# Patient Record
Sex: MALE | Race: WHITE | NOT HISPANIC OR LATINO | Employment: OTHER | URBAN - NONMETROPOLITAN AREA
[De-identification: names, ages, dates, MRNs, and addresses within clinical notes are randomized per-mention and may not be internally consistent; named-entity substitution may affect disease eponyms.]

---

## 2017-03-13 ENCOUNTER — APPOINTMENT (EMERGENCY)
Dept: RADIOLOGY | Facility: HOSPITAL | Age: 62
End: 2017-03-13
Payer: COMMERCIAL

## 2017-03-13 ENCOUNTER — HOSPITAL ENCOUNTER (OUTPATIENT)
Facility: HOSPITAL | Age: 62
Setting detail: OBSERVATION
LOS: 1 days | Discharge: HOME | End: 2017-03-15
Attending: FAMILY MEDICINE | Admitting: FAMILY MEDICINE
Payer: COMMERCIAL

## 2017-03-13 ENCOUNTER — APPOINTMENT (OUTPATIENT)
Dept: NON INVASIVE DIAGNOSTICS | Facility: HOSPITAL | Age: 62
End: 2017-03-13
Payer: COMMERCIAL

## 2017-03-13 DIAGNOSIS — R07.9 CHEST PAIN: Primary | ICD-10-CM

## 2017-03-13 LAB
ALBUMIN SERPL BCP-MCNC: 3.9 G/DL (ref 3.5–5)
ALP SERPL-CCNC: 51 U/L (ref 46–116)
ALT SERPL W P-5'-P-CCNC: 42 U/L (ref 12–78)
ANION GAP SERPL CALCULATED.3IONS-SCNC: 7 MMOL/L (ref 4–13)
AST SERPL W P-5'-P-CCNC: 25 U/L (ref 5–45)
ATRIAL RATE: 81 BPM
BASOPHILS # BLD AUTO: 0.02 THOUSANDS/ΜL (ref 0–0.1)
BASOPHILS NFR BLD AUTO: 0 % (ref 0–1)
BILIRUB SERPL-MCNC: 0.4 MG/DL (ref 0.2–1)
BUN SERPL-MCNC: 11 MG/DL (ref 5–25)
CALCIUM SERPL-MCNC: 9 MG/DL (ref 8.3–10.1)
CHLORIDE SERPL-SCNC: 102 MMOL/L (ref 100–108)
CO2 SERPL-SCNC: 30 MMOL/L (ref 21–32)
CREAT SERPL-MCNC: 0.89 MG/DL (ref 0.6–1.3)
EOSINOPHIL # BLD AUTO: 0.17 THOUSAND/ΜL (ref 0–0.61)
EOSINOPHIL NFR BLD AUTO: 3 % (ref 0–6)
ERYTHROCYTE [DISTWIDTH] IN BLOOD BY AUTOMATED COUNT: 14 % (ref 11.6–15.1)
GFR SERPL CREATININE-BSD FRML MDRD: >60 ML/MIN/1.73SQ M
GLUCOSE SERPL-MCNC: 172 MG/DL (ref 65–140)
HCT VFR BLD AUTO: 38.2 % (ref 36.5–49.3)
HGB BLD-MCNC: 12.8 G/DL (ref 12–17)
LYMPHOCYTES # BLD AUTO: 2.13 THOUSANDS/ΜL (ref 0.6–4.47)
LYMPHOCYTES NFR BLD AUTO: 38 % (ref 14–44)
MCH RBC QN AUTO: 27.8 PG (ref 26.8–34.3)
MCHC RBC AUTO-ENTMCNC: 33.5 G/DL (ref 31.4–37.4)
MCV RBC AUTO: 83 FL (ref 82–98)
MONOCYTES # BLD AUTO: 0.46 THOUSAND/ΜL (ref 0.17–1.22)
MONOCYTES NFR BLD AUTO: 8 % (ref 4–12)
NEUTROPHILS # BLD AUTO: 2.88 THOUSANDS/ΜL (ref 1.85–7.62)
NEUTS SEG NFR BLD AUTO: 51 % (ref 43–75)
P AXIS: 57 DEGREES
PLATELET # BLD AUTO: 201 THOUSANDS/UL (ref 149–390)
PLATELET # BLD AUTO: 204 THOUSANDS/UL (ref 149–390)
PMV BLD AUTO: 8.9 FL (ref 8.9–12.7)
PMV BLD AUTO: 9.4 FL (ref 8.9–12.7)
POTASSIUM SERPL-SCNC: 3.9 MMOL/L (ref 3.5–5.3)
PR INTERVAL: 158 MS
PROT SERPL-MCNC: 7.2 G/DL (ref 6.4–8.2)
QRS AXIS: 26 DEGREES
QRSD INTERVAL: 102 MS
QT INTERVAL: 388 MS
QTC INTERVAL: 450 MS
RBC # BLD AUTO: 4.6 MILLION/UL (ref 3.88–5.62)
SODIUM SERPL-SCNC: 139 MMOL/L (ref 136–145)
T WAVE AXIS: 5 DEGREES
TROPONIN I SERPL-MCNC: <0.02 NG/ML
TROPONIN I SERPL-MCNC: <0.02 NG/ML
VENTRICULAR RATE: 81 BPM
WBC # BLD AUTO: 5.66 THOUSAND/UL (ref 4.31–10.16)

## 2017-03-13 PROCEDURE — 36415 COLL VENOUS BLD VENIPUNCTURE: CPT | Performed by: PHYSICIAN ASSISTANT

## 2017-03-13 PROCEDURE — 84484 ASSAY OF TROPONIN QUANT: CPT | Performed by: PHYSICIAN ASSISTANT

## 2017-03-13 PROCEDURE — 80053 COMPREHEN METABOLIC PANEL: CPT | Performed by: PHYSICIAN ASSISTANT

## 2017-03-13 PROCEDURE — 96374 THER/PROPH/DIAG INJ IV PUSH: CPT

## 2017-03-13 PROCEDURE — 84484 ASSAY OF TROPONIN QUANT: CPT | Performed by: FAMILY MEDICINE

## 2017-03-13 PROCEDURE — 93306 TTE W/DOPPLER COMPLETE: CPT

## 2017-03-13 PROCEDURE — 71020 HB CHEST X-RAY 2VW FRONTAL&LATL: CPT

## 2017-03-13 PROCEDURE — 93005 ELECTROCARDIOGRAM TRACING: CPT

## 2017-03-13 PROCEDURE — 85049 AUTOMATED PLATELET COUNT: CPT | Performed by: FAMILY MEDICINE

## 2017-03-13 PROCEDURE — 99285 EMERGENCY DEPT VISIT HI MDM: CPT

## 2017-03-13 PROCEDURE — 85025 COMPLETE CBC W/AUTO DIFF WBC: CPT | Performed by: PHYSICIAN ASSISTANT

## 2017-03-13 RX ORDER — CETIRIZINE HYDROCHLORIDE 10 MG/1
10 TABLET ORAL DAILY
COMMUNITY

## 2017-03-13 RX ORDER — LOVASTATIN 40 MG/1
40 TABLET ORAL
COMMUNITY

## 2017-03-13 RX ORDER — OMEPRAZOLE 40 MG/1
40 CAPSULE, DELAYED RELEASE ORAL 2 TIMES DAILY
COMMUNITY
End: 2017-03-15 | Stop reason: HOSPADM

## 2017-03-13 RX ORDER — AMLODIPINE BESYLATE 10 MG/1
10 TABLET ORAL
COMMUNITY

## 2017-03-13 RX ORDER — CHOLECALCIFEROL (VITAMIN D3) 125 MCG
100 CAPSULE ORAL DAILY
Status: DISCONTINUED | OUTPATIENT
Start: 2017-03-14 | End: 2017-03-15 | Stop reason: HOSPADM

## 2017-03-13 RX ORDER — VALSARTAN 160 MG/1
160 TABLET ORAL DAILY
COMMUNITY
End: 2020-02-21 | Stop reason: ALTCHOICE

## 2017-03-13 RX ORDER — GLIMEPIRIDE 1 MG/1
2 TABLET ORAL 2 TIMES DAILY
COMMUNITY

## 2017-03-13 RX ORDER — PANTOPRAZOLE SODIUM 40 MG/1
40 TABLET, DELAYED RELEASE ORAL
Status: DISCONTINUED | OUTPATIENT
Start: 2017-03-13 | End: 2017-03-15 | Stop reason: HOSPADM

## 2017-03-13 RX ORDER — HYDROCHLOROTHIAZIDE 25 MG/1
25 TABLET ORAL DAILY
Status: DISCONTINUED | OUTPATIENT
Start: 2017-03-14 | End: 2017-03-15 | Stop reason: HOSPADM

## 2017-03-13 RX ORDER — AMLODIPINE BESYLATE 10 MG/1
10 TABLET ORAL
Status: DISCONTINUED | OUTPATIENT
Start: 2017-03-13 | End: 2017-03-15 | Stop reason: HOSPADM

## 2017-03-13 RX ORDER — VALSARTAN 80 MG/1
160 TABLET ORAL DAILY
Status: DISCONTINUED | OUTPATIENT
Start: 2017-03-14 | End: 2017-03-15 | Stop reason: HOSPADM

## 2017-03-13 RX ORDER — ONDANSETRON 2 MG/ML
4 INJECTION INTRAMUSCULAR; INTRAVENOUS ONCE
Status: COMPLETED | OUTPATIENT
Start: 2017-03-13 | End: 2017-03-13

## 2017-03-13 RX ORDER — LORATADINE 10 MG/1
10 TABLET ORAL DAILY
Status: DISCONTINUED | OUTPATIENT
Start: 2017-03-14 | End: 2017-03-15 | Stop reason: HOSPADM

## 2017-03-13 RX ORDER — GLIMEPIRIDE 2 MG/1
2 TABLET ORAL 2 TIMES DAILY
Status: DISCONTINUED | OUTPATIENT
Start: 2017-03-13 | End: 2017-03-15 | Stop reason: HOSPADM

## 2017-03-13 RX ORDER — ASPIRIN 81 MG/1
81 TABLET, CHEWABLE ORAL DAILY
Status: DISCONTINUED | OUTPATIENT
Start: 2017-03-14 | End: 2017-03-15 | Stop reason: HOSPADM

## 2017-03-13 RX ORDER — PRAVASTATIN SODIUM 20 MG
10 TABLET ORAL
Status: DISCONTINUED | OUTPATIENT
Start: 2017-03-13 | End: 2017-03-15 | Stop reason: HOSPADM

## 2017-03-13 RX ORDER — HYDROCHLOROTHIAZIDE 12.5 MG/1
12.5 CAPSULE, GELATIN COATED ORAL DAILY
COMMUNITY

## 2017-03-13 RX ADMIN — PRAVASTATIN SODIUM 10 MG: 20 TABLET ORAL at 17:04

## 2017-03-13 RX ADMIN — SODIUM CHLORIDE 1000 ML: 0.9 INJECTION, SOLUTION INTRAVENOUS at 12:08

## 2017-03-13 RX ADMIN — ONDANSETRON 4 MG: 2 INJECTION INTRAMUSCULAR; INTRAVENOUS at 12:09

## 2017-03-13 RX ADMIN — PANTOPRAZOLE SODIUM 40 MG: 40 TABLET, DELAYED RELEASE ORAL at 17:03

## 2017-03-13 RX ADMIN — ENOXAPARIN SODIUM 40 MG: 40 INJECTION SUBCUTANEOUS at 17:04

## 2017-03-13 RX ADMIN — AMLODIPINE BESYLATE 10 MG: 10 TABLET ORAL at 22:09

## 2017-03-13 RX ADMIN — METFORMIN HYDROCHLORIDE 1000 MG: 500 TABLET, FILM COATED ORAL at 17:04

## 2017-03-13 RX ADMIN — GLIMEPIRIDE 2 MG: 2 TABLET ORAL at 17:03

## 2017-03-14 PROBLEM — R07.9 CHEST PAIN: Status: ACTIVE | Noted: 2017-03-14

## 2017-03-14 LAB
CHOLEST SERPL-MCNC: 138 MG/DL (ref 50–200)
HDLC SERPL-MCNC: 34 MG/DL (ref 40–60)
LDLC SERPL CALC-MCNC: 56 MG/DL (ref 0–100)
TRIGL SERPL-MCNC: 240 MG/DL
TSH SERPL DL<=0.05 MIU/L-ACNC: 2.27 UIU/ML (ref 0.36–3.74)

## 2017-03-14 PROCEDURE — 80061 LIPID PANEL: CPT | Performed by: FAMILY MEDICINE

## 2017-03-14 PROCEDURE — 84443 ASSAY THYROID STIM HORMONE: CPT | Performed by: FAMILY MEDICINE

## 2017-03-14 RX ORDER — PANTOPRAZOLE SODIUM 40 MG/1
40 TABLET, DELAYED RELEASE ORAL
Qty: 60 TABLET | Refills: 0 | Status: SHIPPED | OUTPATIENT
Start: 2017-03-14 | End: 2017-03-17 | Stop reason: HOSPADM

## 2017-03-14 RX ADMIN — METFORMIN HYDROCHLORIDE 1000 MG: 500 TABLET, FILM COATED ORAL at 17:01

## 2017-03-14 RX ADMIN — ENOXAPARIN SODIUM 40 MG: 40 INJECTION SUBCUTANEOUS at 17:02

## 2017-03-14 RX ADMIN — VALSARTAN 160 MG: 80 TABLET, FILM COATED ORAL at 10:06

## 2017-03-14 RX ADMIN — HYDROCHLOROTHIAZIDE 25 MG: 25 TABLET ORAL at 10:06

## 2017-03-14 RX ADMIN — LORATADINE 10 MG: 10 TABLET ORAL at 10:06

## 2017-03-14 RX ADMIN — AMLODIPINE BESYLATE 10 MG: 10 TABLET ORAL at 22:30

## 2017-03-14 RX ADMIN — GLIMEPIRIDE 2 MG: 2 TABLET ORAL at 10:05

## 2017-03-14 RX ADMIN — PRAVASTATIN SODIUM 10 MG: 20 TABLET ORAL at 17:02

## 2017-03-14 RX ADMIN — PANTOPRAZOLE SODIUM 40 MG: 40 TABLET, DELAYED RELEASE ORAL at 07:17

## 2017-03-14 RX ADMIN — METFORMIN HYDROCHLORIDE 1000 MG: 500 TABLET, FILM COATED ORAL at 07:17

## 2017-03-14 RX ADMIN — GLIMEPIRIDE 2 MG: 2 TABLET ORAL at 17:04

## 2017-03-14 RX ADMIN — PANTOPRAZOLE SODIUM 40 MG: 40 TABLET, DELAYED RELEASE ORAL at 17:01

## 2017-03-14 RX ADMIN — Medication 100 MG: at 10:11

## 2017-03-14 RX ADMIN — ASPIRIN 81 MG 81 MG: 81 TABLET ORAL at 10:05

## 2017-03-14 RX ADMIN — SITAGLIPTIN 100 MG: 100 TABLET, FILM COATED ORAL at 10:06

## 2017-03-15 ENCOUNTER — APPOINTMENT (EMERGENCY)
Dept: CT IMAGING | Facility: HOSPITAL | Age: 62
DRG: 176 | End: 2017-03-15
Payer: COMMERCIAL

## 2017-03-15 ENCOUNTER — HOSPITAL ENCOUNTER (INPATIENT)
Facility: HOSPITAL | Age: 62
LOS: 2 days | Discharge: HOME | DRG: 176 | End: 2017-03-17
Attending: EMERGENCY MEDICINE | Admitting: FAMILY MEDICINE
Payer: COMMERCIAL

## 2017-03-15 VITALS
HEART RATE: 69 BPM | SYSTOLIC BLOOD PRESSURE: 119 MMHG | BODY MASS INDEX: 29.14 KG/M2 | WEIGHT: 208.11 LBS | DIASTOLIC BLOOD PRESSURE: 73 MMHG | OXYGEN SATURATION: 95 % | TEMPERATURE: 98.2 F | RESPIRATION RATE: 18 BRPM | HEIGHT: 71 IN

## 2017-03-15 DIAGNOSIS — I26.99 PULMONARY EMBOLI (HCC): Primary | ICD-10-CM

## 2017-03-15 LAB
ALBUMIN SERPL BCP-MCNC: 4.2 G/DL (ref 3.5–5)
ALP SERPL-CCNC: 51 U/L (ref 46–116)
ALT SERPL W P-5'-P-CCNC: 45 U/L (ref 12–78)
ANION GAP SERPL CALCULATED.3IONS-SCNC: 9 MMOL/L (ref 4–13)
APTT PPP: 27 SECONDS (ref 24–36)
AST SERPL W P-5'-P-CCNC: 28 U/L (ref 5–45)
BASOPHILS # BLD AUTO: 0.02 THOUSANDS/ΜL (ref 0–0.1)
BASOPHILS NFR BLD AUTO: 0 % (ref 0–1)
BILIRUB SERPL-MCNC: 0.5 MG/DL (ref 0.2–1)
BUN SERPL-MCNC: 13 MG/DL (ref 5–25)
CALCIUM SERPL-MCNC: 9.2 MG/DL (ref 8.3–10.1)
CHLORIDE SERPL-SCNC: 101 MMOL/L (ref 100–108)
CO2 SERPL-SCNC: 31 MMOL/L (ref 21–32)
CREAT SERPL-MCNC: 0.97 MG/DL (ref 0.6–1.3)
EOSINOPHIL # BLD AUTO: 0.17 THOUSAND/ΜL (ref 0–0.61)
EOSINOPHIL NFR BLD AUTO: 3 % (ref 0–6)
ERYTHROCYTE [DISTWIDTH] IN BLOOD BY AUTOMATED COUNT: 14.2 % (ref 11.6–15.1)
ERYTHROCYTE [DISTWIDTH] IN BLOOD BY AUTOMATED COUNT: 14.3 % (ref 11.6–15.1)
GFR SERPL CREATININE-BSD FRML MDRD: >60 ML/MIN/1.73SQ M
GLUCOSE SERPL-MCNC: 108 MG/DL (ref 65–140)
GLUCOSE SERPL-MCNC: 187 MG/DL (ref 65–140)
HCT VFR BLD AUTO: 38.7 % (ref 36.5–49.3)
HCT VFR BLD AUTO: 42.9 % (ref 36.5–49.3)
HGB BLD-MCNC: 12.9 G/DL (ref 12–17)
HGB BLD-MCNC: 14.3 G/DL (ref 12–17)
INR PPP: 1.1 (ref 0.86–1.16)
LACTATE SERPL-SCNC: 1.8 MMOL/L (ref 0.5–2)
LIPASE SERPL-CCNC: 172 U/L (ref 73–393)
LYMPHOCYTES # BLD AUTO: 1.55 THOUSANDS/ΜL (ref 0.6–4.47)
LYMPHOCYTES NFR BLD AUTO: 25 % (ref 14–44)
MCH RBC QN AUTO: 27.6 PG (ref 26.8–34.3)
MCH RBC QN AUTO: 27.7 PG (ref 26.8–34.3)
MCHC RBC AUTO-ENTMCNC: 33.3 G/DL (ref 31.4–37.4)
MCHC RBC AUTO-ENTMCNC: 33.3 G/DL (ref 31.4–37.4)
MCV RBC AUTO: 83 FL (ref 82–98)
MCV RBC AUTO: 83 FL (ref 82–98)
MONOCYTES # BLD AUTO: 0.42 THOUSAND/ΜL (ref 0.17–1.22)
MONOCYTES NFR BLD AUTO: 7 % (ref 4–12)
NEUTROPHILS # BLD AUTO: 4.16 THOUSANDS/ΜL (ref 1.85–7.62)
NEUTS SEG NFR BLD AUTO: 65 % (ref 43–75)
PLATELET # BLD AUTO: 212 THOUSANDS/UL (ref 149–390)
PLATELET # BLD AUTO: 220 THOUSANDS/UL (ref 149–390)
PMV BLD AUTO: 8.6 FL (ref 8.9–12.7)
PMV BLD AUTO: 9.3 FL (ref 8.9–12.7)
POTASSIUM SERPL-SCNC: 3.6 MMOL/L (ref 3.5–5.3)
PROT SERPL-MCNC: 7.7 G/DL (ref 6.4–8.2)
PROTHROMBIN TIME: 13.9 SECONDS (ref 12–14.3)
RBC # BLD AUTO: 4.67 MILLION/UL (ref 3.88–5.62)
RBC # BLD AUTO: 5.16 MILLION/UL (ref 3.88–5.62)
SODIUM SERPL-SCNC: 141 MMOL/L (ref 136–145)
TROPONIN I SERPL-MCNC: <0.02 NG/ML
WBC # BLD AUTO: 6.32 THOUSAND/UL (ref 4.31–10.16)
WBC # BLD AUTO: 6.56 THOUSAND/UL (ref 4.31–10.16)

## 2017-03-15 PROCEDURE — 71275 CT ANGIOGRAPHY CHEST: CPT

## 2017-03-15 PROCEDURE — 83690 ASSAY OF LIPASE: CPT | Performed by: EMERGENCY MEDICINE

## 2017-03-15 PROCEDURE — 84484 ASSAY OF TROPONIN QUANT: CPT | Performed by: EMERGENCY MEDICINE

## 2017-03-15 PROCEDURE — 85610 PROTHROMBIN TIME: CPT | Performed by: FAMILY MEDICINE

## 2017-03-15 PROCEDURE — 85025 COMPLETE CBC W/AUTO DIFF WBC: CPT | Performed by: EMERGENCY MEDICINE

## 2017-03-15 PROCEDURE — 96361 HYDRATE IV INFUSION ADD-ON: CPT

## 2017-03-15 PROCEDURE — 83605 ASSAY OF LACTIC ACID: CPT | Performed by: EMERGENCY MEDICINE

## 2017-03-15 PROCEDURE — 96374 THER/PROPH/DIAG INJ IV PUSH: CPT

## 2017-03-15 PROCEDURE — 82948 REAGENT STRIP/BLOOD GLUCOSE: CPT

## 2017-03-15 PROCEDURE — 36415 COLL VENOUS BLD VENIPUNCTURE: CPT | Performed by: EMERGENCY MEDICINE

## 2017-03-15 PROCEDURE — 85027 COMPLETE CBC AUTOMATED: CPT | Performed by: FAMILY MEDICINE

## 2017-03-15 PROCEDURE — 74177 CT ABD & PELVIS W/CONTRAST: CPT

## 2017-03-15 PROCEDURE — 85730 THROMBOPLASTIN TIME PARTIAL: CPT | Performed by: FAMILY MEDICINE

## 2017-03-15 PROCEDURE — 80053 COMPREHEN METABOLIC PANEL: CPT | Performed by: EMERGENCY MEDICINE

## 2017-03-15 PROCEDURE — 99285 EMERGENCY DEPT VISIT HI MDM: CPT

## 2017-03-15 PROCEDURE — 93005 ELECTROCARDIOGRAM TRACING: CPT | Performed by: EMERGENCY MEDICINE

## 2017-03-15 PROCEDURE — 96376 TX/PRO/DX INJ SAME DRUG ADON: CPT

## 2017-03-15 RX ORDER — PANTOPRAZOLE SODIUM 40 MG/1
40 TABLET, DELAYED RELEASE ORAL
Status: DISCONTINUED | OUTPATIENT
Start: 2017-03-15 | End: 2017-03-17 | Stop reason: HOSPADM

## 2017-03-15 RX ORDER — GLIMEPIRIDE 2 MG/1
2 TABLET ORAL 2 TIMES DAILY
Status: DISCONTINUED | OUTPATIENT
Start: 2017-03-15 | End: 2017-03-17 | Stop reason: HOSPADM

## 2017-03-15 RX ORDER — HEPARIN SODIUM 1000 [USP'U]/ML
3600 INJECTION, SOLUTION INTRAVENOUS; SUBCUTANEOUS AS NEEDED
Status: DISCONTINUED | OUTPATIENT
Start: 2017-03-15 | End: 2017-03-15

## 2017-03-15 RX ORDER — ONDANSETRON 2 MG/ML
4 INJECTION INTRAMUSCULAR; INTRAVENOUS ONCE
Status: COMPLETED | OUTPATIENT
Start: 2017-03-15 | End: 2017-03-15

## 2017-03-15 RX ORDER — HEPARIN SODIUM 1000 [USP'U]/ML
7200 INJECTION, SOLUTION INTRAVENOUS; SUBCUTANEOUS ONCE
Status: DISCONTINUED | OUTPATIENT
Start: 2017-03-15 | End: 2017-03-15

## 2017-03-15 RX ORDER — HEPARIN SODIUM 10000 [USP'U]/100ML
3-30 INJECTION, SOLUTION INTRAVENOUS
Status: DISCONTINUED | OUTPATIENT
Start: 2017-03-15 | End: 2017-03-15

## 2017-03-15 RX ORDER — HEPARIN SODIUM 1000 [USP'U]/ML
7200 INJECTION, SOLUTION INTRAVENOUS; SUBCUTANEOUS AS NEEDED
Status: DISCONTINUED | OUTPATIENT
Start: 2017-03-15 | End: 2017-03-15

## 2017-03-15 RX ORDER — VALSARTAN 80 MG/1
160 TABLET ORAL DAILY
Status: DISCONTINUED | OUTPATIENT
Start: 2017-03-16 | End: 2017-03-17 | Stop reason: HOSPADM

## 2017-03-15 RX ORDER — SODIUM CHLORIDE 9 MG/ML
75 INJECTION, SOLUTION INTRAVENOUS ONCE
Status: COMPLETED | OUTPATIENT
Start: 2017-03-15 | End: 2017-03-15

## 2017-03-15 RX ORDER — PRAVASTATIN SODIUM 20 MG
20 TABLET ORAL
Status: DISCONTINUED | OUTPATIENT
Start: 2017-03-15 | End: 2017-03-17 | Stop reason: HOSPADM

## 2017-03-15 RX ORDER — ASPIRIN 81 MG/1
81 TABLET, CHEWABLE ORAL DAILY
Status: DISCONTINUED | OUTPATIENT
Start: 2017-03-16 | End: 2017-03-17 | Stop reason: HOSPADM

## 2017-03-15 RX ORDER — AMLODIPINE BESYLATE 10 MG/1
10 TABLET ORAL
Status: DISCONTINUED | OUTPATIENT
Start: 2017-03-15 | End: 2017-03-17 | Stop reason: HOSPADM

## 2017-03-15 RX ADMIN — PRAVASTATIN SODIUM 20 MG: 20 TABLET ORAL at 18:07

## 2017-03-15 RX ADMIN — PANTOPRAZOLE SODIUM 40 MG: 40 TABLET, DELAYED RELEASE ORAL at 18:07

## 2017-03-15 RX ADMIN — ONDANSETRON 4 MG: 2 INJECTION INTRAMUSCULAR; INTRAVENOUS at 11:19

## 2017-03-15 RX ADMIN — SODIUM CHLORIDE 1000 ML: 0.9 INJECTION, SOLUTION INTRAVENOUS at 11:19

## 2017-03-15 RX ADMIN — ONDANSETRON 4 MG: 2 INJECTION INTRAMUSCULAR; INTRAVENOUS at 12:38

## 2017-03-15 RX ADMIN — METFORMIN HYDROCHLORIDE 1000 MG: 500 TABLET, FILM COATED ORAL at 07:26

## 2017-03-15 RX ADMIN — PANTOPRAZOLE SODIUM 40 MG: 40 TABLET, DELAYED RELEASE ORAL at 07:26

## 2017-03-15 RX ADMIN — ENOXAPARIN SODIUM 90 MG: 100 INJECTION SUBCUTANEOUS at 14:47

## 2017-03-15 RX ADMIN — IOHEXOL 50 ML: 240 INJECTION, SOLUTION INTRATHECAL; INTRAVASCULAR; INTRAVENOUS; ORAL at 12:50

## 2017-03-15 RX ADMIN — AMLODIPINE BESYLATE 10 MG: 10 TABLET ORAL at 21:45

## 2017-03-15 RX ADMIN — GLIMEPIRIDE 2 MG: 2 TABLET ORAL at 18:07

## 2017-03-15 RX ADMIN — IOHEXOL 100 ML: 350 INJECTION, SOLUTION INTRAVENOUS at 12:50

## 2017-03-15 RX ADMIN — SODIUM CHLORIDE 75 ML/HR: 0.9 INJECTION, SOLUTION INTRAVENOUS at 18:12

## 2017-03-16 LAB
ANION GAP SERPL CALCULATED.3IONS-SCNC: 6 MMOL/L (ref 4–13)
BUN SERPL-MCNC: 10 MG/DL (ref 5–25)
CALCIUM SERPL-MCNC: 8.6 MG/DL (ref 8.3–10.1)
CHLORIDE SERPL-SCNC: 105 MMOL/L (ref 100–108)
CO2 SERPL-SCNC: 30 MMOL/L (ref 21–32)
CREAT SERPL-MCNC: 0.83 MG/DL (ref 0.6–1.3)
GFR SERPL CREATININE-BSD FRML MDRD: >60 ML/MIN/1.73SQ M
GLUCOSE SERPL-MCNC: 138 MG/DL (ref 65–140)
GLUCOSE SERPL-MCNC: 148 MG/DL (ref 65–140)
GLUCOSE SERPL-MCNC: 161 MG/DL (ref 65–140)
GLUCOSE SERPL-MCNC: 255 MG/DL (ref 65–140)
POTASSIUM SERPL-SCNC: 3.9 MMOL/L (ref 3.5–5.3)
SODIUM SERPL-SCNC: 141 MMOL/L (ref 136–145)

## 2017-03-16 PROCEDURE — 80048 BASIC METABOLIC PNL TOTAL CA: CPT | Performed by: FAMILY MEDICINE

## 2017-03-16 PROCEDURE — 82948 REAGENT STRIP/BLOOD GLUCOSE: CPT

## 2017-03-16 RX ADMIN — INSULIN LISPRO 1 UNITS: 100 INJECTION, SOLUTION INTRAVENOUS; SUBCUTANEOUS at 08:12

## 2017-03-16 RX ADMIN — GLIMEPIRIDE 2 MG: 2 TABLET ORAL at 08:15

## 2017-03-16 RX ADMIN — PRAVASTATIN SODIUM 20 MG: 20 TABLET ORAL at 16:51

## 2017-03-16 RX ADMIN — AMLODIPINE BESYLATE 10 MG: 10 TABLET ORAL at 21:50

## 2017-03-16 RX ADMIN — ENOXAPARIN SODIUM 90 MG: 100 INJECTION SUBCUTANEOUS at 14:21

## 2017-03-16 RX ADMIN — GLIMEPIRIDE 2 MG: 2 TABLET ORAL at 18:12

## 2017-03-16 RX ADMIN — PANTOPRAZOLE SODIUM 40 MG: 40 TABLET, DELAYED RELEASE ORAL at 16:51

## 2017-03-16 RX ADMIN — INSULIN LISPRO 3 UNITS: 100 INJECTION, SOLUTION INTRAVENOUS; SUBCUTANEOUS at 12:11

## 2017-03-16 RX ADMIN — ASPIRIN 81 MG 81 MG: 81 TABLET ORAL at 08:15

## 2017-03-16 RX ADMIN — ENOXAPARIN SODIUM 90 MG: 100 INJECTION SUBCUTANEOUS at 02:44

## 2017-03-16 RX ADMIN — PANTOPRAZOLE SODIUM 40 MG: 40 TABLET, DELAYED RELEASE ORAL at 06:11

## 2017-03-16 RX ADMIN — SITAGLIPTIN 100 MG: 100 TABLET, FILM COATED ORAL at 08:15

## 2017-03-16 RX ADMIN — VALSARTAN 160 MG: 80 TABLET, FILM COATED ORAL at 08:16

## 2017-03-17 VITALS
BODY MASS INDEX: 29.88 KG/M2 | OXYGEN SATURATION: 95 % | HEART RATE: 70 BPM | TEMPERATURE: 97.8 F | RESPIRATION RATE: 16 BRPM | SYSTOLIC BLOOD PRESSURE: 134 MMHG | DIASTOLIC BLOOD PRESSURE: 80 MMHG | WEIGHT: 213.41 LBS | HEIGHT: 71 IN

## 2017-03-17 LAB
ATRIAL RATE: 99 BPM
GLUCOSE SERPL-MCNC: 157 MG/DL (ref 65–140)
GLUCOSE SERPL-MCNC: 174 MG/DL (ref 65–140)
P AXIS: 32 DEGREES
PR INTERVAL: 152 MS
QRS AXIS: 25 DEGREES
QRSD INTERVAL: 94 MS
QT INTERVAL: 356 MS
QTC INTERVAL: 456 MS
T WAVE AXIS: -7 DEGREES
VENTRICULAR RATE: 99 BPM

## 2017-03-17 PROCEDURE — 82948 REAGENT STRIP/BLOOD GLUCOSE: CPT

## 2017-03-17 RX ORDER — OMEPRAZOLE 20 MG/1
20 TABLET, DELAYED RELEASE ORAL DAILY
Qty: 30 TABLET | Refills: 0
Start: 2017-03-17 | End: 2019-02-14 | Stop reason: DRUGHIGH

## 2017-03-17 RX ADMIN — GLIMEPIRIDE 2 MG: 2 TABLET ORAL at 08:30

## 2017-03-17 RX ADMIN — PANTOPRAZOLE SODIUM 40 MG: 40 TABLET, DELAYED RELEASE ORAL at 06:01

## 2017-03-17 RX ADMIN — SITAGLIPTIN 100 MG: 100 TABLET, FILM COATED ORAL at 08:30

## 2017-03-17 RX ADMIN — ASPIRIN 81 MG 81 MG: 81 TABLET ORAL at 08:30

## 2017-03-17 RX ADMIN — ENOXAPARIN SODIUM 90 MG: 100 INJECTION SUBCUTANEOUS at 02:34

## 2017-03-17 RX ADMIN — INSULIN LISPRO 1 UNITS: 100 INJECTION, SOLUTION INTRAVENOUS; SUBCUTANEOUS at 08:28

## 2017-03-17 RX ADMIN — INSULIN LISPRO 1 UNITS: 100 INJECTION, SOLUTION INTRAVENOUS; SUBCUTANEOUS at 12:53

## 2017-03-17 RX ADMIN — VALSARTAN 160 MG: 80 TABLET, FILM COATED ORAL at 08:30

## 2017-03-31 ENCOUNTER — ALLSCRIPTS OFFICE VISIT (OUTPATIENT)
Dept: OTHER | Facility: OTHER | Age: 62
End: 2017-03-31

## 2017-04-03 ENCOUNTER — TRANSCRIBE ORDERS (OUTPATIENT)
Dept: SLEEP CENTER | Facility: HOSPITAL | Age: 62
End: 2017-04-03

## 2017-04-03 DIAGNOSIS — G47.33 OBSTRUCTIVE SLEEP APNEA (ADULT) (PEDIATRIC): Primary | ICD-10-CM

## 2017-05-05 ENCOUNTER — HOSPITAL ENCOUNTER (OUTPATIENT)
Dept: SLEEP CENTER | Facility: HOSPITAL | Age: 62
Discharge: HOME | End: 2017-05-05
Payer: COMMERCIAL

## 2017-05-05 DIAGNOSIS — G47.33 OBSTRUCTIVE SLEEP APNEA (ADULT) (PEDIATRIC): ICD-10-CM

## 2017-05-05 PROCEDURE — 95810 POLYSOM 6/> YRS 4/> PARAM: CPT

## 2017-05-11 ENCOUNTER — TRANSCRIBE ORDERS (OUTPATIENT)
Dept: SLEEP CENTER | Facility: HOSPITAL | Age: 62
End: 2017-05-11

## 2017-05-11 DIAGNOSIS — G47.33 OBSTRUCTIVE SLEEP APNEA (ADULT) (PEDIATRIC): Primary | ICD-10-CM

## 2017-06-15 ENCOUNTER — HOSPITAL ENCOUNTER (OUTPATIENT)
Dept: SLEEP CENTER | Facility: HOSPITAL | Age: 62
Discharge: HOME | End: 2017-06-15
Payer: COMMERCIAL

## 2017-06-15 DIAGNOSIS — G47.33 OBSTRUCTIVE SLEEP APNEA (ADULT) (PEDIATRIC): ICD-10-CM

## 2017-06-15 PROCEDURE — 95811 POLYSOM 6/>YRS CPAP 4/> PARM: CPT

## 2017-07-06 ENCOUNTER — OFFICE VISIT (OUTPATIENT)
Dept: URGENT CARE | Facility: CLINIC | Age: 62
End: 2017-07-06
Payer: COMMERCIAL

## 2017-07-06 PROCEDURE — 96372 THER/PROPH/DIAG INJ SC/IM: CPT

## 2017-07-06 PROCEDURE — 99213 OFFICE O/P EST LOW 20 MIN: CPT

## 2017-07-10 ENCOUNTER — TRANSCRIBE ORDERS (OUTPATIENT)
Dept: SLEEP CENTER | Facility: HOSPITAL | Age: 62
End: 2017-07-10

## 2017-07-10 DIAGNOSIS — G47.33 OBSTRUCTIVE SLEEP APNEA (ADULT) (PEDIATRIC): Primary | ICD-10-CM

## 2017-07-20 ENCOUNTER — HOSPITAL ENCOUNTER (OUTPATIENT)
Dept: SLEEP CENTER | Facility: HOSPITAL | Age: 62
Discharge: HOME | End: 2017-07-20
Payer: COMMERCIAL

## 2017-07-20 ENCOUNTER — TRANSCRIBE ORDERS (OUTPATIENT)
Dept: SLEEP CENTER | Facility: HOSPITAL | Age: 62
End: 2017-07-20

## 2017-07-20 DIAGNOSIS — G47.33 OBSTRUCTIVE SLEEP APNEA (ADULT) (PEDIATRIC): Primary | ICD-10-CM

## 2017-07-20 DIAGNOSIS — G47.33 OBSTRUCTIVE SLEEP APNEA (ADULT) (PEDIATRIC): ICD-10-CM

## 2017-08-24 ENCOUNTER — APPOINTMENT (OUTPATIENT)
Dept: LAB | Facility: HOSPITAL | Age: 62
End: 2017-08-24
Payer: COMMERCIAL

## 2017-08-24 ENCOUNTER — TRANSCRIBE ORDERS (OUTPATIENT)
Dept: ADMINISTRATIVE | Facility: HOSPITAL | Age: 62
End: 2017-08-24

## 2017-08-24 DIAGNOSIS — R11.0 NAUSEA ALONE: Primary | ICD-10-CM

## 2017-08-24 DIAGNOSIS — R11.0 NAUSEA ALONE: ICD-10-CM

## 2017-08-24 DIAGNOSIS — K30 MILD DIETARY INDIGESTION: ICD-10-CM

## 2017-08-24 LAB
AMYLASE SERPL-CCNC: 59 IU/L (ref 25–115)
LIPASE SERPL-CCNC: 168 U/L (ref 73–393)

## 2017-08-24 PROCEDURE — 82150 ASSAY OF AMYLASE: CPT

## 2017-08-24 PROCEDURE — 83690 ASSAY OF LIPASE: CPT

## 2017-08-24 PROCEDURE — 86803 HEPATITIS C AB TEST: CPT

## 2017-08-24 PROCEDURE — 36415 COLL VENOUS BLD VENIPUNCTURE: CPT

## 2017-08-25 LAB — HCV AB SER QL: NORMAL

## 2017-09-07 ENCOUNTER — APPOINTMENT (OUTPATIENT)
Dept: LAB | Facility: HOSPITAL | Age: 62
End: 2017-09-07
Payer: COMMERCIAL

## 2017-09-07 ENCOUNTER — TRANSCRIBE ORDERS (OUTPATIENT)
Dept: ADMINISTRATIVE | Facility: HOSPITAL | Age: 62
End: 2017-09-07

## 2017-09-07 DIAGNOSIS — I10 UNSPECIFIED ESSENTIAL HYPERTENSION: Primary | ICD-10-CM

## 2017-09-07 DIAGNOSIS — IMO0002 UNCONTROLLED TYPE 2 DIABETES MELLITUS WITH COMPLICATION, WITHOUT LONG-TERM CURRENT USE OF INSULIN: ICD-10-CM

## 2017-09-07 DIAGNOSIS — I10 UNSPECIFIED ESSENTIAL HYPERTENSION: ICD-10-CM

## 2017-09-07 DIAGNOSIS — E78.2 MIXED HYPERLIPIDEMIA: ICD-10-CM

## 2017-09-07 LAB
ALBUMIN SERPL BCP-MCNC: 4 G/DL (ref 3.5–5)
ALP SERPL-CCNC: 45 U/L (ref 46–116)
ALT SERPL W P-5'-P-CCNC: 47 U/L (ref 12–78)
ANION GAP SERPL CALCULATED.3IONS-SCNC: 11 MMOL/L (ref 4–13)
AST SERPL W P-5'-P-CCNC: 31 U/L (ref 5–45)
BILIRUB SERPL-MCNC: 0.4 MG/DL (ref 0.2–1)
BUN SERPL-MCNC: 14 MG/DL (ref 5–25)
CALCIUM SERPL-MCNC: 9 MG/DL (ref 8.3–10.1)
CHLORIDE SERPL-SCNC: 101 MMOL/L (ref 100–108)
CHOLEST SERPL-MCNC: 162 MG/DL (ref 50–200)
CO2 SERPL-SCNC: 27 MMOL/L (ref 21–32)
CREAT SERPL-MCNC: 0.79 MG/DL (ref 0.6–1.3)
CREAT UR-MCNC: 180 MG/DL
EST. AVERAGE GLUCOSE BLD GHB EST-MCNC: 183 MG/DL
GFR SERPL CREATININE-BSD FRML MDRD: 98 ML/MIN/1.73SQ M
GLUCOSE P FAST SERPL-MCNC: 145 MG/DL (ref 65–99)
HBA1C MFR BLD: 8 % (ref 4.2–6.3)
HDLC SERPL-MCNC: 38 MG/DL (ref 40–60)
LDLC SERPL CALC-MCNC: 95 MG/DL (ref 0–100)
MICROALBUMIN UR-MCNC: 22.3 MG/L (ref 0–20)
MICROALBUMIN/CREAT 24H UR: 12 MG/G CREATININE (ref 0–30)
POTASSIUM SERPL-SCNC: 4.3 MMOL/L (ref 3.5–5.3)
PROT SERPL-MCNC: 7.3 G/DL (ref 6.4–8.2)
SODIUM SERPL-SCNC: 139 MMOL/L (ref 136–145)
TRIGL SERPL-MCNC: 147 MG/DL

## 2017-09-07 PROCEDURE — 82570 ASSAY OF URINE CREATININE: CPT | Performed by: INTERNAL MEDICINE

## 2017-09-07 PROCEDURE — 80061 LIPID PANEL: CPT

## 2017-09-07 PROCEDURE — 82043 UR ALBUMIN QUANTITATIVE: CPT | Performed by: INTERNAL MEDICINE

## 2017-09-07 PROCEDURE — 36415 COLL VENOUS BLD VENIPUNCTURE: CPT

## 2017-09-07 PROCEDURE — 80053 COMPREHEN METABOLIC PANEL: CPT

## 2017-09-07 PROCEDURE — 83036 HEMOGLOBIN GLYCOSYLATED A1C: CPT

## 2017-09-21 ENCOUNTER — HOSPITAL ENCOUNTER (OUTPATIENT)
Dept: SLEEP CENTER | Facility: HOSPITAL | Age: 62
Discharge: HOME | End: 2017-09-21
Payer: COMMERCIAL

## 2017-09-21 DIAGNOSIS — G47.33 OBSTRUCTIVE SLEEP APNEA (ADULT) (PEDIATRIC): ICD-10-CM

## 2017-12-18 ENCOUNTER — OFFICE VISIT (OUTPATIENT)
Dept: URGENT CARE | Facility: CLINIC | Age: 62
End: 2017-12-18
Payer: COMMERCIAL

## 2017-12-18 PROCEDURE — 99213 OFFICE O/P EST LOW 20 MIN: CPT

## 2017-12-19 NOTE — PROGRESS NOTES
Assessment  1  Acute bronchitis (466 0) (J20 9)    Plan  Acute bronchitis, unspecified organism    · Azithromycin 250 MG Oral Tablet; TAKE 2 TABLETS ON DAY 1 THEN TAKE 1TABLET A DAY FOR 4 DAYS   · Benzonatate 100 MG Oral Capsule; 1-2 caps every 8 hrs as needed for cough    Discussion/Summary  Medication Side Effects Reviewed: Possible side effects of new medications were reviewed with the patient/guardian today  Understands and agrees with treatment plan: The treatment plan was reviewed with the patient/guardian  The patient/guardian understands and agrees with the treatment plan   Counseling Documentation With Imm: The patient was counseled regarding instructions for management,-- patient and family education  Chief Complaint  1  Cold Symptoms  Chief Complaint Free Text Note Form: C/O sinus pain, post nasal drip, and productive cough , chills x 4 days      History of Present Illness  HPI: Started with cold sx 4 days ago  Now having yellow nasal drainage and cough with yellow mucous  Feels feverish intermittently  No CP, SOB, BROWN  Hospital Based Practices Required Assessment:  Pain Assessment  the patient states they do not have pain  (on a scale of 0 to 10, the patient rates the pain at 0 )  Abuse And Domestic Violence Screen   Yes, the patient is safe at home  -- The patient states no one is hurting them  Depression And Suicide Screen  No, the patient has not had thoughts of hurting themself  No, the patient has not felt depressed in the past 7 days  Cold Symptoms:  Associated symptoms include runny nose,-- post nasal drainage-- and-- productive cough, but-- no nasal congestion,-- no scratchy throat,-- no sore throat,-- no hoarseness,-- no facial pressure,-- no facial pain,-- no headache,-- no ear pain,-- no swollen lymph nodes,-- no wheezing,-- no shortness of breath,-- no fatigue,-- no weakness,-- no nausea,-- no vomiting-- and-- no diarrhea        Review of Systems  Focused-Male:  Constitutional: chills  ENT: no hearing loss-- and-- no hoarseness  Cardiovascular: no chest pain-- and-- no palpitations  Respiratory: no shortness of breath-- and-- no wheezing  Gastrointestinal: no abdominal pain,-- no nausea,-- no vomiting-- and-- no diarrhea  Musculoskeletal: no myalgias  Integumentary: no rashes  Neurological: no headache-- and-- no dizziness  ROS Reviewed:   ROS reviewed  Active Problems  1  Diabetes (250 00) (E11 9)   2  GERD (gastroesophageal reflux disease) (530 81) (K21 9)   3  Hyperlipidemia (272 4) (E78 5)   4  Hypertension (401 9) (I10)   5  Pulmonary embolism (415 19) (I26 99)   6  Seasonal allergies (477 9) (J30 2)    Past Medical History  1  History of Acute upper respiratory infection (465 9) (J06 9)   2  History of Contact dermatitis due to poison ivy (692 6) (L23 7)   3  History of Cough (786 2) (R05)   4  History of urinary tract infection (V13 02) (Z87 440)   5  No pertinent past medical history  Active Problems And Past Medical History Reviewed: The active problems and past medical history were reviewed and updated today  Family History  Father    1  Family history of Melanoma    Social History   · Never a smoker   · Social alcohol use (Z78 9)  Social History Reviewed: The social history was reviewed and updated today  Surgical History  1  History of Cholecystectomy    Current Meds   1  AmLODIPine Besylate 10 MG Oral Tablet; Therapy: 29JAM8588 to (Last DS:91BOM9368)  Requested for: 94STX0621 Ordered   2  Aspirin 81 MG TABS; Therapy: (Recorded:21Jan2015) to Recorded   3  CoQ10 200 MG Oral Capsule; Therapy: (Recorded:21Jan2015) to Recorded   4  Daily Multivitamin TABS; Therapy: (Recorded:21Jan2015) to Recorded   5  Diovan 160 MG Oral Tablet; Therapy: 02UJU0550 to (Last ZH:08FKV7135)  Requested for: 05IMB4715 Ordered   6  Eliquis 5 MG Oral Tablet; one tab BID; Therapy: (Recorded:31Mar2017) to Recorded   7  Fluticasone Propionate 50 MCG/ACT Nasal Suspension;  Therapy: 51GIT6225 to (Last Rx:28Nov2011)  Requested for: 28Nov2011 Ordered   8  FreeStyle Lancets Miscellaneous; Therapy: 03FNQ0142 to (Last Gladystine Sinks)  Requested for: 45WBK9285 Ordered   9  FreeStyle Lite Test In [employer]; Therapy: 17MOG8671 to (Last Gladystine Sinks)  Requested for: 91JOU5571 Ordered   10  Glimepiride 2 MG Oral Tablet; Therapy: (Ailyn Mccarthy) to Recorded   11  HydroCHLOROthiazide 12 5 MG Oral Capsule; Therapy: (Recorded:21Jan2015) to Recorded   12  Jublia 10 % External Solution; Therapy: (Recorded:31Mar2017) to Recorded   13  Lovastatin 40 MG Oral Tablet; Therapy: 38OXU4832 to (Last Rx:29Jow9285)  Requested for: 10Yke6386 Ordered   14  MetFORMIN HCl - 1000 MG Oral Tablet; Therapy: 56KSO8502 to (Last Rx:57Awa2151)  Requested for: 21PCO6740 Ordered   15  MethylPREDNISolone 4 MG Oral Tablet Therapy Pack; take as directed; Therapy: 23PRZ7476 to (Last Rx:16Jqz4819)  Requested for: 11MQY3327 Ordered   16  Omeprazole 40 MG Oral Capsule Delayed Release; Therapy: 82ACR1507 to (Last Rx:21Oct2011)  Requested for: 21Oct2011 Ordered   17  Trulicity SOPN;  Therapy: (Recorded:31Mar2017) to Recorded   18  ZyrTEC Allergy 10 MG Oral Capsule; Therapy: (Recorded:21Jan2015) to Recorded  Medication List Reviewed: The medication list was reviewed and updated today  Allergies  1  Crestor TABS    Vitals  Signs   Recorded: 32SMM3303 10:01AM   Temperature: 97 6 F  Heart Rate: 82  Respiration: 18  Systolic: 884  Diastolic: 80  Height: 5 ft 9 8 in  Weight: 215 lb 3 04 oz  BMI Calculated: 31 05  BSA Calculated: 2 14  O2 Saturation: 96  Pain Scale: 0    Physical Exam   Constitutional  General appearance: No acute distress, well appearing and well nourished  Eyes  Conjunctiva and lids: No swelling, erythema, or discharge  Ears, Nose, Mouth, and Throat  External inspection of ears and nose: Normal    Otoscopic examination: Tympanic membrance translucent with normal light reflex  Canals patent without erythema  Nasal mucosa, septum, and turbinates: Abnormal   There was a mucoid discharge from both nares  The bilateral nasal mucosa was boggy  Oropharynx: Normal with no erythema, edema, exudate or lesions  Pulmonary  Respiratory effort: No increased work of breathing or signs of respiratory distress  Auscultation of lungs: Clear to auscultation  Cardiovascular  Auscultation of heart: Normal rate and rhythm, normal S1 and S2, without murmurs  Lymphatic  Palpation of lymph nodes in neck: No lymphadenopathy  Musculoskeletal  Gait and station: Normal    Skin  Skin and subcutaneous tissue: Normal without rashes or lesions     Psychiatric  Mood and affect: Normal        Future Appointments    Date/Time Provider Specialty Site   02/07/2018 09:00 AM Lizzie Gomez MD Urology [address]     Signatures   Electronically signed by : MAURICE Arreola; Dec 18 2017 10:14AM EST                       (Author)    Electronically signed by : MADELINE Galvan ; Dec 18 2017  3:48PM EST                       (Co-author)

## 2018-01-14 VITALS
SYSTOLIC BLOOD PRESSURE: 126 MMHG | OXYGEN SATURATION: 95 % | DIASTOLIC BLOOD PRESSURE: 76 MMHG | TEMPERATURE: 97.8 F | WEIGHT: 215.13 LBS | HEART RATE: 87 BPM | HEIGHT: 70 IN | RESPIRATION RATE: 20 BRPM | BODY MASS INDEX: 30.8 KG/M2

## 2018-01-20 ENCOUNTER — APPOINTMENT (EMERGENCY)
Dept: CT IMAGING | Facility: HOSPITAL | Age: 63
DRG: 392 | End: 2018-01-20
Payer: COMMERCIAL

## 2018-01-20 ENCOUNTER — HOSPITAL ENCOUNTER (INPATIENT)
Facility: HOSPITAL | Age: 63
LOS: 1 days | Discharge: HOME | DRG: 392 | End: 2018-01-21
Attending: FAMILY MEDICINE | Admitting: FAMILY MEDICINE
Payer: COMMERCIAL

## 2018-01-20 DIAGNOSIS — K52.9 ACUTE GASTROENTERITIS: Primary | ICD-10-CM

## 2018-01-20 PROBLEM — I10 ESSENTIAL HYPERTENSION: Status: ACTIVE | Noted: 2018-01-20

## 2018-01-20 PROBLEM — Z86.711 HISTORY OF PULMONARY EMBOLISM: Status: ACTIVE | Noted: 2018-01-20

## 2018-01-20 PROBLEM — R11.10 VOMITING: Status: ACTIVE | Noted: 2018-01-20

## 2018-01-20 PROBLEM — R79.89 ELEVATED LACTIC ACID LEVEL: Status: ACTIVE | Noted: 2018-01-20

## 2018-01-20 PROBLEM — E78.5 HYPERLIPIDEMIA: Status: ACTIVE | Noted: 2018-01-20

## 2018-01-20 PROBLEM — D72.829 LEUKOCYTOSIS: Status: ACTIVE | Noted: 2018-01-20

## 2018-01-20 PROBLEM — R19.7 DIARRHEA: Status: ACTIVE | Noted: 2018-01-20

## 2018-01-20 PROBLEM — K21.9 GERD (GASTROESOPHAGEAL REFLUX DISEASE): Status: ACTIVE | Noted: 2018-01-20

## 2018-01-20 PROBLEM — E11.9 TYPE 2 DIABETES MELLITUS WITHOUT COMPLICATION (HCC): Status: ACTIVE | Noted: 2018-01-20

## 2018-01-20 LAB
ALBUMIN SERPL BCP-MCNC: 4.4 G/DL (ref 3.5–5)
ALP SERPL-CCNC: 54 U/L (ref 46–116)
ALT SERPL W P-5'-P-CCNC: 57 U/L (ref 12–78)
ANION GAP SERPL CALCULATED.3IONS-SCNC: 11 MMOL/L (ref 4–13)
APTT PPP: 28 SECONDS (ref 23–35)
AST SERPL W P-5'-P-CCNC: 33 U/L (ref 5–45)
BACTERIA UR QL AUTO: NORMAL /HPF
BASOPHILS # BLD AUTO: 0.01 THOUSANDS/ΜL (ref 0–0.1)
BASOPHILS NFR BLD AUTO: 0 % (ref 0–1)
BILIRUB SERPL-MCNC: 0.4 MG/DL (ref 0.2–1)
BILIRUB UR QL STRIP: NEGATIVE
BUN SERPL-MCNC: 23 MG/DL (ref 5–25)
CALCIUM SERPL-MCNC: 8.9 MG/DL (ref 8.3–10.1)
CHLORIDE SERPL-SCNC: 101 MMOL/L (ref 100–108)
CLARITY UR: CLEAR
CO2 SERPL-SCNC: 25 MMOL/L (ref 21–32)
COLOR UR: YELLOW
CREAT SERPL-MCNC: 1.18 MG/DL (ref 0.6–1.3)
EOSINOPHIL # BLD AUTO: 0.33 THOUSAND/ΜL (ref 0–0.61)
EOSINOPHIL NFR BLD AUTO: 3 % (ref 0–6)
ERYTHROCYTE [DISTWIDTH] IN BLOOD BY AUTOMATED COUNT: 13.5 % (ref 11.6–15.1)
GFR SERPL CREATININE-BSD FRML MDRD: 67 ML/MIN/1.73SQ M
GLUCOSE SERPL-MCNC: 168 MG/DL (ref 65–140)
GLUCOSE SERPL-MCNC: 184 MG/DL (ref 65–140)
GLUCOSE UR STRIP-MCNC: ABNORMAL MG/DL
HCT VFR BLD AUTO: 42.4 % (ref 36.5–49.3)
HEMOCCULT STL QL: NEGATIVE
HGB BLD-MCNC: 14.3 G/DL (ref 12–17)
HGB UR QL STRIP.AUTO: NEGATIVE
INR PPP: 0.98 (ref 0.86–1.16)
KETONES UR STRIP-MCNC: ABNORMAL MG/DL
LACTATE SERPL-SCNC: 2 MMOL/L (ref 0.5–2)
LACTATE SERPL-SCNC: 2.9 MMOL/L (ref 0.5–2)
LEUKOCYTE ESTERASE UR QL STRIP: ABNORMAL
LIPASE SERPL-CCNC: 275 U/L (ref 73–393)
LYMPHOCYTES # BLD AUTO: 1.14 THOUSANDS/ΜL (ref 0.6–4.47)
LYMPHOCYTES NFR BLD AUTO: 11 % (ref 14–44)
MCH RBC QN AUTO: 28.5 PG (ref 26.8–34.3)
MCHC RBC AUTO-ENTMCNC: 33.7 G/DL (ref 31.4–37.4)
MCV RBC AUTO: 85 FL (ref 82–98)
MONOCYTES # BLD AUTO: 0.86 THOUSAND/ΜL (ref 0.17–1.22)
MONOCYTES NFR BLD AUTO: 8 % (ref 4–12)
NEUTROPHILS # BLD AUTO: 8.4 THOUSANDS/ΜL (ref 1.85–7.62)
NEUTS SEG NFR BLD AUTO: 78 % (ref 43–75)
NITRITE UR QL STRIP: NEGATIVE
NON-SQ EPI CELLS URNS QL MICRO: NORMAL /HPF
PH UR STRIP.AUTO: 5 [PH] (ref 4.5–8)
PLATELET # BLD AUTO: 205 THOUSANDS/UL (ref 149–390)
PMV BLD AUTO: 8.9 FL (ref 8.9–12.7)
POTASSIUM SERPL-SCNC: 3.7 MMOL/L (ref 3.5–5.3)
PROT SERPL-MCNC: 8.1 G/DL (ref 6.4–8.2)
PROT UR STRIP-MCNC: NEGATIVE MG/DL
PROTHROMBIN TIME: 12.9 SECONDS (ref 12.1–14.4)
RBC # BLD AUTO: 5.01 MILLION/UL (ref 3.88–5.62)
RBC #/AREA URNS AUTO: NORMAL /HPF
SODIUM SERPL-SCNC: 137 MMOL/L (ref 136–145)
SP GR UR STRIP.AUTO: 1.02 (ref 1–1.03)
TROPONIN I SERPL-MCNC: <0.02 NG/ML
UROBILINOGEN UR QL STRIP.AUTO: 0.2 E.U./DL
WBC # BLD AUTO: 10.74 THOUSAND/UL (ref 4.31–10.16)
WBC #/AREA URNS AUTO: NORMAL /HPF

## 2018-01-20 PROCEDURE — 74177 CT ABD & PELVIS W/CONTRAST: CPT

## 2018-01-20 PROCEDURE — 85025 COMPLETE CBC W/AUTO DIFF WBC: CPT | Performed by: PHYSICIAN ASSISTANT

## 2018-01-20 PROCEDURE — 71275 CT ANGIOGRAPHY CHEST: CPT

## 2018-01-20 PROCEDURE — 96375 TX/PRO/DX INJ NEW DRUG ADDON: CPT

## 2018-01-20 PROCEDURE — 96376 TX/PRO/DX INJ SAME DRUG ADON: CPT

## 2018-01-20 PROCEDURE — 82948 REAGENT STRIP/BLOOD GLUCOSE: CPT

## 2018-01-20 PROCEDURE — 96374 THER/PROPH/DIAG INJ IV PUSH: CPT

## 2018-01-20 PROCEDURE — 83605 ASSAY OF LACTIC ACID: CPT | Performed by: PHYSICIAN ASSISTANT

## 2018-01-20 PROCEDURE — 85610 PROTHROMBIN TIME: CPT | Performed by: PHYSICIAN ASSISTANT

## 2018-01-20 PROCEDURE — 96361 HYDRATE IV INFUSION ADD-ON: CPT

## 2018-01-20 PROCEDURE — 82272 OCCULT BLD FECES 1-3 TESTS: CPT | Performed by: PHYSICIAN ASSISTANT

## 2018-01-20 PROCEDURE — 99285 EMERGENCY DEPT VISIT HI MDM: CPT

## 2018-01-20 PROCEDURE — 80053 COMPREHEN METABOLIC PANEL: CPT | Performed by: PHYSICIAN ASSISTANT

## 2018-01-20 PROCEDURE — 81001 URINALYSIS AUTO W/SCOPE: CPT | Performed by: PHYSICIAN ASSISTANT

## 2018-01-20 PROCEDURE — 93005 ELECTROCARDIOGRAM TRACING: CPT | Performed by: PHYSICIAN ASSISTANT

## 2018-01-20 PROCEDURE — 83690 ASSAY OF LIPASE: CPT | Performed by: PHYSICIAN ASSISTANT

## 2018-01-20 PROCEDURE — 36415 COLL VENOUS BLD VENIPUNCTURE: CPT | Performed by: PHYSICIAN ASSISTANT

## 2018-01-20 PROCEDURE — 89055 LEUKOCYTE ASSESSMENT FECAL: CPT | Performed by: PHYSICIAN ASSISTANT

## 2018-01-20 PROCEDURE — 87040 BLOOD CULTURE FOR BACTERIA: CPT | Performed by: PHYSICIAN ASSISTANT

## 2018-01-20 PROCEDURE — 84484 ASSAY OF TROPONIN QUANT: CPT | Performed by: PHYSICIAN ASSISTANT

## 2018-01-20 PROCEDURE — 85730 THROMBOPLASTIN TIME PARTIAL: CPT | Performed by: PHYSICIAN ASSISTANT

## 2018-01-20 RX ORDER — DIPHENHYDRAMINE HYDROCHLORIDE 50 MG/ML
25 INJECTION INTRAMUSCULAR; INTRAVENOUS ONCE
Status: COMPLETED | OUTPATIENT
Start: 2018-01-20 | End: 2018-01-20

## 2018-01-20 RX ORDER — PRAVASTATIN SODIUM 40 MG
40 TABLET ORAL
Status: DISCONTINUED | OUTPATIENT
Start: 2018-01-20 | End: 2018-01-21 | Stop reason: HOSPADM

## 2018-01-20 RX ORDER — ONDANSETRON 2 MG/ML
4 INJECTION INTRAMUSCULAR; INTRAVENOUS ONCE
Status: COMPLETED | OUTPATIENT
Start: 2018-01-20 | End: 2018-01-20

## 2018-01-20 RX ORDER — PANTOPRAZOLE SODIUM 20 MG/1
20 TABLET, DELAYED RELEASE ORAL
Status: DISCONTINUED | OUTPATIENT
Start: 2018-01-21 | End: 2018-01-21 | Stop reason: HOSPADM

## 2018-01-20 RX ORDER — AMLODIPINE BESYLATE 10 MG/1
10 TABLET ORAL
Status: DISCONTINUED | OUTPATIENT
Start: 2018-01-20 | End: 2018-01-21 | Stop reason: HOSPADM

## 2018-01-20 RX ORDER — ASPIRIN 81 MG/1
81 TABLET, CHEWABLE ORAL DAILY
Status: DISCONTINUED | OUTPATIENT
Start: 2018-01-21 | End: 2018-01-21 | Stop reason: HOSPADM

## 2018-01-20 RX ORDER — HYDROCHLOROTHIAZIDE 12.5 MG/1
12.5 TABLET ORAL DAILY
Status: DISCONTINUED | OUTPATIENT
Start: 2018-01-21 | End: 2018-01-21 | Stop reason: HOSPADM

## 2018-01-20 RX ORDER — VALSARTAN 80 MG/1
160 TABLET ORAL DAILY
Status: DISCONTINUED | OUTPATIENT
Start: 2018-01-21 | End: 2018-01-21 | Stop reason: HOSPADM

## 2018-01-20 RX ORDER — SODIUM CHLORIDE 9 MG/ML
125 INJECTION, SOLUTION INTRAVENOUS CONTINUOUS
Status: DISCONTINUED | OUTPATIENT
Start: 2018-01-20 | End: 2018-01-21 | Stop reason: HOSPADM

## 2018-01-20 RX ORDER — ONDANSETRON 2 MG/ML
4 INJECTION INTRAMUSCULAR; INTRAVENOUS EVERY 6 HOURS PRN
Status: DISCONTINUED | OUTPATIENT
Start: 2018-01-20 | End: 2018-01-21 | Stop reason: HOSPADM

## 2018-01-20 RX ORDER — ACETAMINOPHEN 325 MG/1
650 TABLET ORAL EVERY 6 HOURS PRN
Status: DISCONTINUED | OUTPATIENT
Start: 2018-01-20 | End: 2018-01-21 | Stop reason: HOSPADM

## 2018-01-20 RX ORDER — METOCLOPRAMIDE HYDROCHLORIDE 5 MG/ML
10 INJECTION INTRAMUSCULAR; INTRAVENOUS ONCE
Status: COMPLETED | OUTPATIENT
Start: 2018-01-20 | End: 2018-01-20

## 2018-01-20 RX ADMIN — SODIUM CHLORIDE 125 ML/HR: 0.9 INJECTION, SOLUTION INTRAVENOUS at 21:11

## 2018-01-20 RX ADMIN — ONDANSETRON 4 MG: 2 INJECTION INTRAMUSCULAR; INTRAVENOUS at 17:28

## 2018-01-20 RX ADMIN — SODIUM CHLORIDE 1000 ML: 0.9 INJECTION, SOLUTION INTRAVENOUS at 15:42

## 2018-01-20 RX ADMIN — INSULIN LISPRO 1 UNITS: 100 INJECTION, SOLUTION INTRAVENOUS; SUBCUTANEOUS at 21:10

## 2018-01-20 RX ADMIN — PRAVASTATIN SODIUM 40 MG: 40 TABLET ORAL at 21:09

## 2018-01-20 RX ADMIN — ONDANSETRON 4 MG: 2 INJECTION INTRAMUSCULAR; INTRAVENOUS at 15:41

## 2018-01-20 RX ADMIN — IOHEXOL 100 ML: 350 INJECTION, SOLUTION INTRAVENOUS at 17:12

## 2018-01-20 RX ADMIN — SODIUM CHLORIDE 1000 ML: 0.9 INJECTION, SOLUTION INTRAVENOUS at 18:29

## 2018-01-20 RX ADMIN — AMLODIPINE BESYLATE 10 MG: 10 TABLET ORAL at 21:09

## 2018-01-20 RX ADMIN — METOCLOPRAMIDE 10 MG: 5 INJECTION, SOLUTION INTRAMUSCULAR; INTRAVENOUS at 18:29

## 2018-01-20 RX ADMIN — APIXABAN 5 MG: 5 TABLET, FILM COATED ORAL at 21:09

## 2018-01-20 RX ADMIN — DIPHENHYDRAMINE HYDROCHLORIDE 25 MG: 50 INJECTION, SOLUTION INTRAMUSCULAR; INTRAVENOUS at 18:29

## 2018-01-20 NOTE — ED NOTES
Pt ambulated to bathroom at this time and began to vomit while having a bowel movement  Pt reports diarrhea at this time         Real ArtistBASHIR  01/20/18 4347

## 2018-01-20 NOTE — ED NOTES
Pt ambulated to bathroom again for diarrhea  Ghislaine Walden PA-C notified       Ruslan Beaver RN  01/20/18 6845

## 2018-01-21 VITALS
RESPIRATION RATE: 20 BRPM | SYSTOLIC BLOOD PRESSURE: 144 MMHG | DIASTOLIC BLOOD PRESSURE: 78 MMHG | OXYGEN SATURATION: 94 % | HEIGHT: 71 IN | TEMPERATURE: 98.1 F | HEART RATE: 96 BPM | WEIGHT: 222.88 LBS | BODY MASS INDEX: 31.2 KG/M2

## 2018-01-21 LAB
ALBUMIN SERPL BCP-MCNC: 3.3 G/DL (ref 3.5–5)
ALP SERPL-CCNC: 35 U/L (ref 46–116)
ALT SERPL W P-5'-P-CCNC: 41 U/L (ref 12–78)
ANION GAP SERPL CALCULATED.3IONS-SCNC: 14 MMOL/L (ref 4–13)
AST SERPL W P-5'-P-CCNC: 26 U/L (ref 5–45)
BILIRUB SERPL-MCNC: 0.4 MG/DL (ref 0.2–1)
BUN SERPL-MCNC: 23 MG/DL (ref 5–25)
CALCIUM SERPL-MCNC: 7.3 MG/DL (ref 8.3–10.1)
CHLORIDE SERPL-SCNC: 102 MMOL/L (ref 100–108)
CO2 SERPL-SCNC: 19 MMOL/L (ref 21–32)
CREAT SERPL-MCNC: 0.95 MG/DL (ref 0.6–1.3)
ERYTHROCYTE [DISTWIDTH] IN BLOOD BY AUTOMATED COUNT: 13.6 % (ref 11.6–15.1)
EST. AVERAGE GLUCOSE BLD GHB EST-MCNC: 212 MG/DL
GFR SERPL CREATININE-BSD FRML MDRD: 87 ML/MIN/1.73SQ M
GLUCOSE SERPL-MCNC: 161 MG/DL (ref 65–140)
GLUCOSE SERPL-MCNC: 191 MG/DL (ref 65–140)
GLUCOSE SERPL-MCNC: 223 MG/DL (ref 65–140)
HBA1C MFR BLD: 9 % (ref 4.2–6.3)
HCT VFR BLD AUTO: 37.8 % (ref 36.5–49.3)
HGB BLD-MCNC: 12.9 G/DL (ref 12–17)
MAGNESIUM SERPL-MCNC: 1.5 MG/DL (ref 1.6–2.6)
MCH RBC QN AUTO: 29.1 PG (ref 26.8–34.3)
MCHC RBC AUTO-ENTMCNC: 34.1 G/DL (ref 31.4–37.4)
MCV RBC AUTO: 85 FL (ref 82–98)
PHOSPHATE SERPL-MCNC: 3.4 MG/DL (ref 2.3–4.1)
PLATELET # BLD AUTO: 166 THOUSANDS/UL (ref 149–390)
PMV BLD AUTO: 9.5 FL (ref 8.9–12.7)
POTASSIUM SERPL-SCNC: 3.7 MMOL/L (ref 3.5–5.3)
PROT SERPL-MCNC: 6.4 G/DL (ref 6.4–8.2)
RBC # BLD AUTO: 4.43 MILLION/UL (ref 3.88–5.62)
SODIUM SERPL-SCNC: 135 MMOL/L (ref 136–145)
WBC # BLD AUTO: 5.93 THOUSAND/UL (ref 4.31–10.16)

## 2018-01-21 PROCEDURE — 85027 COMPLETE CBC AUTOMATED: CPT | Performed by: PHYSICIAN ASSISTANT

## 2018-01-21 PROCEDURE — G8978 MOBILITY CURRENT STATUS: HCPCS | Performed by: PHYSICAL THERAPIST

## 2018-01-21 PROCEDURE — G8980 MOBILITY D/C STATUS: HCPCS | Performed by: PHYSICAL THERAPIST

## 2018-01-21 PROCEDURE — 83036 HEMOGLOBIN GLYCOSYLATED A1C: CPT | Performed by: PHYSICIAN ASSISTANT

## 2018-01-21 PROCEDURE — 83735 ASSAY OF MAGNESIUM: CPT | Performed by: PHYSICIAN ASSISTANT

## 2018-01-21 PROCEDURE — 82948 REAGENT STRIP/BLOOD GLUCOSE: CPT

## 2018-01-21 PROCEDURE — 80053 COMPREHEN METABOLIC PANEL: CPT | Performed by: PHYSICIAN ASSISTANT

## 2018-01-21 PROCEDURE — 97162 PT EVAL MOD COMPLEX 30 MIN: CPT | Performed by: PHYSICAL THERAPIST

## 2018-01-21 PROCEDURE — 84100 ASSAY OF PHOSPHORUS: CPT | Performed by: PHYSICIAN ASSISTANT

## 2018-01-21 RX ORDER — ONDANSETRON 4 MG/1
4 TABLET, ORALLY DISINTEGRATING ORAL EVERY 6 HOURS PRN
Qty: 20 TABLET | Refills: 0 | Status: SHIPPED | OUTPATIENT
Start: 2018-01-21 | End: 2020-02-21 | Stop reason: ALTCHOICE

## 2018-01-21 RX ORDER — MAGNESIUM SULFATE 1 G/100ML
1 INJECTION INTRAVENOUS ONCE
Status: COMPLETED | OUTPATIENT
Start: 2018-01-21 | End: 2018-01-21

## 2018-01-21 RX ADMIN — INSULIN LISPRO 1 UNITS: 100 INJECTION, SOLUTION INTRAVENOUS; SUBCUTANEOUS at 07:42

## 2018-01-21 RX ADMIN — ASPIRIN 81 MG CHEWABLE TABLET 81 MG: 81 TABLET CHEWABLE at 08:28

## 2018-01-21 RX ADMIN — APIXABAN 5 MG: 5 TABLET, FILM COATED ORAL at 08:28

## 2018-01-21 RX ADMIN — SODIUM CHLORIDE 125 ML/HR: 0.9 INJECTION, SOLUTION INTRAVENOUS at 04:36

## 2018-01-21 RX ADMIN — MAGNESIUM SULFATE HEPTAHYDRATE 1 G: 1 INJECTION, SOLUTION INTRAVENOUS at 06:35

## 2018-01-21 RX ADMIN — HYDROCHLOROTHIAZIDE 12.5 MG: 12.5 TABLET ORAL at 08:28

## 2018-01-21 RX ADMIN — INSULIN LISPRO 2 UNITS: 100 INJECTION, SOLUTION INTRAVENOUS; SUBCUTANEOUS at 11:50

## 2018-01-21 RX ADMIN — VALSARTAN 160 MG: 80 TABLET, FILM COATED ORAL at 08:28

## 2018-01-21 RX ADMIN — PANTOPRAZOLE SODIUM 20 MG: 20 TABLET, DELAYED RELEASE ORAL at 07:42

## 2018-01-21 NOTE — PLAN OF CARE

## 2018-01-21 NOTE — DISCHARGE INSTRUCTIONS
Gastroenteritis   WHAT YOU NEED TO KNOW:   Gastroenteritis, or stomach flu, is an infection of the stomach and intestines  DISCHARGE INSTRUCTIONS:   Call 911 for any of the following:   · You have trouble breathing or a very fast pulse  Seek care immediately if:   · You see blood in your diarrhea  · You cannot stop vomiting  · You have not urinated for 12 hours  · You feel like you are going to faint  Contact your healthcare provider if:   · You have a fever  · You continue to vomit or have diarrhea, even after treatment  · You see worms in your diarrhea  · Your mouth or eyes are dry  You are not urinating as much or as often  · You have questions or concerns about your condition or care  Medicines:   · Medicines  may be given to stop vomiting or diarrhea, decrease abdominal cramps, or treat an infection  · Take your medicine as directed  Contact your healthcare provider if you think your medicine is not helping or if you have side effects  Tell him or her if you are allergic to any medicine  Keep a list of the medicines, vitamins, and herbs you take  Include the amounts, and when and why you take them  Bring the list or the pill bottles to follow-up visits  Carry your medicine list with you in case of an emergency  Manage your symptoms:   · Drink liquids as directed  Ask your healthcare provider how much liquid to drink each day, and which liquids are best for you  You may also need to drink an oral rehydration solution (ORS)  An ORS has the right amounts of sugar, salt, and minerals in water to replace body fluids  · Eat bland foods  When you feel hungry, begin eating soft, bland foods  Examples are bananas, clear soup, potatoes, and applesauce  Do not have dairy products, alcohol, sugary drinks, or drinks with caffeine until you feel better  · Rest as much as possible  Slowly start to do more each day when you begin to feel better    Prevent the spread of gastroenteritis:  Gastroenteritis can spread easily  Keep yourself, your family, and your surroundings clean to help prevent the spread of gastroenteritis:  · Wash your hands often  Use soap and water  Wash your hands after you use the bathroom, change a child's diapers, or sneeze  Wash your hands before you prepare or eat food  · Clean surfaces and do laundry often  Wash your clothes and towels separately from the rest of the laundry  Clean surfaces in your home with antibacterial  or bleach  · Clean food thoroughly and cook safely  Wash raw vegetables before you cook  Cook meat, fish, and eggs fully  Do not use the same dishes for raw meat as you do for other foods  Refrigerate any leftover food immediately  · Be aware when you camp or travel  Drink only clean water  Do not drink from rivers or lakes unless you purify or boil the water first  When you travel, drink bottled water and do not add ice  Do not eat fruit that has not been peeled  Do not eat raw fish or meat that is not fully cooked  Follow up with your healthcare provider as directed:  Write down your questions so you remember to ask them during your visits  © 2017 [employer]  Information is for End User's use only and may not be sold, redistributed or otherwise used for commercial purposes  All illustrations and images included in CareNotes® are the copyrighted property of A [employer] M [employer]  or [address]  The above information is an  only  It is not intended as medical advice for individual conditions or treatments  Talk to your doctor, nurse or pharmacist before following any medical regimen to see if it is safe and effective for you

## 2018-01-21 NOTE — SEPSIS NOTE
Sepsis Note   Kraig Silveira 63 y o  male MRN: [id number]  Unit/Bed#: RM13 Encounter: [id number]            Initial Sepsis Screening     Row Name 01/20/18 1700                Is the patient's history suggestive of a new or worsening infection? (!)  Yes (Proceed)  -TB        Suspected source of infection acute abdominal infection  -TB        Are two or more of the following signs & symptoms of infection both present and new to the patient? No  -TB        Indicate SIRS criteria          If the answer is yes to both questions, suspicion of sepsis is present          If severe sepsis is present AND tissue hypoperfusion perists in the hour after fluid resuscitation or lactate > 4, the patient meets criteria for SEPTIC SHOCK          Are any of the following organ dysfunction criteria present within 6 hours of suspected infection and SIRS criteria that are NOT considered to be chronic conditions?         Organ dysfunction          Date of presentation of severe sepsis          Time of presentation of severe sepsis          Tissue hypoperfusion persists in the hour after crystalloid fluid administration, evidenced, by either:          Was hypotension present within one hour of the conclusion of crystalloid fluid administration?         Date of presentation of septic shock          Time of presentation of septic shock            User Key  (r) = Recorded By, (t) = Taken By, (c) = Cosigned By    Initials Name Provider Type    TB Rg Hinton PA-C Physician Assistant          Pt with suspicion for abdominal infection, no SIRS criteria, no leukocytosis  Sepsis panel labs are sent- lactic acid 2 9  May represent emerging sepsis but no other s/sx at present time  Will monitor closely

## 2018-01-21 NOTE — CASE MANAGEMENT
Thank you,  [facility] in the [facility] by Max Clemons for 2017  Network Utilization Review Department  Phone: [phone]; Fax [phone]  ATTENTION: The Network Utilization Review Department is now centralized for our 7 Facilities  Make a note that we have a new phone and fax numbers for our Department  Please call with any questions or concerns to [phone] and carefully follow the prompts so that you are directed to the right person  All voicemails are confidential  Fax any determinations, approvals, denials, and requests for initial or continue stay review clinical to [phone]  Due to HIGH CALL volume, it would be easier if you could please send faxed requests to expedite your requests and in part, help us provide discharge notifications faster  Initial Clinical Review    Admission: Date/Time/Statement: 1/20/18 @ [address] This Encounter   Procedures    Inpatient Admission (expected length of stay for this patient is greater than two midnights)     Standing Status:   Standing     Number of Occurrences:   1     Order Specific Question:   Admitting Physician     Answer:   Thea Tinajero     Order Specific Question:   Level of Care     Answer:   Med Surg [16]     Order Specific Question:   Estimated length of stay     Answer:   More than 2 Midnights     Order Specific Question:   Certification     Answer:   I certify that inpatient services are medically necessary for this patient for a duration of greater than two midnights  See H&P and MD Progress Notes for additional information about the patient's course of treatment       ED: Date/Time/Mode of Arrival:   ED Arrival Information     Expected Arrival Acuity Means of Arrival Escorted By Service Admission Type    - 1/20/2018 15:04 Urgent Walk-In Spouse General Medicine Urgent    Arrival Complaint    vomiting        Chief Complaint:   Chief Complaint   Patient presents with   [facility] Vomiting     Patient started vomiting approx  2 hours ago along with chills  History of Illness: Naun Mack is a 63 y o  male who presents to the emergency room complaining of vomiting for approximately 2 hours  Vomiting is associated with episodes of chills  Patient's past medical history significant for history of pulmonary embolism type 2 diabetes, hypertension, hyperlipidemia, GERD  Patient states that he was not at family found had eggs and toast in the morning and had hotdogs closer to midday  He reported starting feeling nauseous and had multiple violent episodes of vomiting in large volume  Initially called Urgent care and was told that he should go to the emergency room  He reported normal bowel movements this morning able to pass gas no recent change in his diet  He admits to being on azithromycin treated recently for bronchitis no other new medication or medication changes  Patient denies chest pain,  shortness of breath, cough, headaches, weakness  He also denies any sick contacts or recent travel  He admits to feeling a bit lightheaded after 1st episode of vomiting  Of note patient recalls similar episodes of onset of vomiting prior to being diagnosed with pulmonary embolism  In the emergency room patient had additional episodes of vomiting with new onset of diarrhea  States that diarrhea was foul smelling and non-bloody  He also reported episodes of abdominal pain  He also reported episodes of vomiting in the emergency room was also foul-smelling  Labs drawn in emergency room shows glucose of 168, lactic acid of 2 9, WBC 10 74, CTA chest and CT abdomen and pelvis shows-no definite pulmonary arterial embolism, diffuse small bowel wall thickening with fluid in the proximal colon in keeping with a nonspecific enteritis  Received Zofran 4 mg IV X 2, 2 L normal saline, Benadryl 25 mg Iv, Reglan 10 mg Iv  At bedside patient reports feeling very groggy with intermittent nausea    No additional episode of vomiting or diarrhea since medication were given  His mucous membrane appears very dry  Patient has been admitted on inpatient status for further management and workup of enteritis unknown infectious agent, vomiting and diarrhea, leukocytosis and elevated lactic acid level  ED Vital Signs:   ED Triage Vitals [01/20/18 1512]   Temperature Pulse Respirations Blood Pressure SpO2   (!) 97 °F (36 1 °C) 89 18 127/76 96 %      Temp Source Heart Rate Source Patient Position - Orthostatic VS BP Location FiO2 (%)   Temporal Monitor Lying Left arm --      Pain Score       No Pain        Wt Readings from Last 1 Encounters:   01/21/18 101 kg (222 lb 14 2 oz)     Vital Signs (abnormal):   01/20/18 2329  98 3 °F (36 8 °C)   112  20  132/78  92 %  None (Room air)  Lying   01/20/18 2020  98 °F (36 7 °C)   122  20  130/80  98 %  None (Room air)  Sitting   01/20/18 1956  --   115  20  120/67  96 %  None (Room air)  Lying   01/20/18 1930  --   115  20  120/66  94 %  None (Room air)  Lying   01/20/18 1900  --   111  20  114/56  98 %  None (Room air)  Lying   01/20/18 1845   97 4 °F (36 3 °C)   117  18  124/79  98 %  None (Room air)  Lying   01/20/18 1800  --   106  15  126/71  95 %  None (Room air)  Lying   01/20/18 1512   97 °F (36 1 °C)  89  18  127/76  96 %  --  Lying        Abnormal Labs:   1/20 1/21   Sodium 137    135      CO2 25 19   Anion Gap 11 14   Glucose 168 191   Calcium 8 9 7 3   Alkaline Phosphatase 54 35   Albumin 4 4 3 3   Magnesium  1 5     Trop WNL   Lactic acid 2 9  WBC 10 74  POC Glucose 184, 161   Ref Range & Units 01/20/18 1650   Color, UA  Yellow    Clarity, UA  Clear    Specific Cantrall, UA 1 003 - 1 030 1 025    pH, UA 4 5 - 8 0 5 0    Leukocytes, UA Negative  Elevated glucose may cause decr    Elevated glucose may cause decreased leukocyte values   See urine microscopic for [facility] result/   Nitrite, UA Negative  Negative    Protein, UA Negative mg/dl  Negative    Glucose, UA Negative mg/dl  >=1000 (1%)     Ketones, UA Negative mg/dl Trace       Blood cultures pending     Diagnostic Test Results:   1/20 EKG -   Normal sinus rhythm  Moderate voltage criteria for LVH, may be normal variant  Borderline ECG  When compared with ECG of 15-MAR-2017 11:01,  ST no longer depressed in Anterolateral leads            1/20 CTA chest, abd, pelvis - Diffuse small bowel wall thickening with fluid in the proximal colon in keeping with a nonspecific enteritis      ED Treatment:   Medication Administration from 01/20/2018 1504 to 01/20/2018 2017    Date/Time Order Dose Route Action   01/20/2018 1542 sodium chloride 0 9 % bolus 1,000 mL 1,000 mL Intravenous New Bag   01/20/2018 1541 ondansetron (ZOFRAN) injection 4 mg 4 mg Intravenous Given   01/20/2018 1712 iohexol (OMNIPAQUE) 350 MG/ML injection (SINGLE-DOSE) 100 mL 100 mL Intravenous Given   01/20/2018 1728 ondansetron (ZOFRAN) injection 4 mg 4 mg Intravenous Given   01/20/2018 1829 metoclopramide (REGLAN) injection 10 mg 10 mg Intravenous Given   01/20/2018 1829 diphenhydrAMINE (BENADRYL) injection 25 mg 25 mg Intravenous Given   01/20/2018 1829 sodium chloride 0 9 % bolus 1,000 mL 1,000 mL Intravenous New Bag        Past Medical/Surgical History:    Active Ambulatory Problems     Diagnosis Date Noted    Chest pain 03/14/2017     Resolved Ambulatory Problems     Diagnosis Date Noted    No Resolved Ambulatory Problems     Past Medical History:   Diagnosis Date    Diabetes mellitus ([facility] [address] )     Hyperlipidemia     Hypertension        Admitting Diagnosis: Acute gastroenteritis [K52 9]  Vomiting [R11 10]    Age/Sex: 63 y o  male    Assessment/Plan:   Principal Problem:    Enteritis  Active Problems:    Vomiting    Elevated lactic acid level    Type 2 diabetes mellitus without complication (HCC)    Leukocytosis    Diarrhea    History of pulmonary embolism    Hyperlipidemia    Essential hypertension    GERD (gastroesophageal reflux disease)      Plan for the Primary Problem(s):  · Enteritis  ? Present on admission  ? Possible infectious source unknown infectious agent  ? Associated with nausea, vomiting and non bloody diarrhea  ? CTA chest abdomen pelvis- no definite pulmonary embolism, diffuse small bowel wall thickening with fluid in the proximal colon and keeping written nonspecific enteritis  ? Leukocytosis present, no fever  ? Admit to hospital on inpatient status  ? Telemetry monitoring  ? IV normal saline  ? Blood cultures, fecal leukocytes, occult blood  ? Monitor electrolytes  ? Supportive care  ? Am labs  ? Close monitoring     Plan for Additional Problems:   · Vomiting, diarrhea  ? Present on admission  ? Multiple episodes of vomiting starting around midday  ? Single episode of non-bloody  diarrhea in the emergency room  ? IV normal saline  ? Zofran 4 mg p r n   ? NPO for now, will advance diet as tolerated  ? Monitor electrolytes  · Elevated lactic acid  ? Present on admission  ? Lactic acid level at 2 9  ? IV normal saline  ? Repeat lactic acid levels  ? Close monitoring  · Type 2 diabetes  ? Last A1c at 8 0  ? Glucose at 168  ? Hold oral hypoglycemic  ? Finger stick glucose  ? Sliding scale insulin  ? Repeat A1c  ? Close monitoring  · Leukocytosis  ? WBC at 10 74  ? No evidence of infectious source  ? IV normal saline  ? Repeat CBC in the morning  · History of pulmonary embolism  ? Diagnosis in March 2017 , CTA completed then showed scattered bilateral pulmonary emboli   ? Currently anticoagulated with Eliquis 5 mg bid  ? Close monitoring  · Hyperlipidemia  ? Continue statins  · Essential hypertension  ? Blood pressure is stable  ? Continue amlodipine, hydrochlorothiazide  · GERD  ? Continue omeprazole     VTE Prophylaxis: Apixaban (Eliquis)  / sequential compression device   Code Status: Level 1- Full Code  Anticipated Length of Stay:  Patient will be admitted on an Inpatient basis with an anticipated length of stay of  < 2 midnights     Justification for Hospital Stay: Enteritis, Elevated lactic acid Levels, intractable vomiting,     Admission Orders:  Scheduled Meds:   amLODIPine 10 mg Oral HS   apixaban 5 mg Oral BID   aspirin 81 mg Oral Daily   hydrochlorothiazide 12 5 mg Oral Daily   insulin lispro 1-5 Units Subcutaneous HS   insulin lispro 1-6 Units Subcutaneous TID AC   pantoprazole 20 mg Oral Daily Before Breakfast   pravastatin 40 mg Oral HS   valsartan 160 mg Oral Daily     Continuous Infusions:   sodium chloride 125 mL/hr Last Rate: 125 mL/hr (01/21/18 0436)     PRN Meds:   acetaminophen    ondansetron    Tele   SCDs  POC glucose ac/hs  OOB as chance   Diet Kev/CHO Controlled; Consistent Carbohydrate Diet Level 2 (5 carb servings/75 grams CHO/meal)  PT eval/tx   Cons CM

## 2018-01-21 NOTE — SOCIAL WORK
Cm spoke with the patient and they are for d/c to home today  Cm is taking into account that the patient has preferences while planning the d/c needs  Cm plan was discussed with the patient and the patient is agreeable to the plan the patient does understand the importance of follow up care and taking the medications as prescribed  The patient is aware of any symptoms that he should look for when d/c  The patient will make his own appointments with his urologist and pcp he is agreeable to the d/c plan and his wife is driving him home

## 2018-01-21 NOTE — ED PROVIDER NOTES
History  Chief Complaint   Patient presents with    Vomiting     Patient started vomiting approx  2 hours ago along with chills  63 yr male with gradual onset over past 3 hours with nausea and vomiting, initially stomach contents and food  No pain  Normal BM this morning, passing gas normal  No change in diet  Has been exposed to a large [employer] group this morning and spent all day yesterday at nursing home cleaning out a  family member's belongings but no specific sick contact  Was on azithromycin 3 weeks ago for bronchitis but no other abx or med changes  No fever  +chills  No headache no neck pain no cough no chest pain no belly pain no edema no rash  History provided by:  Patient and spouse      Prior to Admission Medications   Prescriptions Last Dose Informant Patient Reported? Taking?    Dulaglutide (TRULICITY) 1 5 BF/6 1VS SOPN   Yes Yes   Sig: Inject under the skin daily   Efinaconazole (JUBLIA) 10 % SOLN   Yes Yes   Sig: Apply topically daily   Empagliflozin (JARDIANCE PO)   Yes Yes   Sig: Take by mouth   amLODIPine (NORVASC) 10 mg tablet   Yes Yes   Sig: Take 10 mg by mouth daily at bedtime   apixaban (ELIQUIS) 5 mg   No Yes   Sig: Take 1 tablet by mouth 2 (two) times a day for 30 days 2 tabs PO BID x 7 days, then 1 tab PO BID   aspirin 81 MG tablet   Yes Yes   Sig: Take 81 mg by mouth daily   cetirizine (ZyrTEC) 10 mg tablet   Yes Yes   Sig: Take 10 mg by mouth daily   co-enzyme Q-10 30 MG capsule   Yes Yes   Sig: Take 100 mg by mouth daily   glimepiride (AMARYL) 1 mg tablet   Yes Yes   Sig: Take 2 mg by mouth 2 (two) times a day   hydrochlorothiazide (MICROZIDE) 12 5 mg capsule   Yes Yes   Sig: Take 12 5 mg by mouth daily   lovastatin (MEVACOR) 40 MG tablet   Yes Yes   Sig: Take 40 mg by mouth daily at bedtime   metFORMIN (GLUCOPHAGE) 1000 MG tablet   Yes Yes   Sig: Take 1,000 mg by mouth 2 (two) times a day with meals   omeprazole (PriLOSEC OTC) 20 MG tablet   No Yes   Sig: Take 1 tablet by mouth daily for 30 days   valsartan (DIOVAN) 160 mg tablet   Yes Yes   Sig: Take 160 mg by mouth daily      Facility-Administered Medications: None       Past Medical History:   Diagnosis Date    Diabetes mellitus ([facility] [address] )     Hyperlipidemia     Hypertension        Past Surgical History:   Procedure Laterality Date    CHOLECYSTECTOMY         History reviewed  No pertinent family history  I have reviewed and agree with the history as documented  Social History   Substance Use Topics    Smoking status: Never Smoker    Smokeless tobacco: Not on file    Alcohol use Yes        Review of Systems   Constitutional: Negative for activity change, appetite change, chills, diaphoresis, fatigue, fever and unexpected weight change  HENT: Negative for congestion, ear pain, rhinorrhea, sinus pressure, sore throat and tinnitus  Eyes: Negative for visual disturbance  Respiratory: Negative for cough, chest tightness, shortness of breath and wheezing  Cardiovascular: Negative for chest pain, palpitations and leg swelling  Gastrointestinal: Positive for nausea and vomiting  Negative for abdominal distention, abdominal pain, anal bleeding, blood in stool, constipation, diarrhea and rectal pain  Genitourinary: Negative for dysuria, flank pain, frequency, hematuria and urgency  Musculoskeletal: Negative for arthralgias, back pain and myalgias  Skin: Negative for rash and wound  Neurological: Negative for dizziness, tremors, syncope and headaches         Physical Exam  ED Triage Vitals [01/20/18 1512]   Temperature Pulse Respirations Blood Pressure SpO2   (!) 97 °F (36 1 °C) 89 18 127/76 96 %      Temp Source Heart Rate Source Patient Position - Orthostatic VS BP Location FiO2 (%)   Temporal Monitor Lying Left arm --      Pain Score       No Pain           Orthostatic Vital Signs  Vitals:    01/20/18 1800 01/20/18 1845 01/20/18 1900 01/20/18 1930   BP: 126/71 124/79 114/56 120/66   Pulse: (!) 106 (!) 117 (!) 111 (!) 115   Patient Position - Orthostatic VS: Lying Lying Lying Lying       Physical Exam   Constitutional: He is oriented to person, place, and time  Vital signs are normal  He appears well-developed and well-nourished  Non-toxic appearance  He appears distressed (vomiting)  HENT:   Head: Normocephalic and atraumatic  Right Ear: Tympanic membrane and external ear normal    Left Ear: Tympanic membrane and external ear normal    Nose: Nose normal  No rhinorrhea  Mouth/Throat: Uvula is midline and oropharynx is clear and moist    Eyes: Conjunctivae, EOM and lids are normal  Pupils are equal, round, and reactive to light  Right eye exhibits no discharge  Left eye exhibits no discharge  Neck: Normal range of motion and full passive range of motion without pain  Neck supple  Cardiovascular: Normal rate, regular rhythm, normal heart sounds and intact distal pulses  No murmur heard  Pulmonary/Chest: Effort normal and breath sounds normal  No accessory muscle usage  No tachypnea  No respiratory distress  He has no wheezes  He has no rales  He exhibits no tenderness  Abdominal: Soft  Normal appearance and bowel sounds are normal  He exhibits no distension  There is no hepatosplenomegaly  There is no tenderness  There is no rebound, no guarding and no CVA tenderness  No hernia  Musculoskeletal: Normal range of motion  He exhibits no edema or tenderness  Neurological: He is alert and oriented to person, place, and time  No cranial nerve deficit or sensory deficit  Skin: Skin is warm, dry and intact  Capillary refill takes less than 2 seconds  No rash noted  He is not diaphoretic  No erythema  No pallor  Psychiatric: He has a normal mood and affect  His speech is normal and behavior is normal    Nursing note and vitals reviewed        ED Medications  Medications   sodium chloride 0 9 % bolus 1,000 mL (0 mL Intravenous Stopped 1/20/18 1642)   ondansetron (ZOFRAN) injection 4 mg (4 mg Intravenous Given 1/20/18 1541)   iohexol (OMNIPAQUE) 350 MG/ML injection (SINGLE-DOSE) 100 mL (100 mL Intravenous Given 1/20/18 1712)   ondansetron (ZOFRAN) injection 4 mg (4 mg Intravenous Given 1/20/18 1728)   metoclopramide (REGLAN) injection 10 mg (10 mg Intravenous Given 1/20/18 1829)   diphenhydrAMINE (BENADRYL) injection 25 mg (25 mg Intravenous Given 1/20/18 1829)   sodium chloride 0 9 % bolus 1,000 mL (1,000 mL Intravenous New Bag 1/20/18 1829)       Diagnostic Studies  Results Reviewed     Procedure Component Value Units Date/Time    Urine Microscopic [[id number]]  (Normal) Collected:  01/20/18 1650    Lab Status:  Final result Specimen:  Urine from Urine, Clean Catch Updated:  01/20/18 1716     RBC, UA None Seen /hpf      WBC, UA None Seen /hpf      Epithelial Cells Occasional /hpf      Bacteria, UA Occasional /hpf     UA w Reflex to Microscopic w Reflex to Culture [[id number]]  (Abnormal) Collected:  01/20/18 1650    Lab Status:  Final result Specimen:  Urine from Urine, Clean Catch Updated:  01/20/18 1655     Color, UA Yellow     Clarity, UA Clear     Specific Gravity, UA 1 025     pH, UA 5 0     Leukocytes, UA Elevated glucose may cause decreased leukocyte values  See urine microscopic for [facility] result/ (A)     Nitrite, UA Negative     Protein, UA Negative mg/dl      Glucose, UA >=1000 (1%) (A) mg/dl      Ketones, UA Trace (A) mg/dl      Urobilinogen, UA 0 2 E U /dl      Bilirubin, UA Negative     Blood, UA Negative    Blood culture #2 [[id number]] Collected:  01/20/18 1618    Lab Status:   In process Specimen:  Blood from Arm, Left Updated:  01/20/18 1626    Comprehensive metabolic panel [[id number]]  (Abnormal) Collected:  01/20/18 1542    Lab Status:  Final result Specimen:  Blood from Arm, Right Updated:  01/20/18 1614     Sodium 137 mmol/L      Potassium 3 7 mmol/L      Chloride 101 mmol/L      CO2 25 mmol/L      Anion Gap 11 mmol/L      BUN 23 mg/dL      Creatinine 1 18 mg/dL      Glucose 168 (H) mg/dL      Calcium 8 9 mg/dL      AST 33 U/L      ALT 57 U/L      Alkaline Phosphatase 54 U/L      Total Protein 8 1 g/dL      Albumin 4 4 g/dL      Total Bilirubin 0 40 mg/dL      eGFR 67 ml/min/1 73sq m     Narrative:         National Kidney Disease Education Program recommendations are as follows:  GFR calculation is accurate only with a steady state creatinine  Chronic Kidney disease less than 60 ml/min/1 73 sq  meters  Kidney failure less than 15 ml/min/1 73 sq  meters  Lipase [[id number]]  (Normal) Collected:  01/20/18 1542    Lab Status:  Final result Specimen:  Blood from Arm, Right Updated:  01/20/18 1614     Lipase 275 u/L     Troponin I [[id number]]  (Normal) Collected:  01/20/18 1542    Lab Status:  Final result Specimen:  Blood from Arm, Right Updated:  01/20/18 1611     Troponin I <0 02 ng/mL     Narrative:         [employer] Chemistry analyzer 99% cutoff is > 0 04 ng/mL in network labs    o cTnI 99% cutoff is useful only when applied to patients in the clinical setting of myocardial ischemia  o cTnI 99% cutoff should be interpreted in the context of clinical history, ECG findings and possibly cardiac imaging to establish correct diagnosis  o cTnI 99% cutoff may be suggestive but clearly not indicative of a coronary event without the clinical setting of myocardial ischemia  Lactic acid, plasma [[id number]]  (Abnormal) Collected:  01/20/18 1542    Lab Status:  Final result Specimen:  Blood from Arm, Right Updated:  01/20/18 1608     LACTIC ACID 2 9 (HH) mmol/L     Narrative:         Result may be elevated if tourniquet was used during collection  [facility] [[id number]]  (Normal) Collected:  01/20/18 1542    Lab Status:  Final result Specimen:  Blood from Arm, Right Updated:  01/20/18 1558     Protime 12 9 seconds      INR 0 98    APTT [[id number]]  (Normal) Collected:  01/20/18 1542    Lab Status:  Final result Specimen:  Blood from Arm, Right Updated:  01/20/18 1558     PTT 28 seconds     Narrative:          Therapeutic Heparin Range = 60-90 seconds    CBC and differential [[id number]]  (Abnormal) Collected:  01/20/18 1542    Lab Status:  Final result Specimen:  Blood from Arm, Right Updated:  01/20/18 1550     WBC 10 74 (H) Thousand/uL      RBC 5 01 Million/uL      Hemoglobin 14 3 g/dL      Hematocrit 42 4 %      MCV 85 fL      MCH 28 5 pg      MCHC 33 7 g/dL      RDW 13 5 %      MPV 8 9 fL      Platelets 718 Thousands/uL      Neutrophils Relative 78 (H) %      Lymphocytes Relative 11 (L) %      Monocytes Relative 8 %      Eosinophils Relative 3 %      Basophils Relative 0 %      Neutrophils Absolute 8 40 (H) Thousands/µL      Lymphocytes Absolute 1 14 Thousands/µL      Monocytes Absolute 0 86 Thousand/µL      Eosinophils Absolute 0 33 Thousand/µL      Basophils Absolute 0 01 Thousands/µL     Blood culture #1 [[id number]] Collected:  01/20/18 1542    Lab Status: In process Specimen:  Blood from Arm, Right Updated:  01/20/18 1547                 CTA chest ct abdomen pelvis w contrast   Final Result by Roddy Holstein, MD (01/20 1752)      No definite pulmonary arterial embolism  Limited study  Diffuse small bowel wall thickening with fluid in the proximal colon in keeping with a nonspecific enteritis                    Workstation performed: [id number]                    Procedures  ECG 12 Lead Documentation  Date/Time: 1/20/2018 4:06 PM  Performed by: Susan Davenport  Authorized by: Susan Davenport     Indications / Diagnosis:  Vomiting  ECG reviewed by me, the ED Provider: yes    Patient location:  ED  Rate:     ECG rate:  82  Rhythm:     Rhythm: sinus rhythm    Ectopy:     Ectopy: none    QRS:     QRS axis:  Normal  Conduction:     Conduction: normal    ST segments:     ST segments:  Normal  T waves:     T waves: normal             Phone Contacts  ED Phone Contact    ED Course  ED Course as of Jan 20 1950   Sat Jan 20, 2018   1619 Troponin I: <0 02   1619 Lipase: 275   1619 Sodium: 137   1619 Potassium: 3 7   1619 Chloride: 101   1619 CO2: 25   1619 Anion Gap: 11   1619 BUN: 23   1619 Creatinine: 1 18   1619 Glucose: (!) 168   1619 AST: 33   1619 ALT: 57   1620 Alkaline Phosphatase: 54   1620 eGFR: 67   1620 Total Bilirubin: 0 40   1620 WBC: (!) 10 74   1620 Hemoglobin: 14 3   1620 Hematocrit: 42 4   1620 Platelets: 096   1374 PTT: 28   1620 Protime: 12 9   1704 Pt resting, nausea improved s/p zofran  IVF complete 1L  Next for CT  Family concerned about possibility of PE as he had these same sx last time and was diagnosed with incidental PEs  Will expand the CT to include chest  Imaging dept aware  1731 Nausea and vomiting returned, additional zofran ordered    1825 Pt in restroom with explosive diarrhea and vomiting  Reglan/benadryl and additional ivf ordered  CT reviewed - no pe, no obstruction, no stones, no colitis  There is evidence of small bowel enteritis and inflammation  1827 RBC, UA: None Seen   1827 WBC, UA: None Seen   1827 Epithelial Cells: Occasional   1827 Bacteria, UA: Occasional   1827 Ketones, UA: (!) Trace   1858 Paged [facility] for admit no call back yet    1858 Pt with chills now and additional diarrhea episode    1913 Paged [facility] second time        MDM  Number of Diagnoses or Management Options  Acute gastroenteritis: new and requires workup  Diagnosis management comments: 63 yr male with gastroenteritis with intractable vomiting  Admit to medicine  Continue hydration, antiemetics   Accepted to [facility] service       Amount and/or Complexity of Data Reviewed  Clinical lab tests: ordered and reviewed  Tests in the radiology section of CPT®: ordered and reviewed  Obtain history from someone other than the patient: yes  Discuss the patient with other providers: yes    Patient Progress  Patient progress: stable    CritCare Time    Disposition  Final diagnoses:   Acute gastroenteritis     Time reflects when diagnosis was documented in both MDM as applicable and the Disposition within this note     Time User Action Codes Description Comment    1/20/2018  7:18 PM Sandra  Add [K52 9] Acute gastroenteritis       ED Disposition     ED Disposition Condition Comment    Admit  Case was discussed with CONSTANTINO and the patient's admission status was agreed to be Admission Status: inpatient status to the service of Dr Marta Milian   Follow-up Information    None       Patient's Medications   Discharge Prescriptions    No medications on file     No discharge procedures on file      ED Provider  Electronically Signed by           Catalino Salinas PA-C  01/20/18 1950

## 2018-01-21 NOTE — SOCIAL WORK
Cm met with the patient to assess the  functional status prior to the admission  Cm reviewed the discharge check list with the patient and answered any questions  The patient is aware that if they have any questions related to the d/c plan they can have cm review the plan with them at any time  Cm will continue to follow and  develop a safe plan to address all needs and concerns  the patient may have  The patient is independent at home with all adls and he has no dme that he uses at home  He was ordered a cpap for home but when he got the cpap he could not sleep with it on and he sent it back  He does drive and his pcp is with [address] [place] [address] in [facility]  Dr Ezio Unger he uses [facility] and Josue juan in [place]

## 2018-01-21 NOTE — ED NOTES
Pt given glass of water at this time at request  Brina Jeffries PA-C approved     Selam Calles, BASHIR  01/20/18 1904

## 2018-01-21 NOTE — H&P
History and Physical - [facility] Internal Medicine    Patient Information: Konstantin Hawkins 63 y o  male MRN: [id number]  Unit/Bed#: RUIZ Encounter: [id number]  Admitting Physician: Flip Tran PA-C  PCP: rBian Tejada MD  Date of Admission:  01/20/18    Assessment/Plan:    Hospital Problem List:     Principal Problem:    Enteritis  Active Problems:    Vomiting    Elevated lactic acid level    Type 2 diabetes mellitus without complication (HCC)    Leukocytosis    Diarrhea    History of pulmonary embolism    Hyperlipidemia    Essential hypertension    GERD (gastroesophageal reflux disease)      Plan for the Primary Problem(s):  · Enteritis  · Present on admission  · Possible infectious source unknown infectious agent  · Associated with nausea, vomiting and non bloody diarrhea  · CTA chest abdomen pelvis- no definite pulmonary embolism, diffuse small bowel wall thickening with fluid in the proximal colon and keeping written nonspecific enteritis  · Leukocytosis present, no fever  · Admit to hospital on inpatient status  · Telemetry monitoring  · IV normal saline  · Blood cultures, fecal leukocytes, occult blood  · Monitor electrolytes  · Supportive care  · Am labs  · Close monitoring    Plan for Additional Problems:   · Vomiting, diarrhea  · Present on admission  · Multiple episodes of vomiting starting around midday  · Single episode of non-bloody  diarrhea in the emergency room  · IV normal saline  · Zofran 4 mg p r n   · NPO for now, will advance diet as tolerated  · Monitor electrolytes  · Elevated lactic acid  · Present on admission  · Lactic acid level at 2 9  · IV normal saline  · Repeat lactic acid levels  · Close monitoring  · Type 2 diabetes  · Last A1c at 8 0  · Glucose at 168  · Hold oral hypoglycemic  · Finger stick glucose  · Sliding scale insulin  · Repeat A1c  · Close monitoring  · Leukocytosis  · WBC at 10 74  ·  No evidence of infectious source  · IV normal saline  · Repeat CBC in the morning  · History of pulmonary embolism  · Diagnosis in March 2017 , CTA completed then showed scattered bilateral pulmonary emboli   · Currently anticoagulated with Eliquis 5 mg bid  · Close monitoring  · Hyperlipidemia  · Continue statins  · Essential hypertension  · Blood pressure is stable  · Continue amlodipine, hydrochlorothiazide  · GERD  · Continue omeprazole    VTE Prophylaxis: Apixaban (Eliquis)  / sequential compression device   Code Status: Level 1- Full Code  POLST: POLST is not applicable to this patient    Anticipated Length of Stay:  Patient will be admitted on an Inpatient basis with an anticipated length of stay of  < 2 midnights  Justification for Hospital Stay: Enteritis, Elevated lactic acid Levels, intractable vomiting,     Total Time for Visit, including Counseling / Coordination of Care: 30 minutes  Greater than 50% of this total time spent on direct patient counseling and coordination of care  Chief Complaint:   Vomiting and diarrhea    History of Present Illness:    Kraig Silveira is a 63 y o  male who presents to the emergency room complaining of vomiting for approximately 2 hours  Vomiting is associated with episodes of chills  Patient's past medical history significant for history of pulmonary embolism type 2 diabetes, hypertension, hyperlipidemia, GERD  Patient states that he was not at family found had eggs and toast in the morning and had hotdogs closer to midday  He reported starting feeling nauseous and had multiple violent episodes of vomiting in large volume  Initially called Urgent care and was told that he should go to the emergency room  He reported normal bowel movements this morning able to pass gas no recent change in his diet  He admits to being on azithromycin treated recently for bronchitis no other new medication or medication changes  Patient denies chest pain,  shortness of breath, cough, headaches, weakness    He also denies any sick contacts or recent travel  He admits to feeling a bit lightheaded after 1st episode of vomiting  Of note patient recalls similar episodes of onset of vomiting prior to being diagnosed with pulmonary embolism  In the emergency room patient had additional episodes of vomiting with new onset of diarrhea  States that diarrhea was foul smelling and non-bloody  He also reported episodes of abdominal pain  He also reported episodes of vomiting in the emergency room was also foul-smelling  Labs drawn in emergency room shows glucose of 168, lactic acid of 2 9, WBC 10 74, CTA chest and CT abdomen and pelvis shows-no definite pulmonary arterial embolism, diffuse small bowel wall thickening with fluid in the proximal colon in keeping with a nonspecific enteritis  Received Zofran 4 mg IV X 2, 2 L normal saline, Benadryl 25 mg Iv, Reglan 10 mg Iv  At bedside patient reports feeling very groggy with intermittent nausea  No additional episode of vomiting or diarrhea since medication were given  His mucous membrane appears very dry  Patient has been admitted on inpatient status for further management and workup of enteritis unknown infectious agent, vomiting and diarrhea, leukocytosis and elevated lactic acid level    Review of Systems:    Review of Systems   Constitutional: Positive for chills  Negative for appetite change and fever  HENT: Negative for rhinorrhea, sinus pain, sore throat and trouble swallowing  Eyes: Negative  Respiratory: Negative for cough, chest tightness, shortness of breath and wheezing  Cardiovascular: Negative for chest pain, palpitations and leg swelling  Gastrointestinal: Positive for abdominal pain, diarrhea, nausea and vomiting  Endocrine: Negative  Genitourinary: Positive for frequency and urgency  Negative for difficulty urinating and hematuria  Musculoskeletal: Negative for arthralgias, gait problem and myalgias     Allergic/Immunologic: Negative for environmental allergies and food allergies  Neurological: Positive for light-headedness  Negative for dizziness, weakness and headaches  Hematological: Negative  Psychiatric/Behavioral: Negative  Past Medical and Surgical History:     Past Medical History:   Diagnosis Date    Diabetes mellitus ([address] )     Hyperlipidemia     Hypertension        Past Surgical History:   Procedure Laterality Date    CHOLECYSTECTOMY         Meds/Allergies:    Prior to Admission medications    Medication Sig Start Date End Date Taking?  Authorizing Provider   amLODIPine (NORVASC) 10 mg tablet Take 10 mg by mouth daily at bedtime   Yes Historical Provider, MD   apixaban (ELIQUIS) 5 mg Take 1 tablet by mouth 2 (two) times a day for 30 days 2 tabs PO BID x 7 days, then 1 tab PO BID 3/17/17 1/20/18 Yes Olya Lopez MD   aspirin 81 MG tablet Take 81 mg by mouth daily   Yes Historical Provider, MD   cetirizine (ZyrTEC) 10 mg tablet Take 10 mg by mouth daily   Yes Historical Provider, MD   co-enzyme Q-10 30 MG capsule Take 100 mg by mouth daily   Yes Historical Provider, MD   Dulaglutide (TRULICITY) 1 5 /5 4CZ SOPN Inject under the skin daily   Yes Historical Provider, MD   Efinaconazole (JUBLIA) 10 % SOLN Apply topically daily   Yes Historical Provider, MD   Empagliflozin (JARDIANCE PO) Take by mouth   Yes Historical Provider, MD   glimepiride (AMARYL) 1 mg tablet Take 2 mg by mouth 2 (two) times a day   Yes Historical Provider, MD   hydrochlorothiazide (MICROZIDE) 12 5 mg capsule Take 12 5 mg by mouth daily   Yes Historical Provider, MD   lovastatin (MEVACOR) 40 MG tablet Take 40 mg by mouth daily at bedtime   Yes Historical Provider, MD   metFORMIN (GLUCOPHAGE) 1000 MG tablet Take 1,000 mg by mouth 2 (two) times a day with meals   Yes Historical Provider, MD   omeprazole (PriLOSEC OTC) 20 MG tablet Take 1 tablet by mouth daily for 30 days 3/17/17 1/20/18 Yes Olya Lopez MD   valsartan (DIOVAN) 160 mg tablet Take 160 mg by mouth daily   Yes Historical Provider, MD   Fish Oil-Cholecalciferol (FISH OIL + D3 PO) Take by mouth daily  1/20/18  Historical Provider, MD   Multiple Vitamins-Minerals (MULTIVITAMIN ADULT PO) Take by mouth daily  1/20/18  Historical Provider, MD   sitaGLIPtin (JANUVIA) 100 mg tablet Take 100 mg by mouth daily  1/20/18  Historical Provider, MD     I have reviewed home medications with patient personally  Allergies: Allergies   Allergen Reactions    Crestor [Rosuvastatin]        Social History:     Marital Status: /Civil Union   Occupation: Retired  Patient Pre-hospital Living Situation: Lives with Family  Patient Pre-hospital Level of Mobility: Active  Patient Pre-hospital Diet Restrictions: None reported  Substance Use History:   History   Alcohol Use    Yes     History   Smoking Status    Never Smoker   Smokeless Tobacco    Never Used     History   Drug Use No       Family History:    Family History   Problem Relation Age of Onset    Hyperlipidemia Mother     Stroke Father     Cancer Father     Diabetes Sister     Hypertension Sister     Diabetes Brother        Physical Exam:     Vitals:   Blood Pressure: 120/67 (01/20/18 1956)  Pulse: (!) 115 (01/20/18 1956)  Temperature: (!) 97 4 °F (36 3 °C) (01/20/18 1845)  Temp Source: Temporal (01/20/18 1845)  Respirations: 20 (01/20/18 1956)  Weight - Scale: 95 3 kg (210 lb) (01/20/18 1512)  SpO2: 96 % (01/20/18 1956)    Physical Exam   Constitutional: He is oriented to person, place, and time  He appears well-developed  No distress  Dry mucus membranes   HENT:   Head: Normocephalic and atraumatic  Eyes: EOM are normal  Pupils are equal, round, and reactive to light  Neck: Normal range of motion  Neck supple  No JVD present  Cardiovascular: Regular rhythm  Exam reveals no friction rub  No murmur heard  tachycardic   Pulmonary/Chest: Effort normal and breath sounds normal  No stridor  No respiratory distress  He has no wheezes  He has no rales  Abdominal: Soft   He exhibits no distension  There is no tenderness  Musculoskeletal: Normal range of motion  He exhibits no edema or deformity  Lymphadenopathy:     He has no cervical adenopathy  Neurological: He is oriented to person, place, and time  Skin: Skin is warm and dry  No rash noted  He is not diaphoretic  No erythema  Psychiatric: He has a normal mood and affect  Vitals reviewed  Additional Data:     Lab Results: I have personally reviewed pertinent reports  Results from last 7 days  Lab Units 01/20/18  1542   WBC Thousand/uL 10 74*   HEMOGLOBIN g/dL 14 3   HEMATOCRIT % 42 4   PLATELETS Thousands/uL 205   NEUTROS PCT % 78*   LYMPHS PCT % 11*   MONOS PCT % 8   EOS PCT % 3       Results from last 7 days  Lab Units 01/20/18  1542   SODIUM mmol/L 137   POTASSIUM mmol/L 3 7   CHLORIDE mmol/L 101   CO2 mmol/L 25   BUN mg/dL 23   CREATININE mg/dL 1 18   CALCIUM mg/dL 8 9   TOTAL PROTEIN g/dL 8 1   BILIRUBIN TOTAL mg/dL 0 40   ALK PHOS U/L 54   ALT U/L 57   AST U/L 33   GLUCOSE RANDOM mg/dL 168*       Results from last 7 days  Lab Units 01/20/18  1542   INR  0 98       Imaging: I have personally reviewed pertinent reports  Cta Chest Ct Abdomen Pelvis W Contrast    Result Date: 1/20/2018  Narrative: CT PULMONARY ANGIOGRAM OF THE CHEST AND CT ABDOMEN AND PELVIS WITH INTRAVENOUS CONTRAST INDICATION:  "intractable bilious vomiting, hx of p " COMPARISON: CTA chest, CT abdomen/pelvis 3/15/2017  TECHNIQUE:  CT examination of the chest, abdomen and pelvis was performed  Thin section CT angiographic technique was used in the chest in order to evaluate for pulmonary embolus and coronal 3D MIP postprocessing was performed on the acquisition scanner  Reformatted images were created in axial, sagittal, and coronal planes  Radiation dose length product (DLP) for this visit:  1381 mGy-cm     This examination, like all CT scans performed in the [facility], was performed utilizing techniques to minimize radiation dose exposure, including the use of iterative reconstruction and automated exposure control  IV Contrast:  100 mL of iohexol (OMNIPAQUE)  350 single dose    Enteric Contrast:  Enteric contrast was not administered  FINDINGS: CHEST PULMONARY ARTERIAL TREE:  Limited by nonoptimal opacification of the pulmonary arterial tree with the IV contrast bolus  Much greater opacification of the aorta and the pulmonary artery indicates delayed imaging technique  No pulmonary arterial embolism is visualized  LUNGS:  Lungs are clear  There is no tracheal or endobronchial lesion  PLEURA:  Unremarkable  HEART/AORTA:  Unremarkable for patient's age  MEDIASTINUM AND ZORAIDA:  Small sliding-type hiatal hernia  CHEST WALL AND LOWER NECK:       Unremarkable  ABDOMEN LIVER/BILIARY TREE:  Stable fatty infiltration  Hepatomegaly  GALLBLADDER:  Gallbladder is surgically absent  SPLEEN:  Stable subcentimeter simple cyst  PANCREAS:  Unremarkable  ADRENAL GLANDS: Unremarkable  KIDNEYS/URETERS:  No hydronephrosis  Stable left renal cysts  STOMACH AND BOWEL:  Scattered areas of mild small bowel wall thickening with fluid throughout the proximal colon in keeping with a nonspecific enteritis  No bowel obstruction  APPENDIX:  No findings to suggest appendicitis  ABDOMINOPELVIC CAVITY:  No ascites or free intraperitoneal air  No lymphadenopathy  VESSELS:  Unremarkable for patient's age  PELVIS REPRODUCTIVE ORGANS:  Unremarkable for patient's age  URINARY BLADDER:  Unremarkable  ABDOMINAL WALL/INGUINAL REGIONS:  Unremarkable  OSSEOUS STRUCTURES:  No acute fracture or destructive osseous lesion  Impression: No definite pulmonary arterial embolism  Limited study  Diffuse small bowel wall thickening with fluid in the proximal colon in keeping with a nonspecific enteritis      Workstation performed: [id number]       EKG, Pathology, and Other Studies Reviewed on Admission:   · EKG: Normal sinus rhythm at rate of 82    Allscripts / Epic Records Reviewed: Yes     ** Please Note: This note has been constructed using a voice recognition system   **

## 2018-01-21 NOTE — PHYSICAL THERAPY NOTE
01/21/18 0800   Note Type   Note type Eval only   Pain Assessment   Pain Assessment No/denies pain   Home Living   Type of [address] Two level;Stairs to enter with rails  (Tub, shower, and toilet on both levels)   Bathroom Shower/Tub Tub/shower unit   Bathroom Toilet Standard   Bathroom Equipment (Patient ot have grab bars and raised toilet seats installed )   [address] (None)   Prior Function   Level of [place] Independent with ADLs and functional mobility   Lives With [facility] [address] in the last 6 months 0   Vocational Retired   Cognition   Orientation Level Oriented X4   RLE Assessment   RLE Assessment WNL   LLE Assessment   LLE Assessment WNL   Coordination   Movements are Fluid and Coordinated 1   Sensation WFL   Bed Mobility   Rolling R 7  Independent   Supine to Sit 7  Independent   Sit to Supine 7  Independent   Transfers   Sit to Stand 7  Independent   Stand to Sit 7  Independent   Ambulation/Elevation   Gait pattern WNL   Gait Assistance 7  Independent   Assistive Device (None)   Distance 300 feet  Balance   Static Sitting Normal   Dynamic Sitting Normal   Static Standing Normal   Dynamic Standing Normal   Ambulatory Normal   Endurance Deficit   Endurance Deficit No   Assessment   Prognosis Excellent   Problem List (No impairments)   Assessment (Patient is independent with bed mobility transfers and ambul)   Barriers to Discharge None   Goals   Patient Goals Return to independent living at home  LTG Expiration Date 02/04/18   Long Term Goal #1 Independent with bed mobility and transfers   Long Term Goal #2 Independent ambulating long distances     Plan   Treatment/Interventions (None)   PT Frequency One time visit   Recommendation   Recommendation D/C PT   Equipment Recommended (None)   PT - OK to Discharge Yes

## 2018-01-21 NOTE — DISCHARGE SUMMARY
Discharge Summary - [facility] Internal Medicine    Patient Information: Juliana Gonzalez 63 y o  male MRN: [id number]  Unit/Bed#: 828-56 Encounter: [id number]    Discharging Physician / Practitioner: Gisele Davis PA-C  PCP: Hui Davila MD  Admission Date: 1/20/2018  Discharge Date: 01/21/18    Reason for Admission: enteritis, lactic acidosis    Discharge Diagnoses:     Principal Problem:    Enteritis  Active Problems:    Type 2 diabetes mellitus without complication (HCC)    Vomiting    Elevated lactic acid level    Leukocytosis    Diarrhea    Hyperlipidemia    Essential hypertension    History of pulmonary embolism    GERD (gastroesophageal reflux disease)  Resolved Problems:    * No resolved hospital problems  *      Consultations During Hospital Stay:  · none    Procedures Performed:     · CTA chest abdomen pelvis: No definite pulmonary arterial embolism  Limited study  Diffuse small bowel wall thickening with fluid in the proximal colon in keeping with a nonspecific enteritis  Significant Findings / Test Results:     · Lactic acid 2 9, Magnesium 1 5    Incidental Findings:   · none     Test Results Pending at Discharge (will require follow up):   · none     Outpatient Tests Requested:  · none    Complications:  none    Hospital Course:     Juliana Gonzalez is a 63 y o  male patient who originally presented to the hospital on 1/20/2018 due to vomiting for approximately 2 hours  Vomiting is associated with episodes of chills  Patient's past medical history significant for history of pulmonary embolism type 2 diabetes, hypertension, hyperlipidemia, GERD  Patient states that he had eggs and toast in the morning and had hotdogs closer to midday  He reported feeling nauseous and had multiple violent episodes of vomiting in large volume  Initially called Urgent care and was told that he should go to the emergency room    He reported normal bowel movements this morning able to pass gas no recent change in his diet  He admits to being on azithromycin treated recently for bronchitis no other new medication or medication changes  Patient denies chest pain,  shortness of breath, cough, headaches, weakness  He also denies any sick contacts or recent travel  He admits to feeling a bit lightheaded after 1st episode of vomiting  In the emergency room patient had additional episodes of vomiting with new onset of diarrhea  He also reported episodes of abdominal pain  Labs drawn in emergency room shows glucose of 168, lactic acid of 2 9, WBC 10 74, CTA chest and CT abdomen and pelvis shows-no definite pulmonary arterial embolism, diffuse small bowel wall thickening with fluid in the proximal colon in keeping with a nonspecific enteritis  On admission he was started on IV fluids and PRN Zofran  The patient's lactic acidosis resolved with IV fluids  He was noted to have a magnesium of 1 5 and received 2 g IV magnesium  On day 2 of his hospital stay his nausea/vomiting resolved and he was started on full liquids which he tolerated well  He was then given a regular diet which he tolerated as well  His reported his diarrhea improving and was requesting to go home  He was discharged home with instructions to follow up with his PCP within 1 week  Condition at Discharge: good     Discharge Day Visit / Exam:     * Please refer to separate progress note for these details *    Discussion with Family: wife updated at bedside      Discharge instructions/Information to patient and family:   See after visit summary for information provided to patient and family  Provisions for Follow-Up Care:  See after visit summary for information related to follow-up care and any pertinent home health orders        Disposition:     Home    For Discharges to [facility]:   · Not Applicable to this Patient - Not Applicable to this Patient    Planned Readmission: no    Discharge Statement:  I spent 25 minutes discharging the patient  This time was spent on the day of discharge  I had direct contact with the patient on the day of discharge  Greater than 50% of the total time was spent examining patient, answering all patient questions, arranging and discussing plan of care with patient as well as directly providing post-discharge instructions  Additional time then spent on discharge activities  Discharge Medications:  See after visit summary for reconciled discharge medications provided to patient and family  ** Please Note: This note has been constructed using a voice recognition system   **

## 2018-01-21 NOTE — ED NOTES
Pt reports chills at this time  Pt provided with warm blankets and bedside commode for further diarrhea episodes  Family at bedside   Call bell within reach     Tres Culver RN  01/20/18 6611

## 2018-01-22 LAB
ATRIAL RATE: 82 BPM
P AXIS: 10 DEGREES
PR INTERVAL: 170 MS
QRS AXIS: 3 DEGREES
QRSD INTERVAL: 104 MS
QT INTERVAL: 376 MS
QTC INTERVAL: 439 MS
T WAVE AXIS: 0 DEGREES
VENTRICULAR RATE: 82 BPM

## 2018-01-23 VITALS
WEIGHT: 215.19 LBS | TEMPERATURE: 97.6 F | HEART RATE: 82 BPM | DIASTOLIC BLOOD PRESSURE: 80 MMHG | HEIGHT: 70 IN | BODY MASS INDEX: 30.81 KG/M2 | SYSTOLIC BLOOD PRESSURE: 122 MMHG | RESPIRATION RATE: 18 BRPM | OXYGEN SATURATION: 96 %

## 2018-01-24 LAB — WBC SPEC QL GRAM STN: NORMAL

## 2018-01-25 LAB — BACTERIA BLD CULT: NORMAL

## 2018-01-26 ENCOUNTER — TRANSCRIBE ORDERS (OUTPATIENT)
Dept: ADMINISTRATIVE | Facility: HOSPITAL | Age: 63
End: 2018-01-26

## 2018-01-26 ENCOUNTER — APPOINTMENT (OUTPATIENT)
Dept: LAB | Facility: HOSPITAL | Age: 63
End: 2018-01-26
Payer: COMMERCIAL

## 2018-01-26 DIAGNOSIS — R53.83 FATIGUE, UNSPECIFIED TYPE: Primary | ICD-10-CM

## 2018-01-26 DIAGNOSIS — R53.83 FATIGUE, UNSPECIFIED TYPE: ICD-10-CM

## 2018-01-26 DIAGNOSIS — K52.9 INFLAMMATORY BOWEL DISEASE: ICD-10-CM

## 2018-01-26 DIAGNOSIS — E55.9 VITAMIN D DEFICIENCY DISEASE: ICD-10-CM

## 2018-01-26 LAB
25(OH)D3 SERPL-MCNC: 26.1 NG/ML (ref 30–100)
ANION GAP SERPL CALCULATED.3IONS-SCNC: 11 MMOL/L (ref 4–13)
BACTERIA BLD CULT: NORMAL
BASOPHILS # BLD MANUAL: 0 THOUSAND/UL (ref 0–0.1)
BASOPHILS NFR MAR MANUAL: 0 % (ref 0–1)
BUN SERPL-MCNC: 16 MG/DL (ref 5–25)
CALCIUM SERPL-MCNC: 9 MG/DL (ref 8.3–10.1)
CHLORIDE SERPL-SCNC: 100 MMOL/L (ref 100–108)
CO2 SERPL-SCNC: 25 MMOL/L (ref 21–32)
CREAT SERPL-MCNC: 0.92 MG/DL (ref 0.6–1.3)
EOSINOPHIL # BLD MANUAL: 0.04 THOUSAND/UL (ref 0–0.4)
EOSINOPHIL NFR BLD MANUAL: 1 % (ref 0–6)
ERYTHROCYTE [DISTWIDTH] IN BLOOD BY AUTOMATED COUNT: 13.4 % (ref 11.6–15.1)
GFR SERPL CREATININE-BSD FRML MDRD: 90 ML/MIN/1.73SQ M
GLUCOSE SERPL-MCNC: 231 MG/DL (ref 65–140)
HCT VFR BLD AUTO: 38.3 % (ref 36.5–49.3)
HGB BLD-MCNC: 13.1 G/DL (ref 12–17)
LYMPHOCYTES # BLD AUTO: 1.6 THOUSAND/UL (ref 0.6–4.47)
LYMPHOCYTES # BLD AUTO: 45 % (ref 14–44)
MCH RBC QN AUTO: 28.7 PG (ref 26.8–34.3)
MCHC RBC AUTO-ENTMCNC: 34.2 G/DL (ref 31.4–37.4)
MCV RBC AUTO: 84 FL (ref 82–98)
MONOCYTES # BLD AUTO: 0.6 THOUSAND/UL (ref 0–1.22)
MONOCYTES NFR BLD: 17 % (ref 4–12)
NEUTROPHILS # BLD MANUAL: 1.28 THOUSAND/UL (ref 1.85–7.62)
NEUTS SEG NFR BLD AUTO: 36 % (ref 43–75)
PLATELET # BLD AUTO: 176 THOUSANDS/UL (ref 149–390)
PLATELET BLD QL SMEAR: ADEQUATE
PMV BLD AUTO: 8.9 FL (ref 8.9–12.7)
POTASSIUM SERPL-SCNC: 4 MMOL/L (ref 3.5–5.3)
RBC # BLD AUTO: 4.57 MILLION/UL (ref 3.88–5.62)
SODIUM SERPL-SCNC: 136 MMOL/L (ref 136–145)
TOTAL CELLS COUNTED SPEC: 100
TSH SERPL DL<=0.05 MIU/L-ACNC: 1.55 UIU/ML (ref 0.36–3.74)
VARIANT LYMPHS # BLD AUTO: 1 %
WBC # BLD AUTO: 3.55 THOUSAND/UL (ref 4.31–10.16)

## 2018-01-26 PROCEDURE — 85007 BL SMEAR W/DIFF WBC COUNT: CPT

## 2018-01-26 PROCEDURE — 85027 COMPLETE CBC AUTOMATED: CPT

## 2018-01-26 PROCEDURE — 80048 BASIC METABOLIC PNL TOTAL CA: CPT

## 2018-01-26 PROCEDURE — 36415 COLL VENOUS BLD VENIPUNCTURE: CPT

## 2018-01-26 PROCEDURE — 82306 VITAMIN D 25 HYDROXY: CPT

## 2018-01-26 PROCEDURE — 84443 ASSAY THYROID STIM HORMONE: CPT

## 2018-02-01 ENCOUNTER — TRANSCRIBE ORDERS (OUTPATIENT)
Dept: ADMINISTRATIVE | Facility: HOSPITAL | Age: 63
End: 2018-02-01

## 2018-02-01 ENCOUNTER — APPOINTMENT (OUTPATIENT)
Dept: LAB | Facility: HOSPITAL | Age: 63
End: 2018-02-01
Attending: UROLOGY
Payer: COMMERCIAL

## 2018-02-01 DIAGNOSIS — N40.0 BENIGN PROSTATIC HYPERPLASIA WITHOUT LOWER URINARY TRACT SYMPTOMS: Primary | ICD-10-CM

## 2018-02-01 DIAGNOSIS — N13.8 BENIGN PROSTATIC HYPERPLASIA WITH URINARY OBSTRUCTION: Primary | ICD-10-CM

## 2018-02-01 DIAGNOSIS — N40.1 BENIGN PROSTATIC HYPERPLASIA WITH URINARY OBSTRUCTION: Primary | ICD-10-CM

## 2018-02-01 LAB — PSA SERPL-MCNC: 1.7 NG/ML (ref 0–4)

## 2018-02-01 PROCEDURE — 84153 ASSAY OF PSA TOTAL: CPT

## 2018-02-12 ENCOUNTER — OFFICE VISIT (OUTPATIENT)
Dept: UROLOGY | Facility: MEDICAL CENTER | Age: 63
End: 2018-02-12
Payer: COMMERCIAL

## 2018-02-12 VITALS
DIASTOLIC BLOOD PRESSURE: 84 MMHG | WEIGHT: 209 LBS | BODY MASS INDEX: 29.26 KG/M2 | SYSTOLIC BLOOD PRESSURE: 120 MMHG | HEIGHT: 71 IN

## 2018-02-12 DIAGNOSIS — N40.0 BENIGN PROSTATIC HYPERPLASIA WITHOUT LOWER URINARY TRACT SYMPTOMS: Primary | ICD-10-CM

## 2018-02-12 LAB
SL AMB  POCT GLUCOSE, UA: NORMAL
SL AMB LEUKOCYTE ESTERASE,UA: NEGATIVE
SL AMB POCT BILIRUBIN,UA: NEGATIVE
SL AMB POCT BLOOD,UA: NEGATIVE
SL AMB POCT CLARITY,UA: CLEAR
SL AMB POCT COLOR,UA: YELLOW
SL AMB POCT KETONES,UA: NEGATIVE
SL AMB POCT NITRITE,UA: NEGATIVE
SL AMB POCT PH,UA: 5
SL AMB POCT SPECIFIC GRAVITY,UA: 1.01
SL AMB POCT URINE PROTEIN: NEGATIVE
SL AMB POCT UROBILINOGEN: 0.2

## 2018-02-12 PROCEDURE — 81003 URINALYSIS AUTO W/O SCOPE: CPT | Performed by: UROLOGY

## 2018-02-12 PROCEDURE — 99214 OFFICE O/P EST MOD 30 MIN: CPT | Performed by: UROLOGY

## 2018-02-12 NOTE — PATIENT INSTRUCTIONS
Benign Prostatic Hypertrophy   WHAT YOU NEED TO KNOW:   What is benign prostatic hypertrophy? Benign prostatic hypertrophy (BPH) is a condition that causes your prostate gland to grow larger than normal  The prostate gland is the male sex gland that produces a fluid that is part of semen  It is about the size of a walnut and it is located under the bladder  As the prostate grows, it can squeeze the urethra  This can block urine flow and cause urinary problems  What causes BPH? It is not known what causes BPH  It may be just part of getting older  BPH is very common in men over 39years of age, but rarely causes problems before age 61  Some healthcare providers believe it is caused by a change in hormone levels as men get older  What are the signs and symptoms of BPH? · Feeling like you have not emptied your bladder    · Feeling the need to urinate right away     · Urine does not flow right away when you start to urinate or stops and starts again    · Frequent urination, especially at night    · Weak urine stream    · Blood in your urine  How is BPH diagnosed? · Blood tests:  Blood tests such as prostate specific antigen (PSA) measurement may be done  The amount of PSA in your blood may go up if you have BPH  · Cystoscopy: A cystoscopy allows caregivers to look for problems inside your bladder  A cystoscope is put into your bladder through your urethra  The urethra is the tube that urine flows through when you urinate  The cystoscope is a long tube with a lens and a light on the end  The scope may be hooked to a camera or monitor, and pictures may be taken  A tissue sample may also be taken during your cystoscopy  During this test, small tumors may be removed or bleeding may be stopped       · Digital rectal examination:  Your healthcare provider will use his finger to feel if your prostate is larger than normal      · Prostate biopsy:  A small piece of the prostate is removed and sent to a lab for tests      · Transrectal ultrasound:  Sound waves are used to show pictures of your prostate on a monitor  · Urine tests:     ¨ Urine sample: For this test you need to urinate into a small container  You will be given instructions on how to clean your genital area before you urinate  Do not touch the inside of the cup  Follow instructions on where to place the cup of urine when you are done  ¨ Residual urine measurement:  Healthcare providers may use a catheter to measure how much urine is left in your bladder after you urinate  ¨ Flow rate recording: This is a test that measures the speed of your urine stream   How is BPH treated? · Medicines:      ¨ Alpha blockers: This medicine relaxes the muscles in your prostate and bladder  It may help you urinate more easily  ¨ 5 alpha reductase inhibitors: These medicines block the production of a hormone that causes the prostate to get larger  It may help to slow the growth of the prostate or shrink the prostate  · Stent:  A stent is a short, tiny mesh tube that is put into the urethra to hold it open  · Surgery: You may need surgery to remove your prostate  · Other procedures: The prostate may be made smaller by a procedure such as a transurethral needle ablation (TUNA) or microwave heat treatment  You may also have a laser procedure called interstitial laser coagulation (ILC) to remove the prostate  What are the risks of BPH?   · Surgery to remove your prostate may cause you to bleed more than expected or get an infection  You may also have trouble controlling your bladder  This may cause you to leak urine  You may also have sudden strong urges to urinate that make it hard for you to hold your urine  · Without treatment, you may have other health problems  If urine stays in your bladder too long, you may develop a urinary infection or bladder stones   Small blood vessels in the bladder and prostate may start to bleed because you strain more when you urinate  The urethra may suddenly become blocked off, making it even harder to urinate  Over time, your kidneys can be damaged as urine backs up from the bladder and into the kidneys  How can I manage BPH?   · Do not let your bladder get too full before you empty it  Urinate when you feel the urge  · Limit alcohol  Do not drink large amounts of any liquid at one time  · Decrease the amount of salt you eat  Examples of salty foods are chips, cured meats, and canned soups  Do not use table salt  · Healthcare providers may tell you not to eat spicy foods such as chilli peppers  This may help you find out if spicy food makes your BPH symptoms worse  · You may have sex if you feel well  When should I contact my healthcare provider? · There is a large amount of blood in your urine  · Your signs and symptoms get worse  · You have a fever  · You have questions or concerns about your condition or care  When should I seek immediate care? · You are unable to urinate  · Your bladder feels very full and painful  CARE AGREEMENT:   You have the right to help plan your care  Learn about your health condition and how it may be treated  Discuss treatment options with your caregivers to decide what care you want to receive  You always have the right to refuse treatment  The above information is an  only  It is not intended as medical advice for individual conditions or treatments  Talk to your doctor, nurse or pharmacist before following any medical regimen to see if it is safe and effective for you  © 2017 [employer] Information is for End User's use only and may not be sold, redistributed or otherwise used for commercial purposes  All illustrations and images included in CareNotes® are the copyrighted property of [employer]  or [employer]

## 2018-02-12 NOTE — PROGRESS NOTES
Assessment/Plan:    Assessment:  1  Prostatic hyperplasia with obstruction  2  Voiding symptoms aggravated by glycosuria due to diabetic medication    Plan:   1  PSA and office visit in 1 year  No problem-specific Assessment & Plan notes found for this encounter  Diagnoses and all orders for this visit:    Benign prostatic hyperplasia without lower urinary tract symptoms  -     POCT urine dip auto non-scope  -     PSA; Future          Subjective:      Patient ID: John Mccarthy is a 63 y o  male  followed for lower urinary tract symptoms  HPI      He notes urinary frequency, urinary urgency, nocturia x 2  He denies other significant urinary symptoms  No gross hematuria, urinary tract infections or incontinence  He is taking no prescription medications for his symptoms  Some of his urinary sx are due to Jardiance and attendant glycosuria  No dysuria or blood  No UTI's  The following portions of the patient's history were reviewed and updated as appropriate: allergies, current medications, past family history, past medical history, past social history, past surgical history and problem list     Review of Systems   Constitutional: Negative for activity change and fatigue  DIABETIC  Respiratory: Negative for shortness of breath and wheezing  HX OF PULMONARY EMBOLI  Cardiovascular: Negative for chest pain  Gastrointestinal: Negative for abdominal pain  Genitourinary: Negative for difficulty urinating, dysuria, frequency, hematuria and urgency  Musculoskeletal: Negative for back pain and gait problem  Skin: Negative  Allergic/Immunologic: Negative  Neurological: Negative  Psychiatric/Behavioral: Negative  Objective:    Vitals:    02/12/18 1354   BP: 120/84        Physical Exam   Constitutional: He is oriented to person, place, and time  He appears well-developed and well-nourished  HENT:   Head: Normocephalic and atraumatic  Pulmonary/Chest: Effort normal    Abdominal: Soft  Bowel sounds are normal    Genitourinary:   Genitourinary Comments: The prostate is palpably normal in texture  Anatomic landmarks were distinct  Estimated volume is 40 mL  Anal sphincter tone is normal   Perineum is normal    Musculoskeletal: Normal range of motion  Neurological: He is alert and oriented to person, place, and time  Skin: Skin is warm and dry  Psychiatric: He has a normal mood and affect   His behavior is normal  Judgment and thought content normal

## 2018-02-12 NOTE — PROGRESS NOTES
IPSS Questionnaire (AUA-7): Over the past month    1)  How often have you had a sensation of not emptying your bladder completely after you finish urinating? 1 - Less than 1 time in 5   2)  How often have you had to urinate again less than two hours after you finished urinating? 5 - Almost always   3)  How often have you found you stopped and started again several times when you urinated? 0 - Not at all   4) How difficult have you found it to postpone urination? 4 - More than half the time   5) How often have you had a weak urinary stream?  0 - Not at all   6) How often have you had to push or strain to begin urination? 0 - Not at all   7) How many times did you most typically get up to urinate from the time you went to bed until the time you got up in the morning?   2 - 2 times   Total Score:  12

## 2018-04-27 ENCOUNTER — APPOINTMENT (OUTPATIENT)
Dept: LAB | Facility: HOSPITAL | Age: 63
End: 2018-04-27
Payer: COMMERCIAL

## 2018-04-27 ENCOUNTER — TRANSCRIBE ORDERS (OUTPATIENT)
Dept: ADMINISTRATIVE | Facility: HOSPITAL | Age: 63
End: 2018-04-27

## 2018-04-27 DIAGNOSIS — IMO0001 UNCONTROLLED DIABETES MELLITUS TYPE 2 WITHOUT COMPLICATIONS, UNSPECIFIED WHETHER LONG TERM INSULIN USE: Primary | ICD-10-CM

## 2018-04-27 DIAGNOSIS — IMO0001 UNCONTROLLED DIABETES MELLITUS TYPE 2 WITHOUT COMPLICATIONS, UNSPECIFIED WHETHER LONG TERM INSULIN USE: ICD-10-CM

## 2018-04-27 DIAGNOSIS — I10 ESSENTIAL HYPERTENSION, BENIGN: ICD-10-CM

## 2018-04-27 DIAGNOSIS — E78.2 MIXED HYPERLIPIDEMIA: ICD-10-CM

## 2018-04-27 LAB
ALBUMIN SERPL BCP-MCNC: 4 G/DL (ref 3.5–5)
ALP SERPL-CCNC: 52 U/L (ref 46–116)
ALT SERPL W P-5'-P-CCNC: 50 U/L (ref 12–78)
ANION GAP SERPL CALCULATED.3IONS-SCNC: 11 MMOL/L (ref 4–13)
AST SERPL W P-5'-P-CCNC: 34 U/L (ref 5–45)
BILIRUB SERPL-MCNC: 0.4 MG/DL (ref 0.2–1)
BUN SERPL-MCNC: 19 MG/DL (ref 5–25)
CALCIUM SERPL-MCNC: 9.1 MG/DL (ref 8.3–10.1)
CHLORIDE SERPL-SCNC: 102 MMOL/L (ref 100–108)
CHOLEST SERPL-MCNC: 159 MG/DL (ref 50–200)
CO2 SERPL-SCNC: 25 MMOL/L (ref 21–32)
CREAT SERPL-MCNC: 0.9 MG/DL (ref 0.6–1.3)
CREAT UR-MCNC: 82.4 MG/DL
EST. AVERAGE GLUCOSE BLD GHB EST-MCNC: 197 MG/DL
GFR SERPL CREATININE-BSD FRML MDRD: 93 ML/MIN/1.73SQ M
GLUCOSE P FAST SERPL-MCNC: 176 MG/DL (ref 65–99)
HBA1C MFR BLD: 8.5 % (ref 4.2–6.3)
HDLC SERPL-MCNC: 35 MG/DL (ref 40–60)
LDLC SERPL CALC-MCNC: 81 MG/DL (ref 0–100)
MICROALBUMIN UR-MCNC: 16.5 MG/L (ref 0–20)
MICROALBUMIN/CREAT 24H UR: 20 MG/G CREATININE (ref 0–30)
NONHDLC SERPL-MCNC: 124 MG/DL
POTASSIUM SERPL-SCNC: 4.2 MMOL/L (ref 3.5–5.3)
PROT SERPL-MCNC: 7.2 G/DL (ref 6.4–8.2)
SODIUM SERPL-SCNC: 138 MMOL/L (ref 136–145)
TRIGL SERPL-MCNC: 213 MG/DL

## 2018-04-27 PROCEDURE — 36415 COLL VENOUS BLD VENIPUNCTURE: CPT

## 2018-04-27 PROCEDURE — 80061 LIPID PANEL: CPT

## 2018-04-27 PROCEDURE — 82570 ASSAY OF URINE CREATININE: CPT | Performed by: INTERNAL MEDICINE

## 2018-04-27 PROCEDURE — 82043 UR ALBUMIN QUANTITATIVE: CPT | Performed by: INTERNAL MEDICINE

## 2018-04-27 PROCEDURE — 83036 HEMOGLOBIN GLYCOSYLATED A1C: CPT

## 2018-04-27 PROCEDURE — 80053 COMPREHEN METABOLIC PANEL: CPT

## 2019-01-30 ENCOUNTER — APPOINTMENT (OUTPATIENT)
Dept: LAB | Facility: HOSPITAL | Age: 64
End: 2019-01-30
Payer: COMMERCIAL

## 2019-01-30 ENCOUNTER — TRANSCRIBE ORDERS (OUTPATIENT)
Dept: ADMINISTRATIVE | Facility: HOSPITAL | Age: 64
End: 2019-01-30

## 2019-01-30 DIAGNOSIS — N40.0 BENIGN PROSTATIC HYPERPLASIA WITHOUT LOWER URINARY TRACT SYMPTOMS: ICD-10-CM

## 2019-01-30 DIAGNOSIS — E11.65 UNCONTROLLED TYPE 2 DIABETES MELLITUS WITH HYPERGLYCEMIA (HCC): Primary | ICD-10-CM

## 2019-01-30 DIAGNOSIS — E11.65 UNCONTROLLED TYPE 2 DIABETES MELLITUS WITH HYPERGLYCEMIA (HCC): ICD-10-CM

## 2019-01-30 LAB
ALBUMIN SERPL BCP-MCNC: 4 G/DL (ref 3.5–5)
ALP SERPL-CCNC: 49 U/L (ref 46–116)
ALT SERPL W P-5'-P-CCNC: 40 U/L (ref 12–78)
ANION GAP SERPL CALCULATED.3IONS-SCNC: 10 MMOL/L (ref 4–13)
AST SERPL W P-5'-P-CCNC: 25 U/L (ref 5–45)
BILIRUB SERPL-MCNC: 0.4 MG/DL (ref 0.2–1)
BUN SERPL-MCNC: 15 MG/DL (ref 5–25)
CALCIUM SERPL-MCNC: 9.1 MG/DL (ref 8.3–10.1)
CHLORIDE SERPL-SCNC: 102 MMOL/L (ref 100–108)
CHOLEST SERPL-MCNC: 160 MG/DL (ref 50–200)
CO2 SERPL-SCNC: 29 MMOL/L (ref 21–32)
CREAT SERPL-MCNC: 0.98 MG/DL (ref 0.6–1.3)
CREAT UR-MCNC: 103 MG/DL
EST. AVERAGE GLUCOSE BLD GHB EST-MCNC: 171 MG/DL
GFR SERPL CREATININE-BSD FRML MDRD: 83 ML/MIN/1.73SQ M
GLUCOSE P FAST SERPL-MCNC: 140 MG/DL (ref 65–99)
HBA1C MFR BLD: 7.6 % (ref 4.2–6.3)
HDLC SERPL-MCNC: 39 MG/DL (ref 40–60)
LDLC SERPL CALC-MCNC: 89 MG/DL (ref 0–100)
MICROALBUMIN UR-MCNC: 14.8 MG/L (ref 0–20)
MICROALBUMIN/CREAT 24H UR: 14 MG/G CREATININE (ref 0–30)
NONHDLC SERPL-MCNC: 121 MG/DL
POTASSIUM SERPL-SCNC: 3.9 MMOL/L (ref 3.5–5.3)
PROT SERPL-MCNC: 7.3 G/DL (ref 6.4–8.2)
PSA SERPL-MCNC: 3 NG/ML (ref 0–4)
SODIUM SERPL-SCNC: 141 MMOL/L (ref 136–145)
TRIGL SERPL-MCNC: 159 MG/DL

## 2019-01-30 PROCEDURE — 80061 LIPID PANEL: CPT

## 2019-01-30 PROCEDURE — 82570 ASSAY OF URINE CREATININE: CPT | Performed by: INTERNAL MEDICINE

## 2019-01-30 PROCEDURE — 80053 COMPREHEN METABOLIC PANEL: CPT

## 2019-01-30 PROCEDURE — G0103 PSA SCREENING: HCPCS

## 2019-01-30 PROCEDURE — 36415 COLL VENOUS BLD VENIPUNCTURE: CPT

## 2019-01-30 PROCEDURE — 82043 UR ALBUMIN QUANTITATIVE: CPT | Performed by: INTERNAL MEDICINE

## 2019-01-30 PROCEDURE — 83036 HEMOGLOBIN GLYCOSYLATED A1C: CPT

## 2019-02-07 RX ORDER — LOSARTAN POTASSIUM 100 MG/1
100 TABLET ORAL
COMMUNITY

## 2019-02-07 RX ORDER — FLUTICASONE PROPIONATE 50 MCG
2 SPRAY, SUSPENSION (ML) NASAL
COMMUNITY
Start: 2018-11-26

## 2019-02-14 ENCOUNTER — OFFICE VISIT (OUTPATIENT)
Dept: UROLOGY | Facility: MEDICAL CENTER | Age: 64
End: 2019-02-14
Payer: COMMERCIAL

## 2019-02-14 VITALS
WEIGHT: 203.8 LBS | HEIGHT: 71 IN | SYSTOLIC BLOOD PRESSURE: 108 MMHG | DIASTOLIC BLOOD PRESSURE: 66 MMHG | BODY MASS INDEX: 28.53 KG/M2 | HEART RATE: 73 BPM

## 2019-02-14 DIAGNOSIS — N40.0 BENIGN PROSTATIC HYPERPLASIA WITHOUT LOWER URINARY TRACT SYMPTOMS: Primary | ICD-10-CM

## 2019-02-14 LAB
SL AMB  POCT GLUCOSE, UA: NEGATIVE
SL AMB LEUKOCYTE ESTERASE,UA: NEGATIVE
SL AMB POCT BILIRUBIN,UA: NEGATIVE
SL AMB POCT BLOOD,UA: NEGATIVE
SL AMB POCT CLARITY,UA: CLEAR
SL AMB POCT COLOR,UA: YELLOW
SL AMB POCT KETONES,UA: NEGATIVE
SL AMB POCT NITRITE,UA: NEGATIVE
SL AMB POCT PH,UA: 5.5
SL AMB POCT SPECIFIC GRAVITY,UA: 1.01
SL AMB POCT URINE PROTEIN: NEGATIVE
SL AMB POCT UROBILINOGEN: 0.2

## 2019-02-14 PROCEDURE — 81003 URINALYSIS AUTO W/O SCOPE: CPT | Performed by: UROLOGY

## 2019-02-14 PROCEDURE — 99214 OFFICE O/P EST MOD 30 MIN: CPT | Performed by: UROLOGY

## 2019-02-14 RX ORDER — MELATONIN
1000 DAILY
COMMUNITY
End: 2020-11-11 | Stop reason: SINTOL

## 2019-02-14 RX ORDER — OMEPRAZOLE 40 MG/1
CAPSULE, DELAYED RELEASE ORAL
COMMUNITY
Start: 2018-12-28 | End: 2020-11-06 | Stop reason: SDUPTHER

## 2019-02-14 RX ORDER — MECLIZINE HYDROCHLORIDE 25 MG/1
TABLET ORAL 3 TIMES DAILY PRN
COMMUNITY
End: 2020-02-21 | Stop reason: ALTCHOICE

## 2019-02-14 NOTE — PROGRESS NOTES
Assessment/Plan:    Benign prostatic hyperplasia without lower urinary tract symptoms  Minimally symptomatic without treatment  Normal PSA  Return in 1 year  Diagnoses and all orders for this visit:    Benign prostatic hyperplasia without lower urinary tract symptoms  -     POCT urine dip auto non-scope  -     PSA, Total Screen; Future    Other orders  -     omeprazole (PriLOSEC) 40 MG capsule  -     meclizine (ANTIVERT) 25 mg tablet; Take by mouth 3 (three) times a day as needed for dizziness  -     cholecalciferol (VITAMIN D3) 1,000 units tablet; Take 1,000 Units by mouth daily          Subjective:      Patient ID: Emily Williamson is a 66 y o  male  HPI  BPH:  He notes urinary frequency and nocturia x 1  He denies other significant urinary symptoms  He denies gross hematuria, urinary tract infections or incontinence  He is taking neither medications nor supplements for his symptoms  PSA:  3 0 in January 30, 2019  It was 1 7 in Jan 2018  AUA SYMPTOM SCORE      Most Recent Value   AUA SYMPTOM SCORE   How often have you had a sensation of not emptying your bladder completely after you finished urinating? 1   How often have you had to urinate again less than two hours after you finished urinating? 1   How often have you found you stopped and started again several times when you urinate? 1   How often have you found it difficult to postpone urination? 0   How often have you had a weak urinary stream?  0   How often have you had to push or strain to begin urination? 0   How many times did you most typically get up to urinate from the time you went to bed at night until the time you got up in the morning?   1   Quality of Life: If you were to spend the rest of your life with your urinary condition just the way it is now, how would you feel about that?  0   AUA SYMPTOM SCORE  4          The following portions of the patient's history were reviewed and updated as appropriate: allergies, current medications, past family history, past medical history, past social history, past surgical history and problem list     Review of Systems    Constitutional: Negative for activity change and fatigue  DIABETIC  Respiratory: Negative for shortness of breath and wheezing  HX OF PULMONARY EMBOLI  Eliquis  Cardiovascular: Negative for chest pain  Gastrointestinal: Negative for abdominal pain  Genitourinary: Negative for difficulty urinating, dysuria, frequency, hematuria and urgency  Musculoskeletal: Negative for back pain and gait problem  Skin: Negative  Allergic/Immunologic: Negative  Neurological: Negative  Psychiatric/Behavioral: Negative  Objective:      /66 (BP Location: Left arm, Patient Position: Sitting, Cuff Size: Standard)   Pulse 73   Ht 5' 11" (1 803 m)   Wt 92 4 kg (203 lb 12 8 oz)   BMI 28 42 kg/m²          Physical Exam   Constitutional: He is oriented to person, place, and time  He appears well-developed and well-nourished  HENT:   Head: Normocephalic and atraumatic  Eyes: EOM are normal    Neck: Normal range of motion  Neck supple  Pulmonary/Chest: Effort normal    Genitourinary: Rectum normal    Genitourinary Comments: The prostate is 40 grams, firm, smooth and non-tender   Musculoskeletal: Normal range of motion  Neurological: He is alert and oriented to person, place, and time  Skin: Skin is warm and dry  Psychiatric: He has a normal mood and affect   His behavior is normal  Judgment and thought content normal

## 2019-05-02 ENCOUNTER — OFFICE VISIT (OUTPATIENT)
Dept: URGENT CARE | Facility: CLINIC | Age: 64
End: 2019-05-02
Payer: COMMERCIAL

## 2019-05-02 VITALS
TEMPERATURE: 98 F | HEART RATE: 78 BPM | RESPIRATION RATE: 18 BRPM | DIASTOLIC BLOOD PRESSURE: 76 MMHG | WEIGHT: 203 LBS | SYSTOLIC BLOOD PRESSURE: 132 MMHG | BODY MASS INDEX: 28.42 KG/M2 | HEIGHT: 71 IN | OXYGEN SATURATION: 96 %

## 2019-05-02 DIAGNOSIS — J45.901 ALLERGIC BRONCHITIS WITH ACUTE EXACERBATION: Primary | ICD-10-CM

## 2019-05-02 PROCEDURE — 99213 OFFICE O/P EST LOW 20 MIN: CPT | Performed by: PHYSICIAN ASSISTANT

## 2019-05-02 RX ORDER — METHYLPREDNISOLONE 4 MG/1
TABLET ORAL
Qty: 1 EACH | Refills: 0 | Status: SHIPPED | OUTPATIENT
Start: 2019-05-02 | End: 2020-02-21 | Stop reason: ALTCHOICE

## 2019-08-20 ENCOUNTER — TRANSCRIBE ORDERS (OUTPATIENT)
Dept: ADMINISTRATIVE | Facility: HOSPITAL | Age: 64
End: 2019-08-20

## 2019-08-20 ENCOUNTER — APPOINTMENT (OUTPATIENT)
Dept: LAB | Facility: HOSPITAL | Age: 64
End: 2019-08-20
Payer: COMMERCIAL

## 2019-08-20 DIAGNOSIS — I10 ESSENTIAL HYPERTENSION, BENIGN: ICD-10-CM

## 2019-08-20 DIAGNOSIS — Z00.00 ROUTINE GENERAL MEDICAL EXAMINATION AT A HEALTH CARE FACILITY: ICD-10-CM

## 2019-08-20 DIAGNOSIS — E78.5 HYPERLIPIDEMIA, UNSPECIFIED HYPERLIPIDEMIA TYPE: ICD-10-CM

## 2019-08-20 DIAGNOSIS — E11.00 TYPE II DIABETES MELLITUS WITH HYPEROSMOLARITY, UNCONTROLLED (HCC): ICD-10-CM

## 2019-08-20 DIAGNOSIS — E55.9 VITAMIN D DEFICIENCY DISEASE: ICD-10-CM

## 2019-08-20 DIAGNOSIS — E55.9 VITAMIN D DEFICIENCY DISEASE: Primary | ICD-10-CM

## 2019-08-20 DIAGNOSIS — E11.65 TYPE II DIABETES MELLITUS WITH HYPEROSMOLARITY, UNCONTROLLED (HCC): ICD-10-CM

## 2019-08-20 LAB
25(OH)D3 SERPL-MCNC: 29.1 NG/ML (ref 30–100)
ALBUMIN SERPL BCP-MCNC: 4 G/DL (ref 3.5–5)
ALP SERPL-CCNC: 50 U/L (ref 46–116)
ALT SERPL W P-5'-P-CCNC: 33 U/L (ref 12–78)
ANION GAP SERPL CALCULATED.3IONS-SCNC: 9 MMOL/L (ref 4–13)
AST SERPL W P-5'-P-CCNC: 20 U/L (ref 5–45)
BILIRUB SERPL-MCNC: 0.4 MG/DL (ref 0.2–1)
BUN SERPL-MCNC: 25 MG/DL (ref 5–25)
CALCIUM SERPL-MCNC: 9.1 MG/DL (ref 8.3–10.1)
CHLORIDE SERPL-SCNC: 105 MMOL/L (ref 100–108)
CHOLEST SERPL-MCNC: 162 MG/DL (ref 50–200)
CO2 SERPL-SCNC: 27 MMOL/L (ref 21–32)
CREAT SERPL-MCNC: 1.06 MG/DL (ref 0.6–1.3)
CREAT UR-MCNC: 85.5 MG/DL
EST. AVERAGE GLUCOSE BLD GHB EST-MCNC: 174 MG/DL
GFR SERPL CREATININE-BSD FRML MDRD: 75 ML/MIN/1.73SQ M
GLUCOSE P FAST SERPL-MCNC: 163 MG/DL (ref 65–99)
HBA1C MFR BLD: 7.7 % (ref 4.2–6.3)
HDLC SERPL-MCNC: 34 MG/DL (ref 40–60)
LDLC SERPL CALC-MCNC: 76 MG/DL (ref 0–100)
MICROALBUMIN UR-MCNC: 13.1 MG/L (ref 0–20)
MICROALBUMIN/CREAT 24H UR: 15 MG/G CREATININE (ref 0–30)
NONHDLC SERPL-MCNC: 128 MG/DL
POTASSIUM SERPL-SCNC: 4.2 MMOL/L (ref 3.5–5.3)
PROT SERPL-MCNC: 7.5 G/DL (ref 6.4–8.2)
SODIUM SERPL-SCNC: 141 MMOL/L (ref 136–145)
TRIGL SERPL-MCNC: 258 MG/DL
TSH SERPL DL<=0.05 MIU/L-ACNC: 4.34 UIU/ML (ref 0.36–3.74)

## 2019-08-20 PROCEDURE — 82306 VITAMIN D 25 HYDROXY: CPT

## 2019-08-20 PROCEDURE — 82043 UR ALBUMIN QUANTITATIVE: CPT | Performed by: INTERNAL MEDICINE

## 2019-08-20 PROCEDURE — 80061 LIPID PANEL: CPT

## 2019-08-20 PROCEDURE — 80053 COMPREHEN METABOLIC PANEL: CPT

## 2019-08-20 PROCEDURE — 36415 COLL VENOUS BLD VENIPUNCTURE: CPT

## 2019-08-20 PROCEDURE — 84443 ASSAY THYROID STIM HORMONE: CPT

## 2019-08-20 PROCEDURE — 83036 HEMOGLOBIN GLYCOSYLATED A1C: CPT

## 2019-08-20 PROCEDURE — 82570 ASSAY OF URINE CREATININE: CPT | Performed by: INTERNAL MEDICINE

## 2020-01-13 ENCOUNTER — OFFICE VISIT (OUTPATIENT)
Dept: URGENT CARE | Facility: CLINIC | Age: 65
End: 2020-01-13
Payer: COMMERCIAL

## 2020-01-13 VITALS
TEMPERATURE: 98.2 F | RESPIRATION RATE: 18 BRPM | DIASTOLIC BLOOD PRESSURE: 86 MMHG | WEIGHT: 204 LBS | SYSTOLIC BLOOD PRESSURE: 120 MMHG | BODY MASS INDEX: 29.2 KG/M2 | HEART RATE: 85 BPM | HEIGHT: 70 IN | OXYGEN SATURATION: 95 %

## 2020-01-13 DIAGNOSIS — J20.9 ACUTE BRONCHITIS, UNSPECIFIED ORGANISM: Primary | ICD-10-CM

## 2020-01-13 PROCEDURE — 99213 OFFICE O/P EST LOW 20 MIN: CPT

## 2020-01-13 RX ORDER — AZITHROMYCIN 250 MG/1
TABLET, FILM COATED ORAL
Qty: 6 TABLET | Refills: 0 | Status: SHIPPED | OUTPATIENT
Start: 2020-01-13 | End: 2020-01-17

## 2020-01-13 NOTE — PATIENT INSTRUCTIONS
1   Claritin 12 hour for runny nose  2  Mucinex 600 mgs every 12 hours for couth      Acute Bronchitis   WHAT YOU NEED TO KNOW:   Acute bronchitis is swelling and irritation in the air passages of your lungs  This irritation may cause you to cough or have other breathing problems  Acute bronchitis often starts because of another illness, such as a cold or the flu  The illness spreads from your nose and throat to your windpipe and airways  Bronchitis is often called a chest cold  Acute bronchitis lasts about 3 to 6 weeks and is usually not a serious illness  Your cough can last for several weeks  DISCHARGE INSTRUCTIONS:   Return to the emergency department if:   · You cough up blood  · Your lips or fingernails turn blue  · You feel like you are not getting enough air when you breathe  Contact your healthcare provider if:   · You have a fever  · Your breathing problems do not go away or get worse  · Your cough does not get better within 4 weeks  · You have questions or concerns about your condition or care  Self-care:   · Get more rest   Rest helps your body to heal  Slowly start to do more each day  Rest when you feel it is needed  · Avoid irritants in the air  Avoid chemicals, fumes, and dust  Wear a face mask if you must work around dust or fumes  Stay inside on days when air pollution levels are high  If you have allergies, stay inside when pollen counts are high  Do not use aerosol products, such as spray-on deodorant, bug spray, and hair spray  · Do not smoke or be around others who smoke  Nicotine and other chemicals in cigarettes and cigars damages the cilia that move mucus out of your lungs  Ask your healthcare provider for information if you currently smoke and need help to quit  E-cigarettes or smokeless tobacco still contain nicotine  Talk to your healthcare provider before you use these products  · Drink liquids as directed    Liquids help keep your air passages moist and help you cough up mucus  You may need to drink more liquids when you have acute bronchitis  Ask how much liquid to drink each day and which liquids are best for you  · Use a humidifier or vaporizer  Use a cool mist humidifier or a vaporizer to increase air moisture in your home  This may make it easier for you to breathe and help decrease your cough  Decrease risk for acute bronchitis:   · Get the vaccinations you need  Ask your healthcare provider if you should get vaccinated against the flu or pneumonia  · Prevent the spread of germs  You can decrease your risk of acute bronchitis and other illnesses by doing the following:     [facility] your hands often with soap and water  Carry germ-killing hand lotion or gel with you  You can use the lotion or gel to clean your hands when soap and water are not available  ¨ Do not touch your eyes, nose, or mouth unless you have washed your hands first     ¨ Always cover your mouth when you cough to prevent the spread of germs  It is best to cough into a tissue or your shirt sleeve instead of into your hand  Ask those around you cover their mouths when they cough  ¨ Try to avoid people who have a cold or the flu  If you are sick, stay away from others as much as possible  Medicines: Your healthcare provider may  give you any of the following:  · Ibuprofen or acetaminophen  are medicines that help lower your fever  They are available without a doctor's order  Ask your healthcare provider which medicine is right for you  Ask how much to take and how often to take it  Follow directions  These medicines can cause stomach bleeding if not taken correctly  Ibuprofen can cause kidney damage  Do not take ibuprofen if you have kidney disease, an ulcer, or allergies to aspirin  Acetaminophen can cause liver damage  Do not take more than 4,000 milligrams in 24 hours  · Decongestants  help loosen mucus in your lungs and make it easier to cough up   This can help you breathe easier  · Cough suppressants  decrease your urge to cough  If your cough produces mucus, do not take a cough suppressant unless your healthcare provider tells you to  Your healthcare provider may suggest that you take a cough suppressant at night so you can rest     · Inhalers  may be given  Your healthcare provider may give you one or more inhalers to help you breathe easier and cough less  An inhaler gives your medicine to open your airways  Ask your healthcare provider to show you how to use your inhaler correctly  · Take your medicine as directed  Contact your healthcare provider if you think your medicine is not helping or if you have side effects  Tell him of her if you are allergic to any medicine  Keep a list of the medicines, vitamins, and herbs you take  Include the amounts, and when and why you take them  Bring the list or the pill bottles to follow-up visits  Carry your medicine list with you in case of an emergency  Follow up with your healthcare provider as directed:  Write down questions you have so you will remember to ask them during your follow-up visits  © 2017 [address] [facility] Information is for End User's use only and may not be sold, redistributed or otherwise used for commercial purposes  All illustrations and images included in CareNotes® are the copyrighted property of [employer]  or [employer]  The above information is an  only  It is not intended as medical advice for individual conditions or treatments  Talk to your doctor, nurse or pharmacist before following any medical regimen to see if it is safe and effective for you

## 2020-01-13 NOTE — PROGRESS NOTES
[facility]        NAME: Teresa Gillespie is a 60 y o  male  : 1955    MRN: [id number]  DATE: 2020  TIME: 9:03 AM    Assessment and Plan   Acute bronchitis, unspecified organism [J20 9]  1  Acute bronchitis, unspecified organism  azithromycin (ZITHROMAX) 250 mg tablet         Patient Instructions     Patient Instructions     1  Claritin 12 hour for runny nose  2  Mucinex 600 mgs every 12 hours for couth      Acute Bronchitis   WHAT YOU NEED TO KNOW:   Acute bronchitis is swelling and irritation in the air passages of your lungs  This irritation may cause you to cough or have other breathing problems  Acute bronchitis often starts because of another illness, such as a cold or the flu  The illness spreads from your nose and throat to your windpipe and airways  Bronchitis is often called a chest cold  Acute bronchitis lasts about 3 to 6 weeks and is usually not a serious illness  Your cough can last for several weeks  DISCHARGE INSTRUCTIONS:   Return to the emergency department if:   · You cough up blood  · Your lips or fingernails turn blue  · You feel like you are not getting enough air when you breathe  Contact your healthcare provider if:   · You have a fever  · Your breathing problems do not go away or get worse  · Your cough does not get better within 4 weeks  · You have questions or concerns about your condition or care  Self-care:   · Get more rest   Rest helps your body to heal  Slowly start to do more each day  Rest when you feel it is needed  · Avoid irritants in the air  Avoid chemicals, fumes, and dust  Wear a face mask if you must work around dust or fumes  Stay inside on days when air pollution levels are high  If you have allergies, stay inside when pollen counts are high  Do not use aerosol products, such as spray-on deodorant, bug spray, and hair spray  · Do not smoke or be around others who smoke    Nicotine and other chemicals in cigarettes and cigars damages the cilia that move mucus out of your lungs  Ask your healthcare provider for information if you currently smoke and need help to quit  E-cigarettes or smokeless tobacco still contain nicotine  Talk to your healthcare provider before you use these products  · Drink liquids as directed  Liquids help keep your air passages moist and help you cough up mucus  You may need to drink more liquids when you have acute bronchitis  Ask how much liquid to drink each day and which liquids are best for you  · Use a humidifier or vaporizer  Use a cool mist humidifier or a vaporizer to increase air moisture in your home  This may make it easier for you to breathe and help decrease your cough  Decrease risk for acute bronchitis:   · Get the vaccinations you need  Ask your healthcare provider if you should get vaccinated against the flu or pneumonia  · Prevent the spread of germs  You can decrease your risk of acute bronchitis and other illnesses by doing the following:     [facility] your hands often with soap and water  Carry germ-killing hand lotion or gel with you  You can use the lotion or gel to clean your hands when soap and water are not available  ¨ Do not touch your eyes, nose, or mouth unless you have washed your hands first     ¨ Always cover your mouth when you cough to prevent the spread of germs  It is best to cough into a tissue or your shirt sleeve instead of into your hand  Ask those around you cover their mouths when they cough  ¨ Try to avoid people who have a cold or the flu  If you are sick, stay away from others as much as possible  Medicines: Your healthcare provider may  give you any of the following:  · Ibuprofen or acetaminophen  are medicines that help lower your fever  They are available without a doctor's order  Ask your healthcare provider which medicine is right for you  Ask how much to take and how often to take it  Follow directions   These medicines can cause stomach bleeding if not taken correctly  Ibuprofen can cause kidney damage  Do not take ibuprofen if you have kidney disease, an ulcer, or allergies to aspirin  Acetaminophen can cause liver damage  Do not take more than 4,000 milligrams in 24 hours  · Decongestants  help loosen mucus in your lungs and make it easier to cough up  This can help you breathe easier  · Cough suppressants  decrease your urge to cough  If your cough produces mucus, do not take a cough suppressant unless your healthcare provider tells you to  Your healthcare provider may suggest that you take a cough suppressant at night so you can rest     · Inhalers  may be given  Your healthcare provider may give you one or more inhalers to help you breathe easier and cough less  An inhaler gives your medicine to open your airways  Ask your healthcare provider to show you how to use your inhaler correctly  · Take your medicine as directed  Contact your healthcare provider if you think your medicine is not helping or if you have side effects  Tell him of her if you are allergic to any medicine  Keep a list of the medicines, vitamins, and herbs you take  Include the amounts, and when and why you take them  Bring the list or the pill bottles to follow-up visits  Carry your medicine list with you in case of an emergency  Follow up with your healthcare provider as directed:  Write down questions you have so you will remember to ask them during your follow-up visits  © 2017 [employer] Information is for End User's use only and may not be sold, redistributed or otherwise used for commercial purposes  All illustrations and images included in CareNotes® are the copyrighted property of [employer]  or [employer]  The above information is an  only  It is not intended as medical advice for individual conditions or treatments   Talk to your doctor, nurse or pharmacist before following any medical regimen to see if it is safe and effective for you  Follow up with PCP in 3-5 days  Proceed to  ER if symptoms worsen  Chief Complaint   No chief complaint on file  History of Present Illness       This is a 57-year-old male who presents with 4 days of chest congestion, coughing and rhinitis  He has a burning sensation in his chest   He has had no fever with this  He presents with a history of bronchitis at least once a year  He has extensive cardiovascular and diabetic history  Patient has been using Flonase and Zyrtec  He does not use inhalers  Review of Systems   Review of Systems   Constitutional: Negative for fever  HENT: Positive for congestion and rhinorrhea  Negative for sinus pressure, sinus pain and sore throat  Eyes: Negative for discharge and redness  Respiratory: Positive for cough  Negative for shortness of breath  Cardiovascular: Negative for chest pain  Chest burning with cough  Gastrointestinal: Negative for abdominal pain, diarrhea, nausea and vomiting  Musculoskeletal: Negative for myalgias  Skin: Negative for rash           Current Medications       Current Outpatient Medications:     amLODIPine (NORVASC) 10 mg tablet, Take 10 mg by mouth daily at bedtime, Disp: , Rfl:     apixaban (ELIQUIS) 5 mg, Take 1 tablet by mouth 2 (two) times a day for 30 days 2 tabs PO BID x 7 days, then 1 tab PO BID, Disp: 60 tablet, Rfl: 0    aspirin 81 MG tablet, Take 81 mg by mouth daily, Disp: , Rfl:     azithromycin (ZITHROMAX) 250 mg tablet, Take 2 tablets today then 1 tablet daily x 4 days, Disp: 6 tablet, Rfl: 0    cetirizine (ZyrTEC) 10 mg tablet, Take 10 mg by mouth daily, Disp: , Rfl:     cholecalciferol (VITAMIN D3) 1,000 units tablet, Take 1,000 Units by mouth daily, Disp: , Rfl:     co-enzyme Q-10 30 MG capsule, Take 100 mg by mouth daily, Disp: , Rfl:     Dulaglutide (TRULICITY) 1 5 PZ/7 1XA SOPN, Inject under the skin daily, Disp: , Rfl:    Efinaconazole (JUBLIA) 10 % SOLN, Apply topically daily, Disp: , Rfl:     Empagliflozin (JARDIANCE PO), Take by mouth, Disp: , Rfl:     fluticasone (FLONASE) 50 mcg/act nasal spray, 2 sprays into each nostril, Disp: , Rfl:     glimepiride (AMARYL) 1 mg tablet, Take 2 mg by mouth 2 (two) times a day, Disp: , Rfl:     hydrochlorothiazide (MICROZIDE) 12 5 mg capsule, Take 12 5 mg by mouth daily, Disp: , Rfl:     losartan (COZAAR) 100 MG tablet, Take 100 mg by mouth, Disp: , Rfl:     lovastatin (MEVACOR) 40 MG tablet, Take 40 mg by mouth daily at bedtime, Disp: , Rfl:     meclizine (ANTIVERT) 25 mg tablet, Take by mouth 3 (three) times a day as needed for dizziness, Disp: , Rfl:     metFORMIN (GLUCOPHAGE) 1000 MG tablet, Take 1,000 mg by mouth 2 (two) times a day with meals, Disp: , Rfl:     methylPREDNISolone 4 MG tablet therapy pack, Use as directed on package, Disp: 1 each, Rfl: 0    omeprazole (PriLOSEC) 40 MG capsule, , Disp: , Rfl:     ondansetron (ZOFRAN-ODT) 4 mg disintegrating tablet, Take 1 tablet by mouth every 6 (six) hours as needed for nausea or vomiting, Disp: 20 tablet, Rfl: 0    valsartan (DIOVAN) 160 mg tablet, Take 160 mg by mouth daily, Disp: , Rfl:     Current Allergies     Allergies as of 01/13/2020 - Reviewed 01/13/2020   Allergen Reaction Noted    Crestor [rosuvastatin] Rash and Hives 02/24/2015    Enalapril Cough 01/21/2013            The following portions of the patient's history were reviewed and updated as appropriate: allergies, current medications, past family history, past medical history, past social history, past surgical history and problem list      Past Medical History:   Diagnosis Date    BPH without obstruction/lower urinary tract symptoms     Diabetes mellitus ([address] )     Dysuria     GERD (gastroesophageal reflux disease)     Gout     History of blood clots     in lungs    Hyperlipidemia     Hypertension     Skin cancer        Past Surgical History: Procedure Laterality Date    CHOLECYSTECTOMY      EXTERNAL EAR SURGERY  08/2017    for the removal of skin melanoma       Family History   Problem Relation Age of Onset    Hyperlipidemia Mother     Stroke Father     Cancer Father     Diabetes Sister     Hypertension Sister     Diabetes Brother          Medications have been verified  Objective   There were no vitals taken for this visit  Physical Exam     Physical Exam   Constitutional: He is oriented to person, place, and time  He appears well-developed and well-nourished  HENT:   Head: Normocephalic and atraumatic  Right Ear: External ear normal    Left Ear: External ear normal    Mouth/Throat: Oropharynx is clear and moist  No oropharyngeal exudate  Mild erythematous turbinates with clear to white nasal drainage bilaterally  Posterior oropharynx is clear  Eyes: Pupils are equal, round, and reactive to light  Conjunctivae and EOM are normal    Neck: Normal range of motion  Neck supple  Cardiovascular: Normal rate, regular rhythm and normal heart sounds  No murmur heard  Pulmonary/Chest: Effort normal and breath sounds normal  He has no wheezes  He has no rales  Abdominal: Soft  He exhibits no distension and no mass  There is no tenderness  There is no rebound and no guarding  No HS megaly  Musculoskeletal: Normal range of motion  He exhibits no edema  Lymphadenopathy:     He has no cervical adenopathy  Neurological: He is alert and oriented to person, place, and time  Skin: Skin is warm and dry  No rash noted  No erythema  Psychiatric: He has a normal mood and affect  His behavior is normal    Nursing note and vitals reviewed

## 2020-02-08 ENCOUNTER — APPOINTMENT (OUTPATIENT)
Dept: LAB | Facility: HOSPITAL | Age: 65
End: 2020-02-08
Attending: UROLOGY
Payer: COMMERCIAL

## 2020-02-08 DIAGNOSIS — N40.0 BENIGN PROSTATIC HYPERPLASIA WITHOUT LOWER URINARY TRACT SYMPTOMS: ICD-10-CM

## 2020-02-08 LAB — PSA SERPL-MCNC: 2.7 NG/ML (ref 0–4)

## 2020-02-08 PROCEDURE — G0103 PSA SCREENING: HCPCS

## 2020-02-08 PROCEDURE — 36415 COLL VENOUS BLD VENIPUNCTURE: CPT

## 2020-02-21 ENCOUNTER — OFFICE VISIT (OUTPATIENT)
Dept: UROLOGY | Facility: MEDICAL CENTER | Age: 65
End: 2020-02-21
Payer: COMMERCIAL

## 2020-02-21 VITALS
HEIGHT: 70 IN | HEART RATE: 84 BPM | DIASTOLIC BLOOD PRESSURE: 84 MMHG | BODY MASS INDEX: 29.63 KG/M2 | WEIGHT: 207 LBS | SYSTOLIC BLOOD PRESSURE: 156 MMHG

## 2020-02-21 DIAGNOSIS — N40.0 BENIGN PROSTATIC HYPERPLASIA WITHOUT LOWER URINARY TRACT SYMPTOMS: Primary | ICD-10-CM

## 2020-02-21 LAB
SL AMB  POCT GLUCOSE, UA: NORMAL
SL AMB LEUKOCYTE ESTERASE,UA: NEGATIVE
SL AMB POCT BILIRUBIN,UA: NEGATIVE
SL AMB POCT BLOOD,UA: NEGATIVE
SL AMB POCT CLARITY,UA: CLEAR
SL AMB POCT COLOR,UA: YELLOW
SL AMB POCT KETONES,UA: NEGATIVE
SL AMB POCT NITRITE,UA: NEGATIVE
SL AMB POCT PH,UA: 5.5
SL AMB POCT SPECIFIC GRAVITY,UA: 1.01
SL AMB POCT URINE PROTEIN: NEGATIVE
SL AMB POCT UROBILINOGEN: 0.2

## 2020-02-21 PROCEDURE — 81003 URINALYSIS AUTO W/O SCOPE: CPT | Performed by: UROLOGY

## 2020-02-21 PROCEDURE — 99214 OFFICE O/P EST MOD 30 MIN: CPT | Performed by: UROLOGY

## 2020-02-21 NOTE — PROGRESS NOTES
Assessment/Plan:    Benign prostatic hyperplasia without lower urinary tract symptoms  Mildly symptomatic, at least in part due to his glycosuria from medication  Return in 1 year  Diagnoses and all orders for this visit:    Benign prostatic hyperplasia without lower urinary tract symptoms  -     POCT urine dip auto non-scope  -     PSA, Total Screen; Future          Subjective:      Patient ID: Gay Monday is a 60 y o  male  HPI  BPH:  He notes urinary frequency and nocturia x 1  He denies other significant urinary symptoms  He denies gross hematuria, urinary tract infections or incontinence  He is taking neither medications nor supplements for his symptoms  PSA:    0   Lab Value Date/Time    PSA 2 7 02/08/2020 0803    PSA 3 0 01/30/2019 0745    PSA 1 7 02/01/2018 1322    PSA 1 5 12/23/2016 0744    PSA 1 4 01/04/2016 1327    PSA 1 8 12/13/2014 0901   ]    AUA SYMPTOM SCORE      Most Recent Value   AUA SYMPTOM SCORE   How often have you had a sensation of not emptying your bladder completely after you finished urinating? 1   How often have you had to urinate again less than two hours after you finished urinating? 1   How often have you found you stopped and started again several times when you urinate? 1   How often have you found it difficult to postpone urination? 1   How often have you had a weak urinary stream?  0   How often have you had to push or strain to begin urination? 0   How many times did you most typically get up to urinate from the time you went to bed at night until the time you got up in the morning?   1   Quality of Life: If you were to spend the rest of your life with your urinary condition just the way it is now, how would you feel about that?  0   AUA SYMPTOM SCORE  5          The following portions of the patient's history were reviewed and updated as appropriate: allergies, current medications, past family history, past medical history, past social history, past surgical history and problem list     Review of Systems   Constitutional: Negative for activity change and fatigue  Respiratory: Negative for shortness of breath and wheezing  History of pulmonary emboli  Cardiovascular: Negative for chest pain  Hypertension  Gastrointestinal: Negative for abdominal pain  Endocrine:        Non-insulin dependent diabetes  Recent A1c was 7 7 %  Genitourinary: Negative for difficulty urinating, dysuria, frequency, hematuria and urgency  Musculoskeletal: Negative for back pain and gait problem  Skin: Negative  Allergic/Immunologic: Negative  Neurological: Negative  Psychiatric/Behavioral: Negative  Objective:      /84 (BP Location: Left arm, Patient Position: Sitting, Cuff Size: Standard)   Pulse 84   Ht 5' 10" (1 778 m)   Wt 93 9 kg (207 lb)   BMI 29 70 kg/m²          Physical Exam   Constitutional: He is oriented to person, place, and time  He appears well-developed and well-nourished  HENT:   Head: Normocephalic and atraumatic  Eyes: EOM are normal    Neck: Normal range of motion  Neck supple  Pulmonary/Chest: Effort normal    Genitourinary: Rectum normal    Genitourinary Comments: The prostate is 30 gm, firm, smooth, non-tender  Musculoskeletal: Normal range of motion  Neurological: He is alert and oriented to person, place, and time  Skin: Skin is warm and dry  Psychiatric: He has a normal mood and affect   His behavior is normal  Judgment and thought content normal

## 2020-04-25 ENCOUNTER — APPOINTMENT (OUTPATIENT)
Dept: LAB | Facility: HOSPITAL | Age: 65
End: 2020-04-25
Payer: COMMERCIAL

## 2020-04-25 ENCOUNTER — TRANSCRIBE ORDERS (OUTPATIENT)
Dept: ADMINISTRATIVE | Facility: HOSPITAL | Age: 65
End: 2020-04-25

## 2020-04-25 DIAGNOSIS — Z00.00 ANNUAL PHYSICAL EXAM: ICD-10-CM

## 2020-04-25 DIAGNOSIS — I10 ESSENTIAL HYPERTENSION, BENIGN: ICD-10-CM

## 2020-04-25 DIAGNOSIS — E55.9 VITAMIN D DEFICIENCY: ICD-10-CM

## 2020-04-25 DIAGNOSIS — E78.2 MIXED HYPERLIPIDEMIA: ICD-10-CM

## 2020-04-25 DIAGNOSIS — E11.65 UNCONTROLLED TYPE 2 DIABETES MELLITUS WITH HYPERGLYCEMIA (HCC): ICD-10-CM

## 2020-04-25 DIAGNOSIS — E11.65 UNCONTROLLED TYPE 2 DIABETES MELLITUS WITH HYPERGLYCEMIA (HCC): Primary | ICD-10-CM

## 2020-04-25 DIAGNOSIS — Z00.00 ANNUAL PHYSICAL EXAM: Primary | ICD-10-CM

## 2020-04-25 LAB
25(OH)D3 SERPL-MCNC: 31.6 NG/ML (ref 30–100)
ALBUMIN SERPL BCP-MCNC: 4.1 G/DL (ref 3.5–5)
ALP SERPL-CCNC: 51 U/L (ref 46–116)
ALT SERPL W P-5'-P-CCNC: 31 U/L (ref 12–78)
ANION GAP SERPL CALCULATED.3IONS-SCNC: 10 MMOL/L (ref 4–13)
AST SERPL W P-5'-P-CCNC: 21 U/L (ref 5–45)
BILIRUB SERPL-MCNC: 0.3 MG/DL (ref 0.2–1)
BUN SERPL-MCNC: 14 MG/DL (ref 5–25)
CALCIUM SERPL-MCNC: 9.7 MG/DL (ref 8.3–10.1)
CHLORIDE SERPL-SCNC: 104 MMOL/L (ref 100–108)
CHOLEST SERPL-MCNC: 167 MG/DL (ref 50–200)
CO2 SERPL-SCNC: 27 MMOL/L (ref 21–32)
CREAT SERPL-MCNC: 0.95 MG/DL (ref 0.6–1.3)
CREAT UR-MCNC: 55.2 MG/DL
EST. AVERAGE GLUCOSE BLD GHB EST-MCNC: 169 MG/DL
GFR SERPL CREATININE-BSD FRML MDRD: 85 ML/MIN/1.73SQ M
GLUCOSE P FAST SERPL-MCNC: 188 MG/DL (ref 65–99)
HBA1C MFR BLD: 7.5 %
HDLC SERPL-MCNC: 34 MG/DL
LDLC SERPL CALC-MCNC: 96 MG/DL (ref 0–100)
MICROALBUMIN UR-MCNC: <5 MG/L (ref 0–20)
MICROALBUMIN/CREAT 24H UR: <9 MG/G CREATININE (ref 0–30)
NONHDLC SERPL-MCNC: 133 MG/DL
POTASSIUM SERPL-SCNC: 4.4 MMOL/L (ref 3.5–5.3)
PROT SERPL-MCNC: 7.7 G/DL (ref 6.4–8.2)
SODIUM SERPL-SCNC: 141 MMOL/L (ref 136–145)
TRIGL SERPL-MCNC: 185 MG/DL
TSH SERPL DL<=0.05 MIU/L-ACNC: 2.65 UIU/ML (ref 0.36–3.74)

## 2020-04-25 PROCEDURE — 84443 ASSAY THYROID STIM HORMONE: CPT

## 2020-04-25 PROCEDURE — 36415 COLL VENOUS BLD VENIPUNCTURE: CPT

## 2020-04-25 PROCEDURE — 80053 COMPREHEN METABOLIC PANEL: CPT

## 2020-04-25 PROCEDURE — 82043 UR ALBUMIN QUANTITATIVE: CPT | Performed by: INTERNAL MEDICINE

## 2020-04-25 PROCEDURE — 83036 HEMOGLOBIN GLYCOSYLATED A1C: CPT

## 2020-04-25 PROCEDURE — 82306 VITAMIN D 25 HYDROXY: CPT

## 2020-04-25 PROCEDURE — 80061 LIPID PANEL: CPT

## 2020-04-25 PROCEDURE — 82570 ASSAY OF URINE CREATININE: CPT | Performed by: INTERNAL MEDICINE

## 2020-10-29 ENCOUNTER — TRANSCRIBE ORDERS (OUTPATIENT)
Dept: ADMINISTRATIVE | Facility: HOSPITAL | Age: 65
End: 2020-10-29

## 2020-10-29 ENCOUNTER — APPOINTMENT (OUTPATIENT)
Dept: LAB | Facility: HOSPITAL | Age: 65
End: 2020-10-29
Payer: COMMERCIAL

## 2020-10-29 DIAGNOSIS — E11.649 UNCONTROLLED TYPE 2 DIABETES MELLITUS WITH HYPOGLYCEMIA, UNSPECIFIED HYPOGLYCEMIA COMA STATUS (HCC): Primary | ICD-10-CM

## 2020-10-29 DIAGNOSIS — I10 ESSENTIAL HYPERTENSION, BENIGN: ICD-10-CM

## 2020-10-29 DIAGNOSIS — E78.2 MIXED HYPERLIPIDEMIA: ICD-10-CM

## 2020-10-29 DIAGNOSIS — E11.649 UNCONTROLLED TYPE 2 DIABETES MELLITUS WITH HYPOGLYCEMIA, UNSPECIFIED HYPOGLYCEMIA COMA STATUS (HCC): ICD-10-CM

## 2020-10-29 LAB
ALBUMIN SERPL BCP-MCNC: 4.1 G/DL (ref 3.5–5)
ALP SERPL-CCNC: 47 U/L (ref 46–116)
ALT SERPL W P-5'-P-CCNC: 36 U/L (ref 12–78)
ANION GAP SERPL CALCULATED.3IONS-SCNC: 9 MMOL/L (ref 4–13)
AST SERPL W P-5'-P-CCNC: 24 U/L (ref 5–45)
BILIRUB SERPL-MCNC: 0.4 MG/DL (ref 0.2–1)
BUN SERPL-MCNC: 18 MG/DL (ref 5–25)
CALCIUM SERPL-MCNC: 9.4 MG/DL (ref 8.3–10.1)
CHLORIDE SERPL-SCNC: 103 MMOL/L (ref 100–108)
CHOLEST SERPL-MCNC: 184 MG/DL (ref 50–200)
CO2 SERPL-SCNC: 27 MMOL/L (ref 21–32)
CREAT SERPL-MCNC: 0.8 MG/DL (ref 0.6–1.3)
CREAT UR-MCNC: 60.1 MG/DL
EST. AVERAGE GLUCOSE BLD GHB EST-MCNC: 197 MG/DL
GFR SERPL CREATININE-BSD FRML MDRD: 95 ML/MIN/1.73SQ M
GLUCOSE P FAST SERPL-MCNC: 178 MG/DL (ref 65–99)
HBA1C MFR BLD: 8.5 %
HDLC SERPL-MCNC: 36 MG/DL
LDLC SERPL CALC-MCNC: 94 MG/DL (ref 0–100)
MICROALBUMIN UR-MCNC: 18.4 MG/L (ref 0–20)
MICROALBUMIN/CREAT 24H UR: 31 MG/G CREATININE (ref 0–30)
NONHDLC SERPL-MCNC: 148 MG/DL
POTASSIUM SERPL-SCNC: 4 MMOL/L (ref 3.5–5.3)
PROT SERPL-MCNC: 7.6 G/DL (ref 6.4–8.2)
SODIUM SERPL-SCNC: 139 MMOL/L (ref 136–145)
TRIGL SERPL-MCNC: 269 MG/DL

## 2020-10-29 PROCEDURE — 80053 COMPREHEN METABOLIC PANEL: CPT

## 2020-10-29 PROCEDURE — 83036 HEMOGLOBIN GLYCOSYLATED A1C: CPT

## 2020-10-29 PROCEDURE — 80061 LIPID PANEL: CPT

## 2020-10-29 PROCEDURE — 36415 COLL VENOUS BLD VENIPUNCTURE: CPT

## 2020-10-29 PROCEDURE — 82570 ASSAY OF URINE CREATININE: CPT

## 2020-10-29 PROCEDURE — 82043 UR ALBUMIN QUANTITATIVE: CPT

## 2020-11-11 PROBLEM — K52.9 ENTERITIS: Status: RESOLVED | Noted: 2018-01-20 | Resolved: 2020-11-11

## 2020-11-11 PROBLEM — R07.9 CHEST PAIN: Status: RESOLVED | Noted: 2017-03-14 | Resolved: 2020-11-11

## 2020-11-11 PROBLEM — R79.89 ELEVATED LACTIC ACID LEVEL: Status: RESOLVED | Noted: 2018-01-20 | Resolved: 2020-11-11

## 2020-11-11 PROBLEM — G47.33 OSA (OBSTRUCTIVE SLEEP APNEA): Status: ACTIVE | Noted: 2017-06-30

## 2020-11-11 PROBLEM — R19.7 DIARRHEA: Status: RESOLVED | Noted: 2018-01-20 | Resolved: 2020-11-11

## 2020-11-11 PROBLEM — Z86.711 HX PULMONARY EMBOLISM: Status: ACTIVE | Noted: 2017-06-30

## 2021-01-27 ENCOUNTER — APPOINTMENT (OUTPATIENT)
Dept: LAB | Facility: HOSPITAL | Age: 66
End: 2021-01-27
Attending: UROLOGY
Payer: COMMERCIAL

## 2021-01-27 DIAGNOSIS — N40.0 BENIGN PROSTATIC HYPERPLASIA WITHOUT LOWER URINARY TRACT SYMPTOMS: ICD-10-CM

## 2021-01-27 LAB — PSA SERPL-MCNC: 2.6 NG/ML (ref 0–4)

## 2021-01-27 PROCEDURE — 36415 COLL VENOUS BLD VENIPUNCTURE: CPT

## 2021-01-27 PROCEDURE — G0103 PSA SCREENING: HCPCS

## 2021-02-25 ENCOUNTER — OFFICE VISIT (OUTPATIENT)
Dept: UROLOGY | Facility: MEDICAL CENTER | Age: 66
End: 2021-02-25
Payer: COMMERCIAL

## 2021-02-25 VITALS
SYSTOLIC BLOOD PRESSURE: 122 MMHG | HEIGHT: 69 IN | BODY MASS INDEX: 30.51 KG/M2 | DIASTOLIC BLOOD PRESSURE: 82 MMHG | WEIGHT: 206 LBS

## 2021-02-25 DIAGNOSIS — N40.0 BENIGN PROSTATIC HYPERPLASIA WITHOUT LOWER URINARY TRACT SYMPTOMS: Primary | ICD-10-CM

## 2021-02-25 DIAGNOSIS — Z12.5 SPECIAL SCREENING FOR MALIGNANT NEOPLASM OF PROSTATE: ICD-10-CM

## 2021-02-25 LAB
SL AMB  POCT GLUCOSE, UA: ABNORMAL
SL AMB LEUKOCYTE ESTERASE,UA: ABNORMAL
SL AMB POCT BILIRUBIN,UA: ABNORMAL
SL AMB POCT BLOOD,UA: ABNORMAL
SL AMB POCT CLARITY,UA: CLEAR
SL AMB POCT COLOR,UA: YELLOW
SL AMB POCT KETONES,UA: ABNORMAL
SL AMB POCT NITRITE,UA: ABNORMAL
SL AMB POCT PH,UA: 5
SL AMB POCT SPECIFIC GRAVITY,UA: 1.01
SL AMB POCT URINE PROTEIN: ABNORMAL
SL AMB POCT UROBILINOGEN: 0.2

## 2021-02-25 PROCEDURE — 81003 URINALYSIS AUTO W/O SCOPE: CPT | Performed by: UROLOGY

## 2021-02-25 PROCEDURE — 99214 OFFICE O/P EST MOD 30 MIN: CPT | Performed by: UROLOGY

## 2021-02-25 RX ORDER — OLOPATADINE HYDROCHLORIDE 1 MG/ML
1 SOLUTION/ DROPS OPHTHALMIC 2 TIMES DAILY
COMMUNITY

## 2021-02-25 RX ORDER — ALBUTEROL SULFATE 1.25 MG/3ML
1 SOLUTION RESPIRATORY (INHALATION) EVERY 6 HOURS PRN
COMMUNITY

## 2021-02-25 RX ORDER — MULTIVIT WITH MINERALS/LUTEIN
500 TABLET ORAL DAILY
COMMUNITY

## 2021-02-25 NOTE — PROGRESS NOTES
Assessment/Plan:    Benign prostatic hyperplasia without lower urinary tract symptoms  Minimally symptomatic  Return in 1 year  Diagnoses and all orders for this visit:    Benign prostatic hyperplasia without lower urinary tract symptoms  -     POCT urine dip auto non-scope  -     PSA Total, Diagnostic; Future    Special screening for malignant neoplasm of prostate  -     PSA Total, Diagnostic; Future    Other orders  -     ascorbic acid (VITAMIN C) 250 mg tablet; Take 500 mg by mouth daily  -     albuterol (ACCUNEB) 1 25 MG/3ML nebulizer solution; Take 1 ampule by nebulization every 6 (six) hours as needed for wheezing  -     olopatadine (PATANOL) 0 1 % ophthalmic solution; 1 drop 2 (two) times a day          Subjective:      Patient ID: Massimo Huerta is a 63 y o  male  HPI  BPH:  He notes no significant urinary symptons  He denies other significant urinary symptoms  He denies gross hematuria, urinary tract infections or incontinence  He is taking neither medications nor supplements for his symptoms  PSA:  [  0   Lab Value Date/Time    PSA 2 6 01/27/2021 0838    PSA 2 7 02/08/2020 0803    PSA 3 0 01/30/2019 0745    PSA 1 7 02/01/2018 1322    PSA 1 5 12/23/2016 0744    PSA 1 4 01/04/2016 1327    PSA 1 8 12/13/2014 0901   ]    AUA SYMPTOM SCORE      Most Recent Value   AUA SYMPTOM SCORE   How often have you had a sensation of not emptying your bladder completely after you finished urinating? 1   How often have you had to urinate again less than two hours after you finished urinating? 1   How often have you found you stopped and started again several times when you urinate? 1   How often have you found it difficult to postpone urination? 0   How often have you had a weak urinary stream?  0   How often have you had to push or strain to begin urination? 0   How many times did you most typically get up to urinate from the time you went to bed at night until the time you got up in the morning?   1 Quality of Life: If you were to spend the rest of your life with your urinary condition just the way it is now, how would you feel about that?  1   AUA SYMPTOM SCORE  4          The following portions of the patient's history were reviewed and updated as appropriate: allergies, current medications, past family history, past medical history, past social history, past surgical history and problem list     Review of Systems   Constitutional: Negative for activity change and fatigue  Respiratory: Negative for shortness of breath and wheezing  Hx of pulmonary emboli on Eliquis  Cardiovascular: Negative for chest pain  Hypertension  Gastrointestinal: Negative for abdominal pain  Endocrine:        Now on insulin  Control seems better  Glycosuria attributed to Jardiance  Genitourinary: Negative for difficulty urinating, dysuria, frequency, hematuria and urgency  Musculoskeletal: Negative for back pain and gait problem  Skin: Negative  Allergic/Immunologic: Negative  Neurological: Negative  Psychiatric/Behavioral: Negative              Objective:      /82 (BP Location: Left arm, Patient Position: Sitting, Cuff Size: Adult)   Ht 5' 9" (1 753 m)   Wt 93 4 kg (206 lb)   BMI 30 42 kg/m²          Physical Exam

## 2021-03-03 ENCOUNTER — LAB (OUTPATIENT)
Dept: LAB | Facility: HOSPITAL | Age: 66
End: 2021-03-03
Payer: COMMERCIAL

## 2021-03-03 ENCOUNTER — TRANSCRIBE ORDERS (OUTPATIENT)
Dept: ADMINISTRATIVE | Facility: HOSPITAL | Age: 66
End: 2021-03-03

## 2021-03-03 DIAGNOSIS — E78.2 MIXED HYPERLIPIDEMIA: ICD-10-CM

## 2021-03-03 DIAGNOSIS — E11.649 UNCONTROLLED TYPE 2 DIABETES MELLITUS WITH HYPOGLYCEMIA WITHOUT COMA (HCC): ICD-10-CM

## 2021-03-03 DIAGNOSIS — E78.2 MIXED HYPERLIPIDEMIA: Primary | ICD-10-CM

## 2021-03-03 LAB
ALBUMIN SERPL BCP-MCNC: 4.1 G/DL (ref 3.5–5)
ALP SERPL-CCNC: 45 U/L (ref 46–116)
ALT SERPL W P-5'-P-CCNC: 35 U/L (ref 12–78)
ANION GAP SERPL CALCULATED.3IONS-SCNC: 8 MMOL/L (ref 4–13)
AST SERPL W P-5'-P-CCNC: 21 U/L (ref 5–45)
BILIRUB SERPL-MCNC: 0.4 MG/DL (ref 0.2–1)
BUN SERPL-MCNC: 22 MG/DL (ref 5–25)
CALCIUM SERPL-MCNC: 9.5 MG/DL (ref 8.3–10.1)
CHLORIDE SERPL-SCNC: 103 MMOL/L (ref 100–108)
CO2 SERPL-SCNC: 29 MMOL/L (ref 21–32)
CREAT SERPL-MCNC: 0.86 MG/DL (ref 0.6–1.3)
CREAT UR-MCNC: 80.4 MG/DL
EST. AVERAGE GLUCOSE BLD GHB EST-MCNC: 169 MG/DL
GFR SERPL CREATININE-BSD FRML MDRD: 92 ML/MIN/1.73SQ M
GLUCOSE P FAST SERPL-MCNC: 146 MG/DL (ref 65–99)
HBA1C MFR BLD: 7.5 %
MICROALBUMIN UR-MCNC: 11.2 MG/L (ref 0–20)
MICROALBUMIN/CREAT 24H UR: 14 MG/G CREATININE (ref 0–30)
POTASSIUM SERPL-SCNC: 4 MMOL/L (ref 3.5–5.3)
PROT SERPL-MCNC: 7.8 G/DL (ref 6.4–8.2)
SODIUM SERPL-SCNC: 140 MMOL/L (ref 136–145)

## 2021-03-03 PROCEDURE — 82043 UR ALBUMIN QUANTITATIVE: CPT

## 2021-03-03 PROCEDURE — 36415 COLL VENOUS BLD VENIPUNCTURE: CPT

## 2021-03-03 PROCEDURE — 82570 ASSAY OF URINE CREATININE: CPT

## 2021-03-03 PROCEDURE — 83036 HEMOGLOBIN GLYCOSYLATED A1C: CPT

## 2021-03-03 PROCEDURE — 80053 COMPREHEN METABOLIC PANEL: CPT

## 2021-03-10 DIAGNOSIS — Z23 ENCOUNTER FOR IMMUNIZATION: ICD-10-CM

## 2021-06-09 ENCOUNTER — TRANSCRIBE ORDERS (OUTPATIENT)
Dept: ADMINISTRATIVE | Facility: HOSPITAL | Age: 66
End: 2021-06-09

## 2021-06-09 ENCOUNTER — APPOINTMENT (OUTPATIENT)
Dept: LAB | Facility: HOSPITAL | Age: 66
End: 2021-06-09
Payer: COMMERCIAL

## 2021-06-09 DIAGNOSIS — R19.7 DIARRHEA OF PRESUMED INFECTIOUS ORIGIN: ICD-10-CM

## 2021-06-09 DIAGNOSIS — R19.7 DIARRHEA OF PRESUMED INFECTIOUS ORIGIN: Primary | ICD-10-CM

## 2021-06-09 LAB
ALBUMIN SERPL BCP-MCNC: 4.2 G/DL (ref 3.5–5)
ALP SERPL-CCNC: 46 U/L (ref 46–116)
ALT SERPL W P-5'-P-CCNC: 31 U/L (ref 12–78)
ANION GAP SERPL CALCULATED.3IONS-SCNC: 10 MMOL/L (ref 4–13)
AST SERPL W P-5'-P-CCNC: 19 U/L (ref 5–45)
BASOPHILS # BLD AUTO: 0.04 THOUSANDS/ΜL (ref 0–0.1)
BASOPHILS NFR BLD AUTO: 1 % (ref 0–1)
BILIRUB SERPL-MCNC: 0.5 MG/DL (ref 0.2–1)
BUN SERPL-MCNC: 20 MG/DL (ref 5–25)
CALCIUM SERPL-MCNC: 9.5 MG/DL (ref 8.3–10.1)
CHLORIDE SERPL-SCNC: 103 MMOL/L (ref 100–108)
CO2 SERPL-SCNC: 28 MMOL/L (ref 21–32)
CREAT SERPL-MCNC: 0.99 MG/DL (ref 0.6–1.3)
EOSINOPHIL # BLD AUTO: 0.32 THOUSAND/ΜL (ref 0–0.61)
EOSINOPHIL NFR BLD AUTO: 6 % (ref 0–6)
ERYTHROCYTE [DISTWIDTH] IN BLOOD BY AUTOMATED COUNT: 14.6 % (ref 11.6–15.1)
GFR SERPL CREATININE-BSD FRML MDRD: 80 ML/MIN/1.73SQ M
GLUCOSE SERPL-MCNC: 129 MG/DL (ref 65–140)
HCT VFR BLD AUTO: 43 % (ref 36.5–49.3)
HGB BLD-MCNC: 13.6 G/DL (ref 12–17)
IMM GRANULOCYTES # BLD AUTO: 0.01 THOUSAND/UL (ref 0–0.2)
IMM GRANULOCYTES NFR BLD AUTO: 0 % (ref 0–2)
LYMPHOCYTES # BLD AUTO: 1.96 THOUSANDS/ΜL (ref 0.6–4.47)
LYMPHOCYTES NFR BLD AUTO: 37 % (ref 14–44)
MCH RBC QN AUTO: 26.9 PG (ref 26.8–34.3)
MCHC RBC AUTO-ENTMCNC: 31.6 G/DL (ref 31.4–37.4)
MCV RBC AUTO: 85 FL (ref 82–98)
MONOCYTES # BLD AUTO: 0.49 THOUSAND/ΜL (ref 0.17–1.22)
MONOCYTES NFR BLD AUTO: 9 % (ref 4–12)
NEUTROPHILS # BLD AUTO: 2.46 THOUSANDS/ΜL (ref 1.85–7.62)
NEUTS SEG NFR BLD AUTO: 47 % (ref 43–75)
NRBC BLD AUTO-RTO: 0 /100 WBCS
PLATELET # BLD AUTO: 174 THOUSANDS/UL (ref 149–390)
PMV BLD AUTO: 9.2 FL (ref 8.9–12.7)
POTASSIUM SERPL-SCNC: 3.9 MMOL/L (ref 3.5–5.3)
PROT SERPL-MCNC: 7.8 G/DL (ref 6.4–8.2)
RBC # BLD AUTO: 5.05 MILLION/UL (ref 3.88–5.62)
SODIUM SERPL-SCNC: 141 MMOL/L (ref 136–145)
WBC # BLD AUTO: 5.28 THOUSAND/UL (ref 4.31–10.16)

## 2021-06-09 PROCEDURE — 36415 COLL VENOUS BLD VENIPUNCTURE: CPT

## 2021-06-09 PROCEDURE — 85025 COMPLETE CBC W/AUTO DIFF WBC: CPT

## 2021-06-09 PROCEDURE — 80053 COMPREHEN METABOLIC PANEL: CPT

## 2021-09-23 ENCOUNTER — APPOINTMENT (OUTPATIENT)
Dept: LAB | Facility: HOSPITAL | Age: 66
End: 2021-09-23
Payer: COMMERCIAL

## 2021-09-23 DIAGNOSIS — I10 ESSENTIAL HYPERTENSION, MALIGNANT: ICD-10-CM

## 2021-09-23 DIAGNOSIS — E78.2 MIXED HYPERLIPIDEMIA: ICD-10-CM

## 2021-09-23 DIAGNOSIS — E11.649 UNCONTROLLED TYPE 2 DIABETES MELLITUS WITH HYPOGLYCEMIA WITHOUT COMA (HCC): ICD-10-CM

## 2021-09-23 LAB
ALBUMIN SERPL BCP-MCNC: 3.9 G/DL (ref 3.5–5)
ALP SERPL-CCNC: 51 U/L (ref 46–116)
ALT SERPL W P-5'-P-CCNC: 33 U/L (ref 12–78)
ANION GAP SERPL CALCULATED.3IONS-SCNC: 11 MMOL/L (ref 4–13)
AST SERPL W P-5'-P-CCNC: 24 U/L (ref 5–45)
BILIRUB SERPL-MCNC: 0.41 MG/DL (ref 0.2–1)
BUN SERPL-MCNC: 17 MG/DL (ref 5–25)
CALCIUM SERPL-MCNC: 8.7 MG/DL (ref 8.3–10.1)
CHLORIDE SERPL-SCNC: 101 MMOL/L (ref 100–108)
CHOLEST SERPL-MCNC: 155 MG/DL (ref 50–200)
CO2 SERPL-SCNC: 25 MMOL/L (ref 21–32)
CREAT SERPL-MCNC: 0.9 MG/DL (ref 0.6–1.3)
EST. AVERAGE GLUCOSE BLD GHB EST-MCNC: 180 MG/DL
GFR SERPL CREATININE-BSD FRML MDRD: 90 ML/MIN/1.73SQ M
GLUCOSE P FAST SERPL-MCNC: 154 MG/DL (ref 65–99)
HBA1C MFR BLD: 7.9 %
HDLC SERPL-MCNC: 34 MG/DL
LDLC SERPL CALC-MCNC: 73 MG/DL (ref 0–100)
NONHDLC SERPL-MCNC: 121 MG/DL
POTASSIUM SERPL-SCNC: 4 MMOL/L (ref 3.5–5.3)
PROT SERPL-MCNC: 7.6 G/DL (ref 6.4–8.2)
SODIUM SERPL-SCNC: 137 MMOL/L (ref 136–145)
TRIGL SERPL-MCNC: 240 MG/DL
TSH SERPL DL<=0.05 MIU/L-ACNC: 2.41 UIU/ML (ref 0.36–3.74)

## 2021-09-23 PROCEDURE — 80053 COMPREHEN METABOLIC PANEL: CPT

## 2021-09-23 PROCEDURE — 83036 HEMOGLOBIN GLYCOSYLATED A1C: CPT

## 2021-09-23 PROCEDURE — 84443 ASSAY THYROID STIM HORMONE: CPT

## 2021-09-23 PROCEDURE — 80061 LIPID PANEL: CPT

## 2021-09-23 PROCEDURE — 36415 COLL VENOUS BLD VENIPUNCTURE: CPT

## 2021-10-29 ENCOUNTER — TELEPHONE (OUTPATIENT)
Dept: OTHER | Facility: OTHER | Age: 66
End: 2021-10-29

## 2022-02-28 ENCOUNTER — TELEPHONE (OUTPATIENT)
Dept: OTHER | Facility: OTHER | Age: 67
End: 2022-02-28

## 2022-02-28 ENCOUNTER — APPOINTMENT (OUTPATIENT)
Dept: LAB | Facility: HOSPITAL | Age: 67
End: 2022-02-28
Payer: COMMERCIAL

## 2022-02-28 DIAGNOSIS — N40.0 BENIGN PROSTATIC HYPERPLASIA WITHOUT LOWER URINARY TRACT SYMPTOMS: ICD-10-CM

## 2022-02-28 LAB — PSA SERPL-MCNC: 3.2 NG/ML (ref 0–4)

## 2022-02-28 PROCEDURE — 84153 ASSAY OF PSA TOTAL: CPT

## 2022-02-28 NOTE — TELEPHONE ENCOUNTER
[facility])    Is requesting a new order for a  PSA Total, Diagnostic be placed for his upcoming appointment      Name: Adrien Banda MRN: [id number]   Date: 3/11/2022 Status: [facility]   Time: 3:15 PM Length: 15   Visit Type: FOLLOW UP [facility] [[id number]] Copay: $0 00   Provider: Matt Jacobo MD Department: [facility] [address] [place] [address]

## 2022-03-03 ENCOUNTER — TELEPHONE (OUTPATIENT)
Dept: OTHER | Facility: OTHER | Age: 67
End: 2022-03-03

## 2022-03-07 ENCOUNTER — OFFICE VISIT (OUTPATIENT)
Dept: UROLOGY | Facility: CLINIC | Age: 67
End: 2022-03-07
Payer: COMMERCIAL

## 2022-03-07 VITALS
HEART RATE: 72 BPM | SYSTOLIC BLOOD PRESSURE: 136 MMHG | BODY MASS INDEX: 29.98 KG/M2 | WEIGHT: 203 LBS | DIASTOLIC BLOOD PRESSURE: 88 MMHG

## 2022-03-07 DIAGNOSIS — N40.0 BENIGN PROSTATIC HYPERPLASIA WITHOUT LOWER URINARY TRACT SYMPTOMS: Primary | ICD-10-CM

## 2022-03-07 DIAGNOSIS — Z12.5 PROSTATE CANCER SCREENING: ICD-10-CM

## 2022-03-07 PROCEDURE — 99213 OFFICE O/P EST LOW 20 MIN: CPT | Performed by: NURSE PRACTITIONER

## 2022-03-07 NOTE — PROGRESS NOTES
Assessment and plan:     Benign prostatic hyperplasia without lower urinary tract symptoms  · AUA 7  · Continue to monitor off medication  · Follow-up 1 year    Prostate cancer screening  · PSA 3 2  · Follow-up 1 year routine prostate cancer screening          JARED Covarrubias    History of Present Illness     Kamlesh Mota is a 61 y o  male patient who followed with Dr Memo Chamberlain  Who presents for yearly follow-up of BPH and prostate cancer screening  He denies any urinary bother  He is doing well off of medication would like to continue to avoid them if possible  Urine dip unremarkable  Denies family history of prostate cancer  His PSA has been stable    Laboratory     Lab Results   Component Value Date    BUN 17 09/23/2021    CREATININE 0 90 09/23/2021       No components found for: GFR    Lab Results   Component Value Date    GLUCOSE 118 12/31/2015    CALCIUM 8 7 09/23/2021     12/31/2015    K 4 0 09/23/2021    CO2 25 09/23/2021     09/23/2021       Lab Results   Component Value Date    WBC 5 28 06/09/2021    HGB 13 6 06/09/2021    HCT 43 0 06/09/2021    MCV 85 06/09/2021     06/09/2021       Lab Results   Component Value Date    PSA 3 2 02/28/2022    PSA 2 6 01/27/2021    PSA 2 7 02/08/2020       No results found for this or any previous visit (from the past 1 hour(s))  @RESULT(URINEMICROSCOPIC)@    @RESULT(URINECULTURE)@    Radiology       Review of Systems     Review of Systems   Constitutional: Negative for activity change, appetite change, chills, fatigue, fever and unexpected weight change  HENT: Negative for facial swelling  Eyes: Negative for discharge  Respiratory: Negative  Negative for cough and shortness of breath  Cardiovascular: Negative for chest pain and leg swelling  Gastrointestinal: Negative  Negative for abdominal distention, abdominal pain, constipation, diarrhea, nausea and vomiting  Endocrine: Negative  Genitourinary: Negative  Negative for decreased urine volume, difficulty urinating, dysuria, enuresis, flank pain, frequency, genital sores, hematuria and urgency  Musculoskeletal: Negative for back pain and myalgias  Skin: Negative for pallor and rash  Allergic/Immunologic: Negative  Negative for immunocompromised state  Neurological: Negative for facial asymmetry and speech difficulty  Psychiatric/Behavioral: Negative for agitation and confusion  AUA SYMPTOM SCORE      Most Recent Value   AUA SYMPTOM SCORE    How often have you had a sensation of not emptying your bladder completely after you finished urinating? 0 (P)     How often have you had to urinate again less than two hours after you finished urinating? 4 (P)     How often have you found you stopped and started again several times when you urinate? 1 (P)     How often have you found it difficult to postpone urination? 1 (P)     How often have you had a weak urinary stream? 0 (P)     How often have you had to push or strain to begin urination? 0 (P)     How many times did you most typically get up to urinate from the time you went to bed at night until the time you got up in the morning? 1 (P)     Quality of Life: If you were to spend the rest of your life with your urinary condition just the way it is now, how would you feel about that? 2 (P)     AUA SYMPTOM SCORE 7 (P)           Allergies     Allergies   Allergen Reactions    Crestor [Rosuvastatin] Rash and Hives     Other reaction(s): Urticaria    Enalapril Cough       Physical Exam     Physical Exam  Vitals reviewed  Constitutional:       General: He is not in acute distress  Appearance: Normal appearance  He is normal weight  He is not ill-appearing, toxic-appearing or diaphoretic  HENT:      Head: Normocephalic and atraumatic  Eyes:      General: No scleral icterus  Cardiovascular:      Rate and Rhythm: Normal rate  Pulmonary:      Effort: Pulmonary effort is normal  No respiratory distress  Abdominal:      General: Abdomen is flat  There is no distension  Palpations: Abdomen is soft  Tenderness: There is no abdominal tenderness  Genitourinary:     Comments: Prostate smooth nontender without appreciable nodules  Musculoskeletal:         General: No swelling  Cervical back: Normal range of motion  Skin:     General: Skin is warm and dry  Coloration: Skin is not jaundiced or pale  Findings: No rash  Neurological:      General: No focal deficit present  Mental Status: He is alert and oriented to person, place, and time  Gait: Gait normal    Psychiatric:         Mood and Affect: Mood normal          Behavior: Behavior normal          Thought Content:  Thought content normal          Judgment: Judgment normal          Vital Signs     Vitals:    03/07/22 0845   BP: 136/88   Pulse: 72   Weight: 92 1 kg (203 lb)       Current Medications       Current Outpatient Medications:     albuterol (ACCUNEB) 1 25 MG/3ML nebulizer solution, Take 1 ampule by nebulization every 6 (six) hours as needed for wheezing, Disp: , Rfl:     amLODIPine (NORVASC) 10 mg tablet, Take 10 mg by mouth daily at bedtime, Disp: , Rfl:     apixaban (ELIQUIS) 5 mg, Take 1 tablet by mouth 2 (two) times a day for 30 days 2 tabs PO BID x 7 days, then 1 tab PO BID, Disp: 60 tablet, Rfl: 0    ascorbic acid (VITAMIN C) 250 mg tablet, Take 500 mg by mouth daily, Disp: , Rfl:     aspirin 81 MG tablet, Take 81 mg by mouth daily, Disp: , Rfl:     cetirizine (ZyrTEC) 10 mg tablet, Take 10 mg by mouth daily, Disp: , Rfl:     co-enzyme Q-10 30 MG capsule, Take 100 mg by mouth daily, Disp: , Rfl:     Dulaglutide (TRULICITY) 1 5 KR/4 0JW SOPN, Inject 3 mg under the skin daily  , Disp: , Rfl:     Efinaconazole (JUBLIA) 10 % SOLN, Apply topically daily, Disp: , Rfl:     Empagliflozin (JARDIANCE PO), Take 25 mg by mouth  , Disp: , Rfl:     fluticasone (FLONASE) 50 mcg/act nasal spray, 2 sprays into each nostril, Disp: , Rfl:     glimepiride (AMARYL) 1 mg tablet, Take 2 mg by mouth 2 (two) times a day, Disp: , Rfl:     hydrochlorothiazide (MICROZIDE) 12 5 mg capsule, Take 12 5 mg by mouth daily, Disp: , Rfl:     insulin glargine (Lantus SoloStar) 100 units/mL injection pen, Inject 18 Units under the skin daily  , Disp: , Rfl:     losartan (COZAAR) 100 MG tablet, Take 100 mg by mouth, Disp: , Rfl:     lovastatin (MEVACOR) 40 MG tablet, Take 40 mg by mouth daily at bedtime, Disp: , Rfl:     metFORMIN (GLUCOPHAGE) 1000 MG tablet, Take 1,000 mg by mouth 2 (two) times a day with meals, Disp: , Rfl:     montelukast (SINGULAIR) 10 mg tablet, Take 10 mg by mouth daily at bedtime, Disp: , Rfl:     olopatadine (PATANOL) 0 1 % ophthalmic solution, 1 drop 2 (two) times a day, Disp: , Rfl:     omeprazole (PriLOSEC) 40 MG capsule, Take 1 capsule (40 mg total) by mouth daily, Disp: 90 capsule, Rfl: 3    Sodium Sulfate-Mag Sulfate-KCl (Sutab) 3359-310-160 MG TABS, Take 1 kit by mouth see administration instructions Follow instructions given at office visit    See coupon code below, Disp: 24 tablet, Rfl: 0    Active Problems     Patient Active Problem List   Diagnosis    [facility]    Vomiting    Leukocytosis    Mixed hyperlipidemia    Hypertension    Hx pulmonary embolism    GERD (gastroesophageal reflux disease)    Benign prostatic hyperplasia without lower urinary tract symptoms    LELO (obstructive sleep apnea)    Prostate cancer screening       Past Medical History     Past Medical History:   Diagnosis Date    BPH without obstruction/lower urinary tract symptoms     [facility]     Dysuria     GERD (gastroesophageal reflux disease)     Gout     History of pulmonary embolism 2017    bilateral    Hyperlipidemia     Hypertension     Melanoma ([facility] ) 2017    left ear    Sleep apnea        Surgical History     Past Surgical History:   Procedure Laterality Date    CHOLECYSTECTOMY      COLONOSCOPY  10/2016    sessile serrated polyp removed from the proximal transverse, adenoma removed from the distal transverse    EGD  08/2017    gastritis, H  pylori negative, and Candida esophagitis    EGD  11/2011    slight Schatzki's ring, small hiatal hernia    EXTERNAL EAR SURGERY  08/2017    for the removal of skin melanoma       Family History     Family History   Problem Relation Age of Onset    Hyperlipidemia Mother     Stroke Father    [place] Melanoma Father     Diabetes Sister     Hypertension Sister     Diabetes Brother     JETT disease Brother        Social History     Social History     Social History     Tobacco Use   Smoking Status Never Smoker   Smokeless Tobacco Never Used       Past Surgical History:   Procedure Laterality Date    CHOLECYSTECTOMY      COLONOSCOPY  10/2016    sessile serrated polyp removed from the proximal transverse, adenoma removed from the distal transverse    EGD  08/2017    gastritis, H  pylori negative, and Candida esophagitis    EGD  11/2011    slight Schatzki's ring, small hiatal hernia    EXTERNAL EAR SURGERY  08/2017    for the removal of skin melanoma         The following portions of the patient's history were reviewed and updated as appropriate: allergies, current medications, past family history, past medical history, past social history, past surgical history and problem list    Please note :  Voice dictation software has been used to create this document  There may be inadvertent transcription errors      [id number] [address]

## 2022-03-07 NOTE — PATIENT INSTRUCTIONS
BLADDER HEALTH    WHAT IS CONSIDERED NORMAL?  The average bladder can hold about 2 cups of urine before it needs to be emptied   The normal range of voiding urine is 6 to 8 times during a 24 hour period  As we get older, our bladder capacity can get smaller and we may need to pass urine more frequently but usually not more than every 2 hours   Urine should flow easily without discomfort in a good, steady stream until the bladder is empty  No pushing or straining is necessary to empty the bladder   An urge is a signal that you feel as the bladder stretches to fill with urine  Urges can be felt even if the bladder is not full  Urges are not commands to go to the toilet, merely a signal and can be controlled  WHAT ARE GOOD BLADDER HABITS?  Take your time when emptying your bladder  Dont strain or push to empty your bladder  Make sure you empty your bladder completely each time you pass urine  Do not rush the process   Consistently ignoring the urge to go (waiting more than 4 hours between toileting) or urinating too infrequently may be convenient but not healthy for your bladder   Avoid going to the toilet just in case or more often than every 2 hours  It is usually not necessary to go when you feel the first urge  Try to go only when your bladder is full  Urgency and frequency of urination can be improved by retraining the bladder and spacing your fluid intake throughout the day  Practice good toilet habits  Dont let your bladder control your life  TIPS TO MAINTAIN GOOD BLADDER HABITS   Maintain a good fluid intake  Depending on your body size and environment, drink 6 -8 cups (8 oz each) of fluid per day unless otherwise advised by your doctor  Not enough fluid creates a foul odor and dark color of the urine     Limit the amount of caffeine (coffee, cola, chocolate or tea) and citrus foods that you consume as these foods can be associated with increased sensation of urinary urgency and frequency   Limit the amount of alcohol you drink  Alcohol increases urine production and makes it difficult for the brain to coordinate bladder control   Avoid constipation by maintaining a balanced diet of dietary fiber   Cigarette smoking is also irritating to the bladder surface and is associated with bladder cancer  In addition, the coughing associated with smoking may lead to increased incontinent episodes because of the increased pressure  HOW DIET CAN AFFECT YOUR BLADDER  Although there is no particular "diet" that can cure bladder control, there are certain dietary suggestions you can use to help control the problem  There are 2 points to consider when evaluating how your habits and diet may affect your bladder:    1  Foods and fluids can irritate the bladder  Some foods and beverages are thought to contribute to bladder leakage and irritability  However their effect on the bladder is not completely understood and you may want to see if eliminating one or all of these items improves your bladder control  If you are unable to give them up completely, it is recommended that you use the following items in moderation:   Acidic beverages and foods (orange juice, grapefruit juice, lemonade etc)   Alcoholic beverages   Vinegar   Coffee (regular and decaf)   Tea (regular and decaf)   Caffeinated beverages   Carbonated beverages          2  Drinking enough and the right kinds of fluids  Many people with bladder control issues decrease their intake of liquids in hope that they will need to urinate less frequently or have less urinary leakage   You should not restrict fluids to control your bladder  While a decrease in liquid intake does result in a decrease in the volume of urine, the smaller amount of urine may be more highly concentrated         Highly concentrated, dark yellow urine is irritating to the bladder surface and may actually cause you to go to the bathroom more frequently   It also encourages the growth of bacteria, which may lead to infections resulting in incontinence   Substitutions for Bladder Irritants: water is always the best beverage choice    Grape and apple juice are thirst quenchers are good selections and are not as irritating to the bladder   o Low acid fruits:  Pears, apricots, papaya, watermelon  o For coffee drinkers: KAVA®, Postum®, Piero®, Kaffree Niyah®  o For tea drinkers:  non-citrus or herbal and sun brewed tea

## 2022-04-02 ENCOUNTER — APPOINTMENT (OUTPATIENT)
Dept: LAB | Facility: HOSPITAL | Age: 67
End: 2022-04-02
Payer: COMMERCIAL

## 2022-04-02 DIAGNOSIS — I10 HYPERTENSION, UNSPECIFIED TYPE: ICD-10-CM

## 2022-04-02 DIAGNOSIS — E11.00 TYPE II DIABETES MELLITUS WITH HYPEROSMOLARITY, UNCONTROLLED (HCC): ICD-10-CM

## 2022-04-02 DIAGNOSIS — E55.9 VITAMIN D2 DEFICIENCY: ICD-10-CM

## 2022-04-02 DIAGNOSIS — E11.69 DIABETES MELLITUS ASSOCIATED WITH HORMONAL ETIOLOGY (HCC): ICD-10-CM

## 2022-04-02 DIAGNOSIS — E78.5 HYPERLIPIDEMIA, UNSPECIFIED HYPERLIPIDEMIA TYPE: ICD-10-CM

## 2022-04-02 DIAGNOSIS — R53.83 FATIGUE, UNSPECIFIED TYPE: ICD-10-CM

## 2022-04-02 DIAGNOSIS — E11.65 TYPE II DIABETES MELLITUS WITH HYPEROSMOLARITY, UNCONTROLLED (HCC): ICD-10-CM

## 2022-04-02 LAB
25(OH)D3 SERPL-MCNC: 21.1 NG/ML (ref 30–100)
ALBUMIN SERPL BCP-MCNC: 4.2 G/DL (ref 3.5–5)
ALP SERPL-CCNC: 46 U/L (ref 46–116)
ALT SERPL W P-5'-P-CCNC: 31 U/L (ref 12–78)
ANION GAP SERPL CALCULATED.3IONS-SCNC: 12 MMOL/L (ref 4–13)
AST SERPL W P-5'-P-CCNC: 24 U/L (ref 5–45)
BASOPHILS # BLD AUTO: 0.04 THOUSANDS/ΜL (ref 0–0.1)
BASOPHILS NFR BLD AUTO: 1 % (ref 0–1)
BILIRUB SERPL-MCNC: 0.49 MG/DL (ref 0.2–1)
BUN SERPL-MCNC: 20 MG/DL (ref 5–25)
CALCIUM SERPL-MCNC: 8.8 MG/DL (ref 8.3–10.1)
CHLORIDE SERPL-SCNC: 105 MMOL/L (ref 100–108)
CHOLEST SERPL-MCNC: 156 MG/DL
CO2 SERPL-SCNC: 26 MMOL/L (ref 21–32)
CREAT SERPL-MCNC: 0.92 MG/DL (ref 0.6–1.3)
EOSINOPHIL # BLD AUTO: 0.17 THOUSAND/ΜL (ref 0–0.61)
EOSINOPHIL NFR BLD AUTO: 4 % (ref 0–6)
ERYTHROCYTE [DISTWIDTH] IN BLOOD BY AUTOMATED COUNT: 14.3 % (ref 11.6–15.1)
EST. AVERAGE GLUCOSE BLD GHB EST-MCNC: 174 MG/DL
GFR SERPL CREATININE-BSD FRML MDRD: 87 ML/MIN/1.73SQ M
GLUCOSE P FAST SERPL-MCNC: 171 MG/DL (ref 65–99)
HBA1C MFR BLD: 7.7 %
HCT VFR BLD AUTO: 43.4 % (ref 36.5–49.3)
HDLC SERPL-MCNC: 37 MG/DL
HGB BLD-MCNC: 14.2 G/DL (ref 12–17)
IMM GRANULOCYTES # BLD AUTO: 0.01 THOUSAND/UL (ref 0–0.2)
IMM GRANULOCYTES NFR BLD AUTO: 0 % (ref 0–2)
LDLC SERPL CALC-MCNC: 94 MG/DL (ref 0–100)
LYMPHOCYTES # BLD AUTO: 1.49 THOUSANDS/ΜL (ref 0.6–4.47)
LYMPHOCYTES NFR BLD AUTO: 34 % (ref 14–44)
MCH RBC QN AUTO: 27.9 PG (ref 26.8–34.3)
MCHC RBC AUTO-ENTMCNC: 32.7 G/DL (ref 31.4–37.4)
MCV RBC AUTO: 85 FL (ref 82–98)
MONOCYTES # BLD AUTO: 0.47 THOUSAND/ΜL (ref 0.17–1.22)
MONOCYTES NFR BLD AUTO: 11 % (ref 4–12)
NEUTROPHILS # BLD AUTO: 2.17 THOUSANDS/ΜL (ref 1.85–7.62)
NEUTS SEG NFR BLD AUTO: 50 % (ref 43–75)
NONHDLC SERPL-MCNC: 119 MG/DL
NRBC BLD AUTO-RTO: 0 /100 WBCS
PLATELET # BLD AUTO: 204 THOUSANDS/UL (ref 149–390)
PMV BLD AUTO: 9.3 FL (ref 8.9–12.7)
POTASSIUM SERPL-SCNC: 4 MMOL/L (ref 3.5–5.3)
PROT SERPL-MCNC: 8 G/DL (ref 6.4–8.2)
RBC # BLD AUTO: 5.09 MILLION/UL (ref 3.88–5.62)
SODIUM SERPL-SCNC: 143 MMOL/L (ref 136–145)
TRIGL SERPL-MCNC: 125 MG/DL
TSH SERPL DL<=0.05 MIU/L-ACNC: 2.03 UIU/ML (ref 0.36–3.74)
WBC # BLD AUTO: 4.35 THOUSAND/UL (ref 4.31–10.16)

## 2022-04-02 PROCEDURE — 83036 HEMOGLOBIN GLYCOSYLATED A1C: CPT

## 2022-04-02 PROCEDURE — 36415 COLL VENOUS BLD VENIPUNCTURE: CPT

## 2022-04-02 PROCEDURE — 85025 COMPLETE CBC W/AUTO DIFF WBC: CPT

## 2022-04-02 PROCEDURE — 80053 COMPREHEN METABOLIC PANEL: CPT

## 2022-04-02 PROCEDURE — 82306 VITAMIN D 25 HYDROXY: CPT

## 2022-04-02 PROCEDURE — 84443 ASSAY THYROID STIM HORMONE: CPT

## 2022-04-02 PROCEDURE — 80061 LIPID PANEL: CPT

## 2022-10-11 PROBLEM — Z12.5 PROSTATE CANCER SCREENING: Status: RESOLVED | Noted: 2022-03-07 | Resolved: 2022-10-11

## 2022-10-26 ENCOUNTER — APPOINTMENT (OUTPATIENT)
Dept: LAB | Facility: HOSPITAL | Age: 67
End: 2022-10-26

## 2022-10-26 DIAGNOSIS — E11.69 HYPERLIPIDEMIA ASSOCIATED WITH TYPE 2 DIABETES MELLITUS (HCC): ICD-10-CM

## 2022-10-26 DIAGNOSIS — E11.00 TYPE II DIABETES MELLITUS WITH HYPEROSMOLARITY, UNCONTROLLED (HCC): ICD-10-CM

## 2022-10-26 DIAGNOSIS — E11.65 TYPE II DIABETES MELLITUS WITH HYPEROSMOLARITY, UNCONTROLLED (HCC): ICD-10-CM

## 2022-10-26 DIAGNOSIS — I10 ESSENTIAL HYPERTENSION, BENIGN: ICD-10-CM

## 2022-10-26 DIAGNOSIS — E78.5 HYPERLIPIDEMIA ASSOCIATED WITH TYPE 2 DIABETES MELLITUS (HCC): ICD-10-CM

## 2022-10-26 LAB
ALBUMIN SERPL BCP-MCNC: 4 G/DL (ref 3.5–5)
ALP SERPL-CCNC: 53 U/L (ref 46–116)
ALT SERPL W P-5'-P-CCNC: 26 U/L (ref 12–78)
ANION GAP SERPL CALCULATED.3IONS-SCNC: 8 MMOL/L (ref 4–13)
AST SERPL W P-5'-P-CCNC: 19 U/L (ref 5–45)
BILIRUB SERPL-MCNC: 0.41 MG/DL (ref 0.2–1)
BUN SERPL-MCNC: 15 MG/DL (ref 5–25)
CALCIUM SERPL-MCNC: 9.4 MG/DL (ref 8.3–10.1)
CHLORIDE SERPL-SCNC: 101 MMOL/L (ref 96–108)
CO2 SERPL-SCNC: 30 MMOL/L (ref 21–32)
CREAT SERPL-MCNC: 0.92 MG/DL (ref 0.6–1.3)
EST. AVERAGE GLUCOSE BLD GHB EST-MCNC: 177 MG/DL
GFR SERPL CREATININE-BSD FRML MDRD: 86 ML/MIN/1.73SQ M
GLUCOSE P FAST SERPL-MCNC: 159 MG/DL (ref 65–99)
HBA1C MFR BLD: 7.8 %
POTASSIUM SERPL-SCNC: 3.9 MMOL/L (ref 3.5–5.3)
PROT SERPL-MCNC: 7.7 G/DL (ref 6.4–8.4)
SODIUM SERPL-SCNC: 139 MMOL/L (ref 135–147)

## 2022-11-11 ENCOUNTER — HOSPITAL ENCOUNTER (OUTPATIENT)
Dept: CT IMAGING | Facility: HOSPITAL | Age: 67
Discharge: HOME | End: 2022-11-11
Attending: OTOLARYNGOLOGY

## 2022-11-11 DIAGNOSIS — J33.9 NASAL POLYPOSIS: ICD-10-CM

## 2023-02-27 ENCOUNTER — APPOINTMENT (OUTPATIENT)
Dept: LAB | Facility: HOSPITAL | Age: 68
End: 2023-02-27

## 2023-02-27 DIAGNOSIS — Z12.5 PROSTATE CANCER SCREENING: ICD-10-CM

## 2023-02-27 LAB — PSA SERPL-MCNC: 7.1 NG/ML (ref 0–4)

## 2023-03-07 ENCOUNTER — APPOINTMENT (OUTPATIENT)
Dept: LAB | Facility: HOSPITAL | Age: 68
End: 2023-03-07

## 2023-03-07 DIAGNOSIS — M79.10 MYALGIA: ICD-10-CM

## 2023-03-07 DIAGNOSIS — R53.83 FATIGUE, UNSPECIFIED TYPE: ICD-10-CM

## 2023-03-07 LAB
BASOPHILS # BLD AUTO: 0.03 THOUSANDS/ÂΜL (ref 0–0.1)
BASOPHILS NFR BLD AUTO: 1 % (ref 0–1)
CRP SERPL QL: <1 MG/L
EOSINOPHIL # BLD AUTO: 0.16 THOUSAND/ÂΜL (ref 0–0.61)
EOSINOPHIL NFR BLD AUTO: 3 % (ref 0–6)
ERYTHROCYTE [DISTWIDTH] IN BLOOD BY AUTOMATED COUNT: 14.8 % (ref 11.6–15.1)
ERYTHROCYTE [SEDIMENTATION RATE] IN BLOOD: 7 MM/HOUR (ref 0–19)
HCT VFR BLD AUTO: 41.7 % (ref 36.5–49.3)
HGB BLD-MCNC: 13.4 G/DL (ref 12–17)
IMM GRANULOCYTES # BLD AUTO: 0.01 THOUSAND/UL (ref 0–0.2)
IMM GRANULOCYTES NFR BLD AUTO: 0 % (ref 0–2)
LYMPHOCYTES # BLD AUTO: 1.95 THOUSANDS/ÂΜL (ref 0.6–4.47)
LYMPHOCYTES NFR BLD AUTO: 38 % (ref 14–44)
MCH RBC QN AUTO: 27.5 PG (ref 26.8–34.3)
MCHC RBC AUTO-ENTMCNC: 32.1 G/DL (ref 31.4–37.4)
MCV RBC AUTO: 86 FL (ref 82–98)
MONOCYTES # BLD AUTO: 0.53 THOUSAND/ÂΜL (ref 0.17–1.22)
MONOCYTES NFR BLD AUTO: 10 % (ref 4–12)
NEUTROPHILS # BLD AUTO: 2.43 THOUSANDS/ÂΜL (ref 1.85–7.62)
NEUTS SEG NFR BLD AUTO: 48 % (ref 43–75)
NRBC BLD AUTO-RTO: 0 /100 WBCS
PLATELET # BLD AUTO: 184 THOUSANDS/UL (ref 149–390)
PMV BLD AUTO: 8.8 FL (ref 8.9–12.7)
RBC # BLD AUTO: 4.87 MILLION/UL (ref 3.88–5.62)
TSH SERPL DL<=0.05 MIU/L-ACNC: 2.35 UIU/ML (ref 0.45–4.5)
WBC # BLD AUTO: 5.11 THOUSAND/UL (ref 4.31–10.16)

## 2023-03-09 ENCOUNTER — OFFICE VISIT (OUTPATIENT)
Dept: UROLOGY | Facility: CLINIC | Age: 68
End: 2023-03-09

## 2023-03-09 VITALS
DIASTOLIC BLOOD PRESSURE: 76 MMHG | BODY MASS INDEX: 29.09 KG/M2 | SYSTOLIC BLOOD PRESSURE: 120 MMHG | WEIGHT: 197 LBS | HEART RATE: 76 BPM

## 2023-03-09 DIAGNOSIS — R97.20 ELEVATED PSA: Primary | ICD-10-CM

## 2023-03-09 RX ORDER — DULAGLUTIDE 4.5 MG/.5ML
INJECTION, SOLUTION SUBCUTANEOUS
COMMUNITY
Start: 2023-03-03

## 2023-03-09 NOTE — PROGRESS NOTES
3/9/2023    No chief complaint on file  Assessment and Plan    74 y o  male     1  Elevated PSA  · PSA trend  · 7 1 (2/27/2023)  · 3 2 (2/20/2022)  · 2 6 (1/27/2021)  · 2 7 (2/8/2020)  · ANGELIA reveals respective prostate, no palpable nodules masses  · Negative family history prostate cancer  · I recommend repeating a PSA with a PSA total and free in 6 to 8 weeks  I will contact him with those results  Should his PSA remain elevated we did discuss briefly options of a multiparametric MRI of the prostate versus a transperineal prostate biopsy  2   BPH  · Continues to do well and denies any complaints  · Will continue to monitor for worsening symptoms  · Follow-up 1 year      Subjective:    He presents today reporting doing very well since we last saw him  He did have bronchitis in January and is recovering well  He continues to deny any lower urinary tract symptoms  He does have urinary frequency but his is likely secondary to his DM  His current A1C is 7 8  Overall doing well and offers no complaints  History of Present Illness  Gio Graves is a 74 y o  male here for annual follow-up evaluation for BPH and prostate cancer screening  He was last seen by Marc on 3/7/2022 and previously followed with Dr Baron Ocampo  He has a history of BPH however has been doing well off medications  He denied any bothersome urinary symptoms at his last office visit  Prostate cancer screening: Denies any history of prostate cancer  Current PSA is elevated at 7 1 as of 2/27/2023 prior to this it was 3 2 on 2/28/2022, 2 6 on 1/27/2021, and 2 7 on 2/8/2020  Review of Systems   Constitutional: Negative for chills and fever  HENT: Negative for congestion and sore throat  Respiratory: Negative for cough and shortness of breath  Cardiovascular: Negative for chest pain and leg swelling  Gastrointestinal: Negative for abdominal pain, constipation, diarrhea, nausea and vomiting  Genitourinary: Negative for difficulty urinating, dysuria, frequency, hematuria and urgency  Musculoskeletal: Negative for back pain and gait problem  Skin: Negative for wound  Allergic/Immunologic: Negative for immunocompromised state  Neurological: Negative for dizziness, weakness and numbness  Hematological: Does not bruise/bleed easily  AUA SYMPTOM SCORE    Flowsheet Row Most Recent Value   AUA SYMPTOM SCORE    How often have you had a sensation of not emptying your bladder completely after you finished urinating? 1 (P)     How often have you had to urinate again less than two hours after you finished urinating? 3 (P)     How often have you found you stopped and started again several times when you urinate? 1 (P)     How often have you found it difficult to postpone urination? 1 (P)     How often have you had a weak urinary stream? 0 (P)     How often have you had to push or strain to begin urination? 0 (P)     How many times did you most typically get up to urinate from the time you went to bed at night until the time you got up in the morning? 2 (P)     Quality of Life: If you were to spend the rest of your life with your urinary condition just the way it is now, how would you feel about that? 2 (P)     AUA SYMPTOM SCORE 8 (P)           Vitals  There were no vitals filed for this visit  Physical Exam  Vitals reviewed  Constitutional:       General: He is not in acute distress  Appearance: Normal appearance  He is not ill-appearing or toxic-appearing  HENT:      Head: Normocephalic and atraumatic  Eyes:      General: No scleral icterus  Conjunctiva/sclera: Conjunctivae normal    Cardiovascular:      Rate and Rhythm: Normal rate  Pulmonary:      Effort: Pulmonary effort is normal  No respiratory distress  Abdominal:      Palpations: Abdomen is soft  Tenderness: There is no abdominal tenderness  There is no right CVA tenderness or left CVA tenderness        Hernia: No hernia is present  Genitourinary:     Comments: ANGELIA reveals a smooth symmetric prostate, no palpable nodular masses  Musculoskeletal:      Cervical back: Normal range of motion  Right lower leg: No edema  Left lower leg: No edema  Skin:     General: Skin is warm and dry  Coloration: Skin is not jaundiced or pale  Neurological:      General: No focal deficit present  Mental Status: He is alert and oriented to person, place, and time  Mental status is at baseline  Gait: Gait normal    Psychiatric:         Mood and Affect: Mood normal          Behavior: Behavior normal          Thought Content: Thought content normal          Judgment: Judgment normal          Past History  Past Medical History:   Diagnosis Date   • BPH without obstruction/lower urinary tract symptoms    • Diabetes mellitus (HCC)    • Dysuria    • GERD (gastroesophageal reflux disease)    • Gout    • History of pulmonary embolism 2017    bilateral   • Hyperlipidemia    • Hypertension    • Melanoma ([facility] ) 2017    left ear   • Sleep apnea      Social History     Socioeconomic History   • Marital status: /Civil Union     Spouse name: Not on file   • Number of children: Not on file   • Years of education: Not on file   • Highest education level: Not on file   Occupational History   • Occupation: Retired - Store [employer]   Tobacco Use   • Smoking status: Never   • Smokeless tobacco: Never   Vaping Use   • Vaping Use: Never used   Substance and Sexual Activity   • Alcohol use: Yes     Comment: Drinks socailly      • Drug use: No   • Sexual activity: Not on file   Other Topics Concern   • Not on file   Social History Narrative   • Not on file     Social Determinants of Health     Financial Resource Strain: Not on file   Food Insecurity: Not on file   Transportation Needs: Not on file   Physical Activity: Not on file   Stress: Not on file   Social Connections: Not on file   Intimate Partner Violence: Not on file   Housing Stability: Not on file     Social History     Tobacco Use   Smoking Status Never   Smokeless Tobacco Never     Family History   Problem Relation Age of Onset   • Hyperlipidemia Mother    • Stroke Father    • Melanoma Father    • Diabetes Sister    • Hypertension Sister    • Diabetes Brother    • JETT disease Brother        The following portions of the patient's history were reviewed and updated as appropriate allergies, current medications, past medical history, past social history, past surgical history and problem list    Imaging:    Results  No results found for this or any previous visit (from the past 1 hour(s)) ]  Lab Results   Component Value Date    PSA 7 1 (H) 02/27/2023    PSA 3 2 02/28/2022    PSA 2 6 01/27/2021    PSA 2 7 02/08/2020     Lab Results   Component Value Date    GLUCOSE 118 12/31/2015    CALCIUM 9 4 10/26/2022     12/31/2015    K 3 9 10/26/2022    CO2 30 10/26/2022     10/26/2022    BUN 15 10/26/2022    CREATININE 0 92 10/26/2022     Lab Results   Component Value Date    WBC 5 11 03/07/2023    HGB 13 4 03/07/2023    HCT 41 7 03/07/2023    MCV 86 03/07/2023     03/07/2023       Please Note:  Voice dictation software has been used to create this document  There may be inadvertent transcriptions errors       JARED Baker 03/09/23

## 2023-03-13 ENCOUNTER — TELEPHONE (OUTPATIENT)
Dept: FAMILY MEDICINE CLINIC | Facility: CLINIC | Age: 68
End: 2023-03-13

## 2023-03-13 NOTE — TELEPHONE ENCOUNTER
SPOKE WITH PATIENT  APPT FOR 6 MO SCHEDULED WITH UROLOGY FOR 9/13  HE IS AWARE TO HAVE PSA DONE AFTER 4/23/23

## 2023-04-24 ENCOUNTER — APPOINTMENT (OUTPATIENT)
Dept: LAB | Facility: HOSPITAL | Age: 68
End: 2023-04-24

## 2023-04-24 DIAGNOSIS — E11.65 POORLY CONTROLLED DIABETES MELLITUS (HCC): ICD-10-CM

## 2023-04-24 DIAGNOSIS — R97.20 ELEVATED PSA: ICD-10-CM

## 2023-04-24 DIAGNOSIS — I10 ESSENTIAL HYPERTENSION: ICD-10-CM

## 2023-04-24 DIAGNOSIS — E11.69 HYPERLIPIDEMIA ASSOCIATED WITH TYPE 2 DIABETES MELLITUS (HCC): ICD-10-CM

## 2023-04-24 DIAGNOSIS — E78.5 HYPERLIPIDEMIA ASSOCIATED WITH TYPE 2 DIABETES MELLITUS (HCC): ICD-10-CM

## 2023-04-24 LAB
ALBUMIN SERPL BCP-MCNC: 4.4 G/DL (ref 3.5–5)
ALP SERPL-CCNC: 53 U/L (ref 34–104)
ALT SERPL W P-5'-P-CCNC: 18 U/L (ref 7–52)
ANION GAP SERPL CALCULATED.3IONS-SCNC: 9 MMOL/L (ref 4–13)
AST SERPL W P-5'-P-CCNC: 15 U/L (ref 13–39)
BILIRUB SERPL-MCNC: 0.39 MG/DL (ref 0.2–1)
BUN SERPL-MCNC: 17 MG/DL (ref 5–25)
CALCIUM SERPL-MCNC: 9.5 MG/DL (ref 8.4–10.2)
CHLORIDE SERPL-SCNC: 103 MMOL/L (ref 96–108)
CHOLEST SERPL-MCNC: 162 MG/DL
CO2 SERPL-SCNC: 29 MMOL/L (ref 21–32)
CREAT SERPL-MCNC: 0.95 MG/DL (ref 0.6–1.3)
EST. AVERAGE GLUCOSE BLD GHB EST-MCNC: 186 MG/DL
GFR SERPL CREATININE-BSD FRML MDRD: 83 ML/MIN/1.73SQ M
GLUCOSE P FAST SERPL-MCNC: 160 MG/DL (ref 65–99)
HBA1C MFR BLD: 8.1 %
HDLC SERPL-MCNC: 37 MG/DL
LDLC SERPL CALC-MCNC: 74 MG/DL (ref 0–100)
NONHDLC SERPL-MCNC: 125 MG/DL
POTASSIUM SERPL-SCNC: 4.1 MMOL/L (ref 3.5–5.3)
PROT SERPL-MCNC: 6.8 G/DL (ref 6.4–8.4)
SODIUM SERPL-SCNC: 141 MMOL/L (ref 135–147)
TRIGL SERPL-MCNC: 257 MG/DL
TSH SERPL DL<=0.05 MIU/L-ACNC: 2.87 UIU/ML (ref 0.45–4.5)

## 2023-04-26 ENCOUNTER — TELEPHONE (OUTPATIENT)
Dept: OTHER | Facility: OTHER | Age: 68
End: 2023-04-26

## 2023-04-26 LAB
PSA FREE MFR SERPL: 20.8 %
PSA FREE SERPL-MCNC: 0.83 NG/ML
PSA SERPL-MCNC: 4 NG/ML (ref 0–4)

## 2023-04-26 NOTE — TELEPHONE ENCOUNTER
Call from patient requesting to schedule follow up appointment to review his PSA and next steps  Please return call

## 2023-05-03 ENCOUNTER — OFFICE VISIT (OUTPATIENT)
Dept: UROLOGY | Facility: CLINIC | Age: 68
End: 2023-05-03

## 2023-05-03 VITALS
SYSTOLIC BLOOD PRESSURE: 120 MMHG | HEART RATE: 68 BPM | DIASTOLIC BLOOD PRESSURE: 82 MMHG | BODY MASS INDEX: 28.49 KG/M2 | HEIGHT: 70 IN | WEIGHT: 199 LBS

## 2023-05-03 DIAGNOSIS — R97.20 ELEVATED PSA: Primary | ICD-10-CM

## 2023-05-03 RX ORDER — LANCETS 30 GAUGE
EACH MISCELLANEOUS 2 TIMES DAILY
COMMUNITY
Start: 2023-03-13

## 2023-05-03 RX ORDER — BLOOD SUGAR DIAGNOSTIC
STRIP MISCELLANEOUS
COMMUNITY
Start: 2023-03-08

## 2023-05-03 RX ORDER — PEN NEEDLE, DIABETIC 32GX 5/32"
NEEDLE, DISPOSABLE MISCELLANEOUS
COMMUNITY
Start: 2023-03-27

## 2023-05-03 NOTE — PROGRESS NOTES
5/3/2023    Chief Complaint   Patient presents with    Follow-up     PSA 04/21/23       Assessment and Plan    74 y o  male     1  Elevated PSA  · PSA trend  · 4 0 (4/24/2023)  · % Free PSA 20 8 (20% probability of prostate cancer)  · 7 1 (2/27/2023)  · 3 2 (2/28/2022)  · 2 6 (1/27/2021)  · Although his PSA did improve to 4 0 after his sudden rise of 7 1 in February  Because his PSA still remains elevated from his baseline I am recommending we proceed with a multiparametric MRI of the prostate  I did discuss alternative options to continue monitoring his PSA versus a transperineal prostate biopsy  Both the patient and his wife would like to proceed with a multiparametric MRI of the prostate  This will help to identify any focal lesion as well as any potential extraprostatic involvement to help guide potential need for future biopsy or staging  · He will follow-up in 2 to 3 months to review these results  History of Present Illness  Pantera Greenberg is a 74 y o  male here for follow-up evaluation of elevated PSA  History of BPH  Previously followed with Dr Solomon Ronquillo and has been doing well off medications  Last seen by me on 3/9/2023 and continued to report doing well and denies any complaints  Patient also followed for prostate cancer screening with negative family history of prostate cancer  His PSA at the time of his last office visit with me was elevated at 7 1 as of 2/27/2023  Prior to this it was 3 2 on 2/20/2022, 2 6 on 1/27/2021, and 2 7 on 2/8/2020  Due to the acute elevation I recommended we recheck this  His repeat PSA has improved to 4 0 as of 4/24/2023  Did include a percent free PSA which was 20 8 which gives him a 20% probability of prostate cancer  Review of Systems   Constitutional: Negative for chills and fever  HENT: Negative for congestion and sore throat  Respiratory: Negative for cough and shortness of breath      Cardiovascular: Negative for chest "pain and leg swelling  Gastrointestinal: Negative for abdominal pain, constipation and diarrhea  Genitourinary: Negative for difficulty urinating, dysuria, frequency, hematuria and urgency  Musculoskeletal: Negative for back pain and gait problem  Skin: Negative for wound  Allergic/Immunologic: Negative for immunocompromised state  Hematological: Does not bruise/bleed easily  Vitals  Vitals:    05/03/23 1020   BP: 120/82   BP Location: Left arm   Patient Position: Sitting   Cuff Size: Large   Pulse: 68   Weight: 90 3 kg (199 lb)   Height: 5' 10\" (1 778 m)       Physical Exam  Vitals reviewed  Constitutional:       General: He is not in acute distress  Appearance: Normal appearance  He is not ill-appearing or toxic-appearing  HENT:      Head: Normocephalic and atraumatic  Eyes:      General: No scleral icterus  Conjunctiva/sclera: Conjunctivae normal    Cardiovascular:      Rate and Rhythm: Normal rate  Pulmonary:      Effort: Pulmonary effort is normal  No respiratory distress  Abdominal:      Tenderness: There is no right CVA tenderness or left CVA tenderness  Hernia: No hernia is present  Musculoskeletal:      Cervical back: Normal range of motion  Right lower leg: No edema  Left lower leg: No edema  Skin:     General: Skin is warm and dry  Coloration: Skin is not jaundiced or pale  Neurological:      General: No focal deficit present  Mental Status: He is alert and oriented to person, place, and time  Mental status is at baseline  Gait: Gait normal    Psychiatric:         Mood and Affect: Mood normal          Behavior: Behavior normal          Thought Content:  Thought content normal          Judgment: Judgment normal          Past History  Past Medical History:   Diagnosis Date    BPH without obstruction/lower urinary tract symptoms     Diabetes mellitus (HCC)     Dysuria     GERD (gastroesophageal reflux disease)     Gout     " History of pulmonary embolism 2017    bilateral    Hyperlipidemia     Hypertension     Melanoma ([address] ) 2017    left ear    Sleep apnea      Social History     Socioeconomic History    Marital status: /Civil Union     Spouse name: None    Number of children: None    Years of education: None    Highest education level: None   Occupational History    Occupation: Retired - Store Room   Tobacco Use    Smoking status: Never     Passive exposure: Never    Smokeless tobacco: Never   Vaping Use    Vaping Use: Never used   Substance and Sexual Activity    Alcohol use: Yes     Comment: Drinks socailly       Drug use: No    Sexual activity: None   Other Topics Concern    None   Social History Narrative    None     Social Determinants of Health     Financial Resource Strain: Not on file   Food Insecurity: Not on file   Transportation Needs: Not on file   Physical Activity: Not on file   Stress: Not on file   Social Connections: Not on file   Intimate Partner Violence: Not on file   Housing Stability: Not on file     Social History     Tobacco Use   Smoking Status Never    Passive exposure: Never   Smokeless Tobacco Never     Family History   Problem Relation Age of Onset    Hyperlipidemia Mother     Stroke Father     Melanoma Father     Diabetes Sister     Hypertension Sister     Diabetes Brother     JETT disease Brother        The following portions of the patient's history were reviewed and updated as appropriate allergies, current medications, past medical history, past social history, past surgical history and problem list    Imaging:    Results  No results found for this or any previous visit (from the past 1 hour(s)) ]  Lab Results   Component Value Date    PSA 4 0 04/24/2023    PSA 7 1 (H) 02/27/2023    PSA 3 2 02/28/2022    PSA 2 6 01/27/2021     Lab Results   Component Value Date    GLUCOSE 118 12/31/2015    CALCIUM 9 5 04/24/2023     12/31/2015    K 4 1 04/24/2023    CO2 29 04/24/2023  04/24/2023    BUN 17 04/24/2023    CREATININE 0 95 04/24/2023     Lab Results   Component Value Date    WBC 5 11 03/07/2023    HGB 13 4 03/07/2023    HCT 41 7 03/07/2023    MCV 86 03/07/2023     03/07/2023       Please Note:  Voice dictation software has been used to create this document  There may be inadvertent transcriptions errors       JARED Noland 05/03/23

## 2023-06-03 ENCOUNTER — APPOINTMENT (OUTPATIENT)
Dept: LAB | Facility: HOSPITAL | Age: 68
End: 2023-06-03
Payer: MEDICARE

## 2023-06-03 DIAGNOSIS — R97.20 ELEVATED PSA: ICD-10-CM

## 2023-06-03 LAB
ANION GAP SERPL CALCULATED.3IONS-SCNC: 10 MMOL/L (ref 4–13)
BUN SERPL-MCNC: 18 MG/DL (ref 5–25)
CALCIUM SERPL-MCNC: 9.7 MG/DL (ref 8.4–10.2)
CHLORIDE SERPL-SCNC: 101 MMOL/L (ref 96–108)
CO2 SERPL-SCNC: 27 MMOL/L (ref 21–32)
CREAT SERPL-MCNC: 0.9 MG/DL (ref 0.6–1.3)
GFR SERPL CREATININE-BSD FRML MDRD: 88 ML/MIN/1.73SQ M
GLUCOSE P FAST SERPL-MCNC: 195 MG/DL (ref 65–99)
POTASSIUM SERPL-SCNC: 3.8 MMOL/L (ref 3.5–5.3)
SODIUM SERPL-SCNC: 138 MMOL/L (ref 135–147)

## 2023-06-03 PROCEDURE — 80048 BASIC METABOLIC PNL TOTAL CA: CPT

## 2023-06-03 PROCEDURE — 36415 COLL VENOUS BLD VENIPUNCTURE: CPT

## 2023-06-06 ENCOUNTER — HOSPITAL ENCOUNTER (OUTPATIENT)
Facility: MEDICAL CENTER | Age: 68
Discharge: HOME | End: 2023-06-06
Payer: MEDICARE

## 2023-06-06 DIAGNOSIS — R97.20 ELEVATED PSA: ICD-10-CM

## 2023-06-06 PROCEDURE — A9585 GADOBUTROL INJECTION: HCPCS

## 2023-06-06 PROCEDURE — G1004 CDSM NDSC: HCPCS

## 2023-06-06 PROCEDURE — 72197 MRI PELVIS W/O & W/DYE: CPT

## 2023-06-06 PROCEDURE — 76377 3D RENDER W/INTRP POSTPROCES: CPT

## 2023-06-06 RX ADMIN — GADOBUTROL 9 ML: 604.72 INJECTION INTRAVENOUS at 08:56

## 2023-06-09 ENCOUNTER — PATIENT MESSAGE (OUTPATIENT)
Dept: UROLOGY | Facility: CLINIC | Age: 68
End: 2023-06-09

## 2023-06-09 ENCOUNTER — TELEPHONE (OUTPATIENT)
Dept: UROLOGY | Facility: CLINIC | Age: 68
End: 2023-06-09

## 2023-06-09 ENCOUNTER — TELEPHONE (OUTPATIENT)
Dept: OTHER | Facility: OTHER | Age: 68
End: 2023-06-09

## 2023-06-09 ENCOUNTER — TELEPHONE (OUTPATIENT)
Dept: OTHER | Facility: HOSPITAL | Age: 68
End: 2023-06-09

## 2023-06-09 NOTE — PATIENT COMMUNICATION
Maru Whitingigned  3:13 PM                     Spouse is calling to review MRI results  They are concerned about the report in his MyChart

## 2023-06-09 NOTE — TELEPHONE ENCOUNTER
From: Rafiq Mays  To: Jackson Scott  Sent: 6/9/2023 3:11 PM EDT  Subject: Prostate MRI    Results are in  I hope to hear from you today  I do have a small spot in question

## 2023-06-09 NOTE — TELEPHONE ENCOUNTER
I spoke with patient via telephone  I confirmed their name and date of birth prior to my telephone encounter  Personally reviewed patient's most recent multiparametric MRI results over the phone  This showed a lesion measuring 5 mm in the right apical posterior peripheral zone with PI-RADS category 3 scoring which is indeterminate (the presence of clinically significant cancer is equivocal)  There is no extraprostatic tumor, seminal vesicle invasion, pelvic lymphadenopathy, or pelvic osseous metastatic disease present  Based on the above findings I discussed recommendations to proceed with a MRI fusion prostate biopsy  Patient is agreeable to this  Our surgical scheduler will contact him to schedule this        Bethene Age

## 2023-07-23 ENCOUNTER — OFFICE VISIT (OUTPATIENT)
Dept: URGENT CARE | Facility: MEDICAL CENTER | Age: 68
End: 2023-07-23
Payer: MEDICARE

## 2023-07-23 VITALS
HEIGHT: 71 IN | WEIGHT: 193.2 LBS | RESPIRATION RATE: 18 BRPM | TEMPERATURE: 98.2 F | DIASTOLIC BLOOD PRESSURE: 80 MMHG | OXYGEN SATURATION: 98 % | HEART RATE: 98 BPM | BODY MASS INDEX: 27.05 KG/M2 | SYSTOLIC BLOOD PRESSURE: 140 MMHG

## 2023-07-23 DIAGNOSIS — J01.90 ACUTE NON-RECURRENT SINUSITIS, UNSPECIFIED LOCATION: Primary | ICD-10-CM

## 2023-07-23 PROCEDURE — G0463 HOSPITAL OUTPT CLINIC VISIT: HCPCS | Performed by: PHYSICIAN ASSISTANT

## 2023-07-23 PROCEDURE — 99212 OFFICE O/P EST SF 10 MIN: CPT | Performed by: PHYSICIAN ASSISTANT

## 2023-07-23 RX ORDER — AMOXICILLIN AND CLAVULANATE POTASSIUM 875; 125 MG/1; MG/1
1 TABLET, FILM COATED ORAL 2 TIMES DAILY
Qty: 14 TABLET | Refills: 0 | Status: SHIPPED | OUTPATIENT
Start: 2023-07-23 | End: 2023-07-30

## 2023-07-23 NOTE — PROGRESS NOTES
[facility]        NAME: Marco Antonio Anaya is a 79 y.o. male  : 1955    MRN: [id number]  DATE: 2023  TIME: 11:05 AM    Assessment and Plan   Acute non-recurrent sinusitis, unspecified location [J01.90]  1. Acute non-recurrent sinusitis, unspecified location  amoxicillin-clavulanate (AUGMENTIN) 875-125 mg per tablet            Patient Instructions       Follow up with PCP in 3-5 days. Proceed to  ER if symptoms worsen. Chief Complaint     Chief Complaint   Patient presents with   • Facial Pain     Pt started with facial & teeth pain on the R side, sinus pain x 2 days ago. Nothing taken OTC for symptoms. History of Present Illness       Patient presents with a 2 ay hx of right facial and dental pain. Congestion but no fever or chills. She had similar symptoms in the past was treated with Augmentin which alleviated symptoms. Review of Systems   Review of Systems   Constitutional: Negative for chills and fever. HENT: Positive for congestion, sinus pressure and sinus pain. Negative for sore throat. Respiratory: Negative for cough.           Current Medications       Current Outpatient Medications:   •  amLODIPine (NORVASC) 10 mg tablet, Take 10 mg by mouth daily at bedtime, Disp: , Rfl:   •  amoxicillin-clavulanate (AUGMENTIN) 875-125 mg per tablet, Take 1 tablet by mouth 2 (two) times a day for 7 days, Disp: 14 tablet, Rfl: 0  •  ascorbic acid (VITAMIN C) 250 mg tablet, Take 500 mg by mouth daily, Disp: , Rfl:   •  BD Pen Needle Kiarra U/F 32G X 4 MM MISC, , Disp: , Rfl:   •  cetirizine (ZyrTEC) 10 mg tablet, Take 10 mg by mouth daily, Disp: , Rfl:   •  Empagliflozin (JARDIANCE PO), Take 25 mg by mouth  , Disp: , Rfl:   •  fluticasone (FLONASE) 50 mcg/act nasal spray, 2 sprays into each nostril, Disp: , Rfl:   •  HumaLOG KwikPen 100 units/mL injection pen, Inject 7 Units under the skin 3 (three) times a day with meals, Disp: , Rfl:   •  hydrochlorothiazide (MICROZIDE) 12.5 mg capsule, Take 12.5 mg by mouth daily, Disp: , Rfl:   •  insulin glargine (Lantus SoloStar) 100 units/mL injection pen, Inject 15 Units under the skin daily, Disp: , Rfl:   •  Lancets (OneTouch Delica Plus [id number]) MISC, 2 (two) times a day Use to test, Disp: , Rfl:   •  losartan (COZAAR) 100 MG tablet, Take 100 mg by mouth, Disp: , Rfl:   •  lovastatin (MEVACOR) 40 MG tablet, Take 40 mg by mouth daily at bedtime, Disp: , Rfl:   •  metFORMIN (GLUCOPHAGE) 1000 MG tablet, Take 1,000 mg by mouth 2 (two) times a day with meals, Disp: , Rfl:   •  montelukast (SINGULAIR) 10 mg tablet, Take 10 mg by mouth daily at bedtime, Disp: , Rfl:   •  NON FORMULARY, Allergy shots, Disp: , Rfl:   •  omeprazole (PriLOSEC) 40 MG capsule, Take 1 capsule (40 mg total) by mouth daily, Disp: 90 capsule, Rfl: 3  •  OneTouch Ultra test strip, use 1 TEST STRIP to TEST BLOOD SUGAR twice a day, Disp: , Rfl:   •  Trulicity 4.5 VF/2.5WY injection, inject 4.5 milligrams subcutaneously weekly, Disp: , Rfl:   •  albuterol (ACCUNEB) 1.25 MG/3ML nebulizer solution, Take 1 ampule by nebulization every 6 (six) hours as needed for wheezing (Patient not taking: Reported on 3/29/2023), Disp: , Rfl:   •  apixaban (ELIQUIS) 5 mg, Take 1 tablet by mouth 2 (two) times a day for 30 days 2 tabs PO BID x 7 days, then 1 tab PO BID, Disp: 60 tablet, Rfl: 0  •  aspirin 81 MG tablet, Take 81 mg by mouth daily (Patient not taking: Reported on 5/3/2023), Disp: , Rfl:   •  benzonatate (TESSALON) 200 MG capsule, Take 1 capsule (200 mg total) by mouth daily at bedtime as needed for cough (Patient not taking: Reported on 5/3/2023), Disp: 20 capsule, Rfl: 0  •  co-enzyme Q-10 30 MG capsule, Take 100 mg by mouth daily (Patient not taking: Reported on 3/29/2023), Disp: , Rfl:   •  Efinaconazole 10 % SOLN, Apply topically daily (Patient not taking: Reported on 10/13/2022), Disp: , Rfl:   •  glimepiride (AMARYL) 1 mg tablet, Take 2 mg by mouth 2 (two) times a day (Patient not taking: Reported on 7/19/2023), Disp: , Rfl:   •  olopatadine (PATANOL) 0.1 % ophthalmic solution, 1 drop 2 (two) times a day (Patient not taking: Reported on 5/3/2023), Disp: , Rfl:   •  Sodium Sulfate-Mag Sulfate-KCl (Sutab) [id number] MG TABS, Take 1 kit by mouth see administration instructions Follow instructions given at office visit.   See coupon code below (Patient not taking: Reported on 10/13/2022), Disp: 24 tablet, Rfl: 0    Current Allergies     Allergies as of 07/23/2023 - Reviewed 07/23/2023   Allergen Reaction Noted   • Crestor [rosuvastatin] Rash and Hives 02/24/2015   • Enalapril Cough 01/21/2013            The following portions of the patient's history were reviewed and updated as appropriate: allergies, current medications, past family history, past medical history, past social history, past surgical history and problem list.     Past Medical History:   Diagnosis Date   • BPH without obstruction/lower urinary tract symptoms    • Diabetes mellitus ([address])    • Dysuria    • GERD (gastroesophageal reflux disease)    • Gout    • History of melanoma     left ear - surgically removed   • History of pulmonary embolism 2017    bilateral   • Hyperlipidemia    • Hypertension    • Melanoma ([address]) 2017    left ear   • Prostate cancer ([address])    • Sleep apnea        Past Surgical History:   Procedure Laterality Date   • CHOLECYSTECTOMY     • COLONOSCOPY  10/2016    sessile serrated polyp removed from the proximal transverse, adenoma removed from the distal transverse   • EGD  08/2017    gastritis, H. pylori negative, and Candida esophagitis   • EGD  11/2011    slight Schatzki's ring, small hiatal hernia   • EXTERNAL EAR SURGERY Left 08/2017    for the removal of skin melanoma-Plastic surgery       Family History   Problem Relation Age of Onset   • Hyperlipidemia Mother    • Stroke Father    • Melanoma Father    • Diabetes Sister    • Hypertension Sister    • Diabetes Brother    • JETT disease Brother Medications have been verified. Objective   /80   Pulse 98   Temp 98.2 °F (36.8 °C)   Resp 18   Ht 5' 11" (1.803 m)   Wt 87.6 kg (193 lb 3.2 oz)   SpO2 98%   BMI 26.95 kg/m²   No LMP for male patient. Physical Exam     Physical Exam  Vitals and nursing note reviewed. Constitutional:       Appearance: Normal appearance. HENT:      Head: Normocephalic and atraumatic. Right Ear: Tympanic membrane normal.      Left Ear: Tympanic membrane normal.      Ears:      Comments: Right maxillary sinus tenderness     Mouth/Throat:      Mouth: Mucous membranes are moist.      Pharynx: Oropharynx is clear. Comments: Teeth in good repair  Eyes:      Conjunctiva/sclera: Conjunctivae normal.   Cardiovascular:      Rate and Rhythm: Normal rate and regular rhythm. Heart sounds: Normal heart sounds. Pulmonary:      Effort: Pulmonary effort is normal.      Breath sounds: Normal breath sounds. Musculoskeletal:      Cervical back: Neck supple. Lymphadenopathy:      Cervical: No cervical adenopathy. Skin:     General: Skin is warm. Neurological:      Mental Status: He is alert.

## 2023-07-25 ENCOUNTER — EVALUATION (OUTPATIENT)
Dept: PHYSICAL THERAPY | Facility: HOME HEALTHCARE | Age: 68
End: 2023-07-25
Payer: MEDICARE

## 2023-07-25 DIAGNOSIS — H81.12 BPPV (BENIGN PAROXYSMAL POSITIONAL VERTIGO), LEFT: Primary | ICD-10-CM

## 2023-07-25 PROBLEM — M79.10 MYALGIA: Status: ACTIVE | Noted: 2023-03-16

## 2023-07-25 PROBLEM — R53.83 FATIGUE: Status: ACTIVE | Noted: 2023-03-16

## 2023-07-25 PROBLEM — I26.99 PULMONARY EMBOLISM, BILATERAL (HCC): Status: ACTIVE | Noted: 2017-03-24

## 2023-07-25 PROCEDURE — 97162 PT EVAL MOD COMPLEX 30 MIN: CPT

## 2023-07-25 PROCEDURE — 95992 CANALITH REPOSITIONING PROC: CPT

## 2023-07-25 NOTE — PROGRESS NOTES
PT Evaluation     Today's date: 2023  Patient name: Christopher Loya  : 1955  MRN: [id number]  Referring provider: Reny Shin DO  Dx:   Encounter Diagnosis     ICD-10-CM    1. BPPV (benign paroxysmal positional vertigo), left  H81.12 Ambulatory referral to Physical Therapy          Start Time: 1305  Stop Time: 1340  Total time in clinic (min): 35 minutes    Assessment  Assessment details: Pt is a 80 y/o male presenting to OPPT with symptoms of dizziness consistent with diagnosis of L posterior canal BPPV. The pt tested positive with the L mitch hallpike and had an improvement in symptoms following a L sided epley maneuver. The pt was educated to perform a self epley maneuver at home only if symptomatic again and to call if he feels he needs any additional PT treatment sessions. Understanding of Dx/Px/POC: good   Prognosis: good    Goals  STG: 3-4 weeks  Pt will be independent with HEP in order to continue to progress outside of PT treatment sessions. Pt will improve in quality of life as demonstrated by a 50% improvement in vertigo symptoms. LT-8 weeks  Pt will improve in quality of life as demonstrated by ability to perform all ADLs without symptoms of dizziness. Pt will improve in functional mobility as demonstrated by 50% improvement in resting posture. Pt will improve in functional mobility as demonstrated by ability to roll in bed without symptoms of dizziness. Pt will improve in functional mobility as demonstrated by ability to bend forward without vertigo symptoms. Plan  Plan details: Hold. Pt will call if he needs additional PT sessions.   Patient would benefit from: skilled physical therapy  Planned therapy interventions: body mechanics training, canalith repositioning, functional ROM exercises, gait training, home exercise program, manual therapy, neuromuscular re-education, patient education, postural training, strengthening, stretching, therapeutic exercise and therapeutic activities  Treatment plan discussed with: patient        Subjective Evaluation    History of Present Illness  Mechanism of injury: Pt is presenting to OPPT with symptoms of dizziness and vertigo, which has been ongoing for the past few months when getting out of bed and especially when he turns his head to the left. He reports that the dizziness symptoms only last for a few seconds and that he describes it as a spinning. He reports that he did have vertigo many years ago, and they had him take meclizine. The pt is not currently on any medications for the vertigo. The pt reports that he does have frequent episodes of vomiting, sometimes which correlates sometimes with the dizziness and sometimes not. Objective     Concurrent Complaints  Positive for nausea/motion sickness. Negative for tinnitus, visual change, hearing loss, memory loss, aural fullness, poor concentration and peripheral neuropathy  Neuro Exam:     Dizziness  Positive for disequilibrium and vertigo. Negative for oscillopsia, motion sickness, light-headedness, rocking or swaying, diplopia and floating or swimming. Exacerbating factors  Positive for rolling in bed and turning head. Negative for bending over, looking up, walking, supine to/from sitting, optokinetic movement and walking in busy environment.      Oculomotor exam   Oculomotor ROM: WNL  Resting nystagmus: not present   Smooth pursuits: within normal limits  Vertical saccades: normal  Horizontal saccades: normal    Positional testing   Filer-Hallpike   Left posterior canal: symptomatic, torsional and upbeating             Precautions: None    Re-eval Date: 08/25/2023      Manuals 7/25                                                                Neuro Re-Ed             Katherine santamaria (+) L            Epley x3            VOR x1             VORx2             Romberg             Tandem             Morales-daroff             Ther Ex             Nustep (sx dump) Ther Activity                                       Gait Training             Amb w/ headturns             Amb w/ head nods             Modalities

## 2023-07-26 ENCOUNTER — APPOINTMENT (OUTPATIENT)
Dept: LAB | Facility: HOSPITAL | Age: 68
End: 2023-07-26
Payer: MEDICARE

## 2023-07-26 DIAGNOSIS — R19.7 DIARRHEA, UNSPECIFIED TYPE: ICD-10-CM

## 2023-07-26 PROCEDURE — 82653 EL-1 FECAL QUANTITATIVE: CPT

## 2023-07-26 PROCEDURE — 87505 NFCT AGENT DETECTION GI: CPT

## 2023-07-27 LAB
CAMPYLOBACTER DNA SPEC NAA+PROBE: NORMAL
SALMONELLA DNA SPEC QL NAA+PROBE: NORMAL
SHIGA TOXIN STX GENE SPEC NAA+PROBE: NORMAL
SHIGELLA DNA SPEC QL NAA+PROBE: NORMAL

## 2023-07-29 LAB — ELASTASE PANC STL-MCNT: 243 UG ELAST./G

## 2023-07-31 ENCOUNTER — OFFICE VISIT (OUTPATIENT)
Dept: ENDOCRINOLOGY | Facility: CLINIC | Age: 68
End: 2023-07-31
Payer: MEDICARE

## 2023-07-31 VITALS
DIASTOLIC BLOOD PRESSURE: 70 MMHG | HEART RATE: 104 BPM | SYSTOLIC BLOOD PRESSURE: 118 MMHG | WEIGHT: 185.4 LBS | BODY MASS INDEX: 25.86 KG/M2 | OXYGEN SATURATION: 97 %

## 2023-07-31 DIAGNOSIS — I10 PRIMARY HYPERTENSION: ICD-10-CM

## 2023-07-31 DIAGNOSIS — E78.2 MIXED HYPERLIPIDEMIA: ICD-10-CM

## 2023-07-31 DIAGNOSIS — E11.65 TYPE 2 DIABETES MELLITUS WITH HYPERGLYCEMIA, WITH LONG-TERM CURRENT USE OF INSULIN (HCC): Primary | ICD-10-CM

## 2023-07-31 DIAGNOSIS — Z79.4 TYPE 2 DIABETES MELLITUS WITH HYPERGLYCEMIA, WITH LONG-TERM CURRENT USE OF INSULIN (HCC): Primary | ICD-10-CM

## 2023-07-31 PROCEDURE — 99205 OFFICE O/P NEW HI 60 MIN: CPT | Performed by: STUDENT IN AN ORGANIZED HEALTH CARE EDUCATION/TRAINING PROGRAM

## 2023-07-31 PROCEDURE — 95251 CONT GLUC MNTR ANALYSIS I&R: CPT | Performed by: STUDENT IN AN ORGANIZED HEALTH CARE EDUCATION/TRAINING PROGRAM

## 2023-07-31 RX ORDER — CLINDAMYCIN HYDROCHLORIDE 300 MG/1
300 CAPSULE ORAL 4 TIMES DAILY
COMMUNITY
End: 2023-08-12

## 2023-07-31 RX ORDER — INSULIN GLARGINE 100 [IU]/ML
18 INJECTION, SOLUTION SUBCUTANEOUS DAILY
Qty: 15 ML | Refills: 1 | Status: SHIPPED | OUTPATIENT
Start: 2023-07-31

## 2023-07-31 RX ORDER — INSULIN LISPRO 100 [IU]/ML
INJECTION, SOLUTION INTRAVENOUS; SUBCUTANEOUS
Qty: 15 ML | Refills: 1
Start: 2023-07-31

## 2023-07-31 NOTE — PROGRESS NOTES
Christopher Loya 79 y.o. male MRN: [id number]    Encounter: [id number]      Assessment/Plan     1. Type 2 diabetes on chronic insulin c/b DPN - due to GI AE, suggest stopping trulicity. Increase lantus 18 units daily due to fasting hyperglycemia. Will titrate 1-2x weekly for FBG target <110-130, or until TDD 40-50 units. Decrease humalog 7 units with supper only, start scale ISF 30 target 120. Continue metformin and jardiance. Send BG log in 1-2 weeks for titration of lantus. Continue is-CGM. Will arrange RTC 2-mo with labs to review progress, call sooner with concerns. 2. Hypertension - controlled. Continue current Rx    3. Hyperlipidemia - fair control. Continue statin rx. Problem List Items Addressed This Visit        Cardiovascular and Mediastinum    Hypertension       Other    Mixed hyperlipidemia   Other Visit Diagnoses     Type 2 diabetes mellitus with hyperglycemia, with long-term current use of insulin (HCC)    -  Primary    Relevant Medications    Insulin Glargine Solostar (Lantus SoloStar) 100 UNIT/ML SOPN    HumaLOG KwikPen 100 units/mL injection pen    Other Relevant Orders    Basic metabolic panel Lab Collect    HEMOGLOBIN A1C W/ EAG ESTIMATION Lab Collect        RTC 2-mo    I have spent a total time of 60 minutes on 07/31/23 in caring for this patient including Diagnostic results, Risks and benefits of tx options, Instructions for management, Patient and family education, Importance of tx compliance, Impressions, Counseling / Coordination of care, Documenting in the medical record, Reviewing / ordering tests, medicine, procedures   and Obtaining or reviewing history  . CC: Diabetes    History of Present Illness     HPI:    Vasquez Pena presents today as a new visit for type 2 diabetes. He is joined today by his wife Charlie who is contributing to history. Vasquez Pena reports longstanding type 2 diabetes. May have some neuropathy, otherwise no known DM complications.  He follows with Dr. Vanna Denver for eye examination. He follows with Dr. Johnny Johnson for DM foot examination. Recently concerned about weight loss and GI symptoms, including vomiting. This coincided with last increase in trulicity dosing. He is following with GI and has GES scheduled. Has been on insulin for several years. Was started on humalog more recently. Current therapy plan includes lantus 15 units qHS, humalog 7 units qAC, metformin 1g bid, trulicity 4.5 mg weekly, jardiance 25 mg daily. He was previously on glimepiride. He uses dejuan 2 sensor. He uses prescribed  for monitoring. Donews   Device used DEJUAN 2  Home use     Indication   Type 2 Diabetes    More than 72 hours of data was reviewed. Report to be scanned to chart. Date Range: Jul 18 - Jul 31, 2023    Analysis of data:   % time CGM used: 90%  Average Glucose: 179 mg/dL  Coefficient of Variation: 21.5%     Time in Target Range:  61%   Time Above Range: 39%   Time Below Range: 0%     Interpretation of data: fairly stable glycemic control. FBG elevated above goal. Has most notable post-prandial rise following supper. No hypoglycemia. For hyperlipidemia he takes lovastatin 40 mg daily. For hypertension he takes losartan 100 mg daily, HCTZ 12.5 mg daily. Review of Systems   Constitutional: Positive for unexpected weight change (weight loss). Negative for diaphoresis. HENT: Negative for trouble swallowing and voice change. Gastrointestinal: Positive for nausea and vomiting. Endocrine: Negative for polydipsia and polyuria. All other systems reviewed and are negative.       Historical Information   Past Medical History:   Diagnosis Date   • BPH without obstruction/lower urinary tract symptoms    • Diabetes mellitus ([address])    • Dysuria    • GERD (gastroesophageal reflux disease)    • Gout    • History of melanoma     left ear - surgically removed   • History of pulmonary embolism 2017    bilateral   • Hyperlipidemia    • Hypertension    • Melanoma ([address]) 2017    left ear   • Prostate cancer [facility])    • Sleep apnea      Past Surgical History:   Procedure Laterality Date   • CHOLECYSTECTOMY     • COLONOSCOPY  10/2016    sessile serrated polyp removed from the proximal transverse, adenoma removed from the distal transverse   • EGD  08/2017    gastritis, H. pylori negative, and Candida esophagitis   • EGD  11/2011    slight Schatzki's ring, small hiatal hernia   • EXTERNAL EAR SURGERY Left 08/2017    for the removal of skin melanoma-Plastic surgery     Social History   Social History     Substance and Sexual Activity   Alcohol Use Yes    Comment: Drinks socailly. Social History     Substance and Sexual Activity   Drug Use No     Social History     Tobacco Use   Smoking Status Never   • Passive exposure: Never   Smokeless Tobacco Never     Family History:   Family History   Problem Relation Age of Onset   • Hyperlipidemia Mother    • Stroke Father    • Melanoma Father    • Diabetes Sister    • Hypertension Sister    • Diabetes Brother    • JETT disease Brother        Meds/Allergies   Current Outpatient Medications   Medication Sig Dispense Refill   • amLODIPine (NORVASC) 10 mg tablet Take 10 mg by mouth daily at bedtime     • ascorbic acid (VITAMIN C) 250 mg tablet Take 500 mg by mouth daily     • BD Pen Needle Kiarra U/F 32G X 4 MM MISC      • cetirizine (ZyrTEC) 10 mg tablet Take 10 mg by mouth daily     • clindamycin (CLEOCIN) 300 MG capsule Take 300 mg by mouth 4 (four) times a day     • Empagliflozin (JARDIANCE PO) Take 25 mg by mouth       • fluticasone (FLONASE) 50 mcg/act nasal spray 2 sprays into each nostril     • HumaLOG KwikPen 100 units/mL injection pen Inject 7 units with supper plus scale.  TDD 15 units 15 mL 1   • hydrochlorothiazide (MICROZIDE) 12.5 mg capsule Take 12.5 mg by mouth daily     • Insulin Glargine Solostar (Lantus SoloStar) 100 UNIT/ML SOPN Inject 0.18 mL (18 Units total) under the skin daily 15 mL 1   • Lancets (OneTouch Delica Plus [id number]) MISC 2 (two) times a day Use to test     • losartan (COZAAR) 100 MG tablet Take 100 mg by mouth     • lovastatin (MEVACOR) 40 MG tablet Take 40 mg by mouth daily at bedtime     • metFORMIN (GLUCOPHAGE) 1000 MG tablet Take 1,000 mg by mouth 2 (two) times a day with meals     • olopatadine (PATANOL) 0.1 % ophthalmic solution 1 drop 2 (two) times a day     • omeprazole (PriLOSEC) 40 MG capsule Take 1 capsule (40 mg total) by mouth daily 90 capsule 3   • OneTouch Ultra test strip use 1 TEST STRIP to TEST BLOOD SUGAR twice a day     • VITAMIN D PO Take by mouth     • albuterol (ACCUNEB) 1.25 MG/3ML nebulizer solution Take 1 ampule by nebulization every 6 (six) hours as needed for wheezing (Patient not taking: Reported on 3/29/2023)     • apixaban (ELIQUIS) 5 mg Take 1 tablet by mouth 2 (two) times a day for 30 days 2 tabs PO BID x 7 days, then 1 tab PO BID 60 tablet 0   • aspirin 81 MG tablet Take 81 mg by mouth daily (Patient not taking: Reported on 5/3/2023)     • benzonatate (TESSALON) 200 MG capsule Take 1 capsule (200 mg total) by mouth daily at bedtime as needed for cough 20 capsule 0   • co-enzyme Q-10 30 MG capsule Take 100 mg by mouth daily     • Efinaconazole 10 % SOLN Apply topically daily     • glimepiride (AMARYL) 1 mg tablet Take 2 mg by mouth 2 (two) times a day     • montelukast (SINGULAIR) 10 mg tablet Take 10 mg by mouth daily at bedtime     • NON FORMULARY Allergy shots     • Sodium Sulfate-Mag Sulfate-KCl (Sutab) 1405-131-339 MG TABS Take 1 kit by mouth see administration instructions Follow instructions given at office visit. See coupon code below 24 tablet 0     No current facility-administered medications for this visit.      Allergies   Allergen Reactions   • Crestor [Rosuvastatin] Rash and Hives     Other reaction(s): Urticaria   • Enalapril Cough       Objective   Vitals: Blood pressure 118/70, pulse 104, weight 84.1 kg (185 lb 6.4 oz), SpO2 97 %.    Physical Exam  Vitals reviewed. Constitutional:       Appearance: Normal appearance. HENT:      Head: Normocephalic and atraumatic. Eyes:      General: No scleral icterus. Conjunctiva/sclera: Conjunctivae normal.   Cardiovascular:      Pulses: no weak pulses          Dorsalis pedis pulses are 2+ on the right side and 2+ on the left side. Pulmonary:      Effort: Pulmonary effort is normal. No respiratory distress. Abdominal:      Palpations: Abdomen is soft. Tenderness: There is no abdominal tenderness. Musculoskeletal:      Cervical back: Normal range of motion. Feet:      Right foot:      Skin integrity: No ulcer, skin breakdown, erythema, warmth, callus or dry skin. Left foot:      Skin integrity: No ulcer, skin breakdown, erythema, warmth, callus or dry skin. Skin:     General: Skin is warm and dry. Neurological:      General: No focal deficit present. Mental Status: He is alert. Psychiatric:         Mood and Affect: Mood normal.         Behavior: Behavior normal.         Diabetic Foot Exam    Patient's shoes and socks removed. Right Foot/Ankle   Right Foot Inspection  Skin Exam: skin normal and skin intact. No dry skin, no warmth, no callus, no erythema, no maceration, no abnormal color, no pre-ulcer, no ulcer and no callus. Sensory   Vibration: intact  Monofilament testing: intact    Vascular  The right DP pulse is 2+. Left Foot/Ankle  Left Foot Inspection  Skin Exam: skin normal and skin intact. No dry skin, no warmth, no erythema, no maceration, normal color, no pre-ulcer, no ulcer and no callus. Sensory   Vibration: intact  Monofilament testing: intact    Vascular  The left DP pulse is 2+. Assign Risk Category  No deformity present  No loss of protective sensation  No weak pulses  Risk: 0      The history was obtained from the review of the chart, patient and family.     Lab Results:   Lab Results   Component Value Date/Time    Hemoglobin A1C 8.1 (H) 04/24/2023 07:53 AM    Hemoglobin A1C 7.8 (H) 10/26/2022 07:05 AM    WBC 5.11 03/07/2023 01:47 PM    Hemoglobin 13.4 03/07/2023 01:47 PM    Hematocrit 41.7 03/07/2023 01:47 PM    MCV 86 03/07/2023 01:47 PM    Platelets 229 12/84/5995 01:47 PM    BUN 18 06/03/2023 07:12 AM    BUN 17 04/24/2023 07:53 AM    BUN 15 10/26/2022 07:05 AM    Potassium 3.8 06/03/2023 07:12 AM    Potassium 4.1 04/24/2023 07:53 AM    Potassium 3.9 10/26/2022 07:05 AM    Chloride 101 06/03/2023 07:12 AM    Chloride 103 04/24/2023 07:53 AM    Chloride 101 10/26/2022 07:05 AM    CO2 27 06/03/2023 07:12 AM    CO2 29 04/24/2023 07:53 AM    CO2 30 10/26/2022 07:05 AM    Creatinine 0.90 06/03/2023 07:12 AM    Creatinine 0.95 04/24/2023 07:53 AM    Creatinine 0.92 10/26/2022 07:05 AM    AST 15 04/24/2023 07:53 AM    AST 19 10/26/2022 07:05 AM    ALT 18 04/24/2023 07:53 AM    ALT 26 10/26/2022 07:05 AM    Total Protein 6.8 04/24/2023 07:53 AM    Total Protein 7.7 10/26/2022 07:05 AM    Albumin 4.4 04/24/2023 07:53 AM    Albumin 4.0 10/26/2022 07:05 AM    HDL, Direct 37 (L) 04/24/2023 07:53 AM    Triglycerides 257 (H) 04/24/2023 07:53 AM     Lab Results   Component Value Date    LDLCALC 74 04/24/2023           Imaging Studies: I have personally reviewed pertinent reports. Portions of the record may have been created with voice recognition software. Occasional wrong word or "sound a like" substitutions may have occurred due to the inherent limitations of voice recognition software. Read the chart carefully and recognize, using context, where substitutions have occurred.

## 2023-07-31 NOTE — PATIENT INSTRUCTIONS
Stop Trulicity    Increase Lantus 18 units    Decrease Humalog 7 units with supper, plus scale    Check sugars 4x daily    Before meals, bedtime    Goal Blood Sugars:   Premeal , even better <110  2hr after a meal <180, even better <140  A1C <7%, even better <6.5%. Aim for 45g carbohydrates with meals  15-20g with snacks    Goal exercise is 30 minutes daily, most days of the week    Check your sugar prior to driving. Your sugar should be > 90 prior to driving. If sugar is < 90 have a snack and recheck sugar. While driving, if you have symptoms of low sugar such as sweating, tremors, vision changes, immediately pull off the road and check sugar and treat. Severe low sugars require report to the [facility] by law.      Please have labs done before next visit    Bring your meter or logbook with you each visit    Return appointment 2 months

## 2023-08-01 ENCOUNTER — LAB (OUTPATIENT)
Dept: LAB | Facility: HOSPITAL | Age: 68
End: 2023-08-01
Payer: MEDICARE

## 2023-08-01 ENCOUNTER — OFFICE VISIT (OUTPATIENT)
Dept: LAB | Facility: HOSPITAL | Age: 68
End: 2023-08-01
Payer: MEDICARE

## 2023-08-01 DIAGNOSIS — R97.20 ELEVATED PROSTATE SPECIFIC ANTIGEN (PSA): ICD-10-CM

## 2023-08-01 DIAGNOSIS — Z01.810 PRE-OPERATIVE CARDIOVASCULAR EXAMINATION: ICD-10-CM

## 2023-08-01 DIAGNOSIS — Z01.812 PRE-OPERATIVE LABORATORY EXAMINATION: ICD-10-CM

## 2023-08-01 DIAGNOSIS — R39.89 SUSPECTED UTI: ICD-10-CM

## 2023-08-01 LAB
ALBUMIN SERPL BCP-MCNC: 4.6 G/DL (ref 3.5–5)
ALP SERPL-CCNC: 46 U/L (ref 34–104)
ALT SERPL W P-5'-P-CCNC: 15 U/L (ref 7–52)
ANION GAP SERPL CALCULATED.3IONS-SCNC: 9 MMOL/L
AST SERPL W P-5'-P-CCNC: 16 U/L (ref 13–39)
ATRIAL RATE: 75 BPM
BASOPHILS # BLD AUTO: 0.04 THOUSANDS/ÂΜL (ref 0–0.1)
BASOPHILS NFR BLD AUTO: 1 % (ref 0–1)
BILIRUB SERPL-MCNC: 0.4 MG/DL (ref 0.2–1)
BUN SERPL-MCNC: 23 MG/DL (ref 5–25)
CALCIUM SERPL-MCNC: 9.9 MG/DL (ref 8.4–10.2)
CHLORIDE SERPL-SCNC: 98 MMOL/L (ref 96–108)
CO2 SERPL-SCNC: 30 MMOL/L (ref 21–32)
CREAT SERPL-MCNC: 0.79 MG/DL (ref 0.6–1.3)
EOSINOPHIL # BLD AUTO: 0.18 THOUSAND/ÂΜL (ref 0–0.61)
EOSINOPHIL NFR BLD AUTO: 3 % (ref 0–6)
ERYTHROCYTE [DISTWIDTH] IN BLOOD BY AUTOMATED COUNT: 14.2 % (ref 11.6–15.1)
GFR SERPL CREATININE-BSD FRML MDRD: 93 ML/MIN/1.73SQ M
GLUCOSE P FAST SERPL-MCNC: 163 MG/DL (ref 65–99)
HCT VFR BLD AUTO: 45.1 % (ref 36.5–49.3)
HGB BLD-MCNC: 14.7 G/DL (ref 12–17)
IMM GRANULOCYTES # BLD AUTO: 0.01 THOUSAND/UL (ref 0–0.2)
IMM GRANULOCYTES NFR BLD AUTO: 0 % (ref 0–2)
LYMPHOCYTES # BLD AUTO: 2.31 THOUSANDS/ÂΜL (ref 0.6–4.47)
LYMPHOCYTES NFR BLD AUTO: 44 % (ref 14–44)
MCH RBC QN AUTO: 28.5 PG (ref 26.8–34.3)
MCHC RBC AUTO-ENTMCNC: 32.6 G/DL (ref 31.4–37.4)
MCV RBC AUTO: 87 FL (ref 82–98)
MONOCYTES # BLD AUTO: 0.48 THOUSAND/ÂΜL (ref 0.17–1.22)
MONOCYTES NFR BLD AUTO: 9 % (ref 4–12)
NEUTROPHILS # BLD AUTO: 2.23 THOUSANDS/ÂΜL (ref 1.85–7.62)
NEUTS SEG NFR BLD AUTO: 43 % (ref 43–75)
NRBC BLD AUTO-RTO: 0 /100 WBCS
P AXIS: 31 DEGREES
PLATELET # BLD AUTO: 187 THOUSANDS/UL (ref 149–390)
PMV BLD AUTO: 8.9 FL (ref 8.9–12.7)
POTASSIUM SERPL-SCNC: 4 MMOL/L (ref 3.5–5.3)
PR INTERVAL: 166 MS
PROT SERPL-MCNC: 7.6 G/DL (ref 6.4–8.4)
QRS AXIS: 67 DEGREES
QRSD INTERVAL: 102 MS
QT INTERVAL: 412 MS
QTC INTERVAL: 460 MS
RBC # BLD AUTO: 5.16 MILLION/UL (ref 3.88–5.62)
SODIUM SERPL-SCNC: 137 MMOL/L (ref 135–147)
T WAVE AXIS: 26 DEGREES
VENTRICULAR RATE: 75 BPM
WBC # BLD AUTO: 5.25 THOUSAND/UL (ref 4.31–10.16)

## 2023-08-01 PROCEDURE — 80053 COMPREHEN METABOLIC PANEL: CPT

## 2023-08-01 PROCEDURE — 93010 ELECTROCARDIOGRAM REPORT: CPT | Performed by: INTERNAL MEDICINE

## 2023-08-01 PROCEDURE — 93005 ELECTROCARDIOGRAM TRACING: CPT

## 2023-08-01 PROCEDURE — 87086 URINE CULTURE/COLONY COUNT: CPT

## 2023-08-01 PROCEDURE — 36415 COLL VENOUS BLD VENIPUNCTURE: CPT

## 2023-08-01 PROCEDURE — 85025 COMPLETE CBC W/AUTO DIFF WBC: CPT

## 2023-08-02 LAB — BACTERIA UR CULT: NORMAL

## 2023-08-11 ENCOUNTER — HOSPITAL ENCOUNTER (EMERGENCY)
Facility: HOSPITAL | Age: 68
End: 2023-08-11
Attending: EMERGENCY MEDICINE
Payer: MEDICARE

## 2023-08-11 ENCOUNTER — TELEPHONE (OUTPATIENT)
Dept: UROLOGY | Facility: AMBULATORY SURGERY CENTER | Age: 68
End: 2023-08-11

## 2023-08-11 ENCOUNTER — APPOINTMENT (EMERGENCY)
Dept: CT IMAGING | Facility: HOSPITAL | Age: 68
End: 2023-08-11
Payer: MEDICARE

## 2023-08-11 ENCOUNTER — HOSPITAL ENCOUNTER (INPATIENT)
Facility: HOSPITAL | Age: 68
LOS: 1 days | Discharge: HOME | DRG: 247 | End: 2023-08-12
Attending: INTERNAL MEDICINE | Admitting: INTERNAL MEDICINE
Payer: MEDICARE

## 2023-08-11 VITALS
BODY MASS INDEX: 27.15 KG/M2 | HEART RATE: 77 BPM | WEIGHT: 194.67 LBS | SYSTOLIC BLOOD PRESSURE: 119 MMHG | TEMPERATURE: 97.2 F | OXYGEN SATURATION: 92 % | DIASTOLIC BLOOD PRESSURE: 77 MMHG | RESPIRATION RATE: 20 BRPM

## 2023-08-11 DIAGNOSIS — R07.9 CHEST PAIN: ICD-10-CM

## 2023-08-11 DIAGNOSIS — I21.4 NSTEMI (NON-ST ELEVATED MYOCARDIAL INFARCTION) (HCC): Primary | ICD-10-CM

## 2023-08-11 DIAGNOSIS — I21.4 NON-STEMI (NON-ST ELEVATED MYOCARDIAL INFARCTION) (HCC): Primary | ICD-10-CM

## 2023-08-11 LAB
2HR DELTA HS TROPONIN: 279 NG/L
4HR DELTA HS TROPONIN: 838 NG/L
ALBUMIN SERPL BCP-MCNC: 4.6 G/DL (ref 3.5–5)
ALP SERPL-CCNC: 59 U/L (ref 34–104)
ALT SERPL W P-5'-P-CCNC: 17 U/L (ref 7–52)
ANION GAP SERPL CALCULATED.3IONS-SCNC: 11 MMOL/L
ANION GAP SERPL CALCULATED.3IONS-SCNC: 6 MMOL/L
APTT PPP: 25 SECONDS (ref 23–37)
AST SERPL W P-5'-P-CCNC: 21 U/L (ref 13–39)
BASOPHILS # BLD AUTO: 0.04 THOUSANDS/ÂΜL (ref 0–0.1)
BASOPHILS NFR BLD AUTO: 1 % (ref 0–1)
BILIRUB SERPL-MCNC: 0.33 MG/DL (ref 0.2–1)
BNP SERPL-MCNC: 54 PG/ML (ref 0–100)
BUN SERPL-MCNC: 17 MG/DL (ref 5–25)
BUN SERPL-MCNC: 18 MG/DL (ref 5–25)
CALCIUM SERPL-MCNC: 9.6 MG/DL (ref 8.3–10.1)
CALCIUM SERPL-MCNC: 9.7 MG/DL (ref 8.4–10.2)
CARDIAC TROPONIN I PNL SERPL HS: 1430 NG/L
CARDIAC TROPONIN I PNL SERPL HS: 1709 NG/L
CARDIAC TROPONIN I PNL SERPL HS: 2268 NG/L
CHLORIDE SERPL-SCNC: 104 MMOL/L (ref 96–108)
CHLORIDE SERPL-SCNC: 96 MMOL/L (ref 96–108)
CO2 SERPL-SCNC: 28 MMOL/L (ref 21–32)
CO2 SERPL-SCNC: 30 MMOL/L (ref 21–32)
CREAT SERPL-MCNC: 0.8 MG/DL (ref 0.6–1.3)
CREAT SERPL-MCNC: 0.93 MG/DL (ref 0.6–1.3)
EOSINOPHIL # BLD AUTO: 0.26 THOUSAND/ÂΜL (ref 0–0.61)
EOSINOPHIL NFR BLD AUTO: 4 % (ref 0–6)
ERYTHROCYTE [DISTWIDTH] IN BLOOD BY AUTOMATED COUNT: 14.3 % (ref 11.6–15.1)
ERYTHROCYTE [DISTWIDTH] IN BLOOD BY AUTOMATED COUNT: 14.4 % (ref 11.6–15.1)
GFR SERPL CREATININE-BSD FRML MDRD: 85 ML/MIN/1.73SQ M
GFR SERPL CREATININE-BSD FRML MDRD: 93 ML/MIN/1.73SQ M
GLUCOSE SERPL-MCNC: 146 MG/DL (ref 65–140)
GLUCOSE SERPL-MCNC: 150 MG/DL (ref 65–140)
GLUCOSE SERPL-MCNC: 209 MG/DL (ref 65–140)
HCT VFR BLD AUTO: 41.9 % (ref 36.5–49.3)
HCT VFR BLD AUTO: 45.3 % (ref 36.5–49.3)
HGB BLD-MCNC: 14 G/DL (ref 12–17)
HGB BLD-MCNC: 15 G/DL (ref 12–17)
IMM GRANULOCYTES # BLD AUTO: 0 THOUSAND/UL (ref 0–0.2)
IMM GRANULOCYTES NFR BLD AUTO: 0 % (ref 0–2)
INR PPP: 0.86 (ref 0.84–1.19)
LYMPHOCYTES # BLD AUTO: 2.18 THOUSANDS/ÂΜL (ref 0.6–4.47)
LYMPHOCYTES NFR BLD AUTO: 37 % (ref 14–44)
MCH RBC QN AUTO: 28.9 PG (ref 26.8–34.3)
MCH RBC QN AUTO: 29.1 PG (ref 26.8–34.3)
MCHC RBC AUTO-ENTMCNC: 33.1 G/DL (ref 31.4–37.4)
MCHC RBC AUTO-ENTMCNC: 33.4 G/DL (ref 31.4–37.4)
MCV RBC AUTO: 87 FL (ref 82–98)
MCV RBC AUTO: 88 FL (ref 82–98)
MONOCYTES # BLD AUTO: 0.63 THOUSAND/ÂΜL (ref 0.17–1.22)
MONOCYTES NFR BLD AUTO: 11 % (ref 4–12)
NEUTROPHILS # BLD AUTO: 2.78 THOUSANDS/ÂΜL (ref 1.85–7.62)
NEUTS SEG NFR BLD AUTO: 47 % (ref 43–75)
NRBC BLD AUTO-RTO: 0 /100 WBCS
PLATELET # BLD AUTO: 192 THOUSANDS/UL (ref 149–390)
PLATELET # BLD AUTO: 217 THOUSANDS/UL (ref 149–390)
PMV BLD AUTO: 8.9 FL (ref 8.9–12.7)
PMV BLD AUTO: 9.1 FL (ref 8.9–12.7)
POTASSIUM SERPL-SCNC: 3.5 MMOL/L (ref 3.5–5.3)
POTASSIUM SERPL-SCNC: 4 MMOL/L (ref 3.5–5.3)
PROT SERPL-MCNC: 7.6 G/DL (ref 6.4–8.4)
PROTHROMBIN TIME: 12 SECONDS (ref 11.6–14.5)
RBC # BLD AUTO: 4.84 MILLION/UL (ref 3.88–5.62)
RBC # BLD AUTO: 5.16 MILLION/UL (ref 3.88–5.62)
SODIUM SERPL-SCNC: 137 MMOL/L (ref 135–147)
SODIUM SERPL-SCNC: 138 MMOL/L (ref 135–147)
TSH SERPL DL<=0.05 MIU/L-ACNC: 4.39 UIU/ML (ref 0.45–4.5)
WBC # BLD AUTO: 5.81 THOUSAND/UL (ref 4.31–10.16)
WBC # BLD AUTO: 5.89 THOUSAND/UL (ref 4.31–10.16)

## 2023-08-11 PROCEDURE — 85610 PROTHROMBIN TIME: CPT | Performed by: EMERGENCY MEDICINE

## 2023-08-11 PROCEDURE — 36415 COLL VENOUS BLD VENIPUNCTURE: CPT | Performed by: EMERGENCY MEDICINE

## 2023-08-11 PROCEDURE — 92928 PRQ TCAT PLMT NTRAC ST 1 LES: CPT | Performed by: INTERNAL MEDICINE

## 2023-08-11 PROCEDURE — 93005 ELECTROCARDIOGRAM TRACING: CPT

## 2023-08-11 PROCEDURE — C1725 CATH, TRANSLUMIN NON-LASER: HCPCS | Performed by: INTERNAL MEDICINE

## 2023-08-11 PROCEDURE — C1760 CLOSURE DEV, VASC: HCPCS | Performed by: INTERNAL MEDICINE

## 2023-08-11 PROCEDURE — 92978 ENDOLUMINL IVUS OCT C 1ST: CPT | Performed by: INTERNAL MEDICINE

## 2023-08-11 PROCEDURE — 85347 COAGULATION TIME ACTIVATED: CPT

## 2023-08-11 PROCEDURE — C1894 INTRO/SHEATH, NON-LASER: HCPCS | Performed by: INTERNAL MEDICINE

## 2023-08-11 PROCEDURE — 71275 CT ANGIOGRAPHY CHEST: CPT

## 2023-08-11 PROCEDURE — C9601 PERC DRUG-EL COR STENT BRAN: HCPCS | Performed by: INTERNAL MEDICINE

## 2023-08-11 PROCEDURE — 84484 ASSAY OF TROPONIN QUANT: CPT | Performed by: EMERGENCY MEDICINE

## 2023-08-11 PROCEDURE — 82948 REAGENT STRIP/BLOOD GLUCOSE: CPT

## 2023-08-11 PROCEDURE — B2151ZZ FLUOROSCOPY OF LEFT HEART USING LOW OSMOLAR CONTRAST: ICD-10-PCS | Performed by: INTERNAL MEDICINE

## 2023-08-11 PROCEDURE — C1887 CATHETER, GUIDING: HCPCS | Performed by: INTERNAL MEDICINE

## 2023-08-11 PROCEDURE — 027035Z DILATION OF CORONARY ARTERY, ONE ARTERY WITH TWO DRUG-ELUTING INTRALUMINAL DEVICES, PERCUTANEOUS APPROACH: ICD-10-PCS | Performed by: INTERNAL MEDICINE

## 2023-08-11 PROCEDURE — C1757 CATH, THROMBECTOMY/EMBOLECT: HCPCS | Performed by: INTERNAL MEDICINE

## 2023-08-11 PROCEDURE — C1753 CATH, INTRAVAS ULTRASOUND: HCPCS | Performed by: INTERNAL MEDICINE

## 2023-08-11 PROCEDURE — 80053 COMPREHEN METABOLIC PANEL: CPT | Performed by: EMERGENCY MEDICINE

## 2023-08-11 PROCEDURE — 85027 COMPLETE CBC AUTOMATED: CPT | Performed by: EMERGENCY MEDICINE

## 2023-08-11 PROCEDURE — 84443 ASSAY THYROID STIM HORMONE: CPT | Performed by: EMERGENCY MEDICINE

## 2023-08-11 PROCEDURE — 4A023N7 MEASUREMENT OF CARDIAC SAMPLING AND PRESSURE, LEFT HEART, PERCUTANEOUS APPROACH: ICD-10-PCS | Performed by: INTERNAL MEDICINE

## 2023-08-11 PROCEDURE — 99153 MOD SED SAME PHYS/QHP EA: CPT | Performed by: INTERNAL MEDICINE

## 2023-08-11 PROCEDURE — C1874 STENT, COATED/COV W/DEL SYS: HCPCS | Performed by: INTERNAL MEDICINE

## 2023-08-11 PROCEDURE — G1004 CDSM NDSC: HCPCS

## 2023-08-11 PROCEDURE — C1769 GUIDE WIRE: HCPCS | Performed by: INTERNAL MEDICINE

## 2023-08-11 PROCEDURE — 99152 MOD SED SAME PHYS/QHP 5/>YRS: CPT | Performed by: INTERNAL MEDICINE

## 2023-08-11 PROCEDURE — 83880 ASSAY OF NATRIURETIC PEPTIDE: CPT | Performed by: EMERGENCY MEDICINE

## 2023-08-11 PROCEDURE — 85025 COMPLETE CBC W/AUTO DIFF WBC: CPT | Performed by: EMERGENCY MEDICINE

## 2023-08-11 PROCEDURE — 93458 L HRT ARTERY/VENTRICLE ANGIO: CPT | Performed by: INTERNAL MEDICINE

## 2023-08-11 PROCEDURE — 80048 BASIC METABOLIC PNL TOTAL CA: CPT | Performed by: INTERNAL MEDICINE

## 2023-08-11 PROCEDURE — 74177 CT ABD & PELVIS W/CONTRAST: CPT

## 2023-08-11 PROCEDURE — C9600 PERC DRUG-EL COR STENT SING: HCPCS | Performed by: INTERNAL MEDICINE

## 2023-08-11 PROCEDURE — 99223 1ST HOSP IP/OBS HIGH 75: CPT | Performed by: INTERNAL MEDICINE

## 2023-08-11 PROCEDURE — 85730 THROMBOPLASTIN TIME PARTIAL: CPT | Performed by: EMERGENCY MEDICINE

## 2023-08-11 PROCEDURE — B2111ZZ FLUOROSCOPY OF MULTIPLE CORONARY ARTERIES USING LOW OSMOLAR CONTRAST: ICD-10-PCS | Performed by: INTERNAL MEDICINE

## 2023-08-11 PROCEDURE — B2101ZZ FLUOROSCOPY OF SINGLE CORONARY ARTERY USING LOW OSMOLAR CONTRAST: ICD-10-PCS | Performed by: INTERNAL MEDICINE

## 2023-08-11 DEVICE — STENT ONYXNG30030UX ONYX 3.00X30RX
Type: IMPLANTABLE DEVICE | Site: HEART | Status: FUNCTIONAL
Brand: ONYX FRONTIER™

## 2023-08-11 DEVICE — STENT ONYXNG25030UX ONYX 2.50X30RX
Type: IMPLANTABLE DEVICE | Site: HEART | Status: FUNCTIONAL
Brand: ONYX FRONTIER™

## 2023-08-11 DEVICE — PERCLOSE™ PROSTYLE™ SUTURE-MEDIATED CLOSURE AND REPAIR SYSTEM
Type: IMPLANTABLE DEVICE | Site: HEART | Status: FUNCTIONAL
Brand: PERCLOSE™ PROSTYLE™

## 2023-08-11 RX ORDER — CLOPIDOGREL BISULFATE 75 MG/1
75 TABLET ORAL DAILY
Status: DISCONTINUED | OUTPATIENT
Start: 2023-08-12 | End: 2023-08-11 | Stop reason: HOSPADM

## 2023-08-11 RX ORDER — ASPIRIN 81 MG/1
81 TABLET, CHEWABLE ORAL DAILY
Status: DISCONTINUED | OUTPATIENT
Start: 2023-08-12 | End: 2023-08-12 | Stop reason: HOSPADM

## 2023-08-11 RX ORDER — HEPARIN SODIUM 1000 [USP'U]/ML
INJECTION, SOLUTION INTRAVENOUS; SUBCUTANEOUS CODE/TRAUMA/SEDATION MEDICATION
Status: DISCONTINUED | OUTPATIENT
Start: 2023-08-11 | End: 2023-08-11 | Stop reason: HOSPADM

## 2023-08-11 RX ORDER — AMLODIPINE BESYLATE 10 MG/1
10 TABLET ORAL DAILY
Status: DISCONTINUED | OUTPATIENT
Start: 2023-08-11 | End: 2023-08-11 | Stop reason: HOSPADM

## 2023-08-11 RX ORDER — NITROGLYCERIN 20 MG/100ML
INJECTION INTRAVENOUS CODE/TRAUMA/SEDATION MEDICATION
Status: DISCONTINUED | OUTPATIENT
Start: 2023-08-11 | End: 2023-08-11 | Stop reason: HOSPADM

## 2023-08-11 RX ORDER — OXYCODONE HYDROCHLORIDE 5 MG/1
5 TABLET ORAL EVERY 4 HOURS PRN
Status: DISCONTINUED | OUTPATIENT
Start: 2023-08-11 | End: 2023-08-11 | Stop reason: HOSPADM

## 2023-08-11 RX ORDER — HEPARIN SODIUM 1000 [USP'U]/ML
2000 INJECTION, SOLUTION INTRAVENOUS; SUBCUTANEOUS EVERY 6 HOURS PRN
Status: DISCONTINUED | OUTPATIENT
Start: 2023-08-11 | End: 2023-08-11 | Stop reason: HOSPADM

## 2023-08-11 RX ORDER — SODIUM CHLORIDE 9 MG/ML
100 INJECTION, SOLUTION INTRAVENOUS CONTINUOUS
Status: DISCONTINUED | OUTPATIENT
Start: 2023-08-11 | End: 2023-08-11

## 2023-08-11 RX ORDER — FENTANYL CITRATE 50 UG/ML
25 INJECTION, SOLUTION INTRAMUSCULAR; INTRAVENOUS
Status: DISCONTINUED | OUTPATIENT
Start: 2023-08-11 | End: 2023-08-11 | Stop reason: HOSPADM

## 2023-08-11 RX ORDER — LIDOCAINE HYDROCHLORIDE 10 MG/ML
INJECTION, SOLUTION EPIDURAL; INFILTRATION; INTRACAUDAL; PERINEURAL CODE/TRAUMA/SEDATION MEDICATION
Status: DISCONTINUED | OUTPATIENT
Start: 2023-08-11 | End: 2023-08-11 | Stop reason: HOSPADM

## 2023-08-11 RX ORDER — PRAVASTATIN SODIUM 40 MG
40 TABLET ORAL
Status: DISCONTINUED | OUTPATIENT
Start: 2023-08-11 | End: 2023-08-11

## 2023-08-11 RX ORDER — HEPARIN SODIUM 10000 [USP'U]/100ML
3-20 INJECTION, SOLUTION INTRAVENOUS
Status: DISCONTINUED | OUTPATIENT
Start: 2023-08-11 | End: 2023-08-11 | Stop reason: HOSPADM

## 2023-08-11 RX ORDER — INSULIN GLARGINE 100 [IU]/ML
10 INJECTION, SOLUTION SUBCUTANEOUS
Status: DISCONTINUED | OUTPATIENT
Start: 2023-08-11 | End: 2023-08-12 | Stop reason: HOSPADM

## 2023-08-11 RX ORDER — ACETAMINOPHEN 325 MG/1
650 TABLET ORAL EVERY 4 HOURS PRN
Status: DISCONTINUED | OUTPATIENT
Start: 2023-08-11 | End: 2023-08-12 | Stop reason: HOSPADM

## 2023-08-11 RX ORDER — CLOPIDOGREL BISULFATE 75 MG/1
600 TABLET ORAL ONCE
Status: COMPLETED | OUTPATIENT
Start: 2023-08-11 | End: 2023-08-11

## 2023-08-11 RX ORDER — VERAPAMIL HCL 2.5 MG/ML
AMPUL (ML) INTRAVENOUS CODE/TRAUMA/SEDATION MEDICATION
Status: DISCONTINUED | OUTPATIENT
Start: 2023-08-11 | End: 2023-08-11 | Stop reason: HOSPADM

## 2023-08-11 RX ORDER — NITROGLYCERIN 0.4 MG/1
0.4 TABLET SUBLINGUAL
Status: DISCONTINUED | OUTPATIENT
Start: 2023-08-11 | End: 2023-08-12 | Stop reason: HOSPADM

## 2023-08-11 RX ORDER — FENTANYL CITRATE 50 UG/ML
INJECTION, SOLUTION INTRAMUSCULAR; INTRAVENOUS CODE/TRAUMA/SEDATION MEDICATION
Status: DISCONTINUED | OUTPATIENT
Start: 2023-08-11 | End: 2023-08-11 | Stop reason: HOSPADM

## 2023-08-11 RX ORDER — PRAVASTATIN SODIUM 40 MG
40 TABLET ORAL
Status: DISCONTINUED | OUTPATIENT
Start: 2023-08-11 | End: 2023-08-11 | Stop reason: HOSPADM

## 2023-08-11 RX ORDER — ACETAMINOPHEN 325 MG/1
650 TABLET ORAL EVERY 4 HOURS PRN
Status: DISCONTINUED | OUTPATIENT
Start: 2023-08-11 | End: 2023-08-11 | Stop reason: HOSPADM

## 2023-08-11 RX ORDER — INSULIN GLARGINE 100 [IU]/ML
18 INJECTION, SOLUTION SUBCUTANEOUS
Status: DISCONTINUED | OUTPATIENT
Start: 2023-08-11 | End: 2023-08-11 | Stop reason: HOSPADM

## 2023-08-11 RX ORDER — LOSARTAN POTASSIUM 50 MG/1
100 TABLET ORAL DAILY
Status: DISCONTINUED | OUTPATIENT
Start: 2023-08-11 | End: 2023-08-11 | Stop reason: HOSPADM

## 2023-08-11 RX ORDER — ASPIRIN 81 MG/1
324 TABLET, CHEWABLE ORAL ONCE
Status: COMPLETED | OUTPATIENT
Start: 2023-08-11 | End: 2023-08-11

## 2023-08-11 RX ORDER — CLOPIDOGREL BISULFATE 75 MG/1
75 TABLET ORAL DAILY
Status: DISCONTINUED | OUTPATIENT
Start: 2023-08-12 | End: 2023-08-12 | Stop reason: HOSPADM

## 2023-08-11 RX ORDER — SODIUM CHLORIDE 9 MG/ML
75 INJECTION, SOLUTION INTRAVENOUS CONTINUOUS
Status: DISPENSED | OUTPATIENT
Start: 2023-08-11 | End: 2023-08-11

## 2023-08-11 RX ORDER — ATORVASTATIN CALCIUM 40 MG/1
40 TABLET, FILM COATED ORAL EVERY EVENING
Status: DISCONTINUED | OUTPATIENT
Start: 2023-08-11 | End: 2023-08-12 | Stop reason: HOSPADM

## 2023-08-11 RX ORDER — HEPARIN SODIUM 1000 [USP'U]/ML
4000 INJECTION, SOLUTION INTRAVENOUS; SUBCUTANEOUS ONCE
Status: COMPLETED | OUTPATIENT
Start: 2023-08-11 | End: 2023-08-11

## 2023-08-11 RX ORDER — MIDAZOLAM HYDROCHLORIDE 2 MG/2ML
INJECTION, SOLUTION INTRAMUSCULAR; INTRAVENOUS CODE/TRAUMA/SEDATION MEDICATION
Status: DISCONTINUED | OUTPATIENT
Start: 2023-08-11 | End: 2023-08-11 | Stop reason: HOSPADM

## 2023-08-11 RX ORDER — SODIUM CHLORIDE 9 MG/ML
125 INJECTION, SOLUTION INTRAVENOUS CONTINUOUS
Status: DISCONTINUED | OUTPATIENT
Start: 2023-08-11 | End: 2023-08-11

## 2023-08-11 RX ORDER — PANTOPRAZOLE SODIUM 40 MG/1
40 TABLET, DELAYED RELEASE ORAL
Status: DISCONTINUED | OUTPATIENT
Start: 2023-08-11 | End: 2023-08-11 | Stop reason: HOSPADM

## 2023-08-11 RX ORDER — CLOPIDOGREL BISULFATE 75 MG/1
600 TABLET ORAL ONCE
Status: DISCONTINUED | OUTPATIENT
Start: 2023-08-11 | End: 2023-08-11

## 2023-08-11 RX ORDER — METOPROLOL TARTRATE 50 MG/1
25 TABLET, FILM COATED ORAL EVERY 12 HOURS SCHEDULED
Status: DISCONTINUED | OUTPATIENT
Start: 2023-08-11 | End: 2023-08-12 | Stop reason: HOSPADM

## 2023-08-11 RX ORDER — ONDANSETRON 2 MG/ML
4 INJECTION INTRAMUSCULAR; INTRAVENOUS EVERY 6 HOURS PRN
Status: DISCONTINUED | OUTPATIENT
Start: 2023-08-11 | End: 2023-08-11

## 2023-08-11 RX ORDER — FLUTICASONE PROPIONATE 50 MCG
2 SPRAY, SUSPENSION (ML) NASAL 2 TIMES DAILY
Status: DISCONTINUED | OUTPATIENT
Start: 2023-08-11 | End: 2023-08-12 | Stop reason: HOSPADM

## 2023-08-11 RX ORDER — ASCORBIC ACID 500 MG
500 TABLET ORAL DAILY
Status: DISCONTINUED | OUTPATIENT
Start: 2023-08-11 | End: 2023-08-12 | Stop reason: HOSPADM

## 2023-08-11 RX ORDER — HEPARIN SODIUM 1000 [USP'U]/ML
4000 INJECTION, SOLUTION INTRAVENOUS; SUBCUTANEOUS EVERY 6 HOURS PRN
Status: DISCONTINUED | OUTPATIENT
Start: 2023-08-11 | End: 2023-08-11 | Stop reason: HOSPADM

## 2023-08-11 RX ORDER — HYDROCHLOROTHIAZIDE 12.5 MG/1
12.5 TABLET ORAL DAILY
Status: DISCONTINUED | OUTPATIENT
Start: 2023-08-11 | End: 2023-08-11 | Stop reason: HOSPADM

## 2023-08-11 RX ORDER — ONDANSETRON 2 MG/ML
4 INJECTION INTRAMUSCULAR; INTRAVENOUS EVERY 6 HOURS PRN
Status: DISCONTINUED | OUTPATIENT
Start: 2023-08-11 | End: 2023-08-12 | Stop reason: HOSPADM

## 2023-08-11 RX ORDER — AMLODIPINE BESYLATE 10 MG/1
10 TABLET ORAL
Status: DISCONTINUED | OUTPATIENT
Start: 2023-08-11 | End: 2023-08-11

## 2023-08-11 RX ADMIN — ASPIRIN 324 MG: 81 TABLET, CHEWABLE ORAL at 05:19

## 2023-08-11 RX ADMIN — METOPROLOL TARTRATE 25 MG: 50 TABLET ORAL at 23:06

## 2023-08-11 RX ADMIN — INSULIN GLARGINE 10 UNITS: 100 INJECTION, SOLUTION SUBCUTANEOUS at 23:06

## 2023-08-11 RX ADMIN — OXYCODONE HYDROCHLORIDE AND ACETAMINOPHEN 500 MG: 500 TABLET ORAL at 15:07

## 2023-08-11 RX ADMIN — PANTOPRAZOLE SODIUM 40 MG: 40 TABLET, DELAYED RELEASE ORAL at 07:24

## 2023-08-11 RX ADMIN — SODIUM CHLORIDE 75 ML/HR: 0.9 INJECTION, SOLUTION INTRAVENOUS at 15:02

## 2023-08-11 RX ADMIN — ACETAMINOPHEN 650 MG: 325 TABLET, FILM COATED ORAL at 15:07

## 2023-08-11 RX ADMIN — ATORVASTATIN CALCIUM 40 MG: 40 TABLET, FILM COATED ORAL at 17:08

## 2023-08-11 RX ADMIN — HEPARIN SODIUM 11.8 UNITS/KG/HR: 10000 INJECTION, SOLUTION INTRAVENOUS at 05:22

## 2023-08-11 RX ADMIN — IOHEXOL 100 ML: 350 INJECTION, SOLUTION INTRAVENOUS at 04:01

## 2023-08-11 RX ADMIN — CLOPIDOGREL BISULFATE 600 MG: 75 TABLET ORAL at 05:19

## 2023-08-11 RX ADMIN — METOPROLOL TARTRATE 25 MG: 50 TABLET ORAL at 15:07

## 2023-08-11 RX ADMIN — HEPARIN SODIUM 11.8 UNITS/KG/HR: 10000 INJECTION, SOLUTION INTRAVENOUS at 05:24

## 2023-08-11 RX ADMIN — HEPARIN SODIUM 4000 UNITS: 1000 INJECTION INTRAVENOUS; SUBCUTANEOUS at 05:21

## 2023-08-11 RX ADMIN — SODIUM CHLORIDE 125 ML/HR: 0.9 INJECTION, SOLUTION INTRAVENOUS at 10:59

## 2023-08-11 NOTE — CONSULTS
Consultation - General Cardiology Team 2  London Lord 79 y.o. male MRN: [id number]  Unit/Bed#: BE CATH LAB ROOM Encounter: [id number]      Consults  PCP: Aston Adam MD   Outpatient Cardiologist: none    History of Present Illness   Physician Requesting Consult:  Henry Molina MD  Reason for Consult / Principal Problem: NSTMI        Assessment/Plan      Non-ST segment elevated myocardial infarction  · Patient presented with chest pain, 7 out of 10 in severity, pressure-like and radiating to the arms, lasts for 5 to 10 minutes during the last 3 days  · Patient has history of hypertension, diabetes and hyperlipidemia  · EKG showed Q wave in lead II, III and aVF  · CTA PE is negative for pulmonary pleasant  · Troponin was 1430 then 1709 then 2268  · Johanne Basque was 279 Then is 838  · Patient was loaded with Plavix and aspirin  · Patient was started on heparin drip  Continue aspirin 81 once daily  Continue Plavix 75 mg once daily  Continue atorvastatin 40 mg tablet once daily  Starting metoprolol tartrate 25 twice daily  Patient is currently in the Cath Lab for cardiac catheterization  Initial images showed culprit lesion in the RCA which might need stenting  Patient was scheduled for prostatic biopsy for possible prostatic cancer on Tuesday which might prevent from placement of stent  Discussion was made with the urologist was agreeable to postpone the biopsy for 6-month  Continue telemetry monitor  Continue to monitor hemodynamics    History of PE  · Patient has history of pulmonary embolism in 2017 unprovoked  · Patient was on Eliquis 5 mg twice daily  Eliquis was held because of starting of heparin drip  Restart Eliquis when able    Elevated PSA  · Patient has history of elevated PSA  · Patient is following with urology for concern of prostate cancer  · MRI prostate showed prostate gland boundaries and areas of concern for significant prostate cancer were segmented using 3D advanced post-processing on an independent [employer] system workstation with active physician participation. The segmentation was performed should   · MR-ultrasound fusion biopsy be required. · Patient was scheduled for prostate biopsy on Tuesday next week  Discussed with the urologist the need for biopsy next week as it might prevent from placing stent  Urologist is agreeable to postpone the biopsy for 6-month     Hypertension  · Patient has history of hypertension  · Patient is on home amlodipine 10 mg and losartan 100 mg tablet  Hold losartan and amlodipine  Start metoprolol 25 mg twice daily    Hyperlipidemia  · Patient has history of hyperlipidemia  · Patient is on lovastatin 40 mg tablet once daily  Ordered Lipitor 40 mg tablet once daily    Diabetes type 2  · Patient has history of diabetes type 2  Diabetes management per primary team      HPI: Marietta Mac is a 79y.o. 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 Patient reported chest pain started Sunday. Patient reported that he had upper GI endoscopy on Monday. Patient reported that the pain started to get worse on Tuesday 7 out of 10 in severity, pressure-like over the middle of the chest, radiating to both arms. Patient denied nausea or vomiting, patient denied shortness of breath. Patient admitted having excessive retching. In the ED patient had troponin elevation of 1430 then 1709 then 2268. CTA PE was negative for PE. Randalyn Lawson was 279 then 838, BNP was 54 otherwise other labs unremarkable. Patient was loaded with Plavix, aspirin and started on heparin drip. Patient was transferred to [facility] for cardiac cath. Patient reported that he does not have any more pain. His EKG showed Q waves in the inferior leads without ST segment elevations.       Review of Systems   Constitutional: Negative for appetite change, fever and unexpected weight change. HENT: Negative for congestion, dental problem, drooling, ear discharge, ear pain, hearing loss, nosebleeds, sinus pain and voice change. Eyes: Negative for photophobia, pain, redness, itching and visual disturbance. Respiratory: Positive for chest tightness. Negative for apnea, cough, choking, shortness of breath, wheezing and stridor. Cardiovascular: Positive for chest pain. Negative for palpitations and leg swelling. Gastrointestinal: Negative for abdominal distention, abdominal pain, anal bleeding, blood in stool, constipation, diarrhea, nausea, rectal pain and vomiting. Endocrine: Negative for cold intolerance, heat intolerance, polydipsia, polyphagia and polyuria. Genitourinary: Negative for decreased urine volume, difficulty urinating, dysuria, enuresis, flank pain, hematuria, penile pain, scrotal swelling and testicular pain. Musculoskeletal: Negative for arthralgias, back pain, gait problem, joint swelling and neck pain. Skin: Negative for color change, pallor, rash and wound. Neurological: Negative for dizziness, seizures, syncope, facial asymmetry, speech difficulty, weakness, light-headedness, numbness and headaches. Psychiatric/Behavioral: Negative for agitation, behavioral problems, confusion, dysphoric mood, self-injury and suicidal ideas. The patient is not hyperactive. ROS as noted above.        Historical Information   Past Medical History:   Diagnosis Date   • BPH without obstruction/lower urinary tract symptoms    • Diabetes mellitus ([address])    • Dysuria    • GERD (gastroesophageal reflux disease)    • Gout    • History of melanoma     left ear - surgically removed   • History of pulmonary embolism 2017    bilateral   • Hyperlipidemia    • Hypertension    • Melanoma ([address]) 2017    left ear   • Prostate cancer [facility])    • Sleep apnea      Past Surgical History:   Procedure Laterality Date   • CHOLECYSTECTOMY • COLONOSCOPY  10/2016    sessile serrated polyp removed from the proximal transverse, adenoma removed from the distal transverse   • EGD  08/2017    gastritis, H. pylori negative, and Candida esophagitis   • EGD  11/2011    slight Schatzki's ring, small hiatal hernia   • EXTERNAL EAR SURGERY Left 08/2017    for the removal of skin melanoma-Plastic surgery     Social History     Substance and Sexual Activity   Alcohol Use Yes    Comment: Drinks socailly.       Social History     Substance and Sexual Activity   Drug Use No     Social History     Tobacco Use   Smoking Status Never   • Passive exposure: Never   Smokeless Tobacco Never     Family History: non-contributory    Meds/Allergies   Hospital Medications:   Current Facility-Administered Medications   Medication Dose Route Frequency   • acetaminophen (TYLENOL) tablet 650 mg  650 mg Oral Q4H PRN   • ascorbic acid (VITAMIN C) tablet 500 mg  500 mg Oral Daily   • [START ON 8/12/2023] aspirin chewable tablet 81 mg  81 mg Oral Daily   • atorvastatin (LIPITOR) tablet 40 mg  40 mg Oral QPM   • [START ON 8/12/2023] clopidogrel (PLAVIX) tablet 75 mg  75 mg Oral Daily   • fentanyl citrate (PF) 100 MCG/2ML   Intravenous Code/Trauma/Sedation Med   • fluticasone (FLONASE) 50 mcg/act nasal spray 2 spray  2 spray Nasal BID   • heparin (porcine) injection   Intra-arterial Code/Trauma/Sedation Med   • insulin glargine (LANTUS) subcutaneous injection 10 Units 0.1 mL  10 Units Subcutaneous HS   • lidocaine (PF) (XYLOCAINE-MPF) 1 % injection   Infiltration Code/Trauma/Sedation Med   • metoprolol tartrate (LOPRESSOR) tablet 25 mg  25 mg Oral Q12H [facility]   • midazolam (VERSED) injection   Intravenous Code/Trauma/Sedation Med   • nitroglycerin (NITROSTAT) SL tablet 0.4 mg  0.4 mg Sublingual Q5 Min PRN   • nitroGLYcerin 200 mcg/mL IA bolus   Intra-arterial Code/Trauma/Sedation Med   • nitroGLYcerin 200 mcg/mL IC bolus   Intracoronary Code/Trauma/Sedation Med   • ondansetron (ZOFRAN) injection 4 mg  4 mg Intravenous Q6H PRN   • ondansetron (ZOFRAN) injection 4 mg  4 mg Intravenous Q6H PRN   • sodium chloride 0.9 % infusion  75 mL/hr Intravenous Continuous     Home Medications:   Medications Prior to Admission   Medication   • albuterol (ACCUNEB) 1.25 MG/3ML nebulizer solution   • amLODIPine (NORVASC) 10 mg tablet   • apixaban (ELIQUIS) 5 mg   • ascorbic acid (VITAMIN C) 250 mg tablet   • aspirin 81 MG tablet   • BD Pen Needle Kiarra U/F 32G X 4 MM MISC   • benzonatate (TESSALON) 200 MG capsule   • cetirizine (ZyrTEC) 10 mg tablet   • clindamycin (CLEOCIN) 300 MG capsule   • co-enzyme Q-10 30 MG capsule   • Efinaconazole 10 % SOLN   • Empagliflozin (JARDIANCE PO)   • fluticasone (FLONASE) 50 mcg/act nasal spray   • glimepiride (AMARYL) 1 mg tablet   • HumaLOG KwikPen 100 units/mL injection pen   • hydrochlorothiazide (MICROZIDE) 12.5 mg capsule   • Insulin Glargine Solostar (Lantus SoloStar) 100 UNIT/ML SOPN   • Lancets (OneTouch Delica Plus [id number]) MISC   • losartan (COZAAR) 100 MG tablet   • lovastatin (MEVACOR) 40 MG tablet   • metFORMIN (GLUCOPHAGE) 1000 MG tablet   • montelukast (SINGULAIR) 10 mg tablet   • NON FORMULARY   • olopatadine (PATANOL) 0.1 % ophthalmic solution   • omeprazole (PriLOSEC) 40 MG capsule   • OneTouch Ultra test strip   • Sodium Sulfate-Mag Sulfate-KCl (Sutab) 0643-081-221 MG TABS   • VITAMIN D PO       Allergies   Allergen Reactions   • Crestor [Rosuvastatin] Rash and Hives     Other reaction(s): Urticaria   • Enalapril Cough       Objective   Vitals: Blood pressure 121/74, pulse 76, temperature 97.5 °F (36.4 °C), temperature source Oral, SpO2 100 %.   Orthostatic Blood Pressures    Flowsheet Row Most Recent Value   Blood Pressure 121/74 filed at 08/11/2023 1100            Invasive Devices     Peripheral Intravenous Line  Duration           Peripheral IV 08/11/23 Distal;Right;Upper;Ventral (anterior) Arm <1 day    Peripheral IV 08/11/23 Left Antecubital <1 day Physical Exam  Constitutional:       General: He is not in acute distress. Appearance: Normal appearance. He is obese. He is not ill-appearing, toxic-appearing or diaphoretic. HENT:      Head: Normocephalic and atraumatic. Nose: Nose normal. No congestion or rhinorrhea. Mouth/Throat:      Mouth: Mucous membranes are moist.      Pharynx: Oropharynx is clear. No oropharyngeal exudate or posterior oropharyngeal erythema. Cardiovascular:      Rate and Rhythm: Normal rate and regular rhythm. Pulses: Normal pulses. Heart sounds: Normal heart sounds. No murmur heard. No friction rub. No gallop. Pulmonary:      Effort: Pulmonary effort is normal. No respiratory distress. Breath sounds: Normal breath sounds. No stridor. No wheezing, rhonchi or rales. Chest:      Chest wall: No tenderness. Abdominal:      General: Abdomen is flat. Bowel sounds are normal. There is no distension. Palpations: Abdomen is soft. There is no mass. Tenderness: There is no abdominal tenderness. There is no right CVA tenderness, left CVA tenderness, guarding or rebound. Hernia: No hernia is present. Musculoskeletal:         General: No swelling, tenderness, deformity or signs of injury. Normal range of motion. Cervical back: Normal range of motion and neck supple. No rigidity or tenderness. Right lower leg: No edema. Left lower leg: No edema. Lymphadenopathy:      Cervical: No cervical adenopathy. Skin:     General: Skin is warm. Coloration: Skin is not jaundiced or pale. Findings: No bruising, erythema, lesion or rash. Neurological:      General: No focal deficit present. Mental Status: He is alert and oriented to person, place, and time. Cranial Nerves: No cranial nerve deficit. Sensory: No sensory deficit. Motor: No weakness.    Psychiatric:         Mood and Affect: Mood normal.         Behavior: Behavior normal.         Thought Content: Thought content normal.         Judgment: Judgment normal.           Lab Results: I have personally reviewed pertinent lab results. Results from last 7 days   Lab Units 23  1114 23  0306   POTASSIUM mmol/L 4.0 3.5   CO2 mmol/L 28 30   CHLORIDE mmol/L 104 96   BUN mg/dL 17 18   CREATININE mg/dL 0.80 0.93     Results from last 7 days   Lab Units 23  0456 23  0306   HEMOGLOBIN g/dL 14.0 15.0   HEMATOCRIT % 41.9 45.3   PLATELETS Thousands/uL 192 217     Results from last 7 days   Lab Units 23  0456   PTT seconds 25             Imaging: I have personally reviewed pertinent reports. Holter/Telemetry:   No events    ECHO: Results for orders placed during the hospital encounter of 17    Echo complete with contrast if indicated    Narrative  [address]  [address] [place] [address], [address] [place] [address]  [phone]    Transthoracic Echocardiogram  2D, M-mode, Doppler, and Color Doppler    Study date:  13-Mar-2017    Patient: Tracy Chavis  MR number: [id number]  Account number: [de-identified]  : 1955  Age: 63 years  Gender: Male  Status: Outpatient  Location: Bedside  Height: 71 in  Weight: 211.6 lb  BP: 136/ 84 mmHg    Indications: Assess left ventricular function. Diagnoses: R07.9 - Chest pain, unspecified    Sonographer:  Wiliam Julien RDCS  Primary Physician:  Jose Ramos MD  Referring Physician:  Johny Spurling, MD  Group:  [facility]  Interpreting Physician:  Jose Kingsley DO    SUMMARY    LEFT VENTRICLE:  Systolic function was normal. Ejection fraction was estimated in the range of 60 % to 65 %. There were no regional wall motion abnormalities. Doppler parameters were consistent with abnormal left ventricular relaxation (grade 1 diastolic dysfunction). AORTIC VALVE:  There was trace regurgitation. AORTA:  There was mild dilatation of the ascending aorta.  4.2cm    HISTORY: PRIOR HISTORY: dyslipidemia, diabetes mellitus, gerd, hypertension    PROCEDURE: The procedure was performed at the bedside. This was a stat study. The transthoracic approach was used. The study included complete 2D imaging, M-mode, complete spectral Doppler, and color Doppler. Images were obtained from the  parasternal, apical, subcostal, and suprasternal notch acoustic windows. Image quality was adequate. LEFT VENTRICLE: Size was normal. Systolic function was normal. Ejection fraction was estimated in the range of 60 % to 65 %. There were no regional wall motion abnormalities. Wall thickness was normal. DOPPLER: Doppler parameters were  consistent with abnormal left ventricular relaxation (grade 1 diastolic dysfunction). RIGHT VENTRICLE: The size was normal. Systolic function was normal. Wall thickness was normal.    LEFT ATRIUM: Size was normal.    RIGHT ATRIUM: Size was normal.    MITRAL VALVE: Valve structure was normal. There was normal leaflet separation. DOPPLER: The transmitral velocity was within the normal range. There was no evidence for stenosis. There was no regurgitation. AORTIC VALVE: The valve was probably trileaflet. Leaflets exhibited normal thickness and normal cuspal separation. DOPPLER: Transaortic velocity was within the normal range. There was no evidence for stenosis. There was trace  regurgitation. TRICUSPID VALVE: The valve structure was normal. There was normal leaflet separation. DOPPLER: The transtricuspid velocity was within the normal range. There was no evidence for stenosis. There was no regurgitation. PULMONIC VALVE: Leaflets exhibited normal thickness, no calcification, and normal cuspal separation. DOPPLER: The transpulmonic velocity was within the normal range. There was no regurgitation. PERICARDIUM: There was no pericardial effusion. The pericardium was normal in appearance. AORTA: The root exhibited normal size. There was mild dilatation of the ascending aorta. 4.2cm    SYSTEMIC VEINS: IVC: The inferior vena cava was not well visualized. SYSTEM MEASUREMENT TABLES    2D  %FS: 36.27 %  AV Diam: 3.93 cm  EDV(Teich): 101.82 ml  EF(Teich): 65.94 %  ESV(Teich): 34.68 ml  IVSd: 1.4 cm  LA Area: 14.73 cm2  LA Diam: 4.21 cm  LVEDV MOD A4C: 149.68 ml  LVEF MOD A4C: 57.19 %  LVESV MOD A4C: 64.07 ml  LVIDd: 4.69 cm  LVIDs: 2.99 cm  LVLd A4C: 9.1 cm  LVLs A4C: 7.28 cm  LVPWd: 1.1 cm  RA Area: 15.24 cm2  RV DIAM: 3.77 cm  SV MOD A4C: 85.61 ml  SV(Teich): 67.14 ml    CW  AV Vmax: 1.19 m/s  AV maxP.7 mmHg  PV Vmax: 0.98 m/s  PV maxPG: 3.83 mmHg  TR Vmax: 2.49 m/s  TR maxP.72 mmHg    MM  TAPSE: 2.5 cm    PW  E': 0.06 m/s  E/E': 9.64  LVOT Vmax: 0.79 m/s  LVOT maxP.49 mmHg  MV A Colt: 0.66 m/s  MV Dec Caroline: 3.77 m/s2  MV DecT: 153.83 ms  MV E Colt: 0.58 m/s  MV E/A Ratio: 0.88  RVOT Vmax: 0.81 m/s  RVOT maxP.61 mmHg    [facility] Commission Accredited Echocardiography Laboratory    Prepared and electronically signed by    Dario Diggs DO  Signed 13-Mar-2017 18:06:23      CATH/STRESS TEST:   Pending results    EKG: Interpretation: q waves of inferior lead       VTE Prophylaxis: Heparin           Case discussed and reviewed with Dr. Aniket Medina who agrees with my assessment and plan. Thank you for involving us in the care of your patient. Mellisa Monroe DO PGY-2  [facility].      ==========================================================================================    Counseling / Coordination of Care  Total floor / unit time spent today 45 minutes minutes. Greater than 50% of total time was spent with the patient and / or family counseling and / or coordination of care. ** Please Note: Fluency DirectDictation voice to text software may have been used in the creation of this document.  **

## 2023-08-11 NOTE — TELEPHONE ENCOUNTER
Pt under care of: Ed Barcenas     Last Seen: 3/9/23    Pt calling due to:    Patients wife calling in stating her  suffered a heart attack last night and is now admitted. Per cardiologist patient will need surgery canceled for 8/15/23. They would like to keep 8/29/23 apt with  to discuss further options for patient.      Pt can be reached at: [phone]  (Ciara-wife)

## 2023-08-11 NOTE — ED PROVIDER NOTES
History  Chief Complaint   Patient presents with   • Chest Pain     Pt c/o chest pain that started two days ago, two hours ago started going down bilateral arms but more painful on the left arm.      61-year-old male with a past medical history of DM, bilateral PE, on Eliquis, history of recent endoscopy, who presents for chest pain. Patient reports that his chest pain began Tuesday. He had an upper endoscopy performed on Monday without complication. Describes a Tuesday, doing nothing in particular, he gradually developed chest pain. He has noticed worsening of this pain, and notices that it is rating down the arms now. He states that when he had a PE in the past, he had no pain or discomfort. He denies shortness of breath although the wife reports that he has appeared somewhat short of breath recently. No nausea or vomiting. No abdominal pain. Pain does not radiate to the back. Is not pleuritic. He does report that it improves when he lays flat. He states that he was recently off his Eliquis for his procedures, he was off Eliquis on the weekend, took it Monday night and Tuesday morning, and has been off it since. No leg swelling or leg pain. ROS otherwise negative. Prior to Admission Medications   Prescriptions Last Dose Informant Patient Reported? Taking? BD Pen Needle Kiarra U/F 32G X 4 MM MISC  Self, Spouse/Significant Other Yes No   Efinaconazole 10 % SOLN  Self, Spouse/Significant Other Yes No   Sig: Apply topically daily   Empagliflozin (JARDIANCE PO) 8/10/2023 Self, Spouse/Significant Other Yes Yes   Sig: Take 25 mg by mouth     HumaLOG KwikPen 100 units/mL injection pen 8/10/2023  No Yes   Sig: Inject 7 units with supper plus scale.  TDD 15 units   Insulin Glargine Solostar (Lantus SoloStar) 100 UNIT/ML SOPN 8/10/2023  No Yes   Sig: Inject 0.18 mL (18 Units total) under the skin daily   Lancets (OneTouch Delica Plus [id number]) MISC  Self, Spouse/Significant Other Yes No   Si (two) times a day Use to test   NON FORMULARY  Self, Spouse/Significant Other Yes No   Sig: Allergy shots   OneTouch Ultra test strip  Self, Spouse/Significant Other Yes No   Sig: use 1 TEST STRIP to TEST BLOOD SUGAR twice a day   Sodium Sulfate-Mag Sulfate-KCl (Sutab) 3877-518-099 MG TABS Not Taking Self, Spouse/Significant Other No No   Sig: Take 1 kit by mouth see administration instructions Follow instructions given at office visit.   See coupon code below   Patient not taking: Reported on 8/11/2023   VITAMIN D PO 8/10/2023  Yes Yes   Sig: Take by mouth   albuterol (ACCUNEB) 1.25 MG/3ML nebulizer solution  Self, Spouse/Significant Other Yes No   Sig: Take 1 ampule by nebulization every 6 (six) hours as needed for wheezing   Patient not taking: Reported on 3/29/2023   amLODIPine (NORVASC) 10 mg tablet 8/10/2023 Self, Spouse/Significant Other Yes Yes   Sig: Take 10 mg by mouth daily at bedtime   apixaban (ELIQUIS) 5 mg  Self No No   Sig: Take 1 tablet by mouth 2 (two) times a day for 30 days 2 tabs PO BID x 7 days, then 1 tab PO BID   ascorbic acid (VITAMIN C) 250 mg tablet 8/10/2023 Self, Spouse/Significant Other Yes Yes   Sig: Take 500 mg by mouth daily   aspirin 81 MG tablet Not Taking Self, Spouse/Significant Other Yes No   Sig: Take 81 mg by mouth daily   Patient not taking: Reported on 5/3/2023   benzonatate (TESSALON) 200 MG capsule Not Taking Self, Spouse/Significant Other No No   Sig: Take 1 capsule (200 mg total) by mouth daily at bedtime as needed for cough   Patient not taking: Reported on 8/11/2023   cetirizine (ZyrTEC) 10 mg tablet 8/10/2023 Self, Spouse/Significant Other Yes Yes   Sig: Take 10 mg by mouth daily   clindamycin (CLEOCIN) 300 MG capsule Past Week  Yes Yes   Sig: Take 300 mg by mouth 4 (four) times a day   co-enzyme Q-10 30 MG capsule Not Taking Self, Spouse/Significant Other Yes No   Sig: Take 100 mg by mouth daily   Patient not taking: Reported on 8/11/2023   fluticasone (FLONASE) 50 mcg/act nasal spray 8/10/2023 Self, Spouse/Significant Other Yes Yes   Si sprays into each nostril   glimepiride (AMARYL) 1 mg tablet Not Taking Self, Spouse/Significant Other Yes No   Sig: Take 2 mg by mouth 2 (two) times a day   Patient not taking: Reported on 2023   hydrochlorothiazide (MICROZIDE) 12.5 mg capsule  Self, Spouse/Significant Other Yes No   Sig: Take 12.5 mg by mouth daily   losartan (COZAAR) 100 MG tablet 8/10/2023 Self, Spouse/Significant Other Yes Yes   Sig: Take 100 mg by mouth   lovastatin (MEVACOR) 40 MG tablet 8/10/2023 Self, Spouse/Significant Other Yes Yes   Sig: Take 40 mg by mouth daily at bedtime   metFORMIN (GLUCOPHAGE) 1000 MG tablet 8/10/2023 Self, Spouse/Significant Other Yes Yes   Sig: Take 1,000 mg by mouth 2 (two) times a day with meals   montelukast (SINGULAIR) 10 mg tablet Not Taking Self, Spouse/Significant Other Yes No   Sig: Take 10 mg by mouth daily at bedtime   Patient not taking: Reported on 2023   olopatadine (PATANOL) 0.1 % ophthalmic solution Past Month Self, Spouse/Significant Other Yes Yes   Si drop 2 (two) times a day   omeprazole (PriLOSEC) 40 MG capsule 8/10/2023 Self, Spouse/Significant Other No Yes   Sig: Take 1 capsule (40 mg total) by mouth daily      Facility-Administered Medications: None       Past Medical History:   Diagnosis Date   • BPH without obstruction/lower urinary tract symptoms    • Diabetes mellitus ([address])    • Dysuria    • GERD (gastroesophageal reflux disease)    • Gout    • History of melanoma     left ear - surgically removed   • History of pulmonary embolism 2017    bilateral   • Hyperlipidemia    • Hypertension    • Melanoma ([address]) 2017    left ear   • Prostate cancer ([address])    • Sleep apnea        Past Surgical History:   Procedure Laterality Date   • CHOLECYSTECTOMY     • COLONOSCOPY  10/2016    sessile serrated polyp removed from the proximal transverse, adenoma removed from the distal transverse   • EGD  2017    gastritis, H. pylori negative, and Candida esophagitis   • EGD  11/2011    slight Schatzki's ring, small hiatal hernia   • EXTERNAL EAR SURGERY Left 08/2017    for the removal of skin melanoma-Plastic surgery       Family History   Problem Relation Age of Onset   • Hyperlipidemia Mother    • Stroke Father    • Melanoma Father    • Diabetes Sister    • Hypertension Sister    • Diabetes Brother    • JETT disease Brother      I have reviewed and agree with the history as documented. E-Cigarette/Vaping   • E-Cigarette Use Never User      E-Cigarette/Vaping Substances   • Nicotine No    • THC No    • CBD No    • Flavoring No    • Other No    • Unknown No      Social History     Tobacco Use   • Smoking status: Never     Passive exposure: Never   • Smokeless tobacco: Never   Vaping Use   • Vaping Use: Never used   Substance Use Topics   • Alcohol use: Yes     Comment: Drinks socailly. • Drug use: No       Review of Systems   Constitutional: Negative for chills, diaphoresis, fatigue and fever. HENT: Negative for congestion and sore throat. Eyes: Negative for visual disturbance. Respiratory: Negative for cough, chest tightness and shortness of breath. Cardiovascular: Positive for chest pain. Negative for palpitations and leg swelling. Gastrointestinal: Negative for abdominal distention, abdominal pain, constipation, diarrhea, nausea and vomiting. Genitourinary: Negative for difficulty urinating and dysuria. Musculoskeletal: Negative for arthralgias and myalgias. Skin: Negative for rash. Neurological: Negative for dizziness, weakness, light-headedness, numbness and headaches. Psychiatric/Behavioral: Negative for agitation, behavioral problems and confusion. The patient is not nervous/anxious. All other systems reviewed and are negative. Physical Exam  Physical Exam  Constitutional:       Appearance: He is well-developed. HENT:      Head: Normocephalic and atraumatic.    Cardiovascular:      Rate and Rhythm: Normal rate and regular rhythm. Heart sounds: Normal heart sounds. No murmur heard. Pulmonary:      Effort: Pulmonary effort is normal. No respiratory distress. Breath sounds: Normal breath sounds. No decreased breath sounds, wheezing, rhonchi or rales. Abdominal:      General: Bowel sounds are normal. There is no distension. Palpations: Abdomen is soft. Tenderness: There is no abdominal tenderness. Musculoskeletal:         General: No deformity. Right lower leg: No tenderness. No edema. Left lower leg: No tenderness. No edema. Comments: No leg pain/swelling   Skin:     General: Skin is warm. Findings: No rash. Neurological:      Mental Status: He is alert and oriented to person, place, and time. Psychiatric:         Behavior: Behavior normal.         Thought Content:  Thought content normal.         Judgment: Judgment normal.         Vital Signs  ED Triage Vitals [08/11/23 0249]   Temperature Pulse Respirations Blood Pressure SpO2   (!) 97.2 °F (36.2 °C) 79 18 152/81 97 %      Temp Source Heart Rate Source Patient Position - Orthostatic VS BP Location FiO2 (%)   Tympanic Monitor Lying Left arm --      Pain Score       7           Vitals:    08/11/23 0400 08/11/23 0530 08/11/23 0600 08/11/23 0730   BP: 145/85 143/90 135/84 119/77   Pulse: 73 78 74 77   Patient Position - Orthostatic VS: Lying Lying Lying Lying         Visual Acuity      ED Medications  Medications   iohexol (OMNIPAQUE) 350 MG/ML injection (SINGLE-DOSE) 100 mL (100 mL Intravenous Given 8/11/23 0401)   aspirin chewable tablet 324 mg (324 mg Oral Given 8/11/23 0519)   heparin (porcine) injection 4,000 Units (4,000 Units Intravenous Given 8/11/23 0521)   clopidogrel (PLAVIX) tablet 600 mg (600 mg Oral Given 8/11/23 0519)       Diagnostic Studies  Results Reviewed     Procedure Component Value Units Date/Time    HS Troponin I 4hr [[id number]]  (Abnormal) Collected: 08/11/23 0700    Lab Status: Final result Specimen: Blood from Arm, Right Updated: 08/11/23 0737     hs TnI 4hr 2,268 ng/L      Delta 4hr hsTnI 838 ng/L     HS Troponin I 2hr [[id number]]  (Abnormal) Collected: 08/11/23 0456    Lab Status: Final result Specimen: Blood from Arm, Right Updated: 08/11/23 0531     hs TnI 2hr 1,709 ng/L      Delta 2hr hsTnI 279 ng/L     APTT six (6) hours after Heparin bolus/drip initiation or dosing change [[id number]]  (Normal) Collected: 08/11/23 0456    Lab Status: Final result Specimen: Blood from Arm, Left Updated: 08/11/23 0521     PTT 25 seconds     Protime-INR [[id number]]  (Normal) Collected: 08/11/23 0456    Lab Status: Final result Specimen: Blood from Arm, Left Updated: 08/11/23 0521     Protime 12.0 seconds      INR 0.86    CBC [[id number]]  (Normal) Collected: 08/11/23 0456    Lab Status: Final result Specimen: Blood from Arm, Left Updated: 08/11/23 0514     WBC 5.81 Thousand/uL      RBC 4.84 Million/uL      Hemoglobin 14.0 g/dL      Hematocrit 41.9 %      MCV 87 fL      MCH 28.9 pg      MCHC 33.4 g/dL      RDW 14.4 %      Platelets 067 Thousands/uL      MPV 9.1 fL     TSH [[id number]]  (Normal) Collected: 08/11/23 0306    Lab Status: Final result Specimen: Blood from Arm, Right Updated: 08/11/23 0344     TSH 3RD GENERATON 4.390 uIU/mL     B-Type Natriuretic Peptide(BNP) [[id number]]  (Normal) Collected: 08/11/23 0307    Lab Status: Final result Specimen: Blood from Arm, Right Updated: 08/11/23 0344     BNP 54 pg/mL     Comprehensive metabolic panel [[id number]]  (Abnormal) Collected: 08/11/23 0306    Lab Status: Final result Specimen: Blood from Arm, Right Updated: 08/11/23 0342     Sodium 137 mmol/L      Potassium 3.5 mmol/L      Chloride 96 mmol/L      CO2 30 mmol/L      ANION GAP 11 mmol/L      BUN 18 mg/dL      Creatinine 0.93 mg/dL      Glucose 209 mg/dL      Calcium 9.7 mg/dL      AST 21 U/L      ALT 17 U/L      Alkaline Phosphatase 59 U/L      Total Protein 7.6 g/dL      Albumin 4.6 g/dL      Total Bilirubin 0.33 mg/dL      eGFR 85 ml/min/1.73sq m     Narrative:      Specimen Lipemic. National Kidney Disease Foundation guidelines for Chronic Kidney Disease (CKD):   •  Stage 1 with normal or high GFR (GFR > 90 mL/min/1.73 square meters)  •  Stage 2 Mild CKD (GFR = 60-89 mL/min/1.73 square meters)  •  Stage 3A Moderate CKD (GFR = 45-59 mL/min/1.73 square meters)  •  Stage 3B Moderate CKD (GFR = 30-44 mL/min/1.73 square meters)  •  Stage 4 Severe CKD (GFR = 15-29 mL/min/1.73 square meters)  •  Stage 5 End Stage CKD (GFR <15 mL/min/1.73 square meters)  Note: GFR calculation is accurate only with a steady state creatinine    HS Troponin 0hr (reflex protocol) [[id number]]  (Abnormal) Collected: 08/11/23 0306    Lab Status: Final result Specimen: Blood from Arm, Right Updated: 08/11/23 0336     hs TnI 0hr 1,430 ng/L     CBC and differential [[id number]] Collected: 08/11/23 0306    Lab Status: Final result Specimen: Blood from Arm, Right Updated: 08/11/23 0314     WBC 5.89 Thousand/uL      RBC 5.16 Million/uL      Hemoglobin 15.0 g/dL      Hematocrit 45.3 %      MCV 88 fL      MCH 29.1 pg      MCHC 33.1 g/dL      RDW 14.3 %      MPV 8.9 fL      Platelets 232 Thousands/uL      nRBC 0 /100 WBCs      Neutrophils Relative 47 %      Immat GRANS % 0 %      Lymphocytes Relative 37 %      Monocytes Relative 11 %      Eosinophils Relative 4 %      Basophils Relative 1 %      Neutrophils Absolute 2.78 Thousands/µL      Immature Grans Absolute 0.00 Thousand/uL      Lymphocytes Absolute 2.18 Thousands/µL      Monocytes Absolute 0.63 Thousand/µL      Eosinophils Absolute 0.26 Thousand/µL      Basophils Absolute 0.04 Thousands/µL                  PE Study with CT abdomen & pelvis with contrast   ED Interpretation by Laura Ferugson MD (08/11 9787)   No obvious large PE on my wet read      Final Result by Joshua Hung MD (08/11 2893)      No evidence of pulmonary embolus. No evidence of esophageal perforation.  No evidence of acute pathology throughout the chest, abdomen or pelvis                     Workstation performed: [id number]                    Procedures  ECG 12 Lead Documentation Only    Date/Time: 8/11/2023 3:10 AM    Performed by: Aaron Crews MD  Authorized by: Aaron Crews MD    Indications / Diagnosis:  CP  ECG reviewed by me, the ED Provider: yes    Patient location:  ED  Previous ECG:     Previous ECG:  Compared to current    Similarity:  Changes noted  Interpretation:     Interpretation: abnormal    Rate:     ECG rate:  76    ECG rate assessment: normal    Rhythm:     Rhythm: sinus rhythm    Ectopy:     Ectopy: none    QRS:     QRS axis:  Normal    QRS intervals:  Normal  Conduction:     Conduction: normal    ST segments:     ST segments:  Non-specific    ST segment elevation noted on lead: Borderline elevation in inferior leads, not quite 1 mm.     Depression:  AVL  T waves:     T waves: non-specific    Q waves:     Q waves:  II and aVF    CriticalCare Time    Date/Time: 8/11/2023 6:15 AM    Performed by: Aaron Crews MD  Authorized by: Aaron Crews MD    Critical care provider statement:     Critical care time (minutes):  35    Critical care start time:  8/11/2023 5:00 AM    Critical care end time:  8/11/2023 5:35 AM    Critical care time was exclusive of:  Separately billable procedures and treating other patients and teaching time    Critical care was necessary to treat or prevent imminent or life-threatening deterioration of the following conditions:  Cardiac failure    Critical care was time spent personally by me on the following activities:  Obtaining history from patient or surrogate, development of treatment plan with patient or surrogate, discussions with consultants, evaluation of patient's response to treatment, examination of patient, interpretation of cardiac output measurements, ordering and performing treatments and interventions, ordering and review of laboratory studies, ordering and review of radiographic studies, re-evaluation of patient's condition and review of old charts    I assumed direction of critical care for this patient from another provider in my specialty: no               ED Course  ED Course as of 08/11/23 2202   Fri Aug 11, 2023   0318 WBC: 5.89  Re-assuring, less consistent with infection. 0340 hs TnI 0hr(!): 1,430   0345 hs TnI 0hr(!): 1,430   0357 hs TnI 0hr(!): 1,430  Patient in CT now, will update ECG. I reached out to cardiology. 0403 I do not see evidence of large PE at this time, will await read   0534 hs TnI 2hr(!): 1,709   0534 Delta 2hr hsTnI(!): 279   0554 Will transfer to \[facility]\"".   7208 EMTALA complete, transfer to \[facility]\"", no time yet             HEART Risk Score    Flowsheet Row Most Recent Value   Heart Score Risk Calculator    History 2 Filed at: 08/11/2023 0631   ECG 1 Filed at: 08/11/2023 0631   Age 2 Filed at: 08/11/2023 0631   Risk Factors 2 Filed at: 08/11/2023 0631   Troponin 2 Filed at: 08/11/2023 0631   HEART Score 9 Filed at: 08/11/2023 0631                        SBIRT 22yo+    Flowsheet Row Most Recent Value   Initial Alcohol Screen: US AUDIT-C     1. How often do you have a drink containing alcohol? 0 Filed at: 08/11/2023 0249   2. How many drinks containing alcohol do you have on a typical day you are drinking? 0 Filed at: 08/11/2023 0249   3a. Male UNDER 65: How often do you have five or more drinks on one occasion? 0 Filed at: 08/11/2023 0249   3b. FEMALE Any Age, or MALE 65+: How often do you have 4 or more drinks on one occassion? 0 Filed at: 08/11/2023 0249   Audit-C Score 0 Filed at: 08/11/2023 0249                    Medical Decision Making  I reviewed the patient's medical chart, PMHx, prior encounters, medications. My independent interpretation of ECG demonstrated: Borderline ST-segment elevation in inferior leads. Slight depression in aVL. NSR.     My DDx includes: ACS, PE, Boerhave's syndrome, arrhythmia    Patient has a broad differential given hx of PE off of eliquis and recent endoscopy. Will perform CTA PE/abd pelvis, Will perform cardiac workup, re-assess. Patient's initial ECG did demonstrate some borderline ST segment elevation in the inferior leads, with slight reciprocal changes. These were not quite 1 mm, and were difficult to determine if true elevation versus abnormality in waveform baseline. Troponin returned at 1400, at this time I discussed with cardiology the initial ECG as well as the troponin, and asked whether this patient should be made a STEMI alert, or if we should await PE study, and treat as NSTEMI if negative. It was agreed to treat as NSTEMI assuming that the PE study was negative. Delta troponin princess to 1700. It was also discussed with cardiology, decision was made to transfer to [facility]. Aspirin, Plavix, heparin were started per recommendations from cardiologist on-call. Discussed repeat ECG with borderline ST elevation in inferolateral leads around 7 AM with cardiologist on-call, Maik Molina, who recommends continuing above recommendations, no STEMI alert at this time. Amount and/or Complexity of Data Reviewed  Labs: ordered. Decision-making details documented in ED Course. Radiology: ordered and independent interpretation performed. Risk  OTC drugs. Prescription drug management.           Disposition  Final diagnoses:   NSTEMI (non-ST elevated myocardial infarction) [facility])   Chest pain     Time reflects when diagnosis was documented in both MDM as applicable and the Disposition within this note     Time User Action Codes Description Comment    8/11/2023  5:01 AM Elva Cuevas [I21.4] NSTEMI (non-ST elevated myocardial infarction) ([address])     8/11/2023  5:51 AM Elva Cuevas [R07.9] Chest pain       ED Disposition     ED Disposition   Transfer to Another Facility-In Network    Condition   --    Date/Time Fri Aug 11, 2023  5:52 AM    Comment   Oral Raid should be transferred out to \[facility]\"".            MD Documentation    Otilia De Jesus Most Recent Value   Patient Condition The patient has been stabilized such that within reasonable medical probability, no material deterioration of the patient condition or the condition of the unborn child(janiya) is likely to result from the transfer   Reason for Transfer Level of Care needed not available at this facility   Benefits of Transfer Specialized equipment and/or services available at the receiving facility (Include comment)________________________  [Cardiology - interventional]   Risks of Transfer Potential for delay in receiving treatment, Potential deterioration of medical condition, Loss of IV, Possible worsening of condition or death during transfer, Increased discomfort during transfer   Accepting Physician [address] Name, Mariano Izquierdo   Sending MD [facility]   Provider Certification General risk, such as traffic hazards, adverse weather conditions, rough terrain or turbulence, possible failure of equipment (including vehicle or aircraft), or consequences of actions of persons outside the control of the transport personnel, Unanticipated needs of medical equipment and personnel during transport, Risk of worsening condition      RN Documentation    [address] Name, [address] [facility]   Bed Assignment 204   Report Given to [address] [facility] [address]    None         Discharge Medication List as of 8/11/2023  8:32 AM      CONTINUE these medications which have NOT CHANGED    Details   amLODIPine (NORVASC) 10 mg tablet Take 10 mg by mouth daily at bedtime, Historical Med      ascorbic acid (VITAMIN C) 250 mg tablet Take 500 mg by mouth daily, Historical Med      cetirizine (ZyrTEC) 10 mg tablet Take 10 mg by mouth daily, Historical Med      clindamycin (CLEOCIN) 300 MG capsule Take 300 mg by mouth 4 (four) times a day, Historical Med      Empagliflozin (JARDIANCE PO) Take 25 mg by mouth  , Historical Med      fluticasone (FLONASE) 50 mcg/act nasal spray 2 sprays into each nostril, Starting Mon 11/26/2018, Historical Med      HumaLOG KwikPen 100 units/mL injection pen Inject 7 units with supper plus scale.  TDD 15 units, No Print      Insulin Glargine Solostar (Lantus SoloStar) 100 UNIT/ML SOPN Inject 0.18 mL (18 Units total) under the skin daily, Starting Mon 7/31/2023, Fill Later      losartan (COZAAR) 100 MG tablet Take 100 mg by mouth, Historical Med      lovastatin (MEVACOR) 40 MG tablet Take 40 mg by mouth daily at bedtime, Historical Med      metFORMIN (GLUCOPHAGE) 1000 MG tablet Take 1,000 mg by mouth 2 (two) times a day with meals, Historical Med      olopatadine (PATANOL) 0.1 % ophthalmic solution 1 drop 2 (two) times a day, Historical Med      omeprazole (PriLOSEC) 40 MG capsule Take 1 capsule (40 mg total) by mouth daily, Starting Mon 2/27/2023, Normal      VITAMIN D PO Take by mouth, Historical Med      albuterol (ACCUNEB) 1.25 MG/3ML nebulizer solution Take 1 ampule by nebulization every 6 (six) hours as needed for wheezing, Historical Med      apixaban (ELIQUIS) 5 mg Take 1 tablet by mouth 2 (two) times a day for 30 days 2 tabs PO BID x 7 days, then 1 tab PO BID, Starting Fri 3/17/2017, Until Tue 7/25/2023, Normal      aspirin 81 MG tablet Take 81 mg by mouth daily, Historical Med      BD Pen Needle Kiarra U/F 32G X 4 MM MISC Starting Mon 3/27/2023, Historical Med      benzonatate (TESSALON) 200 MG capsule Take 1 capsule (200 mg total) by mouth daily at bedtime as needed for cough, Starting Tue 4/11/2023, Normal      co-enzyme Q-10 30 MG capsule Take 100 mg by mouth daily, Historical Med      Efinaconazole 10 % SOLN Apply topically daily, Historical Med      glimepiride (AMARYL) 1 mg tablet Take 2 mg by mouth 2 (two) times a day, Historical Med      hydrochlorothiazide (MICROZIDE) 12.5 mg capsule Take 12.5 mg by mouth daily, Historical Med      Lancets (OneTouch Delica Plus [id number]) MISC 2 (two) times a day Use to test, Starting Mon 3/13/2023, Historical Med      montelukast (SINGULAIR) 10 mg tablet Take 10 mg by mouth daily at bedtime, Historical Med      NON FORMULARY Allergy shots, Historical Med      OneTouch Ultra test strip use 1 TEST STRIP to TEST BLOOD SUGAR twice a day, Historical Med      Sodium Sulfate-Mag Sulfate-KCl (Sutab) 8473-442-136 MG TABS Take 1 kit by mouth see administration instructions Follow instructions given at office visit. See coupon code below, Starting Tue 2/15/2022, Normal             No discharge procedures on file.     PDMP Review     None          ED Provider  Electronically Signed by           Kvng Burroughs MD  08/11/23 0653

## 2023-08-11 NOTE — H&P
[address] [facility]  H&P  Name: Sadiq Blake 79 y.o. male I MRN: [id number]  Unit/Bed#: [facility] 204-01 I Date of Admission: 8/11/2023   Date of Service: 8/11/2023 I Hospital Day: 0      Assessment/Plan   * Non-STEMI (non-ST elevated myocardial infarction) [facility])  Assessment & Plan  Will notify cardiology. Continue with aspirin and Plavix. Patient was loaded with Plavix at [address] Continue with heparin drip  Continue with close hemodynamic monitoring. Patient admitted to level 2 stepdown in ICCU  Note cardiology input:  "Patient status post cardiac catheterization. •  S/P successful IVUS-guided PCI to the dRCA/oRPLB lesions with JOHNATHAN x 2  Triple therapy while inpatient then dual therapy on plavix/eliquis thereafter  Cont plavix/eliquis for at least 6 months before discontinuing for prostate biopsy  Would resume plavix/eliquis as soon as possible after the biopsy for a total of 12 months if possible"      Hx pulmonary embolism  Assessment & Plan  Currently on heparin drip. Mixed hyperlipidemia  Assessment & Plan  Continue with statin    Type 2 diabetes mellitus without complication [facility])  Assessment & Plan  Lab Results   Component Value Date    HGBA1C 8.1 (H) 04/24/2023       No results for input(s): "POCGLU" in the last 72 hours. Blood Sugar Average: Last 72 hrs:          VTE Prophylaxis: Heparin Drip  / sequential compression device   Code Status: Full code  POLST: There is no POLST form on file for this patient (pre-hospital)    Anticipated Length of Stay:  Patient will be admitted on an Inpatient basis with an anticipated length of stay of greater than 2 midnights. Justification for Hospital Stay: Needs further evaluation of symptoms    Total Time for Visit, including Counseling / Coordination of Care: 85.  Greater than 50% of this total time spent on direct patient counseling and coordination of care.     Chief Complaint:   Chest pain    History of Present Illness:    Carol Worthy is a 79 y.o. male who presents with a past medical history of BPH, lower urinary tract infection, diabetes gastroesophageal reflux disease, gout history of melanoma history of pulmonary embolism hyperlipidemia hypertension prostate cancer and sleep apnea. Patient presents to the hospital for further evaluation  He has a history of recent endoscopy and presents with symptoms of chest pain. Patient presented for further evaluation of non-STEMI. Review of Systems:    Review of Systems   HENT: Negative for congestion. Respiratory: Negative for apnea. Cardiovascular: Negative for chest pain. Gastrointestinal: Negative for abdominal distention and abdominal pain. Genitourinary: Negative for difficulty urinating. Musculoskeletal: Negative for arthralgias and back pain. Psychiatric/Behavioral: Negative for agitation. All other systems reviewed and are negative. Past Medical and Surgical History:     Past Medical History:   Diagnosis Date   • BPH without obstruction/lower urinary tract symptoms    • Diabetes mellitus ([address])    • Dysuria    • GERD (gastroesophageal reflux disease)    • Gout    • History of melanoma     left ear - surgically removed   • History of pulmonary embolism 2017    bilateral   • Hyperlipidemia    • Hypertension    • Melanoma ([address]) 2017    left ear   • Prostate cancer ([address])    • Sleep apnea        Past Surgical History:   Procedure Laterality Date   • CHOLECYSTECTOMY     • COLONOSCOPY  10/2016    sessile serrated polyp removed from the proximal transverse, adenoma removed from the distal transverse   • EGD  08/2017    gastritis, H. pylori negative, and Candida esophagitis   • EGD  11/2011    slight Schatzki's ring, small hiatal hernia   • EXTERNAL EAR SURGERY Left 08/2017    for the removal of skin melanoma-Plastic surgery       Meds/Allergies:    Prior to Admission medications    Medication Sig Start Date End Date Taking?  Authorizing Provider albuterol (ACCUNEB) 1.25 MG/3ML nebulizer solution Take 1 ampule by nebulization every 6 (six) hours as needed for wheezing  Patient not taking: Reported on 3/29/2023    Historical Provider, MD   amLODIPine (NORVASC) 10 mg tablet Take 10 mg by mouth daily at bedtime    Historical Provider, MD   apixaban (ELIQUIS) 5 mg Take 1 tablet by mouth 2 (two) times a day for 30 days 2 tabs PO BID x 7 days, then 1 tab PO BID 3/17/17 7/25/23  Lorenzo Taylor MD   ascorbic acid (VITAMIN C) 250 mg tablet Take 500 mg by mouth daily    Historical Provider, MD   aspirin 81 MG tablet Take 81 mg by mouth daily  Patient not taking: Reported on 5/3/2023    Historical Provider, MD   BD Pen Needle Kiarra U/F 32G X 4 MM MISC  3/27/23   Historical Provider, MD   benzonatate (TESSALON) 200 MG capsule Take 1 capsule (200 mg total) by mouth daily at bedtime as needed for cough  Patient not taking: Reported on 8/11/2023 4/11/23   JARED Li   cetirizine (ZyrTEC) 10 mg tablet Take 10 mg by mouth daily    Historical Provider, MD   clindamycin (CLEOCIN) 300 MG capsule Take 300 mg by mouth 4 (four) times a day    Historical Provider, MD   co-enzyme Q-10 30 MG capsule Take 100 mg by mouth daily  Patient not taking: Reported on 8/11/2023    Historical Provider, MD   Efinaconazole 10 % SOLN Apply topically daily    Historical Provider, MD   Empagliflozin (JARDIANCE PO) Take 25 mg by mouth      Historical Provider, MD   fluticasone (FLONASE) 50 mcg/act nasal spray 2 sprays into each nostril 11/26/18   Historical Provider, MD   glimepiride (AMARYL) 1 mg tablet Take 2 mg by mouth 2 (two) times a day  Patient not taking: Reported on 8/11/2023    Historical Provider, MD   HumaLOG KwikPen 100 units/mL injection pen Inject 7 units with supper plus scale.  TDD 15 units 7/31/23   Mango Hoffman, DO   hydrochlorothiazide (MICROZIDE) 12.5 mg capsule Take 12.5 mg by mouth daily    Historical Provider, MD   Insulin Glargine Solostar (Lantus SoloStar) 100 UNIT/ML SOPN Inject 0.18 mL (18 Units total) under the skin daily 7/31/23   Cheryal Aleena Mimms, DO   Lancets (OneTouch Delica Plus [id number]) MISC 2 (two) times a day Use to test 3/13/23   Historical Provider, MD   losartan (COZAAR) 100 MG tablet Take 100 mg by mouth    Historical Provider, MD   lovastatin (MEVACOR) 40 MG tablet Take 40 mg by mouth daily at bedtime    Historical Provider, MD   metFORMIN (GLUCOPHAGE) 1000 MG tablet Take 1,000 mg by mouth 2 (two) times a day with meals    Historical Provider, MD   montelukast (SINGULAIR) 10 mg tablet Take 10 mg by mouth daily at bedtime  Patient not taking: Reported on 8/11/2023    Historical Provider, MD   NON FORMULARY Allergy shots    Historical Provider, MD   olopatadine (PATANOL) 0.1 % ophthalmic solution 1 drop 2 (two) times a day    Historical Provider, MD   omeprazole (PriLOSEC) 40 MG capsule Take 1 capsule (40 mg total) by mouth daily 2/27/23   Lida Mendez PA-C   OneTouch Ultra test strip use 1 TEST STRIP to TEST BLOOD SUGAR twice a day 3/8/23   Historical Provider, MD   Sodium Sulfate-Mag Sulfate-KCl (Sutab) 8762-823-161 MG TABS Take 1 kit by mouth see administration instructions Follow instructions given at office visit. See coupon code below  Patient not taking: Reported on 8/11/2023 2/15/22   JARED Horn   VITAMIN D PO Take by mouth    Historical Provider, MD     I have reviewed home medications with patient personally. Allergies: Allergies   Allergen Reactions   • Crestor [Rosuvastatin] Rash and Hives     Other reaction(s): Urticaria   • Enalapril Cough       Social History:     Marital Status: /Civil Union   Occupation: Retired    Substance Use History:   Social History     Substance and Sexual Activity   Alcohol Use Yes    Comment: Drinks socailly.       Social History     Tobacco Use   Smoking Status Never   • Passive exposure: Never   Smokeless Tobacco Never     Social History     Substance and Sexual Activity   Drug Use No       Family History:    Family History   Problem Relation Age of Onset   • Hyperlipidemia Mother    • Stroke Father    • Melanoma Father    • Diabetes Sister    • Hypertension Sister    • Diabetes Brother    • JETT disease Brother        Physical Exam:     Vitals:        Physical Exam  HENT:      Right Ear: Tympanic membrane normal.   Cardiovascular:      Rate and Rhythm: Normal rate and regular rhythm. Pulmonary:      Effort: Pulmonary effort is normal.      Breath sounds: Normal breath sounds. Abdominal:      General: Abdomen is flat. Palpations: Abdomen is soft. Musculoskeletal:         General: No swelling or tenderness. Normal range of motion. Cervical back: Normal range of motion and neck supple. Skin:     General: Skin is warm. Neurological:      General: No focal deficit present. Mental Status: He is alert and oriented to person, place, and time. Psychiatric:         Mood and Affect: Mood normal.             Additional Data:     Lab Results: I have personally reviewed pertinent reports. Results from last 7 days   Lab Units 08/11/23  0456 08/11/23  0306   WBC Thousand/uL 5.81 5.89   HEMOGLOBIN g/dL 14.0 15.0   HEMATOCRIT % 41.9 45.3   PLATELETS Thousands/uL 192 217   NEUTROS PCT %  --  47   LYMPHS PCT %  --  37   MONOS PCT %  --  11   EOS PCT %  --  4     Results from last 7 days   Lab Units 08/11/23  0306   SODIUM mmol/L 137   POTASSIUM mmol/L 3.5   CHLORIDE mmol/L 96   CO2 mmol/L 30   BUN mg/dL 18   CREATININE mg/dL 0.93   ANION GAP mmol/L 11   CALCIUM mg/dL 9.7   ALBUMIN g/dL 4.6   TOTAL BILIRUBIN mg/dL 0.33   ALK PHOS U/L 59   ALT U/L 17   AST U/L 21   GLUCOSE RANDOM mg/dL 209*     Results from last 7 days   Lab Units 08/11/23  0456   INR  0.86                   Imaging: I have personally reviewed pertinent reports. No orders to display           ** Please Note: This note has been constructed using a voice recognition system.  **

## 2023-08-11 NOTE — QUICK NOTE
Called by nursing staff for continual right groin ooze. Manual pressure had been held 30 minutes. Right groin injected with 20 ml of Lidocaine 1% with 100,000 units epinephrine. Manual pressure held and then Quik clot dressing applied. No hematoma, or ecchyomsis. Bedrest extended for 2 hours.

## 2023-08-11 NOTE — ASSESSMENT & PLAN NOTE
Will notify cardiology. Continue with aspirin and Plavix. Patient was loaded with Plavix at [address] Continue with heparin drip  Continue with close hemodynamic monitoring. Patient admitted to level 2 stepdown in ICCU  Discussed case with cardiology. They will proceed with cardiac catheterization. Timing to be determined.

## 2023-08-11 NOTE — DISCHARGE INSTR - AVS FIRST PAGE
1. Please see the post angioplasty discharge instructions. No heavy lifting, greater than 10 lbs. or strenuous activity for 5 days. Follow angioplasty discharge instructions. 2.Remove band aid tomorrow. Shower and wash area-  wrist and groin  gently with soap and water- beginning tomorrow. Rinse and pat dry. Apply new water seal band aid. Repeat this process for 5 days. No powders, creams lotions or antibiotic ointments  for 5 days. No tub baths, hot tubs or swimming for 5 days. 3. Call [facility] Office ([phone]) if you develop a fever, redness or drainage at your  wrist and groin  access site. 4. No driving for 2 days    5. Do not stop Plavix (clopiogrel) any reason without a cardiologist’s consent, or the stent could block up and cause a heart attack. 6. Stent card and book.     7.Perclose booklet

## 2023-08-11 NOTE — ASSESSMENT & PLAN NOTE
Lab Results   Component Value Date    HGBA1C 8.1 (H) 04/24/2023       No results for input(s): "POCGLU" in the last 72 hours.     Blood Sugar Average: Last 72 hrs:

## 2023-08-11 NOTE — EMTALA/ACUTE CARE TRANSFER
[address] EMERGENCY DEPARTMENT  [facility] [address]  Dept: [phone]      EMTALA TRANSFER CONSENT    NAME Martha Lay                                         1955                              MRN [id number]    I have been informed of my rights regarding examination, treatment, and transfer   by Dr. Hortensia Holder*    Benefits: Specialized equipment and/or services available at the receiving facility (Include comment)________________________ (Cardiology - interventional)    Risks: Potential for delay in receiving treatment, Potential deterioration of medical condition, Loss of IV, Possible worsening of condition or death during transfer, Increased discomfort during transfer      Consent for Transfer:  I acknowledge that my medical condition has been evaluated and explained to me by the emergency department physician or other qualified medical person and/or my attending physician, who has recommended that I be transferred to the service of  Accepting Physician: Antonio Dewey at [address] Name, [facility] [address] : [facility]. The above potential benefits of such transfer, the potential risks associated with such transfer, and the probable risks of not being transferred have been explained to me, and I fully understand them. The doctor has explained that, in my case, the benefits of transfer outweigh the risks. I agree to be transferred. I authorize the performance of emergency medical procedures and treatments upon me in both transit and upon arrival at the receiving facility. Additionally, I authorize the release of any and all medical records to the receiving facility and request they be transported with me, if possible. I understand that the safest mode of transportation during a medical emergency is an ambulance and that the Hospital advocates the use of this mode of transport.  Risks of traveling to the receiving facility by car, including absence of medical control, life sustaining equipment, such as oxygen, and medical personnel has been explained to me and I fully understand them. (ARDHA CORRECT BOX BELOW)  [  ]  I consent to the stated transfer and to be transported by ambulance/helicopter. [  ]  I consent to the stated transfer, but refuse transportation by ambulance and accept full responsibility for my transportation by car. I understand the risks of non-ambulance transfers and I exonerate the Hospital and its staff from any deterioration in my condition that results from this refusal.    X___________________________________________    DATE  23  TIME________  Signature of patient or legally responsible individual signing on patient behalf           RELATIONSHIP TO PATIENT_________________________          Provider Certification    NAME Rivera Cannon                                         1955                              MRN [id number]    A medical screening exam was performed on the above named patient. Based on the examination:    Condition Necessitating Transfer The primary encounter diagnosis was NSTEMI (non-ST elevated myocardial infarction) ([address] A diagnosis of Chest pain was also pertinent to this visit.     Patient Condition: The patient has been stabilized such that within reasonable medical probability, no material deterioration of the patient condition or the condition of the unborn child(janiya) is likely to result from the transfer    Reason for Transfer: Level of Care needed not available at this facility    Transfer Requirements: Facility [facility]   · Space available and qualified personnel available for treatment as acknowledged by    · Agreed to accept transfer and to provide appropriate medical treatment as acknowledged by       [place]  · Appropriate medical records of the examination and treatment of the patient are provided at the time of transfer   [address] _______  · Transfer will be performed by qualified personnel from    and appropriate transfer equipment as required, including the use of necessary and appropriate life support measures. Provider Certification: I have examined the patient and explained the following risks and benefits of being transferred/refusing transfer to the patient/family:  General risk, such as traffic hazards, adverse weather conditions, rough terrain or turbulence, possible failure of equipment (including vehicle or aircraft), or consequences of actions of persons outside the control of the transport personnel, Unanticipated needs of medical equipment and personnel during transport, Risk of worsening condition      Based on these reasonable risks and benefits to the patient and/or the unborn child(janiya), and based upon the information available at the time of the patient’s examination, I certify that the medical benefits reasonably to be expected from the provision of appropriate medical treatments at another medical facility outweigh the increasing risks, if any, to the individual’s medical condition, and in the case of labor to the unborn child, from effecting the transfer.     X____________________________________________ DATE 08/11/23        TIME_______      ORIGINAL - SEND TO MEDICAL RECORDS   COPY - SEND WITH PATIENT DURING TRANSFER

## 2023-08-12 ENCOUNTER — APPOINTMENT (INPATIENT)
Dept: NON INVASIVE DIAGNOSTICS | Facility: HOSPITAL | Age: 68
DRG: 247 | End: 2023-08-12
Payer: MEDICARE

## 2023-08-12 VITALS
OXYGEN SATURATION: 97 % | HEART RATE: 68 BPM | DIASTOLIC BLOOD PRESSURE: 75 MMHG | BODY MASS INDEX: 27.25 KG/M2 | WEIGHT: 194.67 LBS | RESPIRATION RATE: 28 BRPM | SYSTOLIC BLOOD PRESSURE: 118 MMHG | HEIGHT: 71 IN | TEMPERATURE: 97.9 F

## 2023-08-12 LAB
AORTIC ROOT: 4.3 CM
APICAL FOUR CHAMBER EJECTION FRACTION: 54 %
ASCENDING AORTA: 4.3 CM
E WAVE DECELERATION TIME: 269 MS
FRACTIONAL SHORTENING: 33 % (ref 28–44)
GLUCOSE SERPL-MCNC: 148 MG/DL (ref 65–140)
GLUCOSE SERPL-MCNC: 208 MG/DL (ref 65–140)
INTERVENTRICULAR SEPTUM IN DIASTOLE (PARASTERNAL SHORT AXIS VIEW): 1.2 CM
INTERVENTRICULAR SEPTUM: 1.2 CM (ref 0.6–1.1)
LAAS-AP2: 17.5 CM2
LAAS-AP4: 14.5 CM2
LEFT ATRIUM SIZE: 4.4 CM
LEFT ATRIUM VOLUME (MOD BIPLANE): 45 ML
LEFT INTERNAL DIMENSION IN SYSTOLE: 3.1 CM (ref 2.1–4)
LEFT VENTRICULAR INTERNAL DIMENSION IN DIASTOLE: 4.6 CM (ref 3.5–6)
LEFT VENTRICULAR POSTERIOR WALL IN END DIASTOLE: 1 CM
LEFT VENTRICULAR STROKE VOLUME: 59 ML
LVSV (TEICH): 59 ML
MV E'TISSUE VEL-SEP: 8 CM/S
MV PEAK A VEL: 0.58 M/S
MV PEAK E VEL: 49 CM/S
MV STENOSIS PRESSURE HALF TIME: 78 MS
MV VALVE AREA P 1/2 METHOD: 2.82 CM2
RIGHT ATRIAL 2D VOLUME: 25 ML
RIGHT ATRIUM AREA SYSTOLE A4C: 11.1 CM2
RIGHT VENTRICLE ID DIMENSION: 3.6 CM
SL CV LEFT ATRIUM LENGTH A2C: 5 CM
SL CV LV EF: 55
SL CV PED ECHO LEFT VENTRICLE DIASTOLIC VOLUME (MOD BIPLANE) 2D: 95 ML
SL CV PED ECHO LEFT VENTRICLE SYSTOLIC VOLUME (MOD BIPLANE) 2D: 37 ML
TRICUSPID ANNULAR PLANE SYSTOLIC EXCURSION: 1.7 CM

## 2023-08-12 PROCEDURE — 82948 REAGENT STRIP/BLOOD GLUCOSE: CPT

## 2023-08-12 PROCEDURE — 99239 HOSP IP/OBS DSCHRG MGMT >30: CPT | Performed by: INTERNAL MEDICINE

## 2023-08-12 PROCEDURE — 93306 TTE W/DOPPLER COMPLETE: CPT

## 2023-08-12 PROCEDURE — 93306 TTE W/DOPPLER COMPLETE: CPT | Performed by: INTERNAL MEDICINE

## 2023-08-12 PROCEDURE — 99232 SBSQ HOSP IP/OBS MODERATE 35: CPT | Performed by: INTERNAL MEDICINE

## 2023-08-12 RX ORDER — EZETIMIBE 10 MG/1
10 TABLET ORAL
Status: DISCONTINUED | OUTPATIENT
Start: 2023-08-12 | End: 2023-08-12 | Stop reason: HOSPADM

## 2023-08-12 RX ORDER — CLOPIDOGREL BISULFATE 75 MG/1
75 TABLET ORAL DAILY
Qty: 30 TABLET | Refills: 0 | Status: SHIPPED | OUTPATIENT
Start: 2023-08-13 | End: 2023-08-21 | Stop reason: SDUPTHER

## 2023-08-12 RX ORDER — EZETIMIBE 10 MG/1
10 TABLET ORAL
Qty: 30 TABLET | Refills: 0 | Status: SHIPPED | OUTPATIENT
Start: 2023-08-12 | End: 2023-08-21 | Stop reason: SDUPTHER

## 2023-08-12 RX ADMIN — APIXABAN 5 MG: 5 TABLET, FILM COATED ORAL at 09:46

## 2023-08-12 RX ADMIN — OXYCODONE HYDROCHLORIDE AND ACETAMINOPHEN 500 MG: 500 TABLET ORAL at 09:46

## 2023-08-12 RX ADMIN — METOPROLOL TARTRATE 25 MG: 50 TABLET ORAL at 09:46

## 2023-08-12 RX ADMIN — ASPIRIN 81 MG CHEWABLE TABLET 81 MG: 81 TABLET CHEWABLE at 09:46

## 2023-08-12 RX ADMIN — FLUTICASONE PROPIONATE 2 SPRAY: 50 SPRAY, METERED NASAL at 09:46

## 2023-08-12 RX ADMIN — CLOPIDOGREL BISULFATE 75 MG: 75 TABLET ORAL at 09:46

## 2023-08-12 NOTE — DISCHARGE SUMMARY
[address] [facility]  Discharge- Marco Antonio Locus 1955, 79 y.o. male MRN: [id number]  Unit/Bed#: [facility] 204-01 Encounter: [id number]  Primary Care Provider: Belinda Nair MD   Date and time admitted to hospital: 8/11/2023  9:37 AM    * Non-STEMI (non-ST elevated myocardial infarction) [facility])  Assessment & Plan    Continue with aspirin and Plavix. Patient was loaded with Plavix at [address] [place] [address]. Continue with heparin drip  Continue with close hemodynamic monitoring. Patient admitted to level 2 stepdown in ICCU  Discussed case with cardiology. Status post cardiac catheterization. See results. Continue with supportive care. Monitor symptoms closely. Hx pulmonary embolism  Assessment & Plan  Currently on heparin drip. Mixed hyperlipidemia  Assessment & Plan  Continue with statin    Type 2 diabetes mellitus without complication [facility])  Assessment & Plan  Lab Results   Component Value Date    HGBA1C 8.1 (H) 04/24/2023       Recent Labs     08/11/23  2258 08/12/23  0605   POCGLU 146* 148*       Blood Sugar Average: Last 72 hrs:  (P) 147              Medical Problems     Resolved Problems  Date Reviewed: 7/31/2023   None         Admission Date:   Admission Orders (From admission, onward)     Ordered        08/11/23 1016  Inpatient Admission  Once                        Admitting Diagnosis: NSTEMI (non-ST elevated myocardial infarction) ([address] [facility]) [I21.4]    HPI: Thisis a  very pleasant 60-year-old male who presents to hospital with past medical history of BPH,  urinary tract infection history ,diabetes, gastroesophageal reflux disease , gout history  history of pulmonary embolism, hyperlipidemia, hypertension prostate cancer and sleep apnea. He presents for further work-up of chest discomfort/pain. He initially presented at [address] [place] [address] with reported symptoms of chest discomfort starting this past Sunday.   Patient reports that he had an upper GI endoscopy performed on Monday. He reports that the pain started to get worse on Tuesday with 7 out of 10 in severity with pressure-like sensation. He denies any other associated symptoms. He was noted to have significant belching on presentation to the ER. On presentation work-up he had a CAT scan which was negative for pulmonary embolism. However he was noted to have an elevated troponin given the nature of his GI symptoms he was transferred to [facility] for cardiac catheterization. His echocardiogram showed an EF of 55% with mild diastolic dysfunction. Vivek Jack He was also noted to have right-sided ventricle systolic function that was mildly reduced in the setting of prior history of pulmonary embolism. He is status post stent appointment to the right coronary. At this point time he was recommended be discharged on Plavix and Eliquis with holding of aspirin therapy. He will need to be on Plavix Eliquis therapy for at least 6 months before holding therapy for prostate biopsy. Would resume total course of Plavix and Eliquis for 12 months. Will need aggressive risk factor modification. Diet and lifestyle modifications discussed with patient's family. Family reports that they will follow-up with cardiac rehab. Family reports that they will follow-up with cardiology for further therapy. Patient has an intolerance to Lipitor and Crestor which would prohibit high intensity statin therapy. For now we will continue with lovastatin in addition to Zetia therapy. If unable to get to target LDL may need to consider outpatient assessment for PCSK type 9 inhibitors therapy for lipid management. Condition at Discharge: fair         Discharge instructions/Information to patient and family:   See after visit summary for information provided to patient and family. Provisions for Follow-Up Care:  See after visit summary for information related to follow-up care and any pertinent home health orders.       PCP: Alexei Sheehan MD    Disposition: Home    Planned Readmission: No    Discharge Statement   I spent 85 minutes discharging the patient. This time was spent on the day of discharge. I had direct contact with the patient on the day of discharge. Additional documentation is required if more than 30 minutes were spent on discharge. Discharge Medications:  See after visit summary for reconciled discharge medications provided to patient and family.

## 2023-08-12 NOTE — ASSESSMENT & PLAN NOTE
Continue with aspirin and Plavix. Patient was loaded with Plavix at [address] Continue with heparin drip  Continue with close hemodynamic monitoring. Patient admitted to level 2 stepdown in ICCU  Discussed case with cardiology. Status post cardiac catheterization. See results. Continue with supportive care. Monitor symptoms closely.

## 2023-08-12 NOTE — PROGRESS NOTES
Progress Note - Cardiology   Arnold Conde 79 y.o. male MRN: [id number]  [facility] Encounter: [id number]    Assessment:  Principal Problem:    Non-STEMI (non-ST elevated myocardial infarction) [facility])  Active Problems:    Type 2 diabetes mellitus without complication ([address])    Mixed hyperlipidemia    Hx pulmonary embolism    Type I non-ST elevation MI. RCA culprit lesion. Preserved EF overall. Mild dilation of the aortic root and ascending aorta. Mild hypokinesis of the right ventricle in the setting of a history of pulmonary embolism. Essential hypertension although blood pressure currently is on the lower side. Dyslipidemia. Intolerance to atorvastatin and Crestor in the past. On lovastatin as an outpatient. Plan:  Since he is on Eliquis as an outpatient, he was loaded with Plavix. Continue 75 mg twice a day. Give aspirin today, but do not continue this upon discharge. Do only Eliquis and Plavix. Started on metoprolol. He is currently not requiring the amlodipine and losartan that he is on as an outpatient. He is on atorvastatin currently but he had a history of intolerance to this. Resume his lovastatin as outpatient. Add Zetia 10 mg. If lipid panel uncontrolled as an outpatient, start PCSK9 inhibitor. Outpatient cardiac rehab was discussed with the patient. He is agreeable. Management of diabetes per primary team.    Romario Delong for discharge later this afternoon. Follow-up will be arranged at the [facility]. I will send a message to their office. Subjective/Objective     Subjective:     Patient feels well this morning. He has no chest pain or shortness of breath. Reviewed the catheterization results with the patient. Echo pending. I looked at it after talking to him. His EF appears preserved. Mild RV dysfunction. Possible distal inferior hypokinesis. Mild dilation of the aortic root and ascending aorta.     Blood pressure is stable/even on the lower side with out several of his medications. He was noted on metoprolol. Objective:    Vitals: /77   Pulse 68   Temp 98.2 °F (36.8 °C) (Oral)   Resp 14   SpO2 95%   There were no vitals filed for this visit. Orthostatic Blood Pressures    Flowsheet Row Most Recent Value   Blood Pressure 108/77 filed at 08/12/2023 0305   Patient Position - Orthostatic VS Lying filed at 08/11/2023 1914            Intake/Output Summary (Last 24 hours) at 8/12/2023 0848  Last data filed at 8/12/2023 0601  Gross per 24 hour   Intake 1643.33 ml   Output 2475 ml   Net -831.67 ml     Physical Exam:   General appearance: alert and in no acute distress  Head: Normocephalic, without obvious abnormality, atraumatic  Neck: no carotid bruit, no JVD and supple, symmetrical, trachea midline  Lungs: clear to auscultation bilaterally. Normal air entry. Normal effort. Heart: S1, S2 normal and no S3 or S4. No murmurs. Abdomen: soft, non-tender; bowel sounds normal; no masses,  no organomegaly  Extremities: extremities normal, atraumatic, no cyanosis or edema  Pulses: 2+ and symmetric bilaterally  Skin: Skin color, texture, turgor normal. No rashes or lesions  Neurologic: Grossly normal. Alert and oriented.     Medications:    Current Facility-Administered Medications:   •  acetaminophen (TYLENOL) tablet 650 mg, 650 mg, Oral, Q4H PRN, Estephania Jimenez, , 650 mg at 08/11/23 1507  •  apixaban (ELIQUIS) tablet 5 mg, 5 mg, Oral, BID, JARED Pablo  •  ascorbic acid (VITAMIN C) tablet 500 mg, 500 mg, Oral, Daily, Robert Velásquez DO, 500 mg at 08/11/23 1507  •  aspirin chewable tablet 81 mg, 81 mg, Oral, Daily, Robert Velásquez DO  •  atorvastatin (LIPITOR) tablet 40 mg, 40 mg, Oral, QPM, Robert Velásquez, DO, 40 mg at 08/11/23 1708  •  clopidogrel (PLAVIX) tablet 75 mg, 75 mg, Oral, Daily, Robert Velásquez, DO  •  ezetimibe (ZETIA) tablet 10 mg, 10 mg, Oral, HS, Chuck Mcbride MD  •  fluticasone (FLONASE) 50 mcg/act nasal spray 2 spray, 2 spray, Nasal, BID, Estephania Jimenez DO  •  insulin glargine (LANTUS) subcutaneous injection 10 Units 0.1 mL, 10 Units, Subcutaneous, HS, Annika Coad, DO, 10 Units at 08/11/23 2306  •  metoprolol tartrate (LOPRESSOR) tablet 25 mg, 25 mg, Oral, Q12H [address], Osiel Maza MD, 25 mg at 08/11/23 2306  •  nitroglycerin (NITROSTAT) SL tablet 0.4 mg, 0.4 mg, Sublingual, Q5 Min PRN, Annika Coad, DO  •  ondansetron (ZOFRAN) injection 4 mg, 4 mg, Intravenous, Q6H PRN, Annika Coad, DO    Lab Results:      Results from last 7 days   Lab Units 08/11/23  0456 08/11/23  0306   WBC Thousand/uL 5.81 5.89   HEMOGLOBIN g/dL 14.0 15.0   HEMATOCRIT % 41.9 45.3   PLATELETS Thousands/uL 192 217         Results from last 7 days   Lab Units 08/11/23  1114 08/11/23  0306   SODIUM mmol/L 138 137   POTASSIUM mmol/L 4.0 3.5   CHLORIDE mmol/L 104 96   CO2 mmol/L 28 30   BUN mg/dL 17 18   CREATININE mg/dL 0.80 0.93   CALCIUM mg/dL 9.6 9.7   ALK PHOS U/L  --  59   ALT U/L  --  17   AST U/L  --  21     Results from last 7 days   Lab Units 08/11/23  0456   INR  0.86   PTT seconds 25           Telemetry: Personally reviewed. No significant arrhythmias    Echo:  Preserved EF,. Small distal inferior area of hypokinesis  Mildly reduced RV function. No significant valvular disease. Cath: Moderate LAD/LCx disease.   RCA culprit, s/p 2 JOHNATHAN

## 2023-08-12 NOTE — PLAN OF CARE
Problem: MOBILITY - ADULT  Goal: Maintain or return to baseline ADL function  Description: INTERVENTIONS:  -  Assess patient's ability to carry out ADLs; assess patient's baseline for ADL function and identify physical deficits which impact ability to perform ADLs (bathing, care of mouth/teeth, toileting, grooming, dressing, etc.)  - Assess/evaluate cause of self-care deficits   - Assess range of motion  - Assess patient's mobility; develop plan if impaired  - Assess patient's need for assistive devices and provide as appropriate  - Encourage maximum independence but intervene and supervise when necessary  - Involve family in performance of ADLs  - Assess for home care needs following discharge   - Consider OT consult to assist with ADL evaluation and planning for discharge  - Provide patient education as appropriate  Outcome: Progressing     Problem: CARDIOVASCULAR - ADULT  Goal: Absence of cardiac dysrhythmias or at baseline rhythm  Description: INTERVENTIONS:  - Continuous cardiac monitoring, vital signs, obtain 12 lead EKG if ordered  - Administer antiarrhythmic and heart rate control medications as ordered  - Monitor electrolytes and administer replacement therapy as ordered  Outcome: Progressing     Problem: GENITOURINARY - ADULT  Goal: Absence of urinary retention  Description: INTERVENTIONS:  - Assess patient’s ability to void and empty bladder  - Monitor I/O  - Bladder scan as needed  - Discuss with physician/AP medications to alleviate retention as needed  - Discuss catheterization for long term situations as appropriate  Outcome: Progressing     Problem: SKIN/TISSUE INTEGRITY - ADULT  Goal: Skin Integrity remains intact(Skin Breakdown Prevention)  Description: Assess:  -Perform Kayden assessment every   -Clean and moisturize skin every   -Inspect skin when repositioning, toileting, and assisting with ADLS  -Assess under medical devices such as  every   -Assess extremities for adequate circulation and sensation     Bed Management:  -Have minimal linens on bed & keep smooth, unwrinkled  -Change linens as needed when moist or perspiring  -Avoid sitting or lying in one position for more than  hours while in bed  -Keep HOB at degrees     Toileting:  -Offer bedside commode  -Assess for incontinence every   -Use incontinent care products after each incontinent episode such as     Activity:  -Mobilize patient  times a day  -Encourage activity and walks on unit  -Encourage or provide ROM exercises   -Turn and reposition patient every  Hours  -Use appropriate equipment to lift or move patient in bed  -Instruct/ Assist with weight shifting every  when out of bed in chair  -Consider limitation of chair time  hour intervals    Skin Care:  -Avoid use of baby powder, tape, friction and shearing, hot water or constrictive clothing  -Relieve pressure over bony prominences using   -Do not massage red bony areas    Next Steps:  -Teach patient strategies to minimize risks such as    -Consider consults to  interdisciplinary teams such as  Outcome: Progressing

## 2023-08-13 LAB
KCT BLD-ACNC: 246 SEC (ref 89–137)
SPECIMEN SOURCE: ABNORMAL

## 2023-08-15 LAB
ATRIAL RATE: 75 BPM
ATRIAL RATE: 76 BPM
ATRIAL RATE: 77 BPM
ATRIAL RATE: 81 BPM
ATRIAL RATE: 89 BPM
P AXIS: 20 DEGREES
P AXIS: 21 DEGREES
P AXIS: 4 DEGREES
P AXIS: 55 DEGREES
P AXIS: 57 DEGREES
PR INTERVAL: 148 MS
PR INTERVAL: 156 MS
PR INTERVAL: 167 MS
QRS AXIS: -14 DEGREES
QRS AXIS: -15 DEGREES
QRS AXIS: -30 DEGREES
QRS AXIS: -7 DEGREES
QRS AXIS: -9 DEGREES
QRSD INTERVAL: 100 MS
QRSD INTERVAL: 90 MS
QRSD INTERVAL: 92 MS
QRSD INTERVAL: 96 MS
QRSD INTERVAL: 96 MS
QT INTERVAL: 366 MS
QT INTERVAL: 379 MS
QT INTERVAL: 392 MS
QT INTERVAL: 400 MS
QT INTERVAL: 406 MS
QTC INTERVAL: 425 MS
QTC INTERVAL: 441 MS
QTC INTERVAL: 452 MS
QTC INTERVAL: 453 MS
QTC INTERVAL: 462 MS
T WAVE AXIS: 44 DEGREES
T WAVE AXIS: 57 DEGREES
T WAVE AXIS: 63 DEGREES
T WAVE AXIS: 68 DEGREES
T WAVE AXIS: 70 DEGREES
VENTRICULAR RATE: 75 BPM
VENTRICULAR RATE: 76 BPM
VENTRICULAR RATE: 77 BPM
VENTRICULAR RATE: 81 BPM
VENTRICULAR RATE: 89 BPM

## 2023-08-15 PROCEDURE — 93010 ELECTROCARDIOGRAM REPORT: CPT | Performed by: INTERNAL MEDICINE

## 2023-08-16 ENCOUNTER — DOCUMENTATION (OUTPATIENT)
Dept: ENDOCRINOLOGY | Facility: CLINIC | Age: 68
End: 2023-08-16
Payer: MEDICARE

## 2023-08-16 DIAGNOSIS — E11.65 TYPE 2 DIABETES MELLITUS WITH HYPERGLYCEMIA, WITH LONG-TERM CURRENT USE OF INSULIN (HCC): ICD-10-CM

## 2023-08-16 DIAGNOSIS — E11.9 TYPE 2 DIABETES MELLITUS WITHOUT COMPLICATION, WITH LONG-TERM CURRENT USE OF INSULIN (HCC): Primary | ICD-10-CM

## 2023-08-16 DIAGNOSIS — Z79.4 TYPE 2 DIABETES MELLITUS WITH HYPERGLYCEMIA, WITH LONG-TERM CURRENT USE OF INSULIN (HCC): ICD-10-CM

## 2023-08-16 DIAGNOSIS — Z79.4 TYPE 2 DIABETES MELLITUS WITHOUT COMPLICATION, WITH LONG-TERM CURRENT USE OF INSULIN (HCC): Primary | ICD-10-CM

## 2023-08-16 PROCEDURE — 95251 CONT GLUC MNTR ANALYSIS I&R: CPT | Performed by: STUDENT IN AN ORGANIZED HEALTH CARE EDUCATION/TRAINING PROGRAM

## 2023-08-16 RX ORDER — INSULIN LISPRO 100 [IU]/ML
INJECTION, SOLUTION INTRAVENOUS; SUBCUTANEOUS
Qty: 15 ML | Refills: 1 | Status: SHIPPED | OUTPATIENT
Start: 2023-08-16

## 2023-08-16 RX ORDER — INSULIN GLARGINE 100 [IU]/ML
22 INJECTION, SOLUTION SUBCUTANEOUS DAILY
Qty: 15 ML | Refills: 1 | Status: SHIPPED | OUTPATIENT
Start: 2023-08-16

## 2023-08-16 NOTE — PROGRESS NOTES
Seema Barnes [employer]us   Device used DEJUAN 2  Home use     Indication   Type 2 Diabetes    More than 72 hours of data was reviewed. Report to be scanned to chart. Date Range:  July 20 - Aug 16, 2023    Analysis of data:   % time CGM used: 88%  Average Glucose: 188 mg/dL  Coefficient of Variation: 21.6%     Time in Target Range: 51%   Time Above Range: 49%   Time Below Range: 0%     Interpretation of data: fair control. Does have fasting hyperglycemia and continues to have elevated post-prandial hyperglycemia particularly following supper. Recommend increase in dosing of lantus 22 units daily, and humalog 9 units with supper.

## 2023-08-21 ENCOUNTER — OFFICE VISIT (OUTPATIENT)
Dept: CARDIOLOGY CLINIC | Facility: CLINIC | Age: 68
End: 2023-08-21
Payer: MEDICARE

## 2023-08-21 VITALS
SYSTOLIC BLOOD PRESSURE: 90 MMHG | HEART RATE: 68 BPM | HEIGHT: 71 IN | BODY MASS INDEX: 25.9 KG/M2 | DIASTOLIC BLOOD PRESSURE: 60 MMHG | WEIGHT: 185 LBS

## 2023-08-21 DIAGNOSIS — I21.4 NON-STEMI (NON-ST ELEVATED MYOCARDIAL INFARCTION) (HCC): Primary | ICD-10-CM

## 2023-08-21 DIAGNOSIS — I10 PRIMARY HYPERTENSION: ICD-10-CM

## 2023-08-21 DIAGNOSIS — E78.2 MIXED HYPERLIPIDEMIA: ICD-10-CM

## 2023-08-21 PROCEDURE — 99204 OFFICE O/P NEW MOD 45 MIN: CPT | Performed by: NURSE PRACTITIONER

## 2023-08-21 RX ORDER — CLOPIDOGREL BISULFATE 75 MG/1
75 TABLET ORAL DAILY
Qty: 90 TABLET | Refills: 3 | Status: SHIPPED | OUTPATIENT
Start: 2023-08-21

## 2023-08-21 RX ORDER — EZETIMIBE 10 MG/1
10 TABLET ORAL
Qty: 90 TABLET | Refills: 3 | Status: SHIPPED | OUTPATIENT
Start: 2023-08-21

## 2023-08-21 NOTE — PROGRESS NOTES
Patient ID: Dhara Prado is a 79 y.o. male. Plan:      Hypertension  Blood pressure running low  Decrease losartan to 50 mg once daily    Non-STEMI (non-ST elevated myocardial infarction) ([address])  Status post stenting of the RCA  Continue Zetia and lovastatin as patient has intolerance to atorvastatin and rosuvastatin  Continue Plavix, not on aspirin as he is on Eliquis  Continue beta-blocker    Mixed hyperlipidemia  Continue Zetia and Mevacor       Follow up Plan/Summary Comments:  Jignesh Zaragoza is recovering well from his recent NSTEMI and stenting. He will continue on his current medications including Eliquis and Plavix. This should continue on a directed for a minimum of 6 months due to his drug-eluting stents. Blood pressure is noted to be low in the office today. Recommend decreasing losartan to 50 mg once daily. Jignesh Zaragoza is scheduled to have an evaluation and begin cardiac rehab. Follow-up in 3 months. He will be seen in the [facility] office which is closer to his home. HPI: I the pleasure of seeing Jignesh Zaragoza in the office today for a hospital follow-up visit. [facility] with a report of chest pain. Troponin levels were noted to be elevated with EKG changes. Treatment for NSTEMI was initiated. He was transferred to [facility] and underwent cardiac catheterization with stenting to the distal RCA and right PLB. Since his discharge from the hospital, he has been feeling well. He denies any symptoms of chest discomfort including indigestion which he felt prior to his presentation. He also notes significant improvement in his fatigue. Review of Systems   10  point ROS  was otherwise non pertinent or negative except as per HPI or as below.    Gait: Normal.      Most recent or relevant cardiac/vascular testing:    Cardiac catheterization 8/11/2023  Status post successful IVUS guided PCI to the distal RCA/ostial RPLB with JOHNATHAN x 2    Echo 8/12/2023  EF 55%  Hypokinesis of the distal inferior wall  Mild LAE  Mild MR  Aortic root mildly dilated at 43 mm, ascending aorta mildly dilated at 43 mm      Objective:     BP 90/60 (BP Location: Right arm, Patient Position: Sitting)   Pulse 68   Ht 5' 11" (1.803 m)   Wt 83.9 kg (185 lb)   BMI 25.80 kg/m²     PHYSICAL EXAM:    General:  Normal appearance, no acute distress  Eyes:  Anicteric. Oral mucosa:  Moist.  Neck:  No JVD. Carotid upstrokes are brisk without bruits. No masses. Chest:  Clear to auscultation   Cardiac:  No palpable PMI. Normal S1 and S2. No murmur gallop or rub. Abdomen:  Soft and nontender. No palpable organomegaly or aortic enlargement. Extremities:  No peripheral edema. Musculoskeletal:  Symmetric. Vascular:  Pedal pulses are intact. Neuro:  Grossly symmetric. Psych:  Alert and oriented x3. Allergies   Allergen Reactions   • Crestor [Rosuvastatin] Rash and Hives     Other reaction(s): Urticaria   • Enalapril Cough       Current Outpatient Medications:   •  apixaban (ELIQUIS) 5 mg, Take 1 tablet by mouth 2 (two) times a day for 30 days 2 tabs PO BID x 7 days, then 1 tab PO BID, Disp: 60 tablet, Rfl: 0  •  ascorbic acid (VITAMIN C) 250 mg tablet, Take 500 mg by mouth daily, Disp: , Rfl:   •  BD Pen Needle Kiarra U/F 32G X 4 MM MISC, , Disp: , Rfl:   •  clopidogrel (PLAVIX) 75 mg tablet, Take 1 tablet (75 mg total) by mouth daily, Disp: 90 tablet, Rfl: 3  •  Empagliflozin (JARDIANCE PO), Take 25 mg by mouth  , Disp: , Rfl:   •  ezetimibe (ZETIA) 10 mg tablet, Take 1 tablet (10 mg total) by mouth daily at bedtime, Disp: 90 tablet, Rfl: 3  •  fluticasone (FLONASE) 50 mcg/act nasal spray, 2 sprays into each nostril, Disp: , Rfl:   •  HumaLOG KwikPen 100 units/mL injection pen, Inject 9 units with supper plus scale.  TDD 20 units, Disp: 15 mL, Rfl: 1  •  Insulin Glargine Solostar (Lantus SoloStar) 100 UNIT/ML SOPN, Inject 0.22 mL (22 Units total) under the skin daily, Disp: 15 mL, Rfl: 1  • Lancets (OneTouch Delica Plus [id number]) MISC, 2 (two) times a day Use to test, Disp: , Rfl:   •  losartan (COZAAR) 100 MG tablet, Take 100 mg by mouth, Disp: , Rfl:   •  lovastatin (MEVACOR) 40 MG tablet, Take 40 mg by mouth daily at bedtime, Disp: , Rfl:   •  metFORMIN (GLUCOPHAGE) 1000 MG tablet, Take 1,000 mg by mouth 2 (two) times a day with meals, Disp: , Rfl:   •  metoprolol tartrate (LOPRESSOR) 25 mg tablet, Take 1 tablet (25 mg total) by mouth every 12 (twelve) hours, Disp: 180 tablet, Rfl: 3  •  olopatadine (PATANOL) 0.1 % ophthalmic solution, 1 drop 2 (two) times a day, Disp: , Rfl:   •  omeprazole (PriLOSEC) 40 MG capsule, Take 1 capsule (40 mg total) by mouth daily, Disp: 90 capsule, Rfl: 3  Past Medical History:   Diagnosis Date   • BPH without obstruction/lower urinary tract symptoms    • CAD (coronary artery disease)     s/p JOHNATHAN distal RCA and JOHANTHAN RPAV 8/11/2023   • Diabetes mellitus ([address])    • Dysuria    • GERD (gastroesophageal reflux disease)    • Gout    • History of melanoma     left ear - surgically removed   • History of pulmonary embolism 2017    bilateral   • Hyperlipidemia    • Hypertension    • Melanoma ([address]) 2017    left ear   • Prostate cancer [facility])    • Sleep apnea      Past Surgical History:   Procedure Laterality Date   • CARDIAC CATHETERIZATION Left 8/11/2023    Procedure: Cardiac Left Heart Cath;  Surgeon: Ethel Meraz DO;  Location: [facility]; Service: Cardiology   • CARDIAC CATHETERIZATION N/A 8/11/2023    Procedure: Cardiac Coronary Angiogram;  Surgeon: Ethel Meraz DO;  [facility]: [facility]; Service: Cardiology   • CARDIAC CATHETERIZATION N/A 8/11/2023    Procedure: Cardiac pci;  Surgeon: Ethel Meraz DO;  Location: [facility]; Service: Cardiology   • CARDIAC CATHETERIZATION N/A 8/11/2023    Procedure: Cardiac other - IVUS;  Surgeon: Ethel Meraz DO;  Location: [facility];   Service: Cardiology   • CHOLECYSTECTOMY • COLONOSCOPY  10/2016    sessile serrated polyp removed from the proximal transverse, adenoma removed from the distal transverse   • EGD  08/2017    gastritis, H. pylori negative, and Candida esophagitis   • EGD  11/2011    slight Schatzki's ring, small hiatal hernia   • EXTERNAL EAR SURGERY Left 08/2017    for the removal of skin melanoma-Plastic surgery       CMP:   Lab Results   Component Value Date     12/31/2015    K 4.0 08/11/2023    K 4.1 12/31/2015     08/11/2023     12/31/2015    CO2 28 08/11/2023    CO2 30.5 12/31/2015    BUN 17 08/11/2023    BUN 16 12/31/2015    CREATININE 0.80 08/11/2023    CREATININE 0.77 12/31/2015    GLUCOSE 118 12/31/2015    EGFR 93 08/11/2023     Lipid Profile:    Lab Results   Component Value Date    CHOL 147 12/31/2015    TRIG 257 (H) 04/24/2023    TRIG 98 12/31/2015    HDL 37 (L) 04/24/2023    HDL 40 12/31/2015         Social History     Tobacco Use   Smoking Status Never   • Passive exposure: Never   Smokeless Tobacco Never

## 2023-08-22 ENCOUNTER — TELEPHONE (OUTPATIENT)
Dept: DIABETES SERVICES | Facility: CLINIC | Age: 68
End: 2023-08-22

## 2023-08-22 DIAGNOSIS — Z79.4 TYPE 2 DIABETES MELLITUS WITHOUT COMPLICATION, WITH LONG-TERM CURRENT USE OF INSULIN (HCC): Primary | ICD-10-CM

## 2023-08-22 DIAGNOSIS — E11.9 TYPE 2 DIABETES MELLITUS WITHOUT COMPLICATION, WITH LONG-TERM CURRENT USE OF INSULIN (HCC): Primary | ICD-10-CM

## 2023-08-24 ENCOUNTER — CLINICAL SUPPORT (OUTPATIENT)
Dept: CARDIAC REHAB | Facility: HOSPITAL | Age: 68
End: 2023-08-24
Payer: MEDICARE

## 2023-08-24 ENCOUNTER — OFFICE VISIT (OUTPATIENT)
Dept: ENDOCRINOLOGY | Facility: OTHER | Age: 68
End: 2023-08-24
Payer: MEDICARE

## 2023-08-24 VITALS — BODY MASS INDEX: 25.91 KG/M2 | WEIGHT: 185.8 LBS

## 2023-08-24 DIAGNOSIS — Z79.4 TYPE 2 DIABETES MELLITUS WITH HYPERGLYCEMIA, WITH LONG-TERM CURRENT USE OF INSULIN (HCC): Primary | ICD-10-CM

## 2023-08-24 DIAGNOSIS — I21.4 NON-STEMI (NON-ST ELEVATED MYOCARDIAL INFARCTION) (HCC): Primary | ICD-10-CM

## 2023-08-24 DIAGNOSIS — Z79.4 TYPE 2 DIABETES MELLITUS WITHOUT COMPLICATION, WITH LONG-TERM CURRENT USE OF INSULIN (HCC): Primary | ICD-10-CM

## 2023-08-24 DIAGNOSIS — E11.9 TYPE 2 DIABETES MELLITUS WITHOUT COMPLICATION, WITH LONG-TERM CURRENT USE OF INSULIN (HCC): Primary | ICD-10-CM

## 2023-08-24 DIAGNOSIS — E11.65 TYPE 2 DIABETES MELLITUS WITH HYPERGLYCEMIA, WITH LONG-TERM CURRENT USE OF INSULIN (HCC): Primary | ICD-10-CM

## 2023-08-24 PROCEDURE — 95249 CONT GLUC MNTR PT PROV EQP: CPT

## 2023-08-24 NOTE — PROGRESS NOTES
Personal [employer] Training        Met with Brody Rodríguez today for a personal [address] training. Marisabel Cabezas brought his own supplies to the visit which include a reader and sensors. Educator reviewed the following:    -- Skin Prep  -- How to place the [address]  -- Importance of site rotation  -- [address] set alerts  -- How to scan for a reading  -- 60 minute warm-up  -- If reading doesn't match symptoms, do a finger stick  -- Return in 14 days for download and change sensor    Lab Results   Component Value Date    HGBA1C 8.1 (H) 04/24/2023         Patient response to instruction    Comprehension: good  Motivation: good  Expected Compliance: good  Response to Teachback: 100%, demonstrated understanding    Thank you for referring your patient to [address], it was a pleasure working with them today. Please feel free to call with any questions or concerns.     Channing Essex, MS, RDN, LDN  [phone] [facility] [address] [facility], [address] [facility]

## 2023-08-24 NOTE — PROGRESS NOTES
Cardiac Rehabilitation Plan of Care   Initial Care Plan          Today's date: 2023   # of Exercise Sessions Completed: 1  Patient name: Shirley Dia      : 1955  Age: 79 y.o. MRN: [id number]  Referring Physician: Ric Nicole, [address] [place] [address]  Cardiologist: Pt saw Aaron Brush, [address] [place] [address] on 2023 but states they are on a wait list to see a cardiologist  Provider: [facility]  Clinician: Danial Kuo MS    Dx: NSTEMI   Angioplasty x 3 and JOHNATHAN x 1 to Dist RCA  Angioplasty x 1 and JOHNATHAN x 1 to RPVA  Date of onset: 2023      SUMMARY OF PROGRESS:  Today is Fernie's initial evaluation to begin Cardiac Rehab post NSTEMI w/ intervention. The patient does currently follow a formal exercise program at home. Prior to his symptoms and event, he stated that he was walking up to 5 miles per day. He has resumed light ADLs reporting weakness and fatigue. Depression screening using the PHQ-9 interprets the patient's score of  6  as  5-9 = Mild Depression. JIM-7 screening tool for anxiety suggests 4  0-4  = Not anxious. When addressed, the patient denies having depression/anxiety. Patient reports excellent social/emotional support from his friends and family. Information to utilize [employer] was provided as well as contact information for counseling through [employer]. PHQ-9 score will be reassessed in 30 days due to an initial score revealing concern for depression. They rate stress 1-2/10 with the following stressors: health. Stress management will be reviewed. The patient is a non-smoker. Patient admits to 100% medication compliance. They report the following physical limitations: decreased strength, fatigue. The patient will complete an initial functional capacity test during his first exercise session. His resting vitals were HR 59, SpO2 96%, and /60. Telemetry revealed sinus bradycardia.  Blood Pressure will be monitored throughout the program and cardiologist will be notified of elevated trends. Jignesh Zaragoza was counseled on exercise guidelines to achieve a minimum of 150 mins/wk of moderate intensity (RPE 4-6) exercise and encouraged to add 1-2 days of exercise on opposite days of cardiac rehab as tolerated. We discussed current dietary habits and goals of heart healthy eating for lipid management and diabetes management. The patient has T2D. Patient's personal goals include: increase strength, reduce fatigue, improve functional capacity, return to all ADLs. The patient's CAD risk factors include:  inactivity, obesity/overweight, hypertension, hyperlipidemia and diabetes. His education will focus on lifestyle modification/education specific to His needs. Patient will attend group education classes on heart healthy eating, reading food labels, stress management, risk factor reduction, understanding heart disease and common heart medications. Patient will attend 36 monitored exercise sessions, 2-3x/wk for 12-18 weeks beginning 8/28/2023. Medication compliance: Yes   Comments: Pt reports to be compliant with medications  Fall Risk: Low   Comments: Ambulates with a steady gait with no assist device    EKG Interpretation: Sinus bradycardia      EXERCISE ASSESSMENT and PLAN    Exercise Prescription:      Frequency: 2-3 days/week   Supplement with home exercise 2+ days/wk as tolerated       Minutes: 30-35         METS: TBD            HR: 20-30 bpm above rest   RPE: 4-6         Modalities: Treadmill, UBE, NuStep and Recumbent bike      30 Day Goals for Exercise Progression:    Frequency: 2-3 days/week of cardiac rehab       Supplement with home exercise 2+ days/wk as tolerated    Minutes: 40-45                              >150 mins/wk of moderate intensity exercise   METS: TBD (1-2 METs greater than initial prescription)   HR: 20-30 bpm above rest    RPE: 4-6   Modalities: Treadmill, UBE, NuStep and Recumbent bike    Strength training:   Will be added following 2-3 weeks of monitored exercise sessions   Modalities: Leg Press, Chest Press, Lateral Raise, Arm Curl, Front Raises and Calf Raises    Home Exercise: none    Goals: 10% improvement in functional capacity - based on max METs achieved in fitness assessment, improved DASI score by 10%, Increase in exercise capacity by 40% - based on peak METs tolerated in cardiac rehab exercise session, Exercise 5 days/wk, >150mins/wk of moderate intensity exercise, Resume ADLs with increased strength and Attend Rehab regularly    Progression Toward Goals:  Reviewed Pt goals and determined plan of care, Will continue to educate and progress as tolerated. Education: benefit of exercise for CAD risk factors, home exercise guidelines, AHA guidelines to achieve >150 mins/wk of moderate exercise and RPE scale   Plan:education on home exercise guidelines, home exercise 30+ mins 2 days opposite CR and Education class: Risk Factors for Heart Disease  Readiness to change: Preparation:  (Getting ready to change)       NUTRITION ASSESSMENT AND PLAN    Weight control:    Starting weight: 185   Current weight:    Waist circumference:    Starting: NA   Current:     Diabetes: T2D  A1c: 8.1    last measured: 4/24/2023    Lipid management: Discussed diet and lipid management and Last lipid profile 4/24/2023  Chol 162    HDL 37  LDL 74    Goals:reduced BMI to < 25, LDL <100, HDL >40, TRG <150, CHOL <200, fasting BG  and improved A1c  < 7.0%    Measurable goals were based Rate Your Plate Dietary Self-Assessment. These are the areas in which the patient could score higher on the assessment. Goals include recommendations for a heart healthy diet based on American Heart Association. Progression Toward Goals: Reviewed Pt goals and determined plan of care, Will continue to educate and progress as tolerated.     Education: heart healthy eating  low sodium diet  hydration  nutrition for  lipid management  nutrition for Improved BG control  target goal for A1c <7.0  exercise and diabetes management   Plan: Education class: Reading Food Labels and Education Class: Heart Healthy Eating  Readiness to change: Preparation:  (Getting ready to change)       PSYCHOSOCIAL ASSESSMENT AND PLAN    Emotional:  Depression assessment:  PHQ-9 = 6  5-9 = Mild Depression            Anxiety measure:  JIM-7 = 4  0-4  = Not anxious  Self-reported stress level:  2/10  Social support: Very Good    Goals:  Reduce perceived stress to 1-3/10, improved [facility] QOL < 27, Feelings in [place] Score < 3, Physical Fitness in [place] Score < 3, Social Support in [place] Score < 3, Daily Activity in [place] Score < 3, Social Activities in [place] Score < 3, Pain in [place] Score < 3, Overall Health in [facility] Score < 3, Quality of Life in [place] Score < 3  and Change in Health in [facility] Score < 3     Progression Toward Goals: Reviewed Pt goals and determined plan of care, Will continue to educate and progress as tolerated. Education: signs/sxs of depression, benefits of a positive support system, stress management techniques and depression and CAD  Plan: Class: Stress and Your Health and Class: Relaxation  Readiness to change: Preparation:  (Getting ready to change)       OTHER CORE COMPONENTS     Tobacco:   Social History     Tobacco Use   Smoking Status Never   • Passive exposure: Never   Smokeless Tobacco Never       Tobacco Use Intervention:   N/A:  Patient is a non-smoker     Anginal Symptoms:  chest pressure and arm pain   NTG use: No prescription    Blood pressure:    Restin/60   Exercise: TBD   Recovery: TBD    Goals: consistent BP < 130/80, reduced dietary sodium <2300mg, moderate intensity exercise >150 mins/wk and medication compliance    Progression Toward Goals: Reviewed Pt goals and determined plan of care, Will continue to educate and progress as tolerated.     Education:  understanding high blood pressure and it's relationship to CAD, low sodium diet and HTN, Education class:  Common Heart Medications and Education class: Understanding Heart Disease  Plan: Class: Understanding Heart Disease and Class: Common Heart Medications  Readiness to change: Preparation:  (Getting ready to change)       CARDIAC REHAB ASSESSMENT    Today's date: 2023  Patient name: Jose Jeffries     : 1955       MRN: [id number]  PCP: Tim Sun MD  Referring Physician: Kathy Lyon, [address] [place] [address]  Cardiologist: Pt just saw Montana Gillespie, [address] [place] [address] on 2023 but states they are on a wait list to see a cardiologist  Surgeon: Jasper Mcleod DO  Dx: NSTEMI  Angioplasty x 3 and JOHNATHAN x 1 to Dist RCA  Angioplasty x 1 and JOHNATHAN x 1 to RPVA  Date of onset: 2023  Cultural needs: None    Weight    Wt Readings from Last 1 Encounters:   23 83.9 kg (185 lb)      Height:   Ht Readings from Last 1 Encounters:   23 5' 11" (1.803 m)     Medical History:   Past Medical History:   Diagnosis Date   • BPH without obstruction/lower urinary tract symptoms    • CAD (coronary artery disease)     s/p JOHNATHAN distal RCA and JOHNATHAN RPAV 2023   • Diabetes mellitus ([address])    • Dysuria    • GERD (gastroesophageal reflux disease)    • Gout    • History of melanoma     left ear - surgically removed   • History of pulmonary embolism 2017    bilateral   • Hyperlipidemia    • Hypertension    • Melanoma ([address]) 2017    left ear   • Prostate cancer ([address])    • Sleep apnea          Physical Limitations: Decreased strength, fatigue    Fall Risk: Low   Comments: Ambulates with a steady gait with no assist device    Anginal Equivalent: Chest Pressure and Arm Pain   NTG use: No prescription    Risk Factors   Cholesterol: Yes  Smoking: Never used  HTN: Yes  DM: Type 2   Obesity: Yes   Inactivity: Yes  Stress:  perceived  stress: 1-2/10   Stressors: Health   Goals for Stress Management:Practice Relaxation Techniques, Read, Exercise and Improve Time Management    Family History:  Family History   Problem Relation Age of Onset   • Hyperlipidemia Mother    • Stroke Father    • Melanoma Father    • Diabetes Sister    • Hypertension Sister    • Diabetes Brother    • JETT disease Brother        Allergies: Crestor [rosuvastatin] and Enalapril  ETOH:   Social History     Substance and Sexual Activity   Alcohol Use Yes    Comment: Drinks socailly. Current Medications:   Current Outpatient Medications   Medication Sig Dispense Refill   • apixaban (ELIQUIS) 5 mg Take 1 tablet by mouth 2 (two) times a day for 30 days 2 tabs PO BID x 7 days, then 1 tab PO BID 60 tablet 0   • ascorbic acid (VITAMIN C) 250 mg tablet Take 500 mg by mouth daily     • BD Pen Needle Kiarra U/F 32G X 4 MM MISC      • clopidogrel (PLAVIX) 75 mg tablet Take 1 tablet (75 mg total) by mouth daily 90 tablet 3   • Empagliflozin (JARDIANCE PO) Take 25 mg by mouth       • ezetimibe (ZETIA) 10 mg tablet Take 1 tablet (10 mg total) by mouth daily at bedtime 90 tablet 3   • fluticasone (FLONASE) 50 mcg/act nasal spray 2 sprays into each nostril     • HumaLOG KwikPen 100 units/mL injection pen Inject 9 units with supper plus scale. TDD 20 units 15 mL 1   • Insulin Glargine Solostar (Lantus SoloStar) 100 UNIT/ML SOPN Inject 0.22 mL (22 Units total) under the skin daily 15 mL 1   • Lancets (OneTouch Delica Plus [id number]) MISC 2 (two) times a day Use to test     • losartan (COZAAR) 100 MG tablet Take 100 mg by mouth     • lovastatin (MEVACOR) 40 MG tablet Take 40 mg by mouth daily at bedtime     • metFORMIN (GLUCOPHAGE) 1000 MG tablet Take 1,000 mg by mouth 2 (two) times a day with meals     • metoprolol tartrate (LOPRESSOR) 25 mg tablet Take 1 tablet (25 mg total) by mouth every 12 (twelve) hours 180 tablet 3   • olopatadine (PATANOL) 0.1 % ophthalmic solution 1 drop 2 (two) times a day     • omeprazole (PriLOSEC) 40 MG capsule Take 1 capsule (40 mg total) by mouth daily 90 capsule 3     No current facility-administered medications for this visit. Functional Status Prior to Diagnosis for Treatment   Occupation: retired from [employer]  Recreation: Enjoy a hobby, spend time outdoors  ADL’s: No limitations  [place]: No limitations  Exercise: None  Other: None    Current Functional Status  Occupation: retired from [employer]  Recreation: Enjoy a hobby, spend time outdoors  ADL’s:Capable of performing light to moderate ADLs  [place]: Capable of performing light to moderate ADLs  Exercise: None  Other: None    Patient Specific Goals: Increase strength, reduce fatigue, improve functional capacity, return to all ADLs w/o issue    Short Term Program Goals: dietary modifications increased strength improved energy/stamina with ADLs exercise 120-150 mins/wk    Long Term Goals: increased intial training workload  Improved [facility] Activity Status score  Improved functional capacity based on initial fitness assessment  improved exercise tolerance  Improved Quality of Life - [facility] score reduced  Improved A1c  Improved fasting glucose    Ability to reach goals/rehabilitation potential:  Very Good     Projected return to function: 12 weeks  Objective tests: sub-max TM ETT (will be completed during first exercise session)      Nutritional   Reviewed details of Rate your Plate. Discussed key elements of heart healthy eating. Reviewed patient goals for dietary modifications and their clinical implications. Reviewed most recent lipid profile.      Goals for dietary modification based on Rate Your Plate Dietary Assessment:  choose lean cuts of meat  poultry without the skin  reduce portions of meat to 3 oz  increase fish intake  low fat dairy   increase whole grains  low sodium  more nuts/seeds  heathier choices while dining out  eat smaller, more frequent meals      Emotional/Social  Patient reports they are coping well with good social support and denies depression or anxiety    Marital status:     Domestic Violence Screening: No    Comments: Patient requires skilled CR to achieve goals and maximum functional capacity. CR medically necessary for secondary prevention.

## 2023-08-25 ENCOUNTER — APPOINTMENT (EMERGENCY)
Dept: RADIOLOGY | Facility: HOSPITAL | Age: 68
End: 2023-08-25
Payer: MEDICARE

## 2023-08-25 ENCOUNTER — HOSPITAL ENCOUNTER (EMERGENCY)
Facility: HOSPITAL | Age: 68
Discharge: HOME | End: 2023-08-25
Attending: EMERGENCY MEDICINE
Payer: MEDICARE

## 2023-08-25 VITALS
TEMPERATURE: 97.8 F | RESPIRATION RATE: 15 BRPM | HEART RATE: 54 BPM | OXYGEN SATURATION: 94 % | DIASTOLIC BLOOD PRESSURE: 80 MMHG | SYSTOLIC BLOOD PRESSURE: 128 MMHG

## 2023-08-25 DIAGNOSIS — R07.9 CHEST PAIN, UNSPECIFIED TYPE: Primary | ICD-10-CM

## 2023-08-25 LAB
2HR DELTA HS TROPONIN: -1 NG/L
ALBUMIN SERPL BCP-MCNC: 4.4 G/DL (ref 3.5–5)
ALP SERPL-CCNC: 47 U/L (ref 34–104)
ALT SERPL W P-5'-P-CCNC: 15 U/L (ref 7–52)
ANION GAP SERPL CALCULATED.3IONS-SCNC: 7 MMOL/L
AST SERPL W P-5'-P-CCNC: 18 U/L (ref 13–39)
ATRIAL RATE: 54 BPM
ATRIAL RATE: 60 BPM
BASOPHILS # BLD AUTO: 0.03 THOUSANDS/ÂΜL (ref 0–0.1)
BASOPHILS NFR BLD AUTO: 1 % (ref 0–1)
BILIRUB SERPL-MCNC: 0.55 MG/DL (ref 0.2–1)
BUN SERPL-MCNC: 15 MG/DL (ref 5–25)
CALCIUM SERPL-MCNC: 9.5 MG/DL (ref 8.4–10.2)
CARDIAC TROPONIN I PNL SERPL HS: 10 NG/L
CARDIAC TROPONIN I PNL SERPL HS: 11 NG/L
CHLORIDE SERPL-SCNC: 101 MMOL/L (ref 96–108)
CO2 SERPL-SCNC: 30 MMOL/L (ref 21–32)
CREAT SERPL-MCNC: 0.83 MG/DL (ref 0.6–1.3)
EOSINOPHIL # BLD AUTO: 0.15 THOUSAND/ÂΜL (ref 0–0.61)
EOSINOPHIL NFR BLD AUTO: 4 % (ref 0–6)
ERYTHROCYTE [DISTWIDTH] IN BLOOD BY AUTOMATED COUNT: 14.1 % (ref 11.6–15.1)
GFR SERPL CREATININE-BSD FRML MDRD: 91 ML/MIN/1.73SQ M
GLUCOSE SERPL-MCNC: 201 MG/DL (ref 65–140)
HCT VFR BLD AUTO: 41.6 % (ref 36.5–49.3)
HGB BLD-MCNC: 13.5 G/DL (ref 12–17)
IMM GRANULOCYTES # BLD AUTO: 0.01 THOUSAND/UL (ref 0–0.2)
IMM GRANULOCYTES NFR BLD AUTO: 0 % (ref 0–2)
LYMPHOCYTES # BLD AUTO: 1.37 THOUSANDS/ÂΜL (ref 0.6–4.47)
LYMPHOCYTES NFR BLD AUTO: 34 % (ref 14–44)
MCH RBC QN AUTO: 28.5 PG (ref 26.8–34.3)
MCHC RBC AUTO-ENTMCNC: 32.5 G/DL (ref 31.4–37.4)
MCV RBC AUTO: 88 FL (ref 82–98)
MONOCYTES # BLD AUTO: 0.38 THOUSAND/ÂΜL (ref 0.17–1.22)
MONOCYTES NFR BLD AUTO: 9 % (ref 4–12)
NEUTROPHILS # BLD AUTO: 2.14 THOUSANDS/ÂΜL (ref 1.85–7.62)
NEUTS SEG NFR BLD AUTO: 52 % (ref 43–75)
NRBC BLD AUTO-RTO: 0 /100 WBCS
P AXIS: 29 DEGREES
P AXIS: 33 DEGREES
PLATELET # BLD AUTO: 182 THOUSANDS/UL (ref 149–390)
PMV BLD AUTO: 9 FL (ref 8.9–12.7)
POTASSIUM SERPL-SCNC: 3.9 MMOL/L (ref 3.5–5.3)
PR INTERVAL: 164 MS
PR INTERVAL: 168 MS
PROT SERPL-MCNC: 7.2 G/DL (ref 6.4–8.4)
QRS AXIS: -11 DEGREES
QRS AXIS: -15 DEGREES
QRSD INTERVAL: 94 MS
QRSD INTERVAL: 98 MS
QT INTERVAL: 422 MS
QT INTERVAL: 436 MS
QTC INTERVAL: 413 MS
QTC INTERVAL: 422 MS
RBC # BLD AUTO: 4.74 MILLION/UL (ref 3.88–5.62)
SODIUM SERPL-SCNC: 138 MMOL/L (ref 135–147)
T WAVE AXIS: -22 DEGREES
T WAVE AXIS: -27 DEGREES
VENTRICULAR RATE: 54 BPM
VENTRICULAR RATE: 60 BPM
WBC # BLD AUTO: 4.08 THOUSAND/UL (ref 4.31–10.16)

## 2023-08-25 PROCEDURE — 93005 ELECTROCARDIOGRAM TRACING: CPT

## 2023-08-25 PROCEDURE — 85025 COMPLETE CBC W/AUTO DIFF WBC: CPT

## 2023-08-25 PROCEDURE — 36415 COLL VENOUS BLD VENIPUNCTURE: CPT

## 2023-08-25 PROCEDURE — 71046 X-RAY EXAM CHEST 2 VIEWS: CPT

## 2023-08-25 PROCEDURE — 99285 EMERGENCY DEPT VISIT HI MDM: CPT

## 2023-08-25 PROCEDURE — 99285 EMERGENCY DEPT VISIT HI MDM: CPT | Performed by: EMERGENCY MEDICINE

## 2023-08-25 PROCEDURE — 84484 ASSAY OF TROPONIN QUANT: CPT

## 2023-08-25 PROCEDURE — 80053 COMPREHEN METABOLIC PANEL: CPT

## 2023-08-25 NOTE — DISCHARGE INSTRUCTIONS
Mr. Lovelace Head,  Your blood tests are unremarkable, aside from high blood sugar. Your EKG was overall unconcerning, and your heart enzymes were normal. We'd like you to follow-up with cardiology. Return to the ED if symptoms worsen.   Sincerely,  Dr. Mayra Gambino

## 2023-08-25 NOTE — ED ATTENDING ATTESTATION
8/25/2023  I, Marcelo Sage MD, saw and evaluated the patient. I have discussed the patient with the resident/non-physician practitioner and agree with the resident's/non-physician practitioner's findings, Plan of Care, and MDM as documented in the resident's/non-physician practitioner's note, except where noted. All available labs and Radiology studies were reviewed. I was present for key portions of any procedure(s) performed by the resident/non-physician practitioner and I was immediately available to provide assistance. At this point I agree with the current assessment done in the Emergency Department. I have conducted an independent evaluation of this patient a history and physical is as follows:    62-year-old male with past medical history of recent NSTEMI status post 1 JOHNATHAN to the RCA, hx of PE on eliquis, diabetes, htn and hyperlipidemia who presents for evaluation of chest pain. Patient states chest pain started last night when he was lying in bed. Pain is in the left upper chest.  Denies any radiation of the pain. He states pain is slightly worse when he pushes on his chest.  Pain is almost completely resolved at this time. Patient was recently hospitalized for an NSTEMI 2 weeks ago. He states this pain feels significantly different. Denies shortness of breath. Denies diaphoresis or lightheadedness. Denies fevers, chills, or cough. Denies abdominal pain, nausea, vomiting, or diarrhea. Denies leg pain or leg swelling. Patient states he is compliant with all of his medications. Physical exam:  Vitals reviewed, patient is slightly bradycardic, vitals otherwise within normal limits. Patient is awake and alert, in no acute distress. Mucous membranes moist, neck supple, heart regular rate and rhythm, lungs clear to auscultation bilaterally, abdomen soft, nontender, nondistended, right groin puncture site with very small area of induration, no discoloration.   No peripheral edema, no skin rashes, no focal neurologic deficits. Mild tenderness to palpation over the left anterior chest wall. Assessment/plan:  22-year-old male with past medical history of recent NSTEMI status post 1 JOHNATHAN to the RCA, hx of PE on eliquis, diabetes, htn and hyperlipidemia who presents for evaluation of chest pain since last night. Differential diagnosis includes: ACS, msk pain, pneumothorax, pleural effusion. Will check CBC to evaluate for anemia, CMP to evaluate for metabolic abnormality, troponin and EKG to evaluate for arrhythmia or ACS. Will obtain chest x-ray to evaluate for pleural effusion or pneumothorax. ED Course     Reviewed labs and imaging, chest x-ray negative for acute cardiopulmonary disease. Reviewed and interpreted EKG, shows T wave inversions in inferior and lateral leads. Initial troponin 11, delta negative. Reassessed patient, he remains asymptomatic. Low suspicion for ACS given negative troponin x2. We will have patient follow-up with cardiology. Patient is agreeable with plan for discharge. Return precautions discussed.     Critical Care Time  Procedures

## 2023-08-25 NOTE — ED PROVIDER NOTES
History  Chief Complaint   Patient presents with   • Chest Pain     Pt c/o chest pain that radiates to the left shoulder starting last night. Patient is a 73-year-old male with history of T2 DM, HLD, HTN, PAD, NSTEMI (2 weeks ago, s/p RCA stent), here with wife for chief complaint of left-sided chest pain that started last night. He describes it as an achy pain near his axilla. Last night it was 6/10 intensity, but it does not feel as bad now. It does not feel like the chest pain that he had 2 weeks ago. He has been taking Norvasc, Lopressor, Plavix, and Eliquis. He denies palpitations, shortness of breath, and lightheadedness. Prior to Admission Medications   Prescriptions Last Dose Informant Patient Reported? Taking? BD Pen Needle Kiarra U/F 32G X 4 MM MISC  Self, Spouse/Significant Other Yes No   Empagliflozin (JARDIANCE PO)  Self, Spouse/Significant Other Yes No   Sig: Take 25 mg by mouth     HumaLOG KwikPen 100 units/mL injection pen   No No   Sig: Inject 9 units with supper plus scale.  TDD 20 units   Insulin Glargine Solostar (Lantus SoloStar) 100 UNIT/ML SOPN   No No   Sig: Inject 0.22 mL (22 Units total) under the skin daily   Lancets (OneTouch Delica Plus [id number]) MISC  Self, Spouse/Significant Other Yes No   Si (two) times a day Use to test   apixaban (ELIQUIS) 5 mg  Self No No   Sig: Take 1 tablet by mouth 2 (two) times a day for 30 days 2 tabs PO BID x 7 days, then 1 tab PO BID   ascorbic acid (VITAMIN C) 250 mg tablet  Self, Spouse/Significant Other Yes No   Sig: Take 500 mg by mouth daily   clopidogrel (PLAVIX) 75 mg tablet   No No   Sig: Take 1 tablet (75 mg total) by mouth daily   ezetimibe (ZETIA) 10 mg tablet   No No   Sig: Take 1 tablet (10 mg total) by mouth daily at bedtime   fluticasone (FLONASE) 50 mcg/act nasal spray  Self, Spouse/Significant Other Yes No   Si sprays into each nostril   losartan (COZAAR) 100 MG tablet  Self, Spouse/Significant Other Yes No   Sig: Take 100 mg by mouth   lovastatin (MEVACOR) 40 MG tablet  Self, Spouse/Significant Other Yes No   Sig: Take 40 mg by mouth daily at bedtime   metFORMIN (GLUCOPHAGE) 1000 MG tablet  Self, Spouse/Significant Other Yes No   Sig: Take 1,000 mg by mouth 2 (two) times a day with meals   metoprolol tartrate (LOPRESSOR) 25 mg tablet   No No   Sig: Take 1 tablet (25 mg total) by mouth every 12 (twelve) hours   olopatadine (PATANOL) 0.1 % ophthalmic solution  Self, Spouse/Significant Other Yes No   Si drop 2 (two) times a day   omeprazole (PriLOSEC) 40 MG capsule  Self, Spouse/Significant Other No No   Sig: Take 1 capsule (40 mg total) by mouth daily      Facility-Administered Medications: None       Past Medical History:   Diagnosis Date   • BPH without obstruction/lower urinary tract symptoms    • CAD (coronary artery disease)     s/p JOHNATHAN distal RCA and JOHNATHAN RPAV 2023   • Diabetes mellitus ([address])    • Dysuria    • GERD (gastroesophageal reflux disease)    • Gout    • History of melanoma     left ear - surgically removed   • History of pulmonary embolism 2017    bilateral   • Hyperlipidemia    • Hypertension    • Melanoma ([address]) 2017    left ear   • Prostate cancer [facility])    • Sleep apnea        Past Surgical History:   Procedure Laterality Date   • CARDIAC CATHETERIZATION Left 2023    Procedure: Cardiac Left Heart Cath;  Surgeon: Eleni Castillo DO;  [facility]; Service: Cardiology   • CARDIAC CATHETERIZATION N/A 2023    Procedure: Cardiac Coronary Angiogram;  Surgeon: Eleni Castillo DO;  [facility]; Service: Cardiology   • CARDIAC CATHETERIZATION N/A 2023    Procedure: Cardiac pci;  Surgeon: Eleni Castillo DO;  [facility]; Service: Cardiology   • CARDIAC CATHETERIZATION N/A 2023    Procedure: Cardiac other - IVUS;  Surgeon: Eleni Castillo DO;  [facility];   Service: Cardiology   • CHOLECYSTECTOMY     • COLONOSCOPY  10/2016    sessile serrated polyp removed from the proximal transverse, adenoma removed from the distal transverse   • EGD  08/2017    gastritis, H. pylori negative, and Candida esophagitis   • EGD  11/2011    slight Schatzki's ring, small hiatal hernia   • EXTERNAL EAR SURGERY Left 08/2017    for the removal of skin melanoma-Plastic surgery       Family History   Problem Relation Age of Onset   • Hyperlipidemia Mother    • Stroke Father    • Melanoma Father    • Diabetes Sister    • Hypertension Sister    • Diabetes Brother    • JETT disease Brother      I have reviewed and agree with the history as documented. E-Cigarette/Vaping   • E-Cigarette Use Never User      E-Cigarette/Vaping Substances   • Nicotine No    • THC No    • CBD No    • Flavoring No    • Other No    • Unknown No      Social History     Tobacco Use   • Smoking status: Never     Passive exposure: Never   • Smokeless tobacco: Never   Vaping Use   • Vaping Use: Never used   Substance Use Topics   • Alcohol use: Yes     Comment: Drinks socailly.     • Drug use: No        Review of Systems    Physical Exam  ED Triage Vitals   Temperature Pulse Respirations Blood Pressure SpO2   08/25/23 1051 08/25/23 1051 08/25/23 1051 08/25/23 1051 08/25/23 1051   (!) 97.4 °F (36.3 °C) 59 18 139/86 96 %      Temp Source Heart Rate Source Patient Position - Orthostatic VS BP Location FiO2 (%)   08/25/23 1051 08/25/23 1051 08/25/23 1051 08/25/23 1051 --   Temporal Monitor Sitting Left arm       Pain Score       08/25/23 1230       No Pain             Orthostatic Vital Signs  Vitals:    08/25/23 1051 08/25/23 1130 08/25/23 1230 08/25/23 1345   BP: 139/86 130/81 128/80    Pulse: 59 58 (!) 54 (!) 54   Patient Position - Orthostatic VS: Sitting Sitting Sitting        Physical Exam    ED Medications  Medications - No data to display    Diagnostic Studies  Results Reviewed     Procedure Component Value Units Date/Time    HS Troponin I 2hr [[id number]]  (Normal) Collected: 08/25/23 1338    Lab Status: Final result Specimen: Blood from Arm, Right Updated: 08/25/23 1413     hs TnI 2hr 10 ng/L      Delta 2hr hsTnI -1 ng/L     HS Troponin I 4hr [[id number]]     Lab Status: No result Specimen: Blood     HS Troponin 0hr (reflex protocol) [[id number]]  (Normal) Collected: 08/25/23 1119    Lab Status: Final result Specimen: Blood from Arm, Right Updated: 08/25/23 1146     hs TnI 0hr 11 ng/L     Comprehensive metabolic panel [[id number]]  (Abnormal) Collected: 08/25/23 1119    Lab Status: Final result Specimen: Blood from Arm, Right Updated: 08/25/23 1143     Sodium 138 mmol/L      Potassium 3.9 mmol/L      Chloride 101 mmol/L      CO2 30 mmol/L      ANION GAP 7 mmol/L      BUN 15 mg/dL      Creatinine 0.83 mg/dL      Glucose 201 mg/dL      Calcium 9.5 mg/dL      AST 18 U/L      ALT 15 U/L      Alkaline Phosphatase 47 U/L      Total Protein 7.2 g/dL      Albumin 4.4 g/dL      Total Bilirubin 0.55 mg/dL      eGFR 91 ml/min/1.73sq m     Narrative:      [place] guidelines for Chronic Kidney Disease (CKD):   •  Stage 1 with normal or high GFR (GFR > 90 mL/min/1.73 square meters)  •  Stage 2 Mild CKD (GFR = 60-89 mL/min/1.73 square meters)  •  Stage 3A Moderate CKD (GFR = 45-59 mL/min/1.73 square meters)  •  Stage 3B Moderate CKD (GFR = 30-44 mL/min/1.73 square meters)  •  Stage 4 Severe CKD (GFR = 15-29 mL/min/1.73 square meters)  •  Stage 5 End Stage CKD (GFR <15 mL/min/1.73 square meters)  Note: GFR calculation is accurate only with a steady state creatinine    CBC and differential [[id number]]  (Abnormal) Collected: 08/25/23 1119    Lab Status: Final result Specimen: Blood from Arm, Right Updated: 08/25/23 1125     WBC 4.08 Thousand/uL      RBC 4.74 Million/uL      Hemoglobin 13.5 g/dL      Hematocrit 41.6 %      MCV 88 fL      MCH 28.5 pg      MCHC 32.5 g/dL      RDW 14.1 %      MPV 9.0 fL      Platelets 306 Thousands/uL      nRBC 0 /100 WBCs      Neutrophils Relative 52 %      Immat GRANS % 0 %      Lymphocytes Relative 34 %      Monocytes Relative 9 %      Eosinophils Relative 4 %      Basophils Relative 1 %      Neutrophils Absolute 2.14 Thousands/µL      Immature Grans Absolute 0.01 Thousand/uL      Lymphocytes Absolute 1.37 Thousands/µL      Monocytes Absolute 0.38 Thousand/µL      Eosinophils Absolute 0.15 Thousand/µL      Basophils Absolute 0.03 Thousands/µL                  XR chest 2 views   Final Result by Estella Zuniga MD (08/25 1149)      No acute cardiopulmonary disease. Workstation performed: [id number]               ED Course     ECG unremarkable  CXR unrevealing   Cardiac labs unremarkable   Repeat troponin w/i normal limits  Repeat ECG unchanged  Patient stable for discharge with cardiology follow-up. Patient is agreeable to plan. All questions answered. SBIRT 20yo+    Flowsheet Row Most Recent Value   Initial Alcohol Screen: US AUDIT-C     1. How often do you have a drink containing alcohol? 0 Filed at: 08/25/2023 1051   2. How many drinks containing alcohol do you have on a typical day you are drinking? 0 Filed at: 08/25/2023 1051   3a. Male UNDER 65: How often do you have five or more drinks on one occasion? 0 Filed at: 08/25/2023 1051   3b. FEMALE Any Age, or MALE 65+: How often do you have 4 or more drinks on one occassion? 0 Filed at: 08/25/2023 1051   Audit-C Score 0 Filed at: 08/25/2023 1051                Medical Decision Making  Amount and/or Complexity of Data Reviewed  Labs: ordered. Details: CBC & CMP unremarkable  0-hr troponin: 11  2-hr Troponin: 10  Radiology: ordered. Details: CXR showed no acute cardiopulmonary abnormalities. ECG/medicine tests: ordered and independent interpretation performed. Details: ECG shows new T-wave inversions in III & aVF.   Repeat ECG: no changes from previous            Disposition  Final diagnoses:   Chest pain, unspecified type     Time reflects when diagnosis was documented in both MDM as applicable and the Disposition within this note     Time User Action Codes Description Comment    8/25/2023  1:53 PM Nahed Otero Add [R07.9] Chest pain, unspecified type       ED Disposition     ED Disposition   Discharge    Condition   Stable    Date/Time   Fri Aug 25, 2023 12:41 PM    Comment   [facility] discharge to home/self care. Follow-up Information     Follow up With Specialties Details Why Contact Info Additional Information    Anuradha Romano MD Emergency Medicine Schedule an appointment as soon as possible for a visit   [phone] [facility] [phone]  [address] [place] [address], [address] [place] [address] Cardiology  As needed [address] [phone] Steffi Vazquez Emergency Department Emergency Medicine  If symptoms worsen [address] [place] [phone]  [address] [place] [address] Emergency Department, [address] [place] [address], [facility]          Patient's Medications   Discharge Prescriptions    No medications on file     No discharge procedures on file. PDMP Review     None           ED Provider  Attending physically available and evaluated Fredie Bumpers. I managed the patient along with the ED Attending.     Electronically Signed by         Nahed Otero DO  08/25/23 0036

## 2023-08-28 ENCOUNTER — CLINICAL SUPPORT (OUTPATIENT)
Dept: CARDIAC REHAB | Facility: HOSPITAL | Age: 68
End: 2023-08-28
Payer: MEDICARE

## 2023-08-28 DIAGNOSIS — I21.4 NON-STEMI (NON-ST ELEVATED MYOCARDIAL INFARCTION) (HCC): ICD-10-CM

## 2023-08-28 LAB
ATRIAL RATE: 54 BPM
ATRIAL RATE: 60 BPM
P AXIS: 29 DEGREES
P AXIS: 33 DEGREES
PR INTERVAL: 164 MS
PR INTERVAL: 168 MS
QRS AXIS: -11 DEGREES
QRS AXIS: -15 DEGREES
QRSD INTERVAL: 94 MS
QRSD INTERVAL: 98 MS
QT INTERVAL: 422 MS
QT INTERVAL: 436 MS
QTC INTERVAL: 413 MS
QTC INTERVAL: 422 MS
T WAVE AXIS: -22 DEGREES
T WAVE AXIS: -27 DEGREES
VENTRICULAR RATE: 54 BPM
VENTRICULAR RATE: 60 BPM

## 2023-08-28 PROCEDURE — 93798 PHYS/QHP OP CAR RHAB W/ECG: CPT

## 2023-08-28 PROCEDURE — 93010 ELECTROCARDIOGRAM REPORT: CPT | Performed by: INTERNAL MEDICINE

## 2023-08-28 NOTE — PROGRESS NOTES
Patient completed their initial exercise tolerance test during their first exercise session. The initial ETT, updated outcomes, and session details are attached to this addendum.

## 2023-08-29 ENCOUNTER — APPOINTMENT (OUTPATIENT)
Dept: CARDIAC REHAB | Facility: HOSPITAL | Age: 68
End: 2023-08-29
Payer: MEDICARE

## 2023-08-29 ENCOUNTER — OFFICE VISIT (OUTPATIENT)
Dept: UROLOGY | Facility: CLINIC | Age: 68
End: 2023-08-29
Payer: MEDICARE

## 2023-08-29 ENCOUNTER — CLINICAL SUPPORT (OUTPATIENT)
Dept: CARDIAC REHAB | Facility: HOSPITAL | Age: 68
End: 2023-08-29
Payer: MEDICARE

## 2023-08-29 VITALS
BODY MASS INDEX: 25.76 KG/M2 | WEIGHT: 184 LBS | HEART RATE: 64 BPM | DIASTOLIC BLOOD PRESSURE: 80 MMHG | HEIGHT: 71 IN | SYSTOLIC BLOOD PRESSURE: 128 MMHG

## 2023-08-29 DIAGNOSIS — R97.20 ELEVATED PSA: Primary | ICD-10-CM

## 2023-08-29 DIAGNOSIS — I21.4 NON-STEMI (NON-ST ELEVATED MYOCARDIAL INFARCTION) (HCC): ICD-10-CM

## 2023-08-29 PROCEDURE — 93798 PHYS/QHP OP CAR RHAB W/ECG: CPT

## 2023-08-29 PROCEDURE — 99214 OFFICE O/P EST MOD 30 MIN: CPT | Performed by: UROLOGY

## 2023-08-29 NOTE — PROGRESS NOTES
[address] [facility]  [facility]  [address] [place] [address]  [phone]  [email or web address]      NAME: Geraldine Maurice  AGE: 79 y.o. SEX: male  : 1955   MRN: [id number]    DATE: 2023  TIME: 11:09 AM    Assessment and Plan:    Elevated PSA with a category 3 lesion on MRI fusion, biopsy delayed due to recent myocardial infarction with stent placement. We will schedule a MR fusion biopsy in 6 months with a repeat MRI before. Check PSA now and in 5 months. Follow-up in about 5 months for history and physical.    He is unable to stop his Plavix in the next 6 months. Therefore if his PSA is markedly elevated, we will start him on Casodex or Lupron because we will be unable to biopsy him in that time. Chief Complaint   No chief complaint on file. History of Present Illness   61-year-old man, established patient but new to me-has elevated PSA with a his PSA history of 2.6 in , 3.2 in , 7.1 2023, and 4.0 2023 with a 20% free fraction. He has a history of BPH and was previously seen by Dr. Papo Sagastume. He was seen by Mr. Kierra York May 3, 2023. He was set up for an MR fusion prostate biopsy August 15 with me but this was canceled due to the patient having a myocardial infarction. He was placed on aspirin and Plavix. MRI showed category 3 lesion 5 mm right apical posterior peripheral zone. The volume of the gland was 41 cc. He also has a history of pulmonary embolism. We were contacted at the time of the MI and we decided to postpone the biopsy for 6 months at least.  He had angioplasty x3 and a drug-eluting stent placed. He is now doing well.   He is normally on Eliquis because of a history of pulmonary embolism and he had stopped the Eliquis prior to the planned prostate biopsy and he had a myocardial infarction probably because of this and underlying coronary artery disease and the associated stress with anxiety. No voiding complaints. The following portions of the patient's history were reviewed and updated as appropriate: allergies, current medications, past family history, past medical history, past social history, past surgical history and problem list.  Past Medical History:   Diagnosis Date   • BPH without obstruction/lower urinary tract symptoms    • CAD (coronary artery disease)     s/p JOHNATHAN distal RCA and JOHNATHAN RPAV 8/11/2023   • Diabetes mellitus ([address])    • Dysuria    • GERD (gastroesophageal reflux disease)    • Gout    • History of melanoma     left ear - surgically removed   • History of pulmonary embolism 2017    bilateral   • Hyperlipidemia    • Hypertension    • Melanoma ([address]) 2017    left ear   • Prostate cancer [facility])    • Sleep apnea      Past Surgical History:   Procedure Laterality Date   • CARDIAC CATHETERIZATION Left 8/11/2023    Procedure: Cardiac Left Heart Cath;  Surgeon: Axel Mohs, DO;  [facility]; Service: Cardiology   • CARDIAC CATHETERIZATION N/A 8/11/2023    Procedure: Cardiac Coronary Angiogram;  Surgeon: Axel Mohs, DO;  [facility]; Service: Cardiology   • CARDIAC CATHETERIZATION N/A 8/11/2023    Procedure: Cardiac pci;  Surgeon: Axel Mohs, DO;  [facility]; Service: Cardiology   • CARDIAC CATHETERIZATION N/A 8/11/2023    Procedure: Cardiac other - IVUS;  Surgeon: Axel Mohs, DO;  [facility];   Service: Cardiology   • CHOLECYSTECTOMY     • COLONOSCOPY  10/2016    sessile serrated polyp removed from the proximal transverse, adenoma removed from the distal transverse   • EGD  08/2017    gastritis, H. pylori negative, and Candida esophagitis   • EGD  11/2011    slight Schatzki's ring, small hiatal hernia   • EXTERNAL EAR SURGERY Left 08/2017    for the removal of skin melanoma-Plastic surgery     shoulder  Review of Systems   Review of Systems Genitourinary: Negative. Active Problem List     Patient Active Problem List   Diagnosis   • Type 2 diabetes mellitus without complication (HCC)   • Vomiting   • Leukocytosis   • Mixed hyperlipidemia   • Hypertension   • Hx pulmonary embolism   • GERD (gastroesophageal reflux disease)   • Benign prostatic hyperplasia without lower urinary tract symptoms   • LELO (obstructive sleep apnea)   • Benign essential hypertension   • Fatigue   • Myalgia   • Pulmonary embolism, bilateral (HCC)   • Non-STEMI (non-ST elevated myocardial infarction) (HCC)       Objective   /80 (BP Location: Right arm, Patient Position: Sitting, Cuff Size: Adult)   Pulse 64   Ht 5' 11" (1.803 m)   Wt 83.5 kg (184 lb)   BMI 25.66 kg/m²     Physical Exam  Vitals reviewed. Constitutional:       Appearance: Normal appearance. HENT:      Head: Normocephalic and atraumatic. Eyes:      Extraocular Movements: Extraocular movements intact. Pulmonary:      Effort: Pulmonary effort is normal.   Musculoskeletal:         General: Normal range of motion. Cervical back: Normal range of motion. Skin:     Coloration: Skin is not jaundiced or pale. Neurological:      General: No focal deficit present. Mental Status: He is alert and oriented to person, place, and time. Psychiatric:         Mood and Affect: Mood normal.         Behavior: Behavior normal.         Thought Content:  Thought content normal.         Judgment: Judgment normal.             Current Medications     Current Outpatient Medications:   •  ascorbic acid (VITAMIN C) 250 mg tablet, Take 500 mg by mouth daily, Disp: , Rfl:   •  BD Pen Needle Kiarra U/F 32G X 4 MM MISC, , Disp: , Rfl:   •  clopidogrel (PLAVIX) 75 mg tablet, Take 1 tablet (75 mg total) by mouth daily, Disp: 90 tablet, Rfl: 3  •  Empagliflozin (JARDIANCE PO), Take 25 mg by mouth  , Disp: , Rfl:   •  ezetimibe (ZETIA) 10 mg tablet, Take 1 tablet (10 mg total) by mouth daily at bedtime, Disp: 90 tablet, Rfl: 3  •  HumaLOG KwikPen 100 units/mL injection pen, Inject 9 units with supper plus scale.  TDD 20 units, Disp: 15 mL, Rfl: 1  •  Insulin Glargine Solostar (Lantus SoloStar) 100 UNIT/ML SOPN, Inject 0.22 mL (22 Units total) under the skin daily, Disp: 15 mL, Rfl: 1  •  losartan (COZAAR) 50 mg tablet, Take 50 mg by mouth, Disp: , Rfl:   •  lovastatin (MEVACOR) 40 MG tablet, Take 40 mg by mouth daily at bedtime, Disp: , Rfl:   •  metFORMIN (GLUCOPHAGE) 1000 MG tablet, Take 1,000 mg by mouth 2 (two) times a day with meals, Disp: , Rfl:   •  metoprolol tartrate (LOPRESSOR) 25 mg tablet, Take 1 tablet (25 mg total) by mouth every 12 (twelve) hours, Disp: 180 tablet, Rfl: 3  •  omeprazole (PriLOSEC) 40 MG capsule, Take 1 capsule (40 mg total) by mouth daily, Disp: 90 capsule, Rfl: 3  •  apixaban (ELIQUIS) 5 mg, Take 1 tablet by mouth 2 (two) times a day for 30 days 2 tabs PO BID x 7 days, then 1 tab PO BID, Disp: 60 tablet, Rfl: 0  •  fluticasone (FLONASE) 50 mcg/act nasal spray, 2 sprays into each nostril (Patient not taking: Reported on 8/29/2023), Disp: , Rfl:   •  Insulin Lispro w/ Trans Port 100 UNIT/ML SOPN, Inject 6 Units under the skin (Patient not taking: Reported on 8/29/2023), Disp: , Rfl:   •  Lancets (OneTouch Delica Plus [id number]) MISC, 2 (two) times a day Use to test (Patient not taking: Reported on 8/29/2023), Disp: , Rfl:   •  olopatadine (PATANOL) 0.1 % ophthalmic solution, 1 drop 2 (two) times a day (Patient not taking: Reported on 8/29/2023), Disp: , Rfl:         London Hidalgo MD

## 2023-08-29 NOTE — PATIENT INSTRUCTIONS
Have PSA done now and I will send you the results. Have the PSA done in about 5 months. We will schedule the procedure for 6 months from now and our  will be calling you to set this up. She will also be setting you up for an MRI of the prostate to be done approximately in December.   You may need a renal function panel which is a blood test done around that time so they can give you the contrast.

## 2023-08-30 ENCOUNTER — APPOINTMENT (OUTPATIENT)
Dept: LAB | Facility: HOSPITAL | Age: 68
End: 2023-08-30
Payer: MEDICARE

## 2023-08-30 ENCOUNTER — APPOINTMENT (OUTPATIENT)
Dept: CARDIAC REHAB | Facility: HOSPITAL | Age: 68
End: 2023-08-30
Payer: MEDICARE

## 2023-08-30 DIAGNOSIS — R97.20 ELEVATED PSA: ICD-10-CM

## 2023-08-30 PROCEDURE — 84154 ASSAY OF PSA FREE: CPT

## 2023-08-30 PROCEDURE — 84153 ASSAY OF PSA TOTAL: CPT

## 2023-08-30 PROCEDURE — 36415 COLL VENOUS BLD VENIPUNCTURE: CPT

## 2023-08-31 PROBLEM — R19.8 ABNORMAL FINDINGS ON ESOPHAGOGASTRODUODENOSCOPY (EGD): Status: ACTIVE | Noted: 2023-08-07

## 2023-08-31 LAB
PSA FREE MFR SERPL: 22 %
PSA FREE SERPL-MCNC: 0.77 NG/ML
PSA SERPL-MCNC: 3.5 NG/ML (ref 0–4)

## 2023-09-05 ENCOUNTER — CLINICAL SUPPORT (OUTPATIENT)
Dept: CARDIAC REHAB | Facility: HOSPITAL | Age: 68
End: 2023-09-05
Payer: MEDICARE

## 2023-09-05 DIAGNOSIS — I21.4 NON-STEMI (NON-ST ELEVATED MYOCARDIAL INFARCTION) (HCC): ICD-10-CM

## 2023-09-05 PROCEDURE — 93798 PHYS/QHP OP CAR RHAB W/ECG: CPT

## 2023-09-06 ENCOUNTER — CLINICAL SUPPORT (OUTPATIENT)
Dept: CARDIAC REHAB | Facility: HOSPITAL | Age: 68
End: 2023-09-06
Payer: MEDICARE

## 2023-09-06 DIAGNOSIS — E11.9 TYPE 2 DIABETES MELLITUS WITHOUT COMPLICATION, WITHOUT LONG-TERM CURRENT USE OF INSULIN (HCC): Primary | ICD-10-CM

## 2023-09-06 DIAGNOSIS — I21.4 NON-STEMI (NON-ST ELEVATED MYOCARDIAL INFARCTION) (HCC): ICD-10-CM

## 2023-09-06 PROCEDURE — 93798 PHYS/QHP OP CAR RHAB W/ECG: CPT

## 2023-09-06 RX ORDER — INSULIN GLARGINE 100 [IU]/ML
22 INJECTION, SOLUTION SUBCUTANEOUS DAILY
Qty: 15 ML | Refills: 1 | Status: SHIPPED | OUTPATIENT
Start: 2023-09-06 | End: 2023-09-13 | Stop reason: SDUPTHER

## 2023-09-08 ENCOUNTER — OFFICE VISIT (OUTPATIENT)
Dept: ENDOCRINOLOGY | Facility: OTHER | Age: 68
End: 2023-09-08
Payer: MEDICARE

## 2023-09-08 VITALS — WEIGHT: 186 LBS | BODY MASS INDEX: 25.94 KG/M2

## 2023-09-08 DIAGNOSIS — Z79.4 TYPE 2 DIABETES MELLITUS WITH HYPERGLYCEMIA, WITH LONG-TERM CURRENT USE OF INSULIN (HCC): Primary | ICD-10-CM

## 2023-09-08 DIAGNOSIS — E11.65 TYPE 2 DIABETES MELLITUS WITH HYPERGLYCEMIA, WITH LONG-TERM CURRENT USE OF INSULIN (HCC): Primary | ICD-10-CM

## 2023-09-08 DIAGNOSIS — E11.9 TYPE 2 DIABETES MELLITUS WITHOUT COMPLICATION, WITHOUT LONG-TERM CURRENT USE OF INSULIN (HCC): ICD-10-CM

## 2023-09-08 PROCEDURE — 97802 MEDICAL NUTRITION INDIV IN: CPT

## 2023-09-08 RX ORDER — INSULIN GLARGINE 100 [IU]/ML
22 INJECTION, SOLUTION SUBCUTANEOUS DAILY
Qty: 15 ML | Refills: 1 | OUTPATIENT
Start: 2023-09-08

## 2023-09-08 NOTE — PATIENT INSTRUCTIONS
Eat 3 meals per day, 4-5 hours apart. No meal skipping. Eat your snack 2-3 hours away from your meals.      Eat 30-45 grams of carbohydrate per meal, and no more than 15-30 grams per snack     Exercise as able and approved by your physician     Complete 3-day food log and return completed log at the follow up appointment

## 2023-09-08 NOTE — PROGRESS NOTES
Medical Nutrition Therapy        Assessment    Visit Type: Initial visit  Chief complaint/Medical Diagnosis/reason for visit E11.65, Z79.4    HPI Marisabel Cabezas was seen in person for the initial MNT appointment, accompanied by his wife, Kristy. Marisabel Cabezas was previously seen by me for a CGM training. BG levels are checked frequently using his carrie. No low BG levels reported or seen on [employer]. Marisabel Cabezas tends to have BG excursions post meal and extended levels in the evening that align with snacking. Patient does take diabetes medication as prescribed. Patient reported no current exercise regimen, but does go to cardiac rehab thrice weekly for approximately one hour. Explained how exercise lowers BG levels by creating more demand for glucose in the muscle cells. Patient will continue rehab and plans to walk. Problems identified in food recall include meal timing, inconsistent carbohydrate intake, high-fat foods (cheesecake), limited non-starchy vegetables and whole grains. Consumption of carbohydrates ranges from 0 to 90 grams per meal. Explained basic pathophysiology of diabetes and impact of diet on blood glucose levels and disease complications. Explained how sodium influences volume retention, blood pressure, and complications for heart disease, stroke, and CKD. Provided patient with a 1959 calorie meal plan to assist with consistency, balance and portion control. Encouraged the consumption of regular meals at regular times. Advised patient to keep carbohydrate intake to 45 grams per meal and 15-30 per snack to assist with glycemic control. Suggested keeping protein intake to 9 ounces a day and fat to 5 servings daily to assist with lipid management and calorie control. Portion booklet and food labels were used to teach basic carbohydrate counting. Patient agreed to keep daily food logs and return them in 2 months for assessment. RD will remain available for further dietary questions/concerns.          Ht Readings from Last 1 Encounters:   08/31/23 5' 11" (1.803 m)     Wt Readings from Last 3 Encounters:   08/31/23 83.3 kg (183 lb 9.6 oz)   08/29/23 83.5 kg (184 lb)   08/24/23 84.3 kg (185 lb 12.8 oz)     Weight Change: No    Barriers to Learning: no barriers    Do you follow any special diet presently?: Yes - cardiac   Who shops: patient and spouse  Who cooks: patient and spouse    Food Log: Completed via the method of food recall    Wakes up about 4 am (stays up and sometimes falls back to sleep)    A few cups of coffee    LOQYPMFUM:2:75-58 pm; 1 slices bread (724) w/ 2 TBSP pb with coffee (reg. 8-10 oz) w/ 1% milk and sweet n low OR 2 slices bread w/ ICBINB w/ poached egg w/coffee  Morning Snack: 10:30-11 am; "TBSP" pb and maybe about 30 minutes later have marshmallow creme or fruit (peach, plum, grapes)  Lunch: 12:30 pm; sandwich (tomato w/ 2 TBSP toscano & salt and pepper OR turkey & toscano) w/ diet coke OR 12 inches steak sandwich (sauce, peppers, onion, beef) (used to have fries but not any more)  Afternoon Snack: 2-2:30 and again at 4 pm;  Peanut butter or handful of peanuts or piece of fruit or pretzel nuggets  Dinner: 6 pm; meat (beef, chicken, veal, fish; broil or grill or bake with butter/vinegar mixture sometimes bread crumbs) with salad (lettuce, tomato, cucumber, onion, cheese) and 1/2 c veg (zucchini, corn, cauliflower, peas, broccoli, string beans)  Evening Snack:7:30, 8:30; small 6 oz milk or yogurt (light n fit) or handful of nuts or SF jello with fruit cocktail  Beverages: coffee, diet coke, water  Eating out/Take out:2x/week; Veal over 1 c alanis hair pasta w/ lemon butter sauce w/ mushrooms with salad with 1 slice italian bread w/ butter with cheesecake (shared) with coffee w/ cream & splenda  Exercise cardiac rehab 3x weekly for about 1 hr    Calorie needs 1959 kcals/day Carbs: 45 g/meal, 15-30 g/snack     Fat: 5 servings/day    Protein:9 servings/day    Nutrition Diagnosis:  Food and nutrition related knowledge deficit  related to Lack of prior exposure to accurate nutrition related information as evidenced by Verbalizes inaccurate or incomplete information    Intervention: plate method, increased fiber intake, label reading, carbohydrate counting, increased plant based foods, meal timing, meal planning, exercise guidelines and food diary     Treatment Goals: Patient understands education and recommendations, Patient will monitor food intake daily with tracker, Patient will consume 3 meals a day, Patient will increase their intake of plant based foods, Patient will count carbohydrates, Patient will exercise and Patient will monitor blood glucose    Monitoring and evaluation:    Term code indicator  FH 1.3.2 Food Intake Criteria: Eat 3 meals per day, 4-5 hours apart. No meal skipping. Eat your snack 2-3 hours away from your meals. Term code indicator  FH 1.6.3 Carbohydrate Intake Criteria: Eat 30-45 grams of carbohydrate per meal, and no more than 15-30 grams per snack   Term code indicator  CH 2.2 Treatments/Therapy/Alternative Medicine Criteria: Exercise as able and approved by your physician     Materials Provided: Portion book, 3-day food log    Patient’s Response to Instruction:  Comprehensiongood  Motivationgood  Expected Compliancegood    Begin Time: 8:45 am  End Time: 10:30 am  Referring Provider: Garrison Berman DO    Thank you for coming to the [address] [place] [address]  for education today. Please feel free to call with any questions or concerns.     Antelmo Olivo, [address] [facility]  [address] [facility] [address] [facility]   [phone]

## 2023-09-11 ENCOUNTER — CLINICAL SUPPORT (OUTPATIENT)
Dept: CARDIAC REHAB | Facility: HOSPITAL | Age: 68
End: 2023-09-11
Payer: MEDICARE

## 2023-09-11 DIAGNOSIS — I21.4 NON-STEMI (NON-ST ELEVATED MYOCARDIAL INFARCTION) (HCC): ICD-10-CM

## 2023-09-11 PROCEDURE — 93798 PHYS/QHP OP CAR RHAB W/ECG: CPT

## 2023-09-12 ENCOUNTER — CLINICAL SUPPORT (OUTPATIENT)
Dept: CARDIAC REHAB | Facility: HOSPITAL | Age: 68
End: 2023-09-12
Payer: MEDICARE

## 2023-09-12 DIAGNOSIS — I21.4 NON-STEMI (NON-ST ELEVATED MYOCARDIAL INFARCTION) (HCC): ICD-10-CM

## 2023-09-12 PROCEDURE — 93798 PHYS/QHP OP CAR RHAB W/ECG: CPT

## 2023-09-13 ENCOUNTER — CLINICAL SUPPORT (OUTPATIENT)
Dept: CARDIAC REHAB | Facility: HOSPITAL | Age: 68
End: 2023-09-13
Payer: MEDICARE

## 2023-09-13 DIAGNOSIS — I21.4 NON-STEMI (NON-ST ELEVATED MYOCARDIAL INFARCTION) (HCC): ICD-10-CM

## 2023-09-13 DIAGNOSIS — E11.9 TYPE 2 DIABETES MELLITUS WITHOUT COMPLICATION, WITHOUT LONG-TERM CURRENT USE OF INSULIN (HCC): ICD-10-CM

## 2023-09-13 PROCEDURE — 93798 PHYS/QHP OP CAR RHAB W/ECG: CPT

## 2023-09-13 RX ORDER — INSULIN GLARGINE 100 [IU]/ML
22 INJECTION, SOLUTION SUBCUTANEOUS DAILY
Qty: 19.8 ML | Refills: 1 | Status: SHIPPED | OUTPATIENT
Start: 2023-09-13 | End: 2023-12-12

## 2023-09-13 NOTE — TELEPHONE ENCOUNTER
Patient called for prescription on Friday it was sent to wrong pharmacy.     Failed per protocol please advise

## 2023-09-16 ENCOUNTER — APPOINTMENT (OUTPATIENT)
Dept: LAB | Facility: HOSPITAL | Age: 68
End: 2023-09-16
Payer: MEDICARE

## 2023-09-16 DIAGNOSIS — E78.5 HYPERLIPIDEMIA ASSOCIATED WITH TYPE 2 DIABETES MELLITUS: ICD-10-CM

## 2023-09-16 DIAGNOSIS — E11.69 HYPERLIPIDEMIA ASSOCIATED WITH TYPE 2 DIABETES MELLITUS: ICD-10-CM

## 2023-09-16 LAB
CHOLEST SERPL-MCNC: 131 MG/DL
HDLC SERPL-MCNC: 37 MG/DL
LDLC SERPL CALC-MCNC: 58 MG/DL (ref 0–100)
NONHDLC SERPL-MCNC: 94 MG/DL
TRIGL SERPL-MCNC: 180 MG/DL

## 2023-09-16 PROCEDURE — 80061 LIPID PANEL: CPT

## 2023-09-16 PROCEDURE — 36415 COLL VENOUS BLD VENIPUNCTURE: CPT

## 2023-09-18 ENCOUNTER — CLINICAL SUPPORT (OUTPATIENT)
Dept: CARDIAC REHAB | Facility: HOSPITAL | Age: 68
End: 2023-09-18
Payer: MEDICARE

## 2023-09-18 DIAGNOSIS — I21.4 NON-STEMI (NON-ST ELEVATED MYOCARDIAL INFARCTION) (HCC): ICD-10-CM

## 2023-09-18 PROCEDURE — 93798 PHYS/QHP OP CAR RHAB W/ECG: CPT

## 2023-09-19 ENCOUNTER — CLINICAL SUPPORT (OUTPATIENT)
Dept: CARDIAC REHAB | Facility: HOSPITAL | Age: 68
End: 2023-09-19
Payer: MEDICARE

## 2023-09-19 DIAGNOSIS — I21.4 NON-STEMI (NON-ST ELEVATED MYOCARDIAL INFARCTION) (HCC): Primary | ICD-10-CM

## 2023-09-19 PROCEDURE — 93798 PHYS/QHP OP CAR RHAB W/ECG: CPT

## 2023-09-19 NOTE — PROGRESS NOTES
Cardiac Rehabilitation Plan of Care   30 Day Reassessment          Today's date: 2023   # of Exercise Sessions Completed:   Patient name: Michael Major      : 1955  Age: 79 y.o. MRN: [id number]  Referring Physician: Dr. Aquilino Cho DO  Cardiologist: Pt saw JARED Worrell on 2023 but states they are on a wait list to see a cardiologist  Provider: [facility]  Clinician: Dayna Valverde. Pamela Khan, MPT, CCRP    Dx: NSTEMI   Angioplasty x 3 and JOHNATHAN x 1 to Dist RCA  Angioplasty x 1 and JOHNATHAN x 1 to RPVA  Date of onset: 2023      SUMMARY OF PROGRESS: Patrice Weaver has completed nine Cardiac Rehab post NSTEMI w/ intervention. The patient does currently follow a formal exercise program at home. Prior to his symptoms and event, he stated that he was walking up to 5 miles per day. He has resumed light to moderate ADLs reporting weakness and fatigue. When addressed, the patient continues to deny having depression/anxiety. Patient reports excellent social/emotional support from his friends and family. Stress management will be reviewed. The patient is a non-smoker. Patient admits to 100% medication compliance. They report the following physical limitations: decreased strength, fatigue. The patient will complete an initial functional capacity test during his first exercise session. His resting vitals were HR 59, SpO2 96%, and /60. Telemetry revealed sinus bradycardia. Blood Pressure will be monitored throughout the program and cardiologist will be notified of elevated trends. Patrice Weaver was counseled on exercise guidelines to achieve a minimum of 150 mins/wk of moderate intensity (RPE 4-6) exercise and encouraged to add 1-2 days of exercise on opposite days of cardiac rehab as tolerated. We discussed current dietary habits and goals of heart healthy eating for lipid management and diabetes management. The patient has T2D.   Patient's personal goals include: increase strength, reduce fatigue, improve functional capacity, return to all ADLs. The patient's CAD risk factors include:  inactivity, obesity/overweight, hypertension, hyperlipidemia and diabetes. His education will focus on lifestyle modification/education specific to His needs. Patient will attend group education classes on heart healthy eating, reading food labels, stress management, risk factor reduction, understanding heart disease and common heart medications. Patient has been compliant attending his CR sessions. He gives great effort during each session. He has correct hemodynamic responses to the activity. He denies any symptoms. He rates his program 3-4/10. He is able to work at a 4.64 MET Level. He has been receiving weekly education. Refer to [facility] documentation for a list of completed topics. His program will be progressed as he is able to tolerate. Medication compliance: Yes   Comments: Pt reports to be compliant with medications  Fall Risk: Low   Comments: Ambulates with a steady gait with no assist device    EKG Interpretation: NSR      EXERCISE ASSESSMENT and PLAN    Exercise Prescription:      Frequency: 2-3 days/week   Supplement with home exercise 3+ days/wk as tolerated       Minutes: 40-45       METS: 4.64            HR:    RPE: 3-5/10         Modalities: Treadmill, UBE, Lifecycle and NuStep      30 Day Goals for Exercise Progression:    Frequency: 2-3 days/week of cardiac rehab       Supplement with home exercise 3+ days/wk as tolerated    Minutes: 45-50                     >150 mins/wk of moderate intensity exercise   METS: 4.64-6.0 (1-2 METs greater than initial prescription)   HR:     RPE: 4-6/10   Modalities: Treadmill, Airdyne bike, UBE, Lifecycle, Elliptical and NuStep    Strength training: Will be added following 2-3 weeks of monitored exercise sessions   Modalities: Leg Press, Chest Press, Lateral Raise, Arm Curl, Front Raises and Calf Raises    Home Exercise: Daily walking.  Plans to return to a local fitness center when appropriate. Goals: 10% improvement in functional capacity - based on max METs achieved in fitness assessment, improved DASI score by 10%, Increase in exercise capacity by 40% - based on peak METs tolerated in cardiac rehab exercise session, Exercise 5 days/wk, >150mins/wk of moderate intensity exercise, Resume ADLs with increased strength and Attend Rehab regularly    Progression Toward Goals: Will continue to educate and progress as tolerated., Goals met: attends CR regulalrly and resumed ADLs. Education: benefit of exercise for CAD risk factors, home exercise guidelines, AHA guidelines to achieve >150 mins/wk of moderate exercise and RPE scale     Plan:education on home exercise guidelines, home exercise 30+ mins 2 days opposite CR and Education class: Risk Factors for Heart Disease     Readiness to change: Action:  (Changing behavior)      NUTRITION ASSESSMENT AND PLAN    Weight control:    Starting weight: 185   Current weight:  186  Waist circumference:    Starting: NA   Current:     Diabetes: T2D  A1c: 8.1    last measured: 4/24/2023    Lipid management: Discussed diet and lipid management and Last lipid profile 4/24/2023  Chol 162    HDL 37  LDL 74    Goals:reduced BMI to < 25, LDL <100, HDL >40, TRG <150, CHOL <200, fasting BG  and improved A1c  < 7.0%    Measurable goals were based Rate Your Plate Dietary Self-Assessment. These are the areas in which the patient could score higher on the assessment. Goals include recommendations for a heart healthy diet based on American Heart Association. Progression Toward Goals: Pt is progressing and showing improvement  toward the following goals:  improving A1C and Lipid Panel. Following a cardiac diet. .  , Will continue to educate and progress as tolerated.     Education: heart healthy eating  low sodium diet  hydration  nutrition for  lipid management  nutrition for Improved BG control  target goal for A1c <7.0  exercise and diabetes management   Plan: Education class: Reading Food Labels and Education Class: Heart Healthy Eating     Readiness to change: Action:  (Changing behavior)      PSYCHOSOCIAL ASSESSMENT AND PLAN    Emotional:  Depression assessment:  PHQ-9 = 6  5-9 = Mild Depression            Anxiety measure:  JIM-7 = 4  0-4  = Not anxious  Self-reported stress level:  210    Social support: Very Good    Goals:  Reduce perceived stress to 1-3/10, improved [facility] QOL < 27, Feelings in [facility] Score < 3, Physical Fitness in [facility] Score < 3, Social Support in [facility] Score < 3, Daily Activity in [facility] Score < 3, Social Activities in [facility] Score < 3, Pain in [facility] Score < 3, Overall Health in [facility] Score < 3, Quality of Life in [facility] Score < 3  and Change in Health in [place] Score < 3     Progression Toward Goals: Will continue to educate and progress as tolerated., Goals met: stress level remains low at 2/10.     Education: signs/sxs of depression, benefits of a positive support system, stress management techniques and depression and CAD  Plan: Class: Stress and Your Health and Class: Relaxation     Readiness to change: Action:  (Changing behavior)      OTHER CORE COMPONENTS     Tobacco:   Social History     Tobacco Use   Smoking Status Never   • Passive exposure: Never   Smokeless Tobacco Never       Tobacco Use Intervention:   N/A:  Patient is a non-smoker     Anginal Symptoms:  chest pressure and arm pain   NTG use: No prescription    Blood pressure:    Restin/66   Exercise: 144/72   Recovery: 120/58    Goals: consistent BP < 130/80, reduced dietary sodium <2300mg, moderate intensity exercise >150 mins/wk and medication compliance    Progression Toward Goals: Pt is progressing and showing improvement  toward the following goals:  resting BP consistently < 130/80.  , Will continue to educate and progress as tolerated., Goals met: no c/o angina, medicaton compliance and reduced daily sodium intake.  .    Education:  understanding high blood pressure and it's relationship to CAD, low sodium diet and HTN, Education class:  Common Heart Medications and Education class: Understanding Heart Disease     Plan: Class: Understanding Heart Disease and Class: Common Heart Medications     Readiness to change: Action:  (Changing behavior)

## 2023-09-20 ENCOUNTER — APPOINTMENT (OUTPATIENT)
Dept: CARDIAC REHAB | Facility: HOSPITAL | Age: 68
End: 2023-09-20
Payer: MEDICARE

## 2023-09-22 ENCOUNTER — CLINICAL SUPPORT (OUTPATIENT)
Dept: CARDIAC REHAB | Facility: HOSPITAL | Age: 68
End: 2023-09-22
Payer: MEDICARE

## 2023-09-22 DIAGNOSIS — I21.4 NON-STEMI (NON-ST ELEVATED MYOCARDIAL INFARCTION) (HCC): ICD-10-CM

## 2023-09-22 PROCEDURE — 93798 PHYS/QHP OP CAR RHAB W/ECG: CPT

## 2023-09-25 ENCOUNTER — CLINICAL SUPPORT (OUTPATIENT)
Dept: CARDIAC REHAB | Facility: HOSPITAL | Age: 68
End: 2023-09-25
Payer: MEDICARE

## 2023-09-25 DIAGNOSIS — I21.4 NON-STEMI (NON-ST ELEVATED MYOCARDIAL INFARCTION) (HCC): ICD-10-CM

## 2023-09-25 PROCEDURE — 93798 PHYS/QHP OP CAR RHAB W/ECG: CPT

## 2023-09-26 ENCOUNTER — CLINICAL SUPPORT (OUTPATIENT)
Dept: CARDIAC REHAB | Facility: HOSPITAL | Age: 68
End: 2023-09-26
Payer: MEDICARE

## 2023-09-26 DIAGNOSIS — I21.4 NON-STEMI (NON-ST ELEVATED MYOCARDIAL INFARCTION) (HCC): ICD-10-CM

## 2023-09-26 PROCEDURE — 93798 PHYS/QHP OP CAR RHAB W/ECG: CPT

## 2023-09-27 ENCOUNTER — APPOINTMENT (OUTPATIENT)
Dept: CARDIAC REHAB | Facility: HOSPITAL | Age: 68
End: 2023-09-27
Payer: MEDICARE

## 2023-09-29 ENCOUNTER — CLINICAL SUPPORT (OUTPATIENT)
Dept: CARDIAC REHAB | Facility: HOSPITAL | Age: 68
End: 2023-09-29
Payer: MEDICARE

## 2023-09-29 DIAGNOSIS — I21.4 NON-STEMI (NON-ST ELEVATED MYOCARDIAL INFARCTION) (HCC): Primary | ICD-10-CM

## 2023-09-29 PROCEDURE — 93798 PHYS/QHP OP CAR RHAB W/ECG: CPT

## 2023-10-02 ENCOUNTER — APPOINTMENT (OUTPATIENT)
Dept: LAB | Facility: HOSPITAL | Age: 68
End: 2023-10-02
Payer: MEDICARE

## 2023-10-02 DIAGNOSIS — E11.65 TYPE 2 DIABETES MELLITUS WITH HYPERGLYCEMIA, WITH LONG-TERM CURRENT USE OF INSULIN (HCC): ICD-10-CM

## 2023-10-02 DIAGNOSIS — Z79.4 TYPE 2 DIABETES MELLITUS WITH HYPERGLYCEMIA, WITH LONG-TERM CURRENT USE OF INSULIN (HCC): ICD-10-CM

## 2023-10-02 LAB
ANION GAP SERPL CALCULATED.3IONS-SCNC: 8 MMOL/L
BUN SERPL-MCNC: 18 MG/DL (ref 5–25)
CALCIUM SERPL-MCNC: 9 MG/DL (ref 8.4–10.2)
CHLORIDE SERPL-SCNC: 107 MMOL/L (ref 96–108)
CO2 SERPL-SCNC: 25 MMOL/L (ref 21–32)
CREAT SERPL-MCNC: 0.76 MG/DL (ref 0.6–1.3)
EST. AVERAGE GLUCOSE BLD GHB EST-MCNC: 192 MG/DL
GFR SERPL CREATININE-BSD FRML MDRD: 95 ML/MIN/1.73SQ M
GLUCOSE P FAST SERPL-MCNC: 126 MG/DL (ref 65–99)
HBA1C MFR BLD: 8.3 %
POTASSIUM SERPL-SCNC: 3.9 MMOL/L (ref 3.5–5.3)
SODIUM SERPL-SCNC: 140 MMOL/L (ref 135–147)

## 2023-10-02 PROCEDURE — 80048 BASIC METABOLIC PNL TOTAL CA: CPT

## 2023-10-02 PROCEDURE — 83036 HEMOGLOBIN GLYCOSYLATED A1C: CPT

## 2023-10-02 PROCEDURE — 36415 COLL VENOUS BLD VENIPUNCTURE: CPT

## 2023-10-03 ENCOUNTER — APPOINTMENT (OUTPATIENT)
Dept: CARDIAC REHAB | Facility: HOSPITAL | Age: 68
End: 2023-10-03
Payer: MEDICARE

## 2023-10-03 ENCOUNTER — CLINICAL SUPPORT (OUTPATIENT)
Dept: CARDIAC REHAB | Facility: HOSPITAL | Age: 68
End: 2023-10-03
Payer: MEDICARE

## 2023-10-03 DIAGNOSIS — I21.4 NON-STEMI (NON-ST ELEVATED MYOCARDIAL INFARCTION) (HCC): ICD-10-CM

## 2023-10-03 PROCEDURE — 93798 PHYS/QHP OP CAR RHAB W/ECG: CPT

## 2023-10-04 ENCOUNTER — CLINICAL SUPPORT (OUTPATIENT)
Dept: CARDIAC REHAB | Facility: HOSPITAL | Age: 68
End: 2023-10-04
Payer: MEDICARE

## 2023-10-04 DIAGNOSIS — I21.4 NON-STEMI (NON-ST ELEVATED MYOCARDIAL INFARCTION) (HCC): ICD-10-CM

## 2023-10-04 PROCEDURE — 93798 PHYS/QHP OP CAR RHAB W/ECG: CPT

## 2023-10-05 ENCOUNTER — OFFICE VISIT (OUTPATIENT)
Dept: CARDIOLOGY CLINIC | Facility: CLINIC | Age: 68
End: 2023-10-05
Payer: MEDICARE

## 2023-10-05 ENCOUNTER — HOSPITAL ENCOUNTER (EMERGENCY)
Facility: HOSPITAL | Age: 68
Discharge: HOME | End: 2023-10-05
Attending: EMERGENCY MEDICINE
Payer: MEDICARE

## 2023-10-05 ENCOUNTER — APPOINTMENT (OUTPATIENT)
Dept: LAB | Facility: HOSPITAL | Age: 68
End: 2023-10-05
Payer: MEDICARE

## 2023-10-05 VITALS
TEMPERATURE: 96.8 F | OXYGEN SATURATION: 97 % | DIASTOLIC BLOOD PRESSURE: 75 MMHG | HEART RATE: 68 BPM | RESPIRATION RATE: 18 BRPM | SYSTOLIC BLOOD PRESSURE: 152 MMHG

## 2023-10-05 VITALS
HEIGHT: 71 IN | HEART RATE: 60 BPM | BODY MASS INDEX: 26.6 KG/M2 | DIASTOLIC BLOOD PRESSURE: 84 MMHG | WEIGHT: 190 LBS | SYSTOLIC BLOOD PRESSURE: 122 MMHG

## 2023-10-05 DIAGNOSIS — I21.4 NON-STEMI (NON-ST ELEVATED MYOCARDIAL INFARCTION) (HCC): ICD-10-CM

## 2023-10-05 DIAGNOSIS — R04.0 EPISTAXIS: Primary | ICD-10-CM

## 2023-10-05 DIAGNOSIS — I10 BENIGN ESSENTIAL HYPERTENSION: ICD-10-CM

## 2023-10-05 DIAGNOSIS — E78.2 MIXED HYPERLIPIDEMIA: ICD-10-CM

## 2023-10-05 LAB
BASOPHILS # BLD AUTO: 0.06 THOUSANDS/ÂΜL (ref 0–0.1)
BASOPHILS NFR BLD AUTO: 1 % (ref 0–1)
EOSINOPHIL # BLD AUTO: 1.05 THOUSAND/ÂΜL (ref 0–0.61)
EOSINOPHIL NFR BLD AUTO: 17 % (ref 0–6)
ERYTHROCYTE [DISTWIDTH] IN BLOOD BY AUTOMATED COUNT: 14.3 % (ref 11.6–15.1)
HCT VFR BLD AUTO: 42.5 % (ref 36.5–49.3)
HGB BLD-MCNC: 13.4 G/DL (ref 12–17)
IMM GRANULOCYTES # BLD AUTO: 0 THOUSAND/UL (ref 0–0.2)
IMM GRANULOCYTES NFR BLD AUTO: 0 % (ref 0–2)
LYMPHOCYTES # BLD AUTO: 1.64 THOUSANDS/ÂΜL (ref 0.6–4.47)
LYMPHOCYTES NFR BLD AUTO: 27 % (ref 14–44)
MCH RBC QN AUTO: 28.1 PG (ref 26.8–34.3)
MCHC RBC AUTO-ENTMCNC: 31.5 G/DL (ref 31.4–37.4)
MCV RBC AUTO: 89 FL (ref 82–98)
MONOCYTES # BLD AUTO: 0.68 THOUSAND/ÂΜL (ref 0.17–1.22)
MONOCYTES NFR BLD AUTO: 11 % (ref 4–12)
NEUTROPHILS # BLD AUTO: 2.7 THOUSANDS/ÂΜL (ref 1.85–7.62)
NEUTS SEG NFR BLD AUTO: 44 % (ref 43–75)
NRBC BLD AUTO-RTO: 0 /100 WBCS
PLATELET # BLD AUTO: 176 THOUSANDS/UL (ref 149–390)
PMV BLD AUTO: 8.7 FL (ref 8.9–12.7)
RBC # BLD AUTO: 4.77 MILLION/UL (ref 3.88–5.62)
WBC # BLD AUTO: 6.13 THOUSAND/UL (ref 4.31–10.16)

## 2023-10-05 PROCEDURE — 85025 COMPLETE CBC W/AUTO DIFF WBC: CPT | Performed by: NURSE PRACTITIONER

## 2023-10-05 PROCEDURE — 99282 EMERGENCY DEPT VISIT SF MDM: CPT

## 2023-10-05 PROCEDURE — 36415 COLL VENOUS BLD VENIPUNCTURE: CPT | Performed by: NURSE PRACTITIONER

## 2023-10-05 PROCEDURE — 99214 OFFICE O/P EST MOD 30 MIN: CPT | Performed by: NURSE PRACTITIONER

## 2023-10-05 PROCEDURE — 99283 EMERGENCY DEPT VISIT LOW MDM: CPT | Performed by: EMERGENCY MEDICINE

## 2023-10-05 NOTE — ASSESSMENT & PLAN NOTE
Blood pressure readings are well controlled  Continue losartan 25 mg daily  Given concern for low blood pressure readings at cardiac rehab, will continue to monitor and further reduce losartan to 12.5 mg daily if needed

## 2023-10-05 NOTE — ED PROVIDER NOTES
History  Chief Complaint   Patient presents with   • Nose Bleed     Approx 4 spontaneous nose bleeds today, takes eliquis and plavix daily. Denies any injury. 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 Notes that he had 1 episode of short-lived epistaxis a few days ago and then today had x4 episodes, has been appropriately leaning forward impinging the nasal ala for 10-15 minutes at a time, using azelastine nasal spray, using saline nasal spray, using steamer to humidify nasal passages, using ENT-recommended moisturizing lotion applied via cotton swab. The only poor nasal hygiene activity seems to do is that one night he scratched the inside of his nose to get at an itch and then it began bleeding. Recommended that he stop doing this and instead use more moisturizing lotion via cotton swab for itches. Recommended that he follow-up with ENT who mentioned cauterization in the past should his nosebleeds not cease. Prior to Admission Medications   Prescriptions Last Dose Informant Patient Reported? Taking? BD Pen Needle Kiarra U/F 32G X 4 MM MISC  Self, Spouse/Significant Other Yes No   Blood Glucose Monitoring Suppl ([facility] Blood Glucose Monitor) KEYANNA   Yes No   Sig: Use   Empagliflozin (JARDIANCE PO)  Self, Spouse/Significant Other Yes No   Sig: Take 25 mg by mouth     HumaLOG KwikPen 100 units/mL injection pen   No No   Sig: Inject 9 units with supper plus scale.  TDD 20 units   Insulin Glargine Solostar (Lantus SoloStar) 100 UNIT/ML SOPN   No No   Sig: Inject 0.22 mL (22 Units total) under the skin daily   Insulin Lispro w/ Trans Port 100 UNIT/ML SOPN   Yes No   Sig: Inject 6 Units under the skin   Patient not taking: Reported on 2023   Lancets (OneTouch Delica Plus [id number]) MISC  Self, Spouse/Significant Other Yes No   Si (two) times a day Use to test   Patient not taking: Reported on 2023   VITAMIN D PO   Yes No   Sig: Take by mouth   apixaban (ELIQUIS) 5 mg  Self No No   Sig: Take 1 tablet by mouth 2 (two) times a day for 30 days 2 tabs PO BID x 7 days, then 1 tab PO BID   ascorbic acid (VITAMIN C) 250 mg tablet  Self, Spouse/Significant Other Yes No   Sig: Take 500 mg by mouth daily   clopidogrel (PLAVIX) 75 mg tablet   No No   Sig: Take 1 tablet (75 mg total) by mouth daily   ezetimibe (ZETIA) 10 mg tablet   No No   Sig: Take 1 tablet (10 mg total) by mouth daily at bedtime   fluticasone (FLONASE) 50 mcg/act nasal spray  Self, Spouse/Significant Other Yes No   Si sprays into each nostril   losartan (COZAAR) 50 mg tablet  Self, Spouse/Significant Other Yes No   Sig: Take 50 mg by mouth   lovastatin (MEVACOR) 40 MG tablet  Self, Spouse/Significant Other Yes No   Sig: Take 40 mg by mouth daily at bedtime   metFORMIN (GLUCOPHAGE) 1000 MG tablet  Self, Spouse/Significant Other Yes No   Sig: Take 1,000 mg by mouth 2 (two) times a day with meals   metoprolol tartrate (LOPRESSOR) 25 mg tablet   No No   Sig: Take 1 tablet (25 mg total) by mouth every 12 (twelve) hours   olopatadine (PATANOL) 0.1 % ophthalmic solution  Self, Spouse/Significant Other Yes No   Si drop 2 (two) times a day   Patient not taking: Reported on 2023   omeprazole (PriLOSEC) 40 MG capsule   No No   Sig: Take 1 capsule (40 mg total) by mouth daily      Facility-Administered Medications: None       Past Medical History:   Diagnosis Date   • BPH without obstruction/lower urinary tract symptoms    • CAD (coronary artery disease)     s/p JOHNATHAN distal RCA and JOHNATHAN RPAV 2023   • Diabetes mellitus ([address])    • Dysuria    • GERD (gastroesophageal reflux disease)    • Gout    • Heart attack ([address])    • History of melanoma     left ear - surgically removed   • History of pulmonary embolism 2017    bilateral   • Hyperlipidemia    • Hypertension    • Melanoma ([address])  left ear   • Prostate cancer [facility])    • Sleep apnea        Past Surgical History:   Procedure Laterality Date   • CARDIAC CATHETERIZATION Left 8/11/2023    Procedure: Cardiac Left Heart Cath;  Surgeon: Otto Song DO;  [facility]; Service: Cardiology   • CARDIAC CATHETERIZATION N/A 8/11/2023    Procedure: Cardiac Coronary Angiogram;  Surgeon: Otto Song DO;  [facility]; Service: Cardiology   • CARDIAC CATHETERIZATION N/A 8/11/2023    Procedure: Cardiac pci;  Surgeon: Otto Song DO;  [facility]; Service: Cardiology   • CARDIAC CATHETERIZATION N/A 8/11/2023    Procedure: Cardiac other - IVUS;  Surgeon: Otto Song DO;  [facility]; Service: Cardiology   • CHOLECYSTECTOMY     • COLONOSCOPY  10/2016    sessile serrated polyp removed from the proximal transverse, adenoma removed from the distal transverse   • EGD  08/2017    gastritis, H. pylori negative, and Candida esophagitis   • EGD  11/2011    slight Schatzki's ring, small hiatal hernia   • EXTERNAL EAR SURGERY Left 08/2017    for the removal of skin melanoma-Plastic surgery       Family History   Problem Relation Age of Onset   • Hyperlipidemia Mother    • Stroke Father    • Melanoma Father    • Diabetes Sister    • Hypertension Sister    • Diabetes Brother    • JETT disease Brother      I have reviewed and agree with the history as documented. E-Cigarette/Vaping   • E-Cigarette Use Never User      E-Cigarette/Vaping Substances   • Nicotine No    • THC No    • CBD No    • Flavoring No    • Other No    • Unknown No      Social History     Tobacco Use   • Smoking status: Never     Passive exposure: Never   • Smokeless tobacco: Never   Vaping Use   • Vaping Use: Never used   Substance Use Topics   • Alcohol use: Yes     Comment: Drinks socailly. • Drug use: No        Review of Systems   Constitutional: Negative for chills and fever.    HENT: Positive for nosebleeds. Negative for ear pain, postnasal drip, rhinorrhea, sinus pressure, sinus pain, sneezing and sore throat. Eyes: Negative for pain and visual disturbance. Respiratory: Negative for shortness of breath. Cardiovascular: Negative for chest pain and palpitations. Gastrointestinal: Negative for abdominal pain, constipation, diarrhea, nausea and vomiting. Skin: Negative for pallor. Neurological: Negative for dizziness and light-headedness. Psychiatric/Behavioral: Negative for confusion. All other systems reviewed and are negative. Physical Exam  ED Triage Vitals   Temperature Pulse Respirations Blood Pressure SpO2   10/05/23 1518 10/05/23 1518 10/05/23 1518 10/05/23 1519 10/05/23 1518   (!) 96.8 °F (36 °C) 68 18 152/75 97 %      Temp Source Heart Rate Source Patient Position - Orthostatic VS BP Location FiO2 (%)   10/05/23 1518 10/05/23 1518 10/05/23 1518 10/05/23 1518 --   Temporal Monitor Lying Left arm       Pain Score       10/05/23 1518       No Pain             Orthostatic Vital Signs  Vitals:    10/05/23 1518 10/05/23 1519   BP:  152/75   Pulse: 68    Patient Position - Orthostatic VS: Lying        Physical Exam  Vitals and nursing note reviewed. Constitutional:       General: He is not in acute distress. Appearance: Normal appearance. He is well-developed. He is not ill-appearing. HENT:      Head: Normocephalic and atraumatic. Right Ear: Tympanic membrane, ear canal and external ear normal. There is no impacted cerumen. Left Ear: Tympanic membrane, ear canal and external ear normal. There is no impacted cerumen. Nose: No congestion or rhinorrhea. Comments: Clotted blood with no active bleeding within L nare     Mouth/Throat:      Mouth: Mucous membranes are moist.      Pharynx: Oropharynx is clear. No oropharyngeal exudate or posterior oropharyngeal erythema. Eyes:      General: No scleral icterus. Right eye: No discharge.          Left eye: No discharge. Conjunctiva/sclera: Conjunctivae normal.   Cardiovascular:      Rate and Rhythm: Normal rate and regular rhythm. Pulses: Normal pulses. Heart sounds: Normal heart sounds. No murmur heard. No friction rub. No gallop. Pulmonary:      Effort: Pulmonary effort is normal. No respiratory distress. Breath sounds: Normal breath sounds. No stridor. No wheezing, rhonchi or rales. Musculoskeletal:      Cervical back: Neck supple. Skin:     General: Skin is warm and dry. Capillary Refill: Capillary refill takes less than 2 seconds. Neurological:      Mental Status: He is alert. Comments: No facial droop, slurred speech or gross focal deficits noted   Psychiatric:         Mood and Affect: Mood normal.         Behavior: Behavior normal.         ED Medications  Medications - No data to display    Diagnostic Studies  Results Reviewed     None                 No orders to display         Procedures  Procedures      ED Course                                       Medical Decision Making  3:47 PM Pt is not bleeding at this moment, no azelastine thromboxane A2 or epinephrine needed at this time. Patient is stable with normal hemoglobin and platelet counts, will discharge with recommendation for ENT follow-up for further management. Amount and/or Complexity of Data Reviewed  Labs: ordered. Disposition  Final diagnoses:   None     ED Disposition     None      Follow-up Information    None         Patient's Medications   Discharge Prescriptions    No medications on file     No discharge procedures on file. PDMP Review     None           ED Provider  Attending physically available and evaluated Juan Nieves. I managed the patient along with the ED Attending.     Electronically Signed by         Cierra Sellers MD  10/05/23 1927

## 2023-10-05 NOTE — ASSESSMENT & PLAN NOTE
Status post stenting of the RCA  Continue Zetia and lovastatin as patient has intolerance to atorvastatin and rosuvastatin  Continue Plavix, not on aspirin as he is on Eliquis  Continue beta-blocker

## 2023-10-05 NOTE — PROGRESS NOTES
Patient ID: Michael Major is a 79 y.o. male. Plan:      Mixed hyperlipidemia  Continue Zetia and Mevacor    Benign essential hypertension  Blood pressure readings are well controlled  Continue losartan 25 mg daily  Given concern for low blood pressure readings at cardiac rehab, will continue to monitor and further reduce losartan to 12.5 mg daily if needed    Non-STEMI (non-ST elevated myocardial infarction) ([address])  Status post stenting of the RCA  Continue Zetia and lovastatin as patient has intolerance to atorvastatin and rosuvastatin  Continue Plavix, not on aspirin as he is on Eliquis  Continue beta-blocker       Follow up Plan/Summary Comments:  After reviewing the blood pressure record from cardiac rehab, I feel that his blood pressures are well controlled and not too low. He started monitoring his blood pressure at home and will keep track of things. I do not feel that his evening fatigue is related to hypotension. If his readings at home are consistently low or if his fatigue does not improve, we can trial further reducing his losartan to 12.5 mg daily. He has no symptoms concerning for recurrent angina. Advised continuing cardiac rehab. I recommended continuing his current regimen for his nosebleeds. If this does not settle down in the next few days, he is to let us know as well as his ENT provider. I have given him a slip for CBC given his nosebleeds and fatigue. A recent TSH was unremarkable. Of note, he reports being on several courses of antibiotics for various reasons within the last few months. Advised follow-up with PCP for his URI symptoms and further evaluation of his fatigue if deemed necessary. Follow-up in our office in 3 months or sooner if needed. HPI: Patrice Weaver presents today for evaluation of low blood pressure readings. I last saw Ed 8/21/2023 for a hospital follow-up visit following stenting of the RCA.   His blood pressure was noted to be low at that time and losartan dose was reduced from 50 to 25 mg daily. He has been attending cardiac rehab and he reports that his blood pressure readings have been low and it was suggested that he follow-up with cardiology. Ed is asymptomatic with these reported low blood pressure readings. He has been doing well in cardiac rehab. He brought along a report of his blood pressure readings which I reviewed today. His lowest rest blood pressure was 118/56. Overall blood pressure readings appear to be in the low 120's over 60's. Ed brought forth several other concerns during his visit today. He had a few episodes of epistaxis including one earlier today. Prior to his stenting, he had been maintained on Eliquis alone without any issues. Since the addition of Plavix to his medication regimen, he has had several episodes of nosebleeding. He recently saw his ENT doctor who recommended using Ayre to moisten nasal passages and to use a cottonball soaked in Afrin nasal spray if bleeding is persistent. He also several weeks, he has been feeling tired in the evening. He reports that he had similar symptoms prior to his stenting but since the stenting, has never returned to his baseline. He denies any other cardiac symptoms. His wife notes that he has been increasing his activity to more than what he was doing prior to the stent and attributes the fatigue to this. Lastly he reports a productive cough and "cold symptoms" for the last 2 days. Review of Systems   10  point ROS  was otherwise non pertinent or negative except as per HPI or as below.    Gait: Normal      Most recent or relevant cardiac/vascular testing:    Cardiac catheterization 8/11/2023  Status post successful IVUS guided PCI to the distal RCA/ostial RPLB with JOHNATHAN x 2     Echo 8/12/2023  EF 55%  Hypokinesis of the distal inferior wall  Mild LAE  Mild MR  Aortic root mildly dilated at 43 mm, ascending aorta mildly dilated at 43 mm      Objective: /84   Pulse 60   Ht 5' 11" (1.803 m)   Wt 86.2 kg (190 lb)   BMI 26.50 kg/m²     PHYSICAL EXAM:    General:  Normal appearance, no acute distress  Eyes:  Anicteric. Oral mucosa:  Moist.  Neck:  No JVD. Carotid upstrokes are brisk without bruits. No masses. Chest:  Clear to auscultation   Cardiac:  No palpable PMI. Normal S1 and S2. No murmur gallop or rub. Abdomen:  Soft and nontender. No palpable organomegaly or aortic enlargement. Extremities:  No peripheral edema. Musculoskeletal:  Symmetric. Vascular:  Pedal pulses are intact. Neuro:  Grossly symmetric. Psych:  Alert and oriented x3. Allergies   Allergen Reactions   • Crestor [Rosuvastatin] Rash and Hives     Other reaction(s): Urticaria   • Enalapril Cough   • Lipitor [Atorvastatin] Rash     pain       Current Outpatient Medications:   •  apixaban (ELIQUIS) 5 mg, Take 1 tablet by mouth 2 (two) times a day for 30 days 2 tabs PO BID x 7 days, then 1 tab PO BID, Disp: 60 tablet, Rfl: 0  •  ascorbic acid (VITAMIN C) 250 mg tablet, Take 500 mg by mouth daily, Disp: , Rfl:   •  BD Pen Needle Kiarra U/F 32G X 4 MM MISC, , Disp: , Rfl:   •  Blood Glucose Monitoring Suppl ([facility] Blood Glucose Monitor) KEYANNA, Use, Disp: , Rfl:   •  clopidogrel (PLAVIX) 75 mg tablet, Take 1 tablet (75 mg total) by mouth daily, Disp: 90 tablet, Rfl: 3  •  Empagliflozin (JARDIANCE PO), Take 25 mg by mouth  , Disp: , Rfl:   •  ezetimibe (ZETIA) 10 mg tablet, Take 1 tablet (10 mg total) by mouth daily at bedtime, Disp: 90 tablet, Rfl: 3  •  fluticasone (FLONASE) 50 mcg/act nasal spray, 2 sprays into each nostril, Disp: , Rfl:   •  HumaLOG KwikPen 100 units/mL injection pen, Inject 9 units with supper plus scale.  TDD 20 units, Disp: 15 mL, Rfl: 1  •  Insulin Glargine Solostar (Lantus SoloStar) 100 UNIT/ML SOPN, Inject 0.22 mL (22 Units total) under the skin daily, Disp: 19.8 mL, Rfl: 1  •  losartan (COZAAR) 50 mg tablet, Take 50 mg by mouth, Disp: , Rfl:   • lovastatin (MEVACOR) 40 MG tablet, Take 40 mg by mouth daily at bedtime, Disp: , Rfl:   •  metFORMIN (GLUCOPHAGE) 1000 MG tablet, Take 1,000 mg by mouth 2 (two) times a day with meals, Disp: , Rfl:   •  metoprolol tartrate (LOPRESSOR) 25 mg tablet, Take 1 tablet (25 mg total) by mouth every 12 (twelve) hours, Disp: 180 tablet, Rfl: 3  •  omeprazole (PriLOSEC) 40 MG capsule, Take 1 capsule (40 mg total) by mouth daily, Disp: 90 capsule, Rfl: 3  •  VITAMIN D PO, Take by mouth, Disp: , Rfl:   •  Insulin Lispro w/ Trans Port 100 UNIT/ML SOPN, Inject 6 Units under the skin (Patient not taking: Reported on 8/29/2023), Disp: , Rfl:   •  Lancets (OneTouch Delica Plus [id number]) MISC, 2 (two) times a day Use to test (Patient not taking: Reported on 8/29/2023), Disp: , Rfl:   •  olopatadine (PATANOL) 0.1 % ophthalmic solution, 1 drop 2 (two) times a day (Patient not taking: Reported on 8/29/2023), Disp: , Rfl:   Past Medical History:   Diagnosis Date   • BPH without obstruction/lower urinary tract symptoms    • CAD (coronary artery disease)     s/p JOHNATHAN distal RCA and JOHNATHAN RPAV 8/11/2023   • Diabetes mellitus ([address])    • Dysuria    • GERD (gastroesophageal reflux disease)    • Gout    • Heart attack ([address]) 2023   • History of melanoma     left ear - surgically removed   • History of pulmonary embolism 2017    bilateral   • Hyperlipidemia    • Hypertension    • Melanoma ([address]) 2017    left ear   • Prostate cancer [facility])    • Sleep apnea      Past Surgical History:   Procedure Laterality Date   • CARDIAC CATHETERIZATION Left 8/11/2023    Procedure: Cardiac Left Heart Cath;  Surgeon: Gwendolyn Wynne DO;  [facility]; Service: Cardiology   • CARDIAC CATHETERIZATION N/A 8/11/2023    Procedure: Cardiac Coronary Angiogram;  Surgeon: Gwendolyn Wynne DO;  [facility];   Service: Cardiology   • CARDIAC CATHETERIZATION N/A 8/11/2023    Procedure: Cardiac pci;  Surgeon: Gwendolyn Wynne DO; [facility]; Service: Cardiology   • CARDIAC CATHETERIZATION N/A 8/11/2023    Procedure: Cardiac other - IVUS;  Surgeon: Hiwot Mays DO;  [facility];   Service: Cardiology   • CHOLECYSTECTOMY     • COLONOSCOPY  10/2016    sessile serrated polyp removed from the proximal transverse, adenoma removed from the distal transverse   • EGD  08/2017    gastritis, H. pylori negative, and Candida esophagitis   • EGD  11/2011    slight Schatzki's ring, small hiatal hernia   • EXTERNAL EAR SURGERY Left 08/2017    for the removal of skin melanoma-Plastic surgery       CMP:   Lab Results   Component Value Date     12/31/2015    K 3.9 10/02/2023    K 4.1 12/31/2015     10/02/2023     12/31/2015    CO2 25 10/02/2023    CO2 30.5 12/31/2015    BUN 18 10/02/2023    BUN 16 12/31/2015    CREATININE 0.76 10/02/2023    CREATININE 0.77 12/31/2015    GLUCOSE 118 12/31/2015    EGFR 95 10/02/2023     Lipid Profile:    Lab Results   Component Value Date    CHOL 147 12/31/2015    TRIG 180 (H) 09/16/2023    TRIG 98 12/31/2015    HDL 37 (L) 09/16/2023    HDL 40 12/31/2015         Social History     Tobacco Use   Smoking Status Never   • Passive exposure: Never   Smokeless Tobacco Never

## 2023-10-06 ENCOUNTER — APPOINTMENT (OUTPATIENT)
Dept: CARDIAC REHAB | Facility: HOSPITAL | Age: 68
End: 2023-10-06
Payer: MEDICARE

## 2023-10-07 NOTE — ED ATTENDING ATTESTATION
10/5/2023  I, Zenia Mendez MD, saw and evaluated the patient. I have discussed the patient with the resident/non-physician practitioner and agree with the resident's/non-physician practitioner's findings, Plan of Care, and MDM as documented in the resident's/non-physician practitioner's note, except where noted. All available labs and Radiology studies were reviewed. I was present for key portions of any procedure(s) performed by the resident/non-physician practitioner and I was immediately available to provide assistance. At this point I agree with the current assessment done in the Emergency Department. I have conducted an independent evaluation of this patient a history and physical is as follows:    80 yo M with past medical history of CAD on plavix and hx of PE on Eliquis who presents for evaluation of epistaxis. He has been having frequent nosebleeds over the past few weeks. He has four episodes today. Each episode stopped with direct pressure. He has been seen by ENT who recommended using afrin soaked cotton balls and direct pressure. He had recent nstemi and had stent placed so needs to remain on plavix. Physical exam:  Vitals reviewed, within normal limits. Patient is awake and alert, no acute distress, mucous membranes moist, small amount of clot visualized on the septum of the left nare, no active bleeding, neck supple, heart regular rate and rhythm, lungs clear to auscultation, abd soft, non distended, no peripheral edema, no skin rashes, no neuro deficits. Assessment/plan:  80 yo M with past medical history of CAD on plavix and hx of PE on Eliquis who presents for evaluation of epistaxis, had four episodes today, no longer bleeding currently. Recommend afrin and direct pressure for further episodes of nosebleeds. Recommend ENT follow up. Discussed strict return precautions.      ED Course         Critical Care Time  Procedures

## 2023-10-09 ENCOUNTER — APPOINTMENT (OUTPATIENT)
Dept: CARDIAC REHAB | Facility: HOSPITAL | Age: 68
End: 2023-10-09
Payer: MEDICARE

## 2023-10-09 ENCOUNTER — TELEPHONE (OUTPATIENT)
Dept: CARDIOLOGY CLINIC | Facility: CLINIC | Age: 68
End: 2023-10-09

## 2023-10-09 NOTE — TELEPHONE ENCOUNTER
Patient's wife called. He has been having frequent nosebleeds and will be seeing ENT today hopefully to be cauterized. Please call his wife to discuss.

## 2023-10-09 NOTE — TELEPHONE ENCOUNTER
Spoke with pt's wife--since starting Plavix, he has had several spontaneous nosebleeds. He has required ENT evaluation and cautery. Further discussion with pt's wife reveals that pt was started on Eliquis in 2017 after a PE was discovered incidentally on imaging. He was evaluated by Hematology at that time and told he could discontinue AC after a period of time, however, he chose to continue. He never experienced issues until Plavix was recently added following JOHNATHAN placement. I recommended that he stop Eliquis if he was told he could in the past and continue with DAPT (asa and Plavix) given his recent cardiac stent. I also recommended that he follow up with hematology.

## 2023-10-10 ENCOUNTER — TELEPHONE (OUTPATIENT)
Dept: HEMATOLOGY ONCOLOGY | Facility: CLINIC | Age: 68
End: 2023-10-10

## 2023-10-10 NOTE — TELEPHONE ENCOUNTER
Patient wife called to make an appt but he has not been seen since 2017. I explained we either need the correct dx to make self referral or provided the fax to have the referral sent in.

## 2023-10-11 ENCOUNTER — APPOINTMENT (OUTPATIENT)
Dept: CARDIAC REHAB | Facility: HOSPITAL | Age: 68
End: 2023-10-11
Payer: MEDICARE

## 2023-10-11 ENCOUNTER — OFFICE VISIT (OUTPATIENT)
Dept: URGENT CARE | Facility: MEDICAL CENTER | Age: 68
End: 2023-10-11
Payer: MEDICARE

## 2023-10-11 VITALS
BODY MASS INDEX: 25.77 KG/M2 | TEMPERATURE: 97.5 F | SYSTOLIC BLOOD PRESSURE: 138 MMHG | HEART RATE: 68 BPM | OXYGEN SATURATION: 98 % | WEIGHT: 180 LBS | DIASTOLIC BLOOD PRESSURE: 96 MMHG | RESPIRATION RATE: 20 BRPM | HEIGHT: 70 IN

## 2023-10-11 DIAGNOSIS — J01.41 ACUTE RECURRENT PANSINUSITIS: Primary | ICD-10-CM

## 2023-10-11 PROCEDURE — G0463 HOSPITAL OUTPT CLINIC VISIT: HCPCS | Performed by: STUDENT IN AN ORGANIZED HEALTH CARE EDUCATION/TRAINING PROGRAM

## 2023-10-11 PROCEDURE — 99213 OFFICE O/P EST LOW 20 MIN: CPT | Performed by: STUDENT IN AN ORGANIZED HEALTH CARE EDUCATION/TRAINING PROGRAM

## 2023-10-11 RX ORDER — AMOXICILLIN AND CLAVULANATE POTASSIUM 875; 125 MG/1; MG/1
1 TABLET, FILM COATED ORAL EVERY 12 HOURS SCHEDULED
Qty: 28 TABLET | Refills: 0 | Status: SHIPPED | OUTPATIENT
Start: 2023-10-11 | End: 2023-10-25

## 2023-10-11 NOTE — PROGRESS NOTES
[facility]        NAME: Monico Son is a 79 y.o. male  : 1955    MRN: [id number]  DATE: 2023  TIME: 9:56 AM    Assessment and Plan   Acute recurrent pansinusitis [J01.41]  1. Acute recurrent pansinusitis  amoxicillin-clavulanate (AUGMENTIN) 875-125 mg per tablet          Due to prolonged course of almost 2 weeks and multiple[e comorbid conditions, will start antibiotic tx for suspected bacterial etiology. This is pt's 3rd episode this year so I have recommended that he discuss recurrent sinus infections with ENT to discuss preventive measures. No saline rinse or other intranasal tx given current epistaxis. Patient Instructions       Follow up with PCP in 3-5 days. Proceed to  ER if symptoms worsen. Chief Complaint     Chief Complaint   Patient presents with    Cold Like Symptoms     Nasal congestion, cough 1-1 1/2 weeks. Taking OTC meds. Not working. History of Present Illness       HPI    Pt presents to clinic for symptoms going on for 12-13 days now. Reports sinus pain and pressure, nasal congestion, rhinorrhea, productive cough. Reports takign OTC mucinex and delsym which is helping a little but delsym is causing blood sugar to elevate. Denies chest pain, dyspnea, Gi symptoms. Has ongoing epistaxis in left nare which is being managed by ENT, eliquis on hold, no bleeding at this time. Review of Systems   Review of Systems   Constitutional:  Negative for chills and fever. HENT:  Positive for congestion, postnasal drip, rhinorrhea, sinus pressure and sinus pain. Negative for ear pain and sore throat. Eyes:  Negative for pain and visual disturbance. Respiratory:  Positive for cough. Negative for shortness of breath. Cardiovascular:  Negative for chest pain and palpitations. Gastrointestinal:  Negative for abdominal pain and vomiting. Genitourinary:  Negative for dysuria and hematuria. Musculoskeletal:  Negative for arthralgias and back pain. Skin:  Negative for color change and rash. Neurological:  Negative for seizures and syncope. All other systems reviewed and are negative. Current Medications       Current Outpatient Medications:     amoxicillin-clavulanate (AUGMENTIN) 875-125 mg per tablet, Take 1 tablet by mouth every 12 (twelve) hours for 14 days, Disp: 28 tablet, Rfl: 0    ascorbic acid (VITAMIN C) 250 mg tablet, Take 500 mg by mouth daily, Disp: , Rfl:     clopidogrel (PLAVIX) 75 mg tablet, Take 1 tablet (75 mg total) by mouth daily, Disp: 90 tablet, Rfl: 3    Empagliflozin (JARDIANCE PO), Take 25 mg by mouth  , Disp: , Rfl:     ezetimibe (ZETIA) 10 mg tablet, Take 1 tablet (10 mg total) by mouth daily at bedtime, Disp: 90 tablet, Rfl: 3    fluticasone (FLONASE) 50 mcg/act nasal spray, 2 sprays into each nostril, Disp: , Rfl:     HumaLOG KwikPen 100 units/mL injection pen, Inject 9 units with supper plus scale.  TDD 20 units, Disp: 15 mL, Rfl: 1    Insulin Glargine Solostar (Lantus SoloStar) 100 UNIT/ML SOPN, Inject 0.22 mL (22 Units total) under the skin daily, Disp: 19.8 mL, Rfl: 1    losartan (COZAAR) 50 mg tablet, Take 50 mg by mouth, Disp: , Rfl:     lovastatin (MEVACOR) 40 MG tablet, Take 40 mg by mouth daily at bedtime, Disp: , Rfl:     metFORMIN (GLUCOPHAGE) 1000 MG tablet, Take 1,000 mg by mouth 2 (two) times a day with meals, Disp: , Rfl:     metoprolol tartrate (LOPRESSOR) 25 mg tablet, Take 1 tablet (25 mg total) by mouth every 12 (twelve) hours, Disp: 180 tablet, Rfl: 3    omeprazole (PriLOSEC) 40 MG capsule, Take 1 capsule (40 mg total) by mouth daily, Disp: 90 capsule, Rfl: 3    apixaban (ELIQUIS) 5 mg, Take 1 tablet by mouth 2 (two) times a day for 30 days 2 tabs PO BID x 7 days, then 1 tab PO BID (Patient not taking: Reported on 10/11/2023), Disp: 60 tablet, Rfl: 0    BD Pen Needle Kiarra U/F 32G X 4 MM MISC, , Disp: , Rfl:     Blood Glucose Monitoring Suppl ([facility] Blood Glucose Monitor) KEYANNA, Use, Disp: , Rfl: Insulin Lispro w/ Trans Port 100 UNIT/ML SOPN, Inject 6 Units under the skin (Patient not taking: Reported on 8/29/2023), Disp: , Rfl:     Lancets (OneTouch Delica Plus [id number]) MISC, 2 (two) times a day Use to test (Patient not taking: Reported on 8/29/2023), Disp: , Rfl:     olopatadine (PATANOL) 0.1 % ophthalmic solution, 1 drop 2 (two) times a day (Patient not taking: Reported on 8/29/2023), Disp: , Rfl:     VITAMIN D PO, Take by mouth, Disp: , Rfl:     Current Allergies     Allergies as of 10/11/2023 - Reviewed 10/11/2023   Allergen Reaction Noted    Crestor [rosuvastatin] Rash and Hives 02/24/2015    Enalapril Cough 01/21/2013    Lipitor [atorvastatin] Rash 08/29/2023            The following portions of the patient's history were reviewed and updated as appropriate: allergies, current medications, past family history, past medical history, past social history, past surgical history and problem list.     Past Medical History:   Diagnosis Date    BPH without obstruction/lower urinary tract symptoms     CAD (coronary artery disease)     s/p JOHNATHAN distal RCA and JOHNATHAN RPAV 8/11/2023    Diabetes mellitus ([address])     Dysuria     GERD (gastroesophageal reflux disease)     Gout     Heart attack ([address]) 2023    History of melanoma     left ear - surgically removed    History of pulmonary embolism 2017    bilateral    Hyperlipidemia     Hypertension     Melanoma ([address]) 2017    left ear    Prostate cancer [facility])     Sleep apnea        Past Surgical History:   Procedure Laterality Date    CARDIAC CATHETERIZATION Left 8/11/2023    Procedure: Cardiac Left Heart Cath;  Surgeon: Kusum Castaneda DO;  [facility]; Service: Cardiology    CARDIAC CATHETERIZATION N/A 8/11/2023    Procedure: Cardiac Coronary Angiogram;  Surgeon: Kusum Castaneda DO;  [facility];   Service: Cardiology    CARDIAC CATHETERIZATION N/A 8/11/2023    Procedure: Cardiac pci;  Surgeon: Kusum Castaneda DO;  [facility]; Service: Cardiology    CARDIAC CATHETERIZATION N/A 8/11/2023    Procedure: Cardiac other - IVUS;  Surgeon: Daysi Sher DO;  [facility]; Service: Cardiology    CHOLECYSTECTOMY      COLONOSCOPY  10/2016    sessile serrated polyp removed from the proximal transverse, adenoma removed from the distal transverse    EGD  08/2017    gastritis, H. pylori negative, and Candida esophagitis    EGD  11/2011    slight Schatzki's ring, small hiatal hernia    EXTERNAL EAR SURGERY Left 08/2017    for the removal of skin melanoma-Plastic surgery       Family History   Problem Relation Age of Onset    Hyperlipidemia Mother     Stroke Father     Melanoma Father     Diabetes Sister     Hypertension Sister     Diabetes Brother     JETT disease Brother          Medications have been verified. Objective   /96   Pulse 68   Temp 97.5 °F (36.4 °C)   Resp 20   Ht 5' 10" (1.778 m)   Wt 81.6 kg (180 lb)   SpO2 98%   BMI 25.83 kg/m²        Physical Exam     Physical Exam  Constitutional:       Appearance: Normal appearance. HENT:      Head: Normocephalic and atraumatic. Right Ear: Ear canal and external ear normal. A middle ear effusion is present. There is no impacted cerumen. Tympanic membrane is not erythematous. Left Ear: Ear canal and external ear normal. A middle ear effusion is present. There is no impacted cerumen. Tympanic membrane is not erythematous. Nose: Congestion present. Left Nostril: Epistaxis (fresh dried blood noted) present. Right Sinus: Maxillary sinus tenderness and frontal sinus tenderness present. Left Sinus: Maxillary sinus tenderness and frontal sinus tenderness present. Mouth/Throat:      Mouth: Mucous membranes are moist.      Pharynx: Oropharynx is clear. No oropharyngeal exudate or posterior oropharyngeal erythema. Eyes:      General: No scleral icterus. Right eye: No discharge.          Left eye: No discharge. Extraocular Movements: Extraocular movements intact. Conjunctiva/sclera: Conjunctivae normal.   Cardiovascular:      Rate and Rhythm: Normal rate and regular rhythm. Pulmonary:      Effort: Pulmonary effort is normal. No respiratory distress. Breath sounds: Normal breath sounds. No wheezing. Neurological:      General: No focal deficit present. Mental Status: He is alert and oriented to person, place, and time.    Psychiatric:         Mood and Affect: Mood normal.         Behavior: Behavior normal.

## 2023-10-12 ENCOUNTER — OFFICE VISIT (OUTPATIENT)
Dept: ENDOCRINOLOGY | Facility: CLINIC | Age: 68
End: 2023-10-12
Payer: MEDICARE

## 2023-10-12 VITALS
SYSTOLIC BLOOD PRESSURE: 136 MMHG | WEIGHT: 187 LBS | DIASTOLIC BLOOD PRESSURE: 88 MMHG | HEIGHT: 70 IN | HEART RATE: 63 BPM | BODY MASS INDEX: 26.77 KG/M2

## 2023-10-12 DIAGNOSIS — E78.2 MIXED HYPERLIPIDEMIA: Primary | ICD-10-CM

## 2023-10-12 DIAGNOSIS — E11.9 TYPE 2 DIABETES MELLITUS WITHOUT COMPLICATION, WITHOUT LONG-TERM CURRENT USE OF INSULIN (HCC): ICD-10-CM

## 2023-10-12 DIAGNOSIS — Z79.4 TYPE 2 DIABETES MELLITUS WITH HYPERGLYCEMIA, WITH LONG-TERM CURRENT USE OF INSULIN (HCC): ICD-10-CM

## 2023-10-12 DIAGNOSIS — I10 PRIMARY HYPERTENSION: ICD-10-CM

## 2023-10-12 DIAGNOSIS — E11.65 TYPE 2 DIABETES MELLITUS WITH HYPERGLYCEMIA, WITH LONG-TERM CURRENT USE OF INSULIN (HCC): ICD-10-CM

## 2023-10-12 PROCEDURE — 99214 OFFICE O/P EST MOD 30 MIN: CPT | Performed by: PHYSICIAN ASSISTANT

## 2023-10-12 PROCEDURE — 95251 CONT GLUC MNTR ANALYSIS I&R: CPT | Performed by: PHYSICIAN ASSISTANT

## 2023-10-12 RX ORDER — INSULIN GLARGINE 100 [IU]/ML
24 INJECTION, SOLUTION SUBCUTANEOUS DAILY
Qty: 30 ML | Refills: 1 | Status: SHIPPED | OUTPATIENT
Start: 2023-10-12 | End: 2024-01-10

## 2023-10-12 RX ORDER — INSULIN GLARGINE 100 [IU]/ML
24 INJECTION, SOLUTION SUBCUTANEOUS DAILY
Qty: 30 ML | Refills: 0 | Status: SHIPPED | OUTPATIENT
Start: 2023-10-12 | End: 2023-10-12 | Stop reason: SDUPTHER

## 2023-10-12 RX ORDER — INSULIN LISPRO 100 [IU]/ML
INJECTION, SOLUTION INTRAVENOUS; SUBCUTANEOUS
Qty: 30 ML | Refills: 1 | Status: SHIPPED | OUTPATIENT
Start: 2023-10-12

## 2023-10-12 NOTE — PROGRESS NOTES
Michael Major 79 y.o. male MRN: [id number]    Encounter: [id number]      Assessment/Plan   Problem List Items Addressed This Visit          Endocrine    Type 2 diabetes mellitus with hyperglycemia, with long-term current use of insulin ([facility])       Lab Results   Component Value Date    HGBA1C 8.3 (H) 10/02/2023   A1c is worsening unfortunately, with latest CGM report GMI slightly better, 7.8%. Reviewed A1c target of at least < 7.0%, with fasting BG targets < 130, and post meal BG excursions not being more than 180. Diet - to follow up with CDE for MNT in 3 months. Pharmacotherapy discussion - Increase in insulin warranted given post-prandial hyperglycemia, particularly with lunch and dinner. Recommend increase Novolog from 9 units with dinner only to 5 units with lunch, 10 units with dinner, and continue scale ISF 25 @ 150. Will also slightly increase Lantus from 22 to 24 units in evening to achieve fasting BG targets. Also suggested switching Jardiance timing to AM versus PM to help with nocturia symptoms, allow peak drug effects to be during daytime hours. Continue metformin at 1000mg BID dosing, GFR remains stable > 90. Of note, Patrice Weaver has intolerance to GLP-1 RA class - significant nausea / GI adverse effects. BG monitoring - continue CGM use with Jared 2. He is using biofeedback to make behavior modifications, improve BG control. He continues to qualify for CGM use due to multiple daily injections of insulin. Labs - will plan to obtain A1c in office at next visit. RTC - 3 months. Relevant Medications    Empagliflozin (Jardiance) 25 MG TABS    metFORMIN (GLUCOPHAGE) 1000 MG tablet    HumaLOG KwikPen 100 units/mL injection pen    Insulin Glargine Solostar (Lantus SoloStar) 100 UNIT/ML SOPN       Cardiovascular and Mediastinum    Hypertension     BP controlled, continue current anti-hypertensive regimen which includes ARB.             Other    Mixed hyperlipidemia - Primary     On statin and Donnell.             CC: Diabetes    History of Present Illness     HPI:  Valerio is here for routine follow up for type 2 DM. He presents with his wife and great nieces today. He feels BG control is slightly improving overall. He is being treated for sinusitis currently and has noticed some BG excursions the past few weeks with being ill. He was recently started on Augmentin, and plans to follow up with ENT due to recurrent sinusitis. He recently initiated "[employer] 2 CGM with success. Had CDE training appointment to help with set up and initiation of monitoring. Valerio had a hypoglycemia event on CGM recently - woke him from sleep to alarm for BG in the 40s. He felt fine, no sx concerning for hypoglycemia. Corrected low with snack. Did not check BG with fingerstick at the time, and this was near end of life cycle for CGM, suspect false value. DM medication review:   Humalog - 9 units at dinner, with scale PRN ISF 25 @ 150. Lantus - 22 units at 9pm  Jardiance 25mg daily  Metformin 1000mg BID      Isaiah Givenslauren   Device used: [email or web address], Home use   Indication: Type 2 Diabetes  More than 72 hours of data was reviewed. Report to be scanned to chart. Date Range: 9/15/23 - 10/15/23    Analysis of data:   % time CGM used: 91%  Average Glucose: 187mg/dL  Coefficient of Variation: 23.0%  GMI : 7.8%  Time in Target Range: 51%  Time Above Range: 39% high, 10% very high  Time Below Range: 0% low, 0% very low. Interpretation of data:  Fasting BG mostly not at target, averaging 150s. He has post lunch and dinner hyperglycemia most days, however variable with timing and intensity of hyperglycemia, likely due to variable meal habits. Review of Systems   Constitutional:  Negative for chills and fever. HENT:  Negative for ear pain and sore throat. Eyes:  Negative for pain and visual disturbance. Respiratory:  Negative for cough and shortness of breath.     Cardiovascular:  Negative for chest pain and palpitations. Gastrointestinal:  Negative for abdominal pain and vomiting. Genitourinary:  Negative for dysuria and hematuria. Musculoskeletal:  Negative for arthralgias and back pain. Skin:  Negative for color change and rash. Neurological:  Negative for seizures and syncope. All other systems reviewed and are negative. Historical Information   Past Medical History:   Diagnosis Date    BPH without obstruction/lower urinary tract symptoms     CAD (coronary artery disease)     s/p JOHNATHAN distal RCA and JOHNATHAN RPAV 8/11/2023    Diabetes mellitus ([address] [facility])     Dysuria     GERD (gastroesophageal reflux disease)     Gout     Heart attack ([address] ) 2023    History of melanoma     left ear - surgically removed    History of pulmonary embolism 2017    bilateral    Hyperlipidemia     Hypertension     Melanoma ([address] [facility]) 2017    left ear    Prostate cancer [facility])     Sleep apnea      Past Surgical History:   Procedure Laterality Date    CARDIAC CATHETERIZATION Left 8/11/2023    Procedure: Cardiac Left Heart Cath;  Surgeon: Farnaz Vail DO;  [facility]; Service: Cardiology    CARDIAC CATHETERIZATION N/A 8/11/2023    Procedure: Cardiac Coronary Angiogram;  Surgeon: Farnaz Vail DO;  [facility]; Service: Cardiology    CARDIAC CATHETERIZATION N/A 8/11/2023    Procedure: Cardiac pci;  Surgeon: Farnaz Vail DO;  [facility]; Service: Cardiology    CARDIAC CATHETERIZATION N/A 8/11/2023    Procedure: Cardiac other - IVUS;  Surgeon: Farnaz Vail DO;  [facility];   Service: Cardiology    CHOLECYSTECTOMY      COLONOSCOPY  10/2016    sessile serrated polyp removed from the proximal transverse, adenoma removed from the distal transverse    EGD  08/2017    gastritis, H. pylori negative, and Candida esophagitis    EGD  11/2011    slight Schatzki's ring, small hiatal hernia    EXTERNAL EAR SURGERY Left 08/2017    for the removal of skin melanoma-Plastic surgery     Social History   Social History     Substance and Sexual Activity   Alcohol Use Yes    Comment: Drinks socailly. Social History     Substance and Sexual Activity   Drug Use No     Social History     Tobacco Use   Smoking Status Never    Passive exposure: Never   Smokeless Tobacco Never     Family History:   Family History   Problem Relation Age of Onset    Hyperlipidemia Mother     Stroke Father     Melanoma Father     Diabetes Sister     Hypertension Sister     Diabetes Brother     JETT disease Brother        Meds/Allergies   Current Outpatient Medications   Medication Sig Dispense Refill    amoxicillin-clavulanate (AUGMENTIN) 875-125 mg per tablet Take 1 tablet by mouth every 12 (twelve) hours for 14 days 28 tablet 0    ascorbic acid (VITAMIN C) 250 mg tablet Take 500 mg by mouth daily      BD Pen Needle Kiarra U/F 32G X 4 MM MISC       Blood Glucose Monitoring Suppl ([facility] Blood Glucose Monitor) KEYANNA Use      clopidogrel (PLAVIX) 75 mg tablet Take 1 tablet (75 mg total) by mouth daily 90 tablet 3    Empagliflozin (JARDIANCE PO) Take 25 mg by mouth        ezetimibe (ZETIA) 10 mg tablet Take 1 tablet (10 mg total) by mouth daily at bedtime 90 tablet 3    fluticasone (FLONASE) 50 mcg/act nasal spray 2 sprays into each nostril      HumaLOG KwikPen 100 units/mL injection pen Inject 9 units with supper plus scale.  TDD 20 units 15 mL 1    Insulin Glargine Solostar (Lantus SoloStar) 100 UNIT/ML SOPN Inject 0.22 mL (22 Units total) under the skin daily 19.8 mL 1    losartan (COZAAR) 50 mg tablet Take 50 mg by mouth      lovastatin (MEVACOR) 40 MG tablet Take 40 mg by mouth daily at bedtime      metFORMIN (GLUCOPHAGE) 1000 MG tablet Take 1,000 mg by mouth 2 (two) times a day with meals      metoprolol tartrate (LOPRESSOR) 25 mg tablet Take 1 tablet (25 mg total) by mouth every 12 (twelve) hours 180 tablet 3    omeprazole (PriLOSEC) 40 MG capsule Take 1 capsule (40 mg total) by mouth daily 90 capsule 3    VITAMIN D PO Take by mouth      apixaban (ELIQUIS) 5 mg Take 1 tablet by mouth 2 (two) times a day for 30 days 2 tabs PO BID x 7 days, then 1 tab PO BID (Patient not taking: Reported on 10/11/2023) 60 tablet 0    Insulin Lispro w/ Trans Port 100 UNIT/ML SOPN Inject 6 Units under the skin (Patient not taking: Reported on 8/29/2023)      Lancets (OneTouch Delica Plus [id number]) MISC 2 (two) times a day Use to test (Patient not taking: Reported on 8/29/2023)      olopatadine (PATANOL) 0.1 % ophthalmic solution 1 drop 2 (two) times a day (Patient not taking: Reported on 8/29/2023)       No current facility-administered medications for this visit. Allergies   Allergen Reactions    Crestor [Rosuvastatin] Rash and Hives     Other reaction(s): Urticaria    Enalapril Cough    Lipitor [Atorvastatin] Rash     pain       Objective   Vitals: Blood pressure 136/88, pulse 63, height 5' 10" (1.778 m), weight 84.8 kg (187 lb). Physical Exam  Vitals reviewed. HENT:      Head: Normocephalic. Mouth/Throat:      Mouth: Mucous membranes are moist.   Cardiovascular:      Rate and Rhythm: Normal rate and regular rhythm. Pulmonary:      Effort: Pulmonary effort is normal.      Breath sounds: Normal breath sounds. Musculoskeletal:      Right lower leg: No edema. Left lower leg: No edema. Skin:     General: Skin is warm and dry. Neurological:      Mental Status: He is alert. Psychiatric:         Mood and Affect: Mood normal.       The history was obtained from the review of the chart, patient.     Lab Results:   Lab Results   Component Value Date/Time    Hemoglobin A1C 8.3 (H) 10/02/2023 07:40 AM    Hemoglobin A1C 8.1 (H) 04/24/2023 07:53 AM    Hemoglobin A1C 7.8 (H) 10/26/2022 07:05 AM    WBC 6.13 10/05/2023 02:52 PM    WBC 4.08 (L) 08/25/2023 11:19 AM    WBC 5.81 08/11/2023 04:56 AM    Hemoglobin 13.4 10/05/2023 02:52 PM    Hemoglobin 13.5 08/25/2023 11:19 AM    Hemoglobin 14.0 08/11/2023 04:56 AM Hematocrit 42.5 10/05/2023 02:52 PM    Hematocrit 41.6 08/25/2023 11:19 AM    Hematocrit 41.9 08/11/2023 04:56 AM    MCV 89 10/05/2023 02:52 PM    MCV 88 08/25/2023 11:19 AM    MCV 87 08/11/2023 04:56 AM    Platelets 138 57/48/0193 02:52 PM    Platelets 056 83/25/7314 11:19 AM    Platelets 752 05/74/2233 04:56 AM    BUN 18 10/02/2023 07:40 AM    BUN 15 08/25/2023 11:19 AM    BUN 17 08/11/2023 11:14 AM    Potassium 3.9 10/02/2023 07:40 AM    Potassium 3.9 08/25/2023 11:19 AM    Potassium 4.0 08/11/2023 11:14 AM    Chloride 107 10/02/2023 07:40 AM    Chloride 101 08/25/2023 11:19 AM    Chloride 104 08/11/2023 11:14 AM    CO2 25 10/02/2023 07:40 AM    CO2 30 08/25/2023 11:19 AM    CO2 28 08/11/2023 11:14 AM    Creatinine 0.76 10/02/2023 07:40 AM    Creatinine 0.83 08/25/2023 11:19 AM    Creatinine 0.80 08/11/2023 11:14 AM    AST 18 08/25/2023 11:19 AM    AST 21 08/11/2023 03:06 AM    AST 16 08/01/2023 07:18 AM    ALT 15 08/25/2023 11:19 AM    ALT 17 08/11/2023 03:06 AM    ALT 15 08/01/2023 07:18 AM    Total Protein 7.2 08/25/2023 11:19 AM    Total Protein 7.6 08/11/2023 03:06 AM    Total Protein 7.6 08/01/2023 07:18 AM    Albumin 4.4 08/25/2023 11:19 AM    Albumin 4.6 08/11/2023 03:06 AM    Albumin 4.6 08/01/2023 07:18 AM    HDL, Direct 37 (L) 09/16/2023 08:12 AM    HDL, Direct 37 (L) 04/24/2023 07:53 AM    Triglycerides 180 (H) 09/16/2023 08:12 AM    Triglycerides 257 (H) 04/24/2023 07:53 AM         Imaging Studies: n/a    Portions of the record may have been created with voice recognition software. Occasional wrong word or "sound a like" substitutions may have occurred due to the inherent limitations of voice recognition software. Read the chart carefully and recognize, using context, where substitutions have occurred.

## 2023-10-12 NOTE — ASSESSMENT & PLAN NOTE
Lab Results   Component Value Date    HGBA1C 8.3 (H) 10/02/2023   A1c is worsening unfortunately, with latest CGM report GMI slightly better, 7.8%. Reviewed A1c target of at least < 7.0%, with fasting BG targets < 130, and post meal BG excursions not being more than 180. Diet - to follow up with CDE for MNT in 3 months. Pharmacotherapy discussion - Increase in insulin warranted given post-prandial hyperglycemia, particularly with lunch and dinner. Recommend increase Novolog from 9 units with dinner only to 5 units with lunch, 10 units with dinner, and continue scale ISF 25 @ 150. Will also slightly increase Lantus from 22 to 24 units in evening to achieve fasting BG targets. Also suggested switching Jardiance timing to AM versus PM to help with nocturia symptoms, allow peak drug effects to be during daytime hours. Continue metformin at 1000mg BID dosing, GFR remains stable > 90. Of note, Alessandra Guerrero has intolerance to GLP-1 RA class - significant nausea / GI adverse effects. BG monitoring - continue CGM use with Jared 2. He is using biofeedback to make behavior modifications, improve BG control. He continues to qualify for CGM use due to multiple daily injections of insulin. Labs - will plan to obtain A1c in office at next visit. RTC - 3 months.

## 2023-10-12 NOTE — PATIENT INSTRUCTIONS
New insulin plan:  Lantus - increase to 24 units in evening. Novolog - add 5 units with Lunch and 10 units with dinner, and continue correctional scale as needed.

## 2023-10-13 ENCOUNTER — APPOINTMENT (OUTPATIENT)
Dept: CARDIAC REHAB | Facility: HOSPITAL | Age: 68
End: 2023-10-13
Payer: MEDICARE

## 2023-10-13 ENCOUNTER — TELEPHONE (OUTPATIENT)
Dept: HEMATOLOGY ONCOLOGY | Facility: CLINIC | Age: 68
End: 2023-10-13

## 2023-10-13 NOTE — PROGRESS NOTES
Cardiac Rehabilitation Plan of Care   60 Day Reassessment          Today's date: 10/13/2023   # of Exercise Sessions Completed: 15/36  Patient name: Arturo Galloway      : 1955  Age: 79 y.o. MRN: [id number]  Referring Physician: Dr. Lauren Blakely DO  Cardiologist: Pt saw JARED Dominguez on 2023 but states they are on a wait list to see a cardiologist  Provider: [facility]  Clinician: Cedrick Wick MS    Dx: NSTEMI   Angioplasty x 3 and JOHNATHAN x 1 to Dist RCA  Angioplasty x 1 and JOHNATHAN x 1 to RPVA  Date of onset: 2023      SUMMARY OF PROGRESS: Yari Cuevas has completed 15 Cardiac Rehab post NSTEMI w/ intervention. The patient does currently follow a formal exercise program at home. Prior to his symptoms and event, he stated that he was walking up to 5 miles per day. He has resumed all ADLs reporting weakness and fatigue. When addressed, the patient continues to deny having depression/anxiety. Patient reports excellent social/emotional support from his friends and family. Stress management will be reviewed. The patient is a non-smoker. Patient admits to 100% medication compliance. They report the following physical limitations: decreased strength, fatigue. The patient has been working at a 4.64 MET level. His resting vitals were HR 68 and /56 with appropriate response to exercise, HR 89 and /64. Telemetry revealed sinus bradycardia. Blood Pressure will be monitored throughout the program and cardiologist will be notified of elevated trends. Yari Cuevas was counseled on exercise guidelines to achieve a minimum of 150 mins/wk of moderate intensity (RPE 4-6) exercise and encouraged to add 1-2 days of exercise on opposite days of cardiac rehab as tolerated. We discussed current dietary habits and goals of heart healthy eating for lipid management and diabetes management. The patient has T2D.   Patient's personal goals include: increase strength, reduce fatigue, improve functional capacity, return to all ADLs. The patient's CAD risk factors include:  inactivity, obesity/overweight, hypertension, hyperlipidemia and diabetes. Patient has been compliant attending his CR sessions. He gives great effort during each session. He has correct hemodynamic responses to the activity. He denies any symptoms. He rates his program 3-4/10. He is able to work at a 4.64 MET Level. He has been receiving weekly education. Refer to [facility] documentation for a list of completed topics. His program will be progressed as he is able to tolerate. Medication compliance: Yes   Comments: Pt reports to be compliant with medications  Fall Risk: Low   Comments: Ambulates with a steady gait with no assist device    EKG Interpretation: NSR      EXERCISE ASSESSMENT and PLAN    Exercise Prescription:      Frequency: 2-3 days/week   Supplement with home exercise 3+ days/wk as tolerated       Minutes: 45-60       METS: 4.64            HR: 20-30 bpm above rest   RPE: 3-5/10         Modalities: Treadmill, UBE, Lifecycle and NuStep      30 Day Goals for Exercise Progression:    Frequency: 2-3 days/week of cardiac rehab       Supplement with home exercise 3+ days/wk as tolerated    Minutes: 45-60                     >150 mins/wk of moderate intensity exercise   METS: 4.64-6.0   HR: 20-30 bpm above rest   RPE: 4-6/10   Modalities: Treadmill, Airdyne bike, UBE, Lifecycle, Elliptical and NuStep    Strength training: Will be added following 2-3 weeks of monitored exercise sessions   Modalities: Leg Press, Chest Press, Lateral Raise, Arm Curl, Front Raises and Calf Raises    Home Exercise: Daily walking. Plans to return to a local fitness center when appropriate.      Goals: 10% improvement in functional capacity - based on max METs achieved in fitness assessment, improved DASI score by 10%, Increase in exercise capacity by 40% - based on peak METs tolerated in cardiac rehab exercise session, Exercise 5 days/wk, >150mins/wk of moderate intensity exercise, Resume ADLs with increased strength and Attend Rehab regularly    Progression Toward Goals: Will continue to educate and progress as tolerated., Goals met: attends CR regulalrly and resumed ADLs. Education: benefit of exercise for CAD risk factors, home exercise guidelines, AHA guidelines to achieve >150 mins/wk of moderate exercise and RPE scale     Plan:education on home exercise guidelines, home exercise 30+ mins 2 days opposite CR and Education class: Risk Factors for Heart Disease     Readiness to change: Action:  (Changing behavior)      NUTRITION ASSESSMENT AND PLAN    Weight control:    Starting weight: 185   Current weight:  187  Waist circumference:    Starting: NA   Current:     Diabetes: T2D  A1c: 8.1    last measured: 4/24/2023    Lipid management: Discussed diet and lipid management and Last lipid profile 4/24/2023  Chol 162    HDL 37  LDL 74    Goals:reduced BMI to < 25, LDL <100, HDL >40, TRG <150, CHOL <200, fasting BG  and improved A1c  < 7.0%    Measurable goals were based Rate Your Plate Dietary Self-Assessment. These are the areas in which the patient could score higher on the assessment. Goals include recommendations for a heart healthy diet based on American Heart Association. Progression Toward Goals: Pt is progressing and showing improvement  toward the following goals:  improving A1C and Lipid Panel. Following a cardiac diet. .  , Will continue to educate and progress as tolerated.     Education: heart healthy eating  low sodium diet  hydration  nutrition for  lipid management  nutrition for Improved BG control  target goal for A1c <7.0  exercise and diabetes management   Plan: Education class: Reading Food Labels and Education Class: Heart Healthy Eating     Readiness to change: Action:  (Changing behavior)      PSYCHOSOCIAL ASSESSMENT AND PLAN    Emotional:  Depression assessment:  PHQ-9 = 6  5-9 = Mild Depression Anxiety measure:  JIM-7 = 4  0-4  = Not anxious  Self-reported stress level:  2/10    Social support: Very Good    Goals:  Reduce perceived stress to 1-3/10, improved [facility] QOL < 27, Feelings in [facility] Score < 3, Physical Fitness in [facility] Score < 3, Social Support in [facility] Score < 3, Daily Activity in [facility] Score < 3, Social Activities in [facility] Score < 3, Pain in [facility] Score < 3, Overall Health in [facility] Score < 3, Quality of Life in [facility] Score < 3  and Change in Health in [place] Score < 3     Progression Toward Goals: Will continue to educate and progress as tolerated., Goals met: stress level remains low at 2/10. Education: signs/sxs of depression, benefits of a positive support system, stress management techniques and depression and CAD  Plan: Class: Stress and Your Health and Class: Relaxation     Readiness to change: Action:  (Changing behavior)      OTHER CORE COMPONENTS     Tobacco:   Social History     Tobacco Use   Smoking Status Never    Passive exposure: Never   Smokeless Tobacco Never       Tobacco Use Intervention:   N/A:  Patient is a non-smoker     Anginal Symptoms:  chest pressure and arm pain   NTG use: No prescription    Blood pressure:    Restin/56   Exercise: 138/64   Recovery: 110/56    Goals: consistent BP < 130/80, reduced dietary sodium <2300mg, moderate intensity exercise >150 mins/wk and medication compliance    Progression Toward Goals: Pt is progressing and showing improvement  toward the following goals:  resting BP consistently < 130/80.  , Will continue to educate and progress as tolerated., Goals met: no c/o angina, medicaton compliance and reduced daily sodium intake.  .    Education:  understanding high blood pressure and it's relationship to CAD, low sodium diet and HTN, Education class:  Common Heart Medications and Education class: Understanding Heart Disease     Plan: Class: Understanding Heart Disease and Class: Common Heart Medications     Readiness to change: Action:  (Changing behavior)

## 2023-10-13 NOTE — TELEPHONE ENCOUNTER
Patient Call    Who are you speaking with? Spouse    If it is not the patient, are they listed on an active communication consent form? Yes   What is the reason for this call? She called to schedule with hematology. I told her we didn't receive her referral yet. She said she would call pcp again   Does this require a call back? N/A   If a call back is required, please list best call back number N/a   If a call back is required, advise that a message will be forwarded to their care team and someone will return their call as soon as possible. Did you relay this information to the patient?  N/A

## 2023-10-16 ENCOUNTER — APPOINTMENT (OUTPATIENT)
Dept: CARDIAC REHAB | Facility: HOSPITAL | Age: 68
End: 2023-10-16
Payer: MEDICARE

## 2023-10-18 ENCOUNTER — APPOINTMENT (OUTPATIENT)
Dept: CARDIAC REHAB | Facility: HOSPITAL | Age: 68
End: 2023-10-18
Payer: MEDICARE

## 2023-10-20 ENCOUNTER — APPOINTMENT (OUTPATIENT)
Dept: CARDIAC REHAB | Facility: HOSPITAL | Age: 68
End: 2023-10-20
Payer: MEDICARE

## 2023-10-23 ENCOUNTER — CLINICAL SUPPORT (OUTPATIENT)
Dept: CARDIAC REHAB | Facility: HOSPITAL | Age: 68
End: 2023-10-23
Payer: MEDICARE

## 2023-10-23 DIAGNOSIS — E78.2 MIXED HYPERLIPIDEMIA: Primary | ICD-10-CM

## 2023-10-23 DIAGNOSIS — I21.4 NON-STEMI (NON-ST ELEVATED MYOCARDIAL INFARCTION) (HCC): ICD-10-CM

## 2023-10-23 PROCEDURE — 93798 PHYS/QHP OP CAR RHAB W/ECG: CPT

## 2023-10-23 RX ORDER — LOVASTATIN 40 MG/1
40 TABLET ORAL
Qty: 90 TABLET | Refills: 3 | Status: SHIPPED | OUTPATIENT
Start: 2023-10-23

## 2023-10-25 ENCOUNTER — CLINICAL SUPPORT (OUTPATIENT)
Dept: CARDIAC REHAB | Facility: HOSPITAL | Age: 68
End: 2023-10-25
Payer: MEDICARE

## 2023-10-25 DIAGNOSIS — I21.4 NON-STEMI (NON-ST ELEVATED MYOCARDIAL INFARCTION) (HCC): ICD-10-CM

## 2023-10-25 PROCEDURE — 93798 PHYS/QHP OP CAR RHAB W/ECG: CPT

## 2023-10-27 ENCOUNTER — APPOINTMENT (OUTPATIENT)
Dept: CARDIAC REHAB | Facility: HOSPITAL | Age: 68
End: 2023-10-27
Payer: MEDICARE

## 2023-10-27 ENCOUNTER — TELEPHONE (OUTPATIENT)
Dept: HEMATOLOGY ONCOLOGY | Facility: CLINIC | Age: 68
End: 2023-10-27

## 2023-10-27 ENCOUNTER — OFFICE VISIT (OUTPATIENT)
Dept: GASTROENTEROLOGY | Facility: CLINIC | Age: 68
End: 2023-10-27
Payer: MEDICARE

## 2023-10-27 VITALS
TEMPERATURE: 98.1 F | BODY MASS INDEX: 26.92 KG/M2 | SYSTOLIC BLOOD PRESSURE: 118 MMHG | WEIGHT: 188 LBS | HEIGHT: 70 IN | DIASTOLIC BLOOD PRESSURE: 70 MMHG

## 2023-10-27 DIAGNOSIS — K21.9 GASTROESOPHAGEAL REFLUX DISEASE WITHOUT ESOPHAGITIS: ICD-10-CM

## 2023-10-27 DIAGNOSIS — I26.99 PULMONARY EMBOLISM, BILATERAL (HCC): ICD-10-CM

## 2023-10-27 DIAGNOSIS — I21.4 NON-STEMI (NON-ST ELEVATED MYOCARDIAL INFARCTION) (HCC): ICD-10-CM

## 2023-10-27 DIAGNOSIS — D13.1 GASTRIC ADENOMA: Primary | ICD-10-CM

## 2023-10-27 PROCEDURE — 99204 OFFICE O/P NEW MOD 45 MIN: CPT | Performed by: INTERNAL MEDICINE

## 2023-10-27 NOTE — TELEPHONE ENCOUNTER
I called Mattie Paget in response to a referral that was received for patient to establish care with Hematology. Outreach was made to schedule a consultation. I left a voicemail explaining the reason for my call and advised patient to call hospitals at [phone]. Another attempt will be made to contact patient.

## 2023-10-30 ENCOUNTER — APPOINTMENT (OUTPATIENT)
Dept: CARDIAC REHAB | Facility: HOSPITAL | Age: 68
End: 2023-10-30
Payer: MEDICARE

## 2023-10-31 ENCOUNTER — CLINICAL SUPPORT (OUTPATIENT)
Dept: CARDIAC REHAB | Facility: HOSPITAL | Age: 68
End: 2023-10-31
Payer: MEDICARE

## 2023-10-31 DIAGNOSIS — I21.4 NON-STEMI (NON-ST ELEVATED MYOCARDIAL INFARCTION) (HCC): ICD-10-CM

## 2023-10-31 PROCEDURE — 93798 PHYS/QHP OP CAR RHAB W/ECG: CPT

## 2023-10-31 NOTE — PROGRESS NOTES
[facility] - Outpatient Follow-up Note  Shanti Walden 79 y.o. male MRN: [id number]  Encounter: [id number]          ASSESSMENT AND PLAN:      1. Gastric adenoma    A single 4 mm polyp in the prepyloric region. Biopsy showed tubular adenoma. The polyp was not completely removed. He needs elective EGD with polypectomy. He had recent non-ST elevation MI and is on dual antiplatelet therapy and Eliquis for PE. His MI was 7 weeks ago. We will wait about 6 months when he can be safely taken off antiplatelet therapy to perform EGD with polypectomy. He will follow-up with me in 6 months for reevaluation and to schedule his endoscopy. We will check with his cardiologist for clearance at that time      2. Gastroesophageal reflux disease without esophagitis   I reviewed diet and lifestyle precautions. This includes limiting coffee, soda, tomatoes, citrus, fatty and spicy foods. I recommend waiting 3 hours after dinner to lie down. I recommend eating small frequent meals as well as sleeping with head of bed elevated at night. Continue omeprazole 40 mg daily    3. Pulmonary embolism, bilateral (HCC)  -On Eliquis. Follow-up with hematology  - Ambulatory Referral to Hematology / Oncology; Future    4. Non-STEMI (non-ST elevated myocardial infarction) [facility])  -Follow-up with cardiology. On dual antiplatelet therapy-aspirin and Plavix        ______________________________________________________________________    SUBJECTIVE: 81-year-old male with history of bilateral PE on Eliquis, on aspirin and Plavix for recent here for EGD and polypectomy. He had a 4 mm polyp in the prepyloric region. Biopsy from the area showed tubular adenoma. He is referred for polypectomy. I reviewed his EGD report and pictures    Biopsy from polyp in the body/fundus showed fundic gland polyp.   Random biopsy from the stomach was also negative for H. Pylori          REVIEW OF SYSTEMS IS OTHERWISE NEGATIVE. Historical Information   Past Medical History:   Diagnosis Date    BPH without obstruction/lower urinary tract symptoms     CAD (coronary artery disease)     s/p JOHNATHAN distal RCA and JOHNATHAN RPAV 8/11/2023    Diabetes mellitus ([address])     Dysuria     GERD (gastroesophageal reflux disease)     Gout     Heart attack ([address]) 2023    History of melanoma     left ear - surgically removed    History of pulmonary embolism 2017    bilateral    Hyperlipidemia     Hypertension     Melanoma ([address]) 2017    left ear    Prostate cancer [facility])     Sleep apnea      Past Surgical History:   Procedure Laterality Date    CARDIAC CATHETERIZATION Left 8/11/2023    Procedure: Cardiac Left Heart Cath;  Surgeon: Delvis Allan DO;  Location: [facility]; Service: Cardiology    CARDIAC CATHETERIZATION N/A 8/11/2023    Procedure: Cardiac Coronary Angiogram;  Surgeon: Delvis Allan DO;  Location: [facility]; Service: Cardiology    CARDIAC CATHETERIZATION N/A 8/11/2023    Procedure: Cardiac pci;  Surgeon: Delvis Allan DO;  Location: [facility]; Service: Cardiology    CARDIAC CATHETERIZATION N/A 8/11/2023    Procedure: Cardiac other - IVUS;  Surgeon: Delvis Allan DO;  Location: [facility]; Service: Cardiology    CHOLECYSTECTOMY      COLONOSCOPY  10/2016    sessile serrated polyp removed from the proximal transverse, adenoma removed from the distal transverse    EGD  08/2017    gastritis, H. pylori negative, and Candida esophagitis    EGD  11/2011    slight Schatzki's ring, small hiatal hernia    EXTERNAL EAR SURGERY Left 08/2017    for the removal of skin melanoma-Plastic surgery     Social History   Social History     Substance and Sexual Activity   Alcohol Use Yes    Comment: Drinks socailly.       Social History     Substance and Sexual Activity   Drug Use No     Social History     Tobacco Use   Smoking Status Never    Passive exposure: Never   Smokeless Tobacco Never     Family History   Problem Relation Age of Onset    Hyperlipidemia Mother     Stroke Father     Melanoma Father     Diabetes Sister     Hypertension Sister     Diabetes Brother     JETT disease Brother        Meds/Allergies       Current Outpatient Medications:     apixaban (ELIQUIS) 5 mg    ascorbic acid (VITAMIN C) 250 mg tablet    BD Pen Needle Kiarra U/F 32G X 4 MM MISC    Blood Glucose Monitoring Suppl ([employer]ocate Blood Glucose Monitor) KEYANNA    clopidogrel (PLAVIX) 75 mg tablet    Empagliflozin (Jardiance) 25 MG TABS    ezetimibe (ZETIA) 10 mg tablet    fluticasone (FLONASE) 50 mcg/act nasal spray    HumaLOG KwikPen 100 units/mL injection pen    Insulin Glargine Solostar (Lantus SoloStar) 100 UNIT/ML SOPN    losartan (COZAAR) 50 mg tablet    lovastatin (MEVACOR) 40 MG tablet    metFORMIN (GLUCOPHAGE) 1000 MG tablet    metoprolol tartrate (LOPRESSOR) 25 mg tablet    omeprazole (PriLOSEC) 40 MG capsule    VITAMIN D PO    Allergies   Allergen Reactions    Crestor [Rosuvastatin] Rash and Hives     Other reaction(s): Urticaria    Enalapril Cough    Lipitor [Atorvastatin] Rash     pain           Objective     Blood pressure 118/70, temperature 98.1 °F (36.7 °C), temperature source Tympanic, height 5' 10" (1.778 m), weight 85.3 kg (188 lb). Body mass index is 26.98 kg/m². PHYSICAL EXAM:      General Appearance:   Alert, cooperative, no distress   HEENT:   Normocephalic, atraumatic, anicteric. Neck:  Supple, symmetrical, trachea midline   Lungs:   Clear to auscultation bilaterally; no rales, rhonchi or wheezing; respirations unlabored    Heart[de-identified]   Regular rate and rhythm; no murmur, rub, or gallop.    Abdomen:   Soft, non-tender, non-distended; normal bowel sounds; no masses, no organomegaly    Genitalia:   Deferred    Rectal:   Deferred    Extremities:  No cyanosis, clubbing or edema    Pulses:  2+ and symmetric    Skin:  No jaundice, rashes, or lesions    Lymph nodes:  No palpable cervical lymphadenopathy        Lab Results:   No visits with results within 1 Day(s) from this visit. Latest known visit with results is:   Office Visit on 10/05/2023   Component Date Value    WBC 10/05/2023 6.13     RBC 10/05/2023 4.77     Hemoglobin 10/05/2023 13.4     Hematocrit 10/05/2023 42.5     MCV 10/05/2023 89     MCH 10/05/2023 28.1     MCHC 10/05/2023 31.5     RDW 10/05/2023 14.3     MPV 10/05/2023 8.7 (L)     Platelets 12/30/1897 176     nRBC 10/05/2023 0     Neutrophils Relative 10/05/2023 44     Immat GRANS % 10/05/2023 0     Lymphocytes Relative 10/05/2023 27     Monocytes Relative 10/05/2023 11     Eosinophils Relative 10/05/2023 17 (H)     Basophils Relative 10/05/2023 1     Neutrophils Absolute 10/05/2023 2.70     Immature Grans Absolute 10/05/2023 0.00     Lymphocytes Absolute 10/05/2023 1.64     Monocytes Absolute 10/05/2023 0.68     Eosinophils Absolute 10/05/2023 1.05 (H)     Basophils Absolute 10/05/2023 0.06          Radiology Results:   No results found.

## 2023-11-01 ENCOUNTER — CLINICAL SUPPORT (OUTPATIENT)
Dept: CARDIAC REHAB | Facility: HOSPITAL | Age: 68
End: 2023-11-01
Payer: MEDICARE

## 2023-11-01 DIAGNOSIS — I21.4 NON-STEMI (NON-ST ELEVATED MYOCARDIAL INFARCTION) (HCC): ICD-10-CM

## 2023-11-01 PROCEDURE — 93798 PHYS/QHP OP CAR RHAB W/ECG: CPT

## 2023-11-03 ENCOUNTER — APPOINTMENT (OUTPATIENT)
Dept: CARDIAC REHAB | Facility: HOSPITAL | Age: 68
End: 2023-11-03
Payer: MEDICARE

## 2023-11-06 ENCOUNTER — CLINICAL SUPPORT (OUTPATIENT)
Dept: CARDIAC REHAB | Facility: HOSPITAL | Age: 68
End: 2023-11-06
Payer: MEDICARE

## 2023-11-06 DIAGNOSIS — I21.4 NON-STEMI (NON-ST ELEVATED MYOCARDIAL INFARCTION) (HCC): ICD-10-CM

## 2023-11-06 PROCEDURE — 93798 PHYS/QHP OP CAR RHAB W/ECG: CPT

## 2023-11-07 ENCOUNTER — TELEPHONE (OUTPATIENT)
Dept: HEMATOLOGY ONCOLOGY | Facility: CLINIC | Age: 68
End: 2023-11-07

## 2023-11-07 NOTE — TELEPHONE ENCOUNTER
I called Jignesh Zaragoza in response to a referral that was received for patient to establish care with Hematology. Outreach was made to schedule a consultation. I left a voicemail explaining the reason for my call and advised patient to call [facility] at [phone]. Another attempt will be made to contact patient.

## 2023-11-08 ENCOUNTER — CLINICAL SUPPORT (OUTPATIENT)
Dept: CARDIAC REHAB | Facility: HOSPITAL | Age: 68
End: 2023-11-08
Payer: MEDICARE

## 2023-11-08 ENCOUNTER — TELEPHONE (OUTPATIENT)
Dept: ENDOCRINOLOGY | Facility: CLINIC | Age: 68
End: 2023-11-08

## 2023-11-08 ENCOUNTER — TELEPHONE (OUTPATIENT)
Dept: HEMATOLOGY ONCOLOGY | Facility: CLINIC | Age: 68
End: 2023-11-08

## 2023-11-08 DIAGNOSIS — I21.4 NON-STEMI (NON-ST ELEVATED MYOCARDIAL INFARCTION) (HCC): ICD-10-CM

## 2023-11-08 DIAGNOSIS — Z79.4 TYPE 2 DIABETES MELLITUS WITH HYPERGLYCEMIA, WITH LONG-TERM CURRENT USE OF INSULIN (HCC): ICD-10-CM

## 2023-11-08 DIAGNOSIS — E11.65 TYPE 2 DIABETES MELLITUS WITH HYPERGLYCEMIA, WITH LONG-TERM CURRENT USE OF INSULIN (HCC): ICD-10-CM

## 2023-11-08 PROCEDURE — 93798 PHYS/QHP OP CAR RHAB W/ECG: CPT

## 2023-11-08 NOTE — TELEPHONE ENCOUNTER
I called [facility] in response to a referral that was received for patient to establish care with Hematology. Outreach was made to schedule a consultation. I left a voicemail explaining the reason for my call and advised patient to call \[facility] [place] [facility]\"" at [phone]. The referral has been closed.

## 2023-11-09 ENCOUNTER — CLINICAL SUPPORT (OUTPATIENT)
Dept: CARDIAC REHAB | Facility: HOSPITAL | Age: 68
End: 2023-11-09
Payer: MEDICARE

## 2023-11-09 DIAGNOSIS — I21.4 NON-STEMI (NON-ST ELEVATED MYOCARDIAL INFARCTION) (HCC): Primary | ICD-10-CM

## 2023-11-09 PROCEDURE — 93798 PHYS/QHP OP CAR RHAB W/ECG: CPT

## 2023-11-10 ENCOUNTER — APPOINTMENT (OUTPATIENT)
Dept: CARDIAC REHAB | Facility: HOSPITAL | Age: 68
End: 2023-11-10
Payer: MEDICARE

## 2023-11-10 NOTE — PROGRESS NOTES
Cardiac Rehabilitation Plan of Care   90 Day Reassessment          Today's date: 11/10/2023   # of Exercise Sessions Completed:   Patient name: Belia Gibson      : 1955  Age: 79 y.o. MRN: [id number]  Referring Physician: Dr. Shar Castellanos DO  Cardiologist: Pt saw Mercedez NassarJARED on 2023 but states they are on a wait list to see a cardiologist  Provider: Deandre Blanco  Clinician: Dunia Fuller. Hardik Vázquez, MPT, CCRP    Dx: NSTEMI   Angioplasty x 3 and JOHNATHAN x 1 to Dist RCA  Angioplasty x 1 and JOHNATHAN x 1 to RPVA  Date of onset: 2023      SUMMARY OF PROGRESS: Eli Han has completed 22 sessions at  Cardiac Rehab post NSTEMI w/ intervention. The patient does currently follow a formal exercise program at home. Prior to his symptoms and event, he stated that he was walking up to 5 miles per day. He has resumed all ADLs. He no longer notes weakness or fatigue. When addressed, the patient continues to deny having depression/anxiety. Patient reports excellent social/emotional support from his friends and family. Stress management will be reviewed. The patient is a non-smoker. Patient admits to 100% medication compliance. Patient has no remaining physical limitations. The patient has been working at a 5.30 MET level. His resting vitals were HR 66 and /66 with appropriate response to exercise, HR 91 and /72. Telemetry reveals NSR. [facility] Blood Pressure will be monitored throughout the program and cardiologist will be notified of elevated trends. Eli Han was counseled on exercise guidelines to achieve a minimum of 150 mins/wk of moderate intensity (RPE 4-6) exercise and encouraged to add 1-2 days of exercise on opposite days of cardiac rehab as tolerated. We discussed current dietary habits and goals of heart healthy eating for lipid management and diabetes management. The patient has T2D.   Patient's personal goals include: increase strength, reduce fatigue, improve functional capacity, return to all ADLs. The patient's CAD risk factors include:  inactivity, obesity/overweight, hypertension, hyperlipidemia and diabetes. Patient has been compliant attending his CR sessions. He gives great effort during each session. He has correct hemodynamic responses to the activity. He denies any symptoms. He rates his program 4/10. He is able to work at a 5.30 MET Level. He is able to attain 5.50 miles on the Lidocycle in a 29 minute period and 1 mile on the TM in 22 m inutes. . He has been receiving weekly education. Refer to [facility] documentation for a list of completed topics. His program will be progressed as he is able to tolerate. Medication compliance: Yes   Comments: Pt reports to be compliant with medications  Fall Risk: Low   Comments: Ambulates with a steady gait with no assist device    EKG Interpretation: NSR      EXERCISE ASSESSMENT and PLAN    Exercise Prescription:      Frequency: 2-3 days/week   Supplement with home exercise 3+ days/wk as tolerated       Minutes: 45-60       METS:5.30            HR: 85-95   RPE: 4-5/10         Modalities: Treadmill, UBE, Lifecycle and NuStep      30 Day Goals for Exercise Progression:    Frequency: 2-3 days/week of cardiac rehab       Supplement with home exercise 3+ days/wk as tolerated    Minutes: 45-60                     >150 mins/wk of moderate intensity exercise   METS: 5.30-6.5   HR: 85-96   RPE: 4-6/10   Modalities: Treadmill, Airdyne bike, UBE, Lifecycle, Elliptical and NuStep    Strength training: Will be added following 2-3 weeks of monitored exercise sessions   Modalities: Leg Press, Chest Press, Lateral Raise, Arm Curl, Front Raises and Calf Raises    Home Exercise: Daily walking. Plans to return to a local fitness center when appropriate.      Goals: 10% improvement in functional capacity - based on max METs achieved in fitness assessment, improved DASI score by 10%, Increase in exercise capacity by 40% - based on peak METs tolerated in cardiac rehab exercise session, Exercise 5 days/wk, >150mins/wk of moderate intensity exercise, Resume ADLs with increased strength and Attend Rehab regularly    Progression Toward Goals: Will continue to educate and progress as tolerated., Goals met: attends CR regularlt, resumed ADLs, increased MET level ability, BLE strength WNL and exercises 5X week> 150 min. .    Education: benefit of exercise for CAD risk factors, home exercise guidelines, AHA guidelines to achieve >150 mins/wk of moderate exercise and RPE scale     Plan:education on home exercise guidelines, home exercise 30+ mins 2 days opposite CR and Education class: Risk Factors for Heart Disease     Readiness to change: Action:  (Changing behavior)      NUTRITION ASSESSMENT AND PLAN    Weight control:    Starting weight: 185   Current weight:  187  Patient is maintaining a healthy weight. Waist circumference:    Starting: NA   Current: NA    Diabetes: T2D  A1c: 8.1    last measured: 4/24/2023  A1C: 8.3 - 10/2/2023    Lipid management: Discussed diet and lipid management and Last lipid profile 4/24/2023  Chol 162    HDL 37  LDL 74    Lipid Panel: Chol 131; Tri 180; HDL 37; HDL 58 - 9/16/2023    Goals:reduced BMI to < 25, LDL <100, HDL >40, TRG <150, CHOL <200, fasting BG  and improved A1c  < 7.0%    Measurable goals were based Rate Your Plate Dietary Self-Assessment. These are the areas in which the patient could score higher on the assessment. Goals include recommendations for a heart healthy diet based on American Heart Association. Progression Toward Goals: Pt is progressing and showing improvement  toward the following goals:  improving A1C and Lipid Panel. Following a cardiac diet. .  , Will continue to educate and progress as tolerated. A1C remains > 7.0.  Tri are >150 and HDL is < 40    Education: heart healthy eating  low sodium diet  hydration  nutrition for  lipid management  nutrition for Improved BG control  target goal for A1c <7.0  exercise and diabetes management   Plan: Education class: Reading Food Labels and Education Class: Heart Healthy Eating     Readiness to change: Action:  (Changing behavior)      PSYCHOSOCIAL ASSESSMENT AND PLAN    Emotional:  Depression assessment:  PHQ-9 = 6  5-9 = Mild Depression            Anxiety measure:  JIM-7 = 4  0-4  = Not anxious  Self-reported stress level:  2/10    Social support: Very Good    Goals:  Reduce perceived stress to 1-3/10, [facility], Feelings in [place] Score < 3, Physical Fitness in [place] Score < 3, Social Support in [place] Score < 3, Daily Activity in [place] Score < 3, Social Activities in [place] Score < 3, Pain in [place] Score < 3, Overall Health in [place] Score < 3, Quality of Life in [place] Score < 3  and Change in Health in [place] Score < 3     Progression Toward Goals: Goals Met: stress level remains low at 2/10, improved physical fitness, no c/o pain and increased social activities in community and [employer]. Education: signs/sxs of depression, benefits of a positive support system, stress management techniques and depression and CAD  Plan: Class: Stress and Your Health and Class: Relaxation     Readiness to change: Action:  (Changing behavior)      OTHER CORE COMPONENTS     Tobacco:   Social History     Tobacco Use   Smoking Status Never    Passive exposure: Never   Smokeless Tobacco Never       Tobacco Use Intervention:   N/A:  Patient is a non-smoker     Anginal Symptoms:  chest pressure and arm pain at time of the event. Patient has not had any symptoms since starting CR.       NTG use: No prescription    Blood pressure:    Restin/66   Exercise: 144/72   Recovery: 128/62    Goals: consistent BP < 130/80, reduced dietary sodium <2300mg, moderate intensity exercise >150 mins/wk and medication compliance    Progression Toward Goals: Pt is progressing and showing improvement  toward the following goals:  resting BP consistently < 130/80.  , Will continue to educate and progress as tolerated., Goals met: no c/o angina, medicaton compliance and reduced daily sodium intake.  .    Education:  understanding high blood pressure and it's relationship to CAD, low sodium diet and HTN, Education class:  Common Heart Medications and Education class: Understanding Heart Disease     Plan: Class: Understanding Heart Disease and Class: Common Heart Medications     Readiness to change: Action:  (Changing behavior)

## 2023-11-13 ENCOUNTER — CLINICAL SUPPORT (OUTPATIENT)
Dept: CARDIAC REHAB | Facility: HOSPITAL | Age: 68
End: 2023-11-13
Payer: MEDICARE

## 2023-11-13 DIAGNOSIS — I21.4 NON-STEMI (NON-ST ELEVATED MYOCARDIAL INFARCTION) (HCC): Primary | ICD-10-CM

## 2023-11-13 PROCEDURE — 93798 PHYS/QHP OP CAR RHAB W/ECG: CPT

## 2023-11-14 DIAGNOSIS — E11.65 TYPE 2 DIABETES MELLITUS WITH HYPERGLYCEMIA, WITH LONG-TERM CURRENT USE OF INSULIN (HCC): ICD-10-CM

## 2023-11-14 DIAGNOSIS — Z79.4 TYPE 2 DIABETES MELLITUS WITH HYPERGLYCEMIA, WITH LONG-TERM CURRENT USE OF INSULIN (HCC): ICD-10-CM

## 2023-11-15 ENCOUNTER — CLINICAL SUPPORT (OUTPATIENT)
Dept: CARDIAC REHAB | Facility: HOSPITAL | Age: 68
End: 2023-11-15
Payer: MEDICARE

## 2023-11-15 DIAGNOSIS — I21.4 NON-STEMI (NON-ST ELEVATED MYOCARDIAL INFARCTION) (HCC): ICD-10-CM

## 2023-11-15 PROCEDURE — 93798 PHYS/QHP OP CAR RHAB W/ECG: CPT

## 2023-11-17 ENCOUNTER — CLINICAL SUPPORT (OUTPATIENT)
Dept: CARDIAC REHAB | Facility: HOSPITAL | Age: 68
End: 2023-11-17
Payer: MEDICARE

## 2023-11-17 DIAGNOSIS — I21.4 NON-STEMI (NON-ST ELEVATED MYOCARDIAL INFARCTION) (HCC): ICD-10-CM

## 2023-11-17 PROCEDURE — 93798 PHYS/QHP OP CAR RHAB W/ECG: CPT

## 2023-11-20 ENCOUNTER — CLINICAL SUPPORT (OUTPATIENT)
Dept: CARDIAC REHAB | Facility: HOSPITAL | Age: 68
End: 2023-11-20
Payer: MEDICARE

## 2023-11-20 DIAGNOSIS — I21.4 NON-STEMI (NON-ST ELEVATED MYOCARDIAL INFARCTION) (HCC): ICD-10-CM

## 2023-11-20 PROCEDURE — 93798 PHYS/QHP OP CAR RHAB W/ECG: CPT

## 2023-11-22 ENCOUNTER — CLINICAL SUPPORT (OUTPATIENT)
Dept: CARDIAC REHAB | Facility: HOSPITAL | Age: 68
End: 2023-11-22
Payer: MEDICARE

## 2023-11-22 DIAGNOSIS — I21.4 NON-STEMI (NON-ST ELEVATED MYOCARDIAL INFARCTION) (HCC): ICD-10-CM

## 2023-11-22 PROCEDURE — 93798 PHYS/QHP OP CAR RHAB W/ECG: CPT

## 2023-11-24 ENCOUNTER — CLINICAL SUPPORT (OUTPATIENT)
Dept: CARDIAC REHAB | Facility: HOSPITAL | Age: 68
End: 2023-11-24
Payer: MEDICARE

## 2023-11-24 DIAGNOSIS — I21.4 NON-STEMI (NON-ST ELEVATED MYOCARDIAL INFARCTION) (HCC): Primary | ICD-10-CM

## 2023-11-24 PROCEDURE — 93798 PHYS/QHP OP CAR RHAB W/ECG: CPT

## 2023-11-27 ENCOUNTER — CLINICAL SUPPORT (OUTPATIENT)
Dept: CARDIAC REHAB | Facility: HOSPITAL | Age: 68
End: 2023-11-27
Payer: MEDICARE

## 2023-11-27 DIAGNOSIS — I21.4 NON-STEMI (NON-ST ELEVATED MYOCARDIAL INFARCTION) (HCC): ICD-10-CM

## 2023-11-27 PROCEDURE — 93798 PHYS/QHP OP CAR RHAB W/ECG: CPT

## 2023-11-28 DIAGNOSIS — Z79.4 TYPE 2 DIABETES MELLITUS WITH HYPERGLYCEMIA, WITH LONG-TERM CURRENT USE OF INSULIN (HCC): Primary | ICD-10-CM

## 2023-11-28 DIAGNOSIS — E11.65 TYPE 2 DIABETES MELLITUS WITH HYPERGLYCEMIA, WITH LONG-TERM CURRENT USE OF INSULIN (HCC): Primary | ICD-10-CM

## 2023-11-28 RX ORDER — PEN NEEDLE, DIABETIC 32GX 5/32"
NEEDLE, DISPOSABLE MISCELLANEOUS 4 TIMES DAILY
Qty: 360 EACH | Refills: 1 | Status: SHIPPED | OUTPATIENT
Start: 2023-11-28 | End: 2024-02-26

## 2023-11-28 RX ORDER — LANCETS 30 GAUGE
EACH MISCELLANEOUS
COMMUNITY
End: 2023-11-28 | Stop reason: SDUPTHER

## 2023-11-28 RX ORDER — BLOOD SUGAR DIAGNOSTIC
1 STRIP MISCELLANEOUS 2 TIMES DAILY
COMMUNITY
End: 2023-11-28 | Stop reason: SDUPTHER

## 2023-11-29 ENCOUNTER — APPOINTMENT (OUTPATIENT)
Dept: LAB | Facility: HOSPITAL | Age: 68
End: 2023-11-29
Payer: MEDICARE

## 2023-11-29 ENCOUNTER — CLINICAL SUPPORT (OUTPATIENT)
Dept: CARDIAC REHAB | Facility: HOSPITAL | Age: 68
End: 2023-11-29
Payer: MEDICARE

## 2023-11-29 DIAGNOSIS — I21.4 NON-STEMI (NON-ST ELEVATED MYOCARDIAL INFARCTION) (HCC): ICD-10-CM

## 2023-11-29 DIAGNOSIS — R97.20 ELEVATED PSA: ICD-10-CM

## 2023-11-29 LAB
ALBUMIN SERPL BCP-MCNC: 4.4 G/DL (ref 3.5–5)
ANION GAP SERPL CALCULATED.3IONS-SCNC: 8 MMOL/L
BUN SERPL-MCNC: 20 MG/DL (ref 5–25)
CALCIUM SERPL-MCNC: 10 MG/DL (ref 8.4–10.2)
CHLORIDE SERPL-SCNC: 104 MMOL/L (ref 96–108)
CO2 SERPL-SCNC: 26 MMOL/L (ref 21–32)
CREAT SERPL-MCNC: 0.91 MG/DL (ref 0.6–1.3)
GFR SERPL CREATININE-BSD FRML MDRD: 87 ML/MIN/1.73SQ M
GLUCOSE SERPL-MCNC: 127 MG/DL (ref 65–140)
PHOSPHATE SERPL-MCNC: 4.2 MG/DL (ref 2.3–4.1)
POTASSIUM SERPL-SCNC: 4.4 MMOL/L (ref 3.5–5.3)
SODIUM SERPL-SCNC: 138 MMOL/L (ref 135–147)

## 2023-11-29 PROCEDURE — 36415 COLL VENOUS BLD VENIPUNCTURE: CPT

## 2023-11-29 PROCEDURE — 80069 RENAL FUNCTION PANEL: CPT

## 2023-11-29 PROCEDURE — 93798 PHYS/QHP OP CAR RHAB W/ECG: CPT

## 2023-11-30 RX ORDER — BLOOD SUGAR DIAGNOSTIC
1 STRIP MISCELLANEOUS 2 TIMES DAILY
Qty: 60 EACH | Refills: 2 | Status: SHIPPED | OUTPATIENT
Start: 2023-11-30 | End: 2024-02-28

## 2023-11-30 RX ORDER — LANCETS 30 GAUGE
EACH MISCELLANEOUS
Qty: 200 EACH | Refills: 1 | Status: SHIPPED | OUTPATIENT
Start: 2023-11-30

## 2023-12-01 ENCOUNTER — CLINICAL SUPPORT (OUTPATIENT)
Dept: CARDIAC REHAB | Facility: HOSPITAL | Age: 68
End: 2023-12-01
Payer: MEDICARE

## 2023-12-01 DIAGNOSIS — I21.4 NON-STEMI (NON-ST ELEVATED MYOCARDIAL INFARCTION) (HCC): ICD-10-CM

## 2023-12-01 PROCEDURE — 93798 PHYS/QHP OP CAR RHAB W/ECG: CPT

## 2023-12-04 ENCOUNTER — HOSPITAL ENCOUNTER (OUTPATIENT)
Facility: MEDICAL CENTER | Age: 68
Discharge: HOME | End: 2023-12-04
Attending: UROLOGY
Payer: MEDICARE

## 2023-12-04 DIAGNOSIS — R97.20 ELEVATED PSA: ICD-10-CM

## 2023-12-04 PROCEDURE — A9585 GADOBUTROL INJECTION: HCPCS | Performed by: UROLOGY

## 2023-12-04 PROCEDURE — 76377 3D RENDER W/INTRP POSTPROCES: CPT

## 2023-12-04 PROCEDURE — G1004 CDSM NDSC: HCPCS

## 2023-12-04 PROCEDURE — 72197 MRI PELVIS W/O & W/DYE: CPT

## 2023-12-04 RX ORDER — GADOBUTROL 604.72 MG/ML
8 INJECTION INTRAVENOUS
Status: COMPLETED | OUTPATIENT
Start: 2023-12-04 | End: 2023-12-04

## 2023-12-04 RX ADMIN — GADOBUTROL 8 ML: 604.72 INJECTION INTRAVENOUS at 09:10

## 2023-12-06 ENCOUNTER — CLINICAL SUPPORT (OUTPATIENT)
Dept: CARDIAC REHAB | Facility: HOSPITAL | Age: 68
End: 2023-12-06
Payer: MEDICARE

## 2023-12-06 DIAGNOSIS — I21.4 NON-STEMI (NON-ST ELEVATED MYOCARDIAL INFARCTION) (HCC): ICD-10-CM

## 2023-12-06 PROCEDURE — 93798 PHYS/QHP OP CAR RHAB W/ECG: CPT

## 2023-12-08 ENCOUNTER — CLINICAL SUPPORT (OUTPATIENT)
Dept: CARDIAC REHAB | Facility: HOSPITAL | Age: 68
End: 2023-12-08
Payer: MEDICARE

## 2023-12-08 DIAGNOSIS — I21.4 NON-STEMI (NON-ST ELEVATED MYOCARDIAL INFARCTION) (HCC): Primary | ICD-10-CM

## 2023-12-08 PROCEDURE — 93798 PHYS/QHP OP CAR RHAB W/ECG: CPT

## 2023-12-08 NOTE — PROGRESS NOTES
Cardiac Rehabilitation Plan of Care   120 Day Reassessment          Today's date: 2023   # of Exercise Sessions Completed:   Patient name: Michael Major      : 1955  Age: 79 y.o. MRN: [id number]  Referring Physician: Dr. Aquilino Cho DO  Cardiologist: Pt saw JARED Worrell on 2023 but states they are on a wait list to see a cardiologist  Patient has another follow up with Nicole Lopez, [address] [place] [address] on 2024 and a follow up with Clifton Stephen PA-C on 2024  Provider: [facility]  Clinician: Talisha Macedo MS    Dx: NSTEMI   Angioplasty x 3 and JOHNATHAN x 1 to Dist RCA  Angioplasty x 1 and JOHNATHAN x 1 to RPVA  Date of onset: 2023      SUMMARY OF PROGRESS: Patrice Weaver has completed 33 sessions at  Cardiac Rehab post NSTEMI w/ intervention. The patient does currently follow a formal exercise program at home. Prior to his symptoms and event, he stated that he was walking up to 5 miles per day. He has resumed all ADLs. He no longer notes weakness or fatigue. When addressed, the patient continues to deny having depression/anxiety. Patient reports excellent social/emotional support from his friends and family. Stress management will be reviewed. The patient is a non-smoker. Patient admits to 100% medication compliance. Patient has no remaining physical limitations. The patient has been working at a 5.67 MET level which increased in this 30 day reporting window from a 5.30 MET level. His resting vitals were HR 76 and /62 with appropriate response to exercise, HR 91 and /72. Telemetry reveals NSR. Blood Pressure will be monitored throughout the program and cardiologist will be notified of elevated trends. Patrice Weaver was counseled on exercise guidelines to achieve a minimum of 150 mins/wk of moderate intensity (RPE 4-6) exercise and encouraged to add 1-2 days of exercise on opposite days of cardiac rehab as tolerated.      We discussed current dietary habits and goals of heart healthy eating for lipid management and diabetes management. The patient has T2D. Patient's personal goals include: increase strength, reduce fatigue, improve functional capacity, return to all ADLs. The patient's CAD risk factors include:  inactivity, obesity/overweight, hypertension, hyperlipidemia and diabetes. Patient has been compliant attending his CR sessions. He gives great effort during each session. He has correct hemodynamic responses to the activity. He denies any symptoms. He rates his program 4/10. He is able to work at a 5.67 MET Level. He is able to attain 5.0 miles on the upright bicycle in a 26 minute period and 1 mile on the TM in 22 m inutes. . He has been receiving weekly education. Refer to [facility] documentation for a list of completed topics. He will be receiving follow up testing for discharge in his next few sessions. He has already looked into what gym he is going to attend following his discharge. Medication compliance: Yes   Comments: Pt reports to be compliant with medications  Fall Risk: Low   Comments: Ambulates with a steady gait with no assist device    EKG Interpretation: NSR      EXERCISE ASSESSMENT and PLAN    Exercise Prescription:      Frequency: 2-3 days/week   Supplement with home exercise 3+ days/wk as tolerated       Minutes: 45-60       METS: 5.67            HR: 20-30 bpm above rest   RPE: 4-5/10         Modalities: Treadmill, UBE, Lifecycle and NuStep      30 Day Goals for Exercise Progression:    Frequency: 2-3 days/week of cardiac rehab       Supplement with home exercise 3+ days/wk as tolerated    Minutes: 45-60                     >150 mins/wk of moderate intensity exercise   METS: 5.67+   HR: 20-30 bpm above rest   RPE: 4-6/10   Modalities: Treadmill, Airdyne bike, UBE, Lifecycle, Elliptical and NuStep    Strength training:   Will be added following 2-3 weeks of monitored exercise sessions   Modalities: Leg Press, Chest Press, Lateral Raise, Arm Curl, Front Raises and Calf Raises    Home Exercise: Daily walking. Plans to return to a local fitness center when appropriate. Goals: 10% improvement in functional capacity - based on max METs achieved in fitness assessment, improved DASI score by 10%, Increase in exercise capacity by 40% - based on peak METs tolerated in cardiac rehab exercise session, Exercise 5 days/wk, >150mins/wk of moderate intensity exercise, Resume ADLs with increased strength and Attend Rehab regularly    Progression Toward Goals: Will continue to educate and progress as tolerated., Goals met: attends CR regularlt, resumed ADLs, increased MET level ability, BLE strength WNL and exercises 5X week> 150 min. .    Education: benefit of exercise for CAD risk factors, home exercise guidelines, AHA guidelines to achieve >150 mins/wk of moderate exercise and RPE scale     Plan:education on home exercise guidelines, home exercise 30+ mins 2 days opposite CR and Education class: Risk Factors for Heart Disease     Readiness to change: Action:  (Changing behavior)      NUTRITION ASSESSMENT AND PLAN    Weight control:    Starting weight: 185   Current weight:  188    Waist circumference:    Starting: NA   Current: NA    Diabetes: T2D  A1c: 8.1    last measured: 4/24/2023  A1C: 8.3 - 10/2/2023    Lipid management: Discussed diet and lipid management and Last lipid profile 4/24/2023  Chol 162    HDL 37  LDL 74  Lipid Panel: Chol 131; Tri 180; HDL 37; HDL 58 - 9/16/2023    Goals:reduced BMI to < 25, LDL <100, HDL >40, TRG <150, CHOL <200, fasting BG  and improved A1c  < 7.0%    Measurable goals were based Rate Your Plate Dietary Self-Assessment. These are the areas in which the patient could score higher on the assessment. Goals include recommendations for a heart healthy diet based on American Heart Association. Progression Toward Goals: Pt is progressing and showing improvement  toward the following goals:  improving A1C and Lipid Panel. Following a cardiac diet. .  , Will continue to educate and progress as tolerated. A1C remains > 7.0. Tri are >150 and HDL is < 40    Education: heart healthy eating  low sodium diet  hydration  nutrition for  lipid management  nutrition for Improved BG control  target goal for A1c <7.0  exercise and diabetes management   Plan: Education class: Reading Food Labels and Education Class: Heart Healthy Eating     Readiness to change: Action:  (Changing behavior)      PSYCHOSOCIAL ASSESSMENT AND PLAN    Emotional:  Depression assessment:  PHQ-9 = 6  5-9 = Mild Depression            Anxiety measure:  JIM-7 = 4  0-4  = Not anxious  Self-reported stress level:  2/10    Social support: Very Good    Goals:  Reduce perceived stress to 1-3/10, [facility] 27, Feelings in [place] Score < 3, Physical Fitness in [place] Score < 3, Social Support in [place] Score < 3, Daily Activity in [place] Score < 3, Social Activities in [place] Score < 3, Pain in [place] Score < 3, Overall Health in [place] Score < 3, Quality of Life in [place] Score < 3  and Change in Health in [place] Score < 3     Progression Toward Goals: Goals Met: stress level remains low at 2/10, improved physical fitness, no c/o pain and increased social activities in community and [employer]. Education: signs/sxs of depression, benefits of a positive support system, stress management techniques and depression and CAD  Plan: Class: Stress and Your Health and Class: Relaxation     Readiness to change: Action:  (Changing behavior)      OTHER CORE COMPONENTS     Tobacco:   Social History     Tobacco Use   Smoking Status Never    Passive exposure: Never   Smokeless Tobacco Never       Tobacco Use Intervention:   N/A:  Patient is a non-smoker     Anginal Symptoms:  chest pressure and arm pain at time of the event. Patient has not had any symptoms since starting CR.       NTG use: No prescription    Blood pressure:    Restin/62   Exercise: 144/72   Recovery: 128/62    Goals: consistent BP < 130/80, reduced dietary sodium <2300mg, moderate intensity exercise >150 mins/wk and medication compliance    Progression Toward Goals: Pt is progressing and showing improvement  toward the following goals:  resting BP consistently < 130/80.  , Will continue to educate and progress as tolerated., Goals met: no c/o angina, medicaton compliance and reduced daily sodium intake.  .    Education:  understanding high blood pressure and it's relationship to CAD, low sodium diet and HTN, Education class:  Common Heart Medications and Education class: Understanding Heart Disease     Plan: Class: Understanding Heart Disease and Class: Common Heart Medications     Readiness to change: Action:  (Changing behavior)

## 2023-12-11 ENCOUNTER — CLINICAL SUPPORT (OUTPATIENT)
Dept: CARDIAC REHAB | Facility: HOSPITAL | Age: 68
End: 2023-12-11
Payer: MEDICARE

## 2023-12-11 DIAGNOSIS — I21.4 NON-STEMI (NON-ST ELEVATED MYOCARDIAL INFARCTION) (HCC): ICD-10-CM

## 2023-12-11 PROCEDURE — 93798 PHYS/QHP OP CAR RHAB W/ECG: CPT

## 2023-12-13 ENCOUNTER — CLINICAL SUPPORT (OUTPATIENT)
Dept: CARDIAC REHAB | Facility: HOSPITAL | Age: 68
End: 2023-12-13
Payer: MEDICARE

## 2023-12-13 DIAGNOSIS — I21.4 NON-STEMI (NON-ST ELEVATED MYOCARDIAL INFARCTION) (HCC): ICD-10-CM

## 2023-12-13 PROCEDURE — 93798 PHYS/QHP OP CAR RHAB W/ECG: CPT

## 2023-12-13 NOTE — PROGRESS NOTES
Cardiac Rehabilitation Plan of Care   Discharge          Today's date: 2023  # of Exercise Sessions Completed:   Patient name: Laura Woodall      : 1955  Age: 79 y.o. MRN: [id number]  Referring Physician: Dr. Brandyn Malone DO  Cardiologist: Pt saw JARED Don on 2023 but states they are on a wait list to see a cardiologist  Patient has another follow up with Anmol Plata, [address] [place] [address] on 2024 and a follow up with Antonette Lu PA-C on 2024  Provider: [facility]  Clinician: Waylon Andrews MS    Dx: NSTEMI   Angioplasty x 3 and JOHNATHAN x 1 to Dist RCA  Angioplasty x 1 and JOHNATHAN x 1 to RPVA  Date of onset: 2023      SUMMARY OF PROGRESS: Rio Cornell has completed 36 sessions at  Cardiac Rehab post NSTEMI w/ intervention. The patient does currently follow a formal exercise program at home. Prior to his symptoms and event, he stated that he was walking up to 5 miles per day. He has resumed all ADLs. He no longer notes weakness or fatigue. When addressed, the patient continues to deny having depression/anxiety. Patient reports excellent social/emotional support from his friends and family. Stress management will be reviewed. The patient is a non-smoker. Patient admits to 100% medication compliance. Patient has no remaining physical limitations. The patient has been working at a 5.67 MET level. His resting vitals were HR 66 and /58 with appropriate response to exercise, HR 99 and /68. Telemetry reveals NSR. Blood Pressure will be monitored throughout the program and cardiologist will be notified of elevated trends. Rio Cornell was counseled on exercise guidelines to achieve a minimum of 150 mins/wk of moderate intensity (RPE 4-6) exercise and encouraged to add 1-2 days of exercise on opposite days of cardiac rehab as tolerated. We discussed current dietary habits and goals of heart healthy eating for lipid management and diabetes management. The patient has T2D. Patient's personal goals include: increase strength, reduce fatigue, improve functional capacity, return to all ADLs. The patient's CAD risk factors include:  inactivity, obesity/overweight, hypertension, hyperlipidemia and diabetes. Rebeca Reis was compliant in attending his scheduled cardiac rehab sessions and gave great effort each session. He states he is planning to attend [facility], a local fitness center, to continue exercising. Medication compliance: Yes   Comments: Pt reports to be compliant with medications  Fall Risk: Low   Comments: Ambulates with a steady gait with no assist device    EKG Interpretation: NSR      EXERCISE ASSESSMENT and PLAN    Exercise Prescription:      Frequency: 2-3 days/week   Supplement with home exercise 3+ days/wk as tolerated       Minutes: 45-60       METS: 5.67            HR: 20-30 bpm above rest   RPE: 4-5/10         Modalities: Treadmill, UBE, and Lifecycle  Achieves 1 mile on the treadmill (22 min) and 5 miles on the Lifecycle (25 min)      Strength training:   Did not strength train    Home Exercise: Daily walking. Plans to return to a local fitness center when appropriate. Goals: 10% improvement in functional capacity - based on max METs achieved in fitness assessment, improved DASI score by 10%, Increase in exercise capacity by 40% - based on peak METs tolerated in cardiac rehab exercise session, Exercise 5 days/wk, >150mins/wk of moderate intensity exercise, Resume ADLs with increased strength and Attend Rehab regularly    Progression Toward Goals:  Goals met: above a 5 MET level, attends CR regularlt, resumed ADLs, increased MET level ability, BLE strength WNL and exercises 5X week> 150 min. ., Patient will be encouraged to focus on lifestyle modifications following discharge.     Education: benefit of exercise for CAD risk factors, home exercise guidelines, AHA guidelines to achieve >150 mins/wk of moderate exercise and RPE scale     Plan:education on home exercise guidelines, home exercise 30+ mins 2 days opposite CR and Education class: Risk Factors for Heart Disease     Readiness to change: Maintenance: (Maintaining the behavior change)      NUTRITION ASSESSMENT AND PLAN    Weight control:    Starting weight: 185   Current weight:  188    Waist circumference:    Starting: NA   Current: NA    Diabetes: T2D  A1c: 8.1    last measured: 4/24/2023  A1c: 8.3    last measured: 10/2/2023    Lipid management: Discussed diet and lipid management and Last lipid profile 4/24/2023  Chol 162    HDL 37  LDL 74  Lipid Panel: 9/16/2023 Chol 131; Tri 180; HDL 37; LDL 58    Goals:reduced BMI to < 25, LDL <100, HDL >40, TRG <150, CHOL <200, fasting BG  and improved A1c  < 7.0%    Measurable goals were based Rate Your Plate Dietary Self-Assessment. These are the areas in which the patient could score higher on the assessment. Goals include recommendations for a heart healthy diet based on American Heart Association. Progression Toward Goals: Goals met: A1c remains < 7.0, Improved total chol, improved Tri, improved LDL. , Patient will be encouraged to focus on lifestyle modifications following discharge.      Education: heart healthy eating  low sodium diet  hydration  nutrition for  lipid management  nutrition for Improved BG control  target goal for A1c <7.0  exercise and diabetes management   Plan: Education class: Reading Food Labels and Education Class: Heart Healthy Eating     Readiness to change: Maintenance: (Maintaining the behavior change)      PSYCHOSOCIAL ASSESSMENT AND PLAN    Emotional:  Depression assessment:  PHQ-9 = 6  5-9 = Mild Depression            Anxiety measure:  JIM-7 = 4  0-4  = Not anxious  Self-reported stress level:  2/10    Social support: Very Good    Goals:  Reduce perceived stress to 1-3/10, improved [facility] QOL < 27, Feelings in [facility] Score < 3, Physical Fitness in [facility] Score < 3, Social Support in [facility] Score < 3, Daily Activity in [facility] Score < 3, Social Activities in [facility] Score < 3, Pain in [facility] Score < 3, Overall Health in [facility] Score < 3, Quality of Life in [facility] Score < 3  and Change in Health in [place] Score < 3     Progression Toward Goals: Goals Met: stress level remains low at 2/10, improved physical fitness, no c/o pain and increased social activities in community and [employer]. Patient will be encouraged to focus on lifestyle modifications following discharge. Education: signs/sxs of depression, benefits of a positive support system, stress management techniques and depression and CAD  Plan: Class: Stress and Your Health and Class: Relaxation     Readiness to change: Maintenance: (Maintaining the behavior change)      OTHER CORE COMPONENTS     Tobacco:   Social History     Tobacco Use   Smoking Status Never    Passive exposure: Never   Smokeless Tobacco Never       Tobacco Use Intervention:   N/A:  Patient is a non-smoker     Anginal Symptoms:  chest pressure and arm pain at time of the event. Patient has not had any symptoms since starting CR. NTG use: No prescription    Blood pressure:    Restin/62   Exercise: 144/72   Recovery: 128/62    Goals: consistent BP < 130/80, reduced dietary sodium <2300mg, moderate intensity exercise >150 mins/wk and medication compliance    Progression Toward Goals: Goals met: resting BP < 130/80, no c/o angina, medicaton compliance and reduced daily sodium intake. ., Patient will be encouraged to focus on lifestyle modifications following discharge.     Education:  understanding high blood pressure and it's relationship to CAD, low sodium diet and HTN, Education class:  Common Heart Medications and Education class: Understanding Heart Disease     Plan: Class: Understanding Heart Disease and Class: Common Heart Medications     Readiness to change: Maintenance: (Maintaining the behavior change)

## 2023-12-15 ENCOUNTER — CLINICAL SUPPORT (OUTPATIENT)
Dept: CARDIAC REHAB | Facility: HOSPITAL | Age: 68
End: 2023-12-15
Payer: MEDICARE

## 2023-12-15 DIAGNOSIS — I21.4 NON-STEMI (NON-ST ELEVATED MYOCARDIAL INFARCTION) (HCC): Primary | ICD-10-CM

## 2023-12-15 PROCEDURE — 93798 PHYS/QHP OP CAR RHAB W/ECG: CPT

## 2023-12-22 ENCOUNTER — OFFICE VISIT (OUTPATIENT)
Dept: URGENT CARE | Facility: MEDICAL CENTER | Age: 68
End: 2023-12-22
Payer: MEDICARE

## 2023-12-22 VITALS
BODY MASS INDEX: 27.92 KG/M2 | RESPIRATION RATE: 18 BRPM | WEIGHT: 195 LBS | HEART RATE: 77 BPM | HEIGHT: 70 IN | TEMPERATURE: 98.6 F | SYSTOLIC BLOOD PRESSURE: 139 MMHG | DIASTOLIC BLOOD PRESSURE: 86 MMHG | OXYGEN SATURATION: 96 %

## 2023-12-22 DIAGNOSIS — H91.92 CHANGE IN HEARING OF LEFT EAR: Primary | ICD-10-CM

## 2023-12-22 PROCEDURE — 99213 OFFICE O/P EST LOW 20 MIN: CPT

## 2023-12-22 PROCEDURE — G0463 HOSPITAL OUTPT CLINIC VISIT: HCPCS

## 2023-12-22 NOTE — PROGRESS NOTES
[facility]        NAME: Fernie March is a 68 y.o. male  : 1955    MRN: [id number]  DATE: 2023  TIME: 3:41 PM    Assessment and Plan   Change in hearing of left ear [H91.92]  1. Change in hearing of left ear              Patient Instructions       Follow up with PCP in 3-5 days.  Proceed to  ER if symptoms worsen.    Chief Complaint     Chief Complaint   Patient presents with   • Ear Fullness     Left ear fullness x 3 days          History of Present Illness       Patient called ENT and made an appt  with them but has been having discomfort in left ear and change in hearing. Patient reports having difficulty with hearing his wife when she is sitting on the left side of him talking.  He reports it feels like under the ocean. He has not done anything at home to treat his symptoms. He reports change in hearing started after initially having  ringing in his ears. He reports he does take zyrtec and also using nasal spray.  He does not have any cold symptoms. He does have a runny nose slightly but reports this is chronically. He denies any dizziness. Reports just a real uncomfortable situation/feeling in the left ear.     Given his cardiac and diabetic history I am hesitant to do a steroid trial dose without consultation with ENT. I called and spoke with Aracelis at Dr. Oropeza/Kai's office. She will speak with Dr. Oropeza, and reach directly out to the patient today before she leaves the office with a treatment plan for follow up. Patient agreeable to this plan.         Review of Systems   Review of Systems   Constitutional:  Negative for appetite change, chills, fatigue and fever.   HENT:  Negative for congestion, ear pain, postnasal drip, rhinorrhea, sinus pressure, sinus pain, sore throat and trouble swallowing.         Ear fullness feeling, change in hearing.    Respiratory:  Negative for cough and shortness of breath.    Cardiovascular:  Negative for chest pain and  palpitations.   Gastrointestinal:  Negative for abdominal pain, constipation, diarrhea, nausea and vomiting.   Musculoskeletal:  Negative for arthralgias and back pain.   Skin:  Negative for color change and rash.   Neurological:  Negative for dizziness, speech difficulty, light-headedness and headaches.   All other systems reviewed and are negative.        Current Medications       Current Outpatient Medications:   •  apixaban (ELIQUIS) 5 mg, Take 1 tablet by mouth 2 (two) times a day for 30 days 2 tabs PO BID x 7 days, then 1 tab PO BID (Patient taking differently: Take 2.5 mg by mouth 2 (two) times a day 2 tabs PO BID x 7 days, then 1 tab PO BID), Disp: 60 tablet, Rfl: 0  •  ascorbic acid (VITAMIN C) 250 mg tablet, Take 500 mg by mouth daily, Disp: , Rfl:   •  BD Pen Needle Kiarra U/F 32G X 4 MM MISC, Inject as directed 4 (four) times a day, Disp: 360 each, Rfl: 1  •  Blood Glucose Monitoring Suppl (Advocate Blood Glucose Monitor) KEYANNA, Use, Disp: , Rfl:   •  clopidogrel (PLAVIX) 75 mg tablet, Take 1 tablet (75 mg total) by mouth daily, Disp: 90 tablet, Rfl: 3  •  Empagliflozin (Jardiance) 25 MG TABS, Take 1 tablet (25 mg total) by mouth in the morning, Disp: 90 tablet, Rfl: 3  •  ezetimibe (ZETIA) 10 mg tablet, Take 1 tablet (10 mg total) by mouth daily at bedtime, Disp: 90 tablet, Rfl: 3  •  fluticasone (FLONASE) 50 mcg/act nasal spray, 2 sprays into each nostril, Disp: , Rfl:   •  glucose blood (OneTouch Ultra) test strip, Use 1 each 2 (two) times a day, Disp: 60 each, Rfl: 2  •  HumaLOG KwikPen 100 units/mL injection pen, Inject 5 units with lunch, 10 units with dinner, with scale as needed TDD up to 40 units daily., Disp: 30 mL, Rfl: 1  •  Insulin Glargine Solostar (Lantus SoloStar) 100 UNIT/ML SOPN, Inject 0.24 mL (24 Units total) under the skin daily Or as directed TDD up to 30 units daily., Disp: 30 mL, Rfl: 1  •  VITAMIN D PO, Take by mouth, Disp: , Rfl:   •  losartan (COZAAR) 50 mg tablet, Take 50 mg by  mouth, Disp: , Rfl:   •  lovastatin (MEVACOR) 40 MG tablet, Take 1 tablet (40 mg total) by mouth daily at bedtime, Disp: 90 tablet, Rfl: 3  •  metFORMIN (GLUCOPHAGE) 1000 MG tablet, Take 1 tablet (1,000 mg total) by mouth 2 (two) times a day with meals, Disp: 180 tablet, Rfl: 3  •  metoprolol tartrate (LOPRESSOR) 25 mg tablet, Take 1 tablet (25 mg total) by mouth every 12 (twelve) hours, Disp: 180 tablet, Rfl: 3  •  omeprazole (PriLOSEC) 40 MG capsule, Take 1 capsule (40 mg total) by mouth daily, Disp: 90 capsule, Rfl: 3  •  OneTouch Delica Lancets 30G MISC, Two daily to check blood sugar, Disp: 200 each, Rfl: 1    Current Allergies     Allergies as of 12/22/2023 - Reviewed 12/22/2023   Allergen Reaction Noted   • Crestor [rosuvastatin] Rash and Hives 02/24/2015   • Enalapril Cough 01/21/2013   • Lipitor [atorvastatin] Rash 08/29/2023            The following portions of the patient's history were reviewed and updated as appropriate: allergies, current medications, past family history, past medical history, past social history, past surgical history and problem list.     Past Medical History:   Diagnosis Date   • BPH without obstruction/lower urinary tract symptoms    • CAD (coronary artery disease)     s/p JOHNATHAN distal RCA and JOHNATHAN RPAV 8/11/2023   • Diabetes mellitus (HCC)    • Dysuria    • GERD (gastroesophageal reflux disease)    • Gout    • Heart attack (HCC) 2023   • History of melanoma     left ear - surgically removed   • History of pulmonary embolism 2017    bilateral   • Hyperlipidemia    • Hypertension    • Melanoma (HCC) 2017    left ear   • Prostate cancer (HCC)    • Sleep apnea        Past Surgical History:   Procedure Laterality Date   • CARDIAC CATHETERIZATION Left 8/11/2023    Procedure: Cardiac Left Heart Cath;  Surgeon: Rubi Ivory DO;  Location: [facility];  Service: Cardiology   • CARDIAC CATHETERIZATION N/A 8/11/2023    Procedure: Cardiac Coronary Angiogram;  Surgeon: Rubi NAVA  "DO Cachorro;  Location: [facility];  Service: Cardiology   • CARDIAC CATHETERIZATION N/A 8/11/2023    Procedure: Cardiac pci;  Surgeon: Rubi Ivory DO;  Location: [facility];  Service: Cardiology   • CARDIAC CATHETERIZATION N/A 8/11/2023    Procedure: Cardiac other - IVUS;  Surgeon: Rubi Ivory DO;  Location: [facility];  Service: Cardiology   • CHOLECYSTECTOMY     • COLONOSCOPY  10/2016    sessile serrated polyp removed from the proximal transverse, adenoma removed from the distal transverse   • EGD  08/2017    gastritis, H. pylori negative, and Candida esophagitis   • EGD  11/2011    slight Schatzki's ring, small hiatal hernia   • EXTERNAL EAR SURGERY Left 08/2017    for the removal of skin melanoma-Plastic surgery       Family History   Problem Relation Age of Onset   • Hyperlipidemia Mother    • Stroke Father    • Melanoma Father    • Diabetes Sister    • Hypertension Sister    • Diabetes Brother    • JETT disease Brother          Medications have been verified.        Objective   /86   Pulse 77   Temp 98.6 °F (37 °C)   Resp 18   Ht 5' 10\" (1.778 m)   Wt 88.5 kg (195 lb)   SpO2 96%   BMI 27.98 kg/m²        Physical Exam     Physical Exam  Vitals and nursing note reviewed.   Constitutional:       General: He is awake. He is not in acute distress.     Appearance: Normal appearance. He is well-developed, well-groomed and normal weight. He is not ill-appearing.   HENT:      Head: Normocephalic and atraumatic.      Right Ear: Tympanic membrane, ear canal and external ear normal.      Left Ear: Tympanic membrane, ear canal and external ear normal. Decreased hearing noted. No drainage, swelling or tenderness.  No middle ear effusion. There is no impacted cerumen. Tympanic membrane is not injected, scarred, perforated, erythematous, retracted or bulging.      Ears:      Suárez exam findings: Does not lateralize.     Right Rinne: AC > BC.     Left Rinne: AC > BC.     " Comments: Patient currently takes Zyrtec and also does use nasal.    AC approx 15 sec/BC approx 30+ seconds bilaterally.     Forced whisper at 5ft unable to clearly identify words from the left ear but able to identify with right ear. At 1-2ft the patient is able to identify forced whisper words but reports there is a significant decreased comparatively.        Nose: Nose normal.      Mouth/Throat:      Lips: Pink.      Mouth: Mucous membranes are moist.      Pharynx: Oropharynx is clear.   Eyes:      Extraocular Movements: Extraocular movements intact.      Conjunctiva/sclera: Conjunctivae normal.      Pupils: Pupils are equal, round, and reactive to light.   Cardiovascular:      Rate and Rhythm: Normal rate and regular rhythm.      Pulses: Normal pulses.      Heart sounds: Normal heart sounds.   Pulmonary:      Effort: Pulmonary effort is normal.      Breath sounds: Normal breath sounds.   Abdominal:      General: Abdomen is flat. Bowel sounds are normal.      Palpations: Abdomen is soft.   Musculoskeletal:         General: Normal range of motion.      Cervical back: Normal range of motion and neck supple.   Skin:     General: Skin is warm.      Capillary Refill: Capillary refill takes less than 2 seconds.   Neurological:      General: No focal deficit present.      Mental Status: He is alert and oriented to person, place, and time.   Psychiatric:         Mood and Affect: Mood normal.         Behavior: Behavior normal. Behavior is cooperative.

## 2024-01-02 ENCOUNTER — APPOINTMENT (OUTPATIENT)
Dept: LAB | Facility: HOSPITAL | Age: 69
End: 2024-01-02
Payer: MEDICARE

## 2024-01-02 DIAGNOSIS — R97.20 ELEVATED PSA: ICD-10-CM

## 2024-01-02 PROCEDURE — 84153 ASSAY OF PSA TOTAL: CPT

## 2024-01-02 PROCEDURE — 84154 ASSAY OF PSA FREE: CPT

## 2024-01-02 PROCEDURE — 36415 COLL VENOUS BLD VENIPUNCTURE: CPT

## 2024-01-02 NOTE — PROGRESS NOTES
UROLOGY PROGRESS NOTE   [facility]  [address] [place] [address] [place]  [address]  [place] [address]  [phone]  Fax:[phone]  [email or web address]      NAME: Fernie March  AGE: 68 y.o. SEX: male  : 1955   MRN: [id number]    DATE: 2024  TIME: 12:04 PM    Assessment and Plan     1. Elevated PSA  -     PSA, total and free; Future; Expected date: 2024      Benign prostatic hyperplasia and elevated PSA.  His free fraction is within acceptable range. I discussed various treatment options for benign prostatic hyperplasia. Currently, the risks associated with surgical intervention given his cardiac status, use of Eliquis and history of pulmonary embolism outweigh the potential benefits of a prostate cancer diagnosis. I will wait 6 months before doing a biopsy, considering his stable PSA level, history of pulmonary embolism, and use of blood thinners. Regular monitoring of PSA levels will be conducted, with the potential for a repeat MRI.    Follow-up  The patient will follow up in 6 months with a PSA.    Chief Complaint     Chief Complaint   Patient presents with    Follow-up     5 month follow up: review Labs and MRI.       History of Present Illness   68-year-old man has elevated PSA with a his PSA history of 2.6 in , 3.2 in , 7.1 2023, and 4.0 2023 with a 20% free fraction.  He has a history of BPH and was previously seen by Dr. Sagastume.  He was seen by Mr. Griggs May 3, 2023.  He was set up for an MR fusion prostate biopsy August 15 with me but this was canceled due to the patient having a myocardial infarction.  He was placed on aspirin and Plavix.  MRI showed category 3 lesion 5 mm right apical posterior peripheral zone.  The volume of the gland was 41 cc.  He also has a history of pulmonary embolism.  We were contacted at the time of the MI and we decided to postpone the biopsy for 6 months at least.  He had angioplasty x3 and a drug-eluting stent  placed.  He is now doing well.  He is normally on Eliquis because of a history of pulmonary embolism and he had stopped the Eliquis prior to the planned prostate biopsy and he had a myocardial infarction probably because of this and underlying coronary artery disease and the associated stress with anxiety.  I last saw him August 29, 2023 and we decided to check a PSA at that time and in 5 months and follow-up at this time for history and physical to prepare for MR fusion biopsy with an MRI before.  This was because he was unable to stop his Plavix for the next 6 months.  PSA was 3.5 August 30, 2023 and it is 4.3 with a free fraction of 20.2% as of January 2, 2024.  Multiparametric MRI of the prostate December 4, 2023 showed a category 3 i.e. equivocal lesion 1.4 cm lesion in the posterior right peripheral zone at the apex with no major change since June 6, 2023.  44 cc gland.    Fernie March is a 68-year-old male who presents for elevated PSA. He is accompanied by his wife    He had an MRI of the prostate in 12/2023 that showed no changes in a category 3 stable lesion.    He has not consulted a cardiologist since suffering from a myocardial infarction. He has an upcoming appointment with a PA next week, followed by a consultation with Dr Cobb.     He is urinating okay. He is on Jardiance for diabetes mellitus.  This gives him some urgency and frequency.  We discussed the mechanism of this with the sodium glucose Co. transporter mechanism.    The following portions of the patient's history were reviewed and updated as appropriate: allergies, current medications, past family history, past medical history, past social history, past surgical history and problem list.  Past Medical History:   Diagnosis Date    BPH without obstruction/lower urinary tract symptoms     CAD (coronary artery disease)     s/p JOHNATHAN distal RCA and JOHNATHAN RPAV 8/11/2023    Diabetes mellitus (HCC)     Dysuria     GERD (gastroesophageal reflux  disease)     Gout     Heart attack (HCC) 2023    History of melanoma     left ear - surgically removed    History of pulmonary embolism 2017    bilateral    Hyperlipidemia     Hypertension     Melanoma (HCC) 2017    left ear    Prostate cancer (HCC)     Sleep apnea      Past Surgical History:   Procedure Laterality Date    CARDIAC CATHETERIZATION Left 8/11/2023    Procedure: Cardiac Left Heart Cath;  Surgeon: Rubi Ivory DO;  [facility];  Service: Cardiology    CARDIAC CATHETERIZATION N/A 8/11/2023    Procedure: Cardiac Coronary Angiogram;  Surgeon: Rubi Ivory DO;  [facility];  Service: Cardiology    CARDIAC CATHETERIZATION N/A 8/11/2023    Procedure: Cardiac pci;  Surgeon: Rubi Ivory DO;  [facility];  Service: Cardiology    CARDIAC CATHETERIZATION N/A 8/11/2023    Procedure: Cardiac other - IVUS;  Surgeon: Rubi Ivory DO;  [facility];  Service: Cardiology    CHOLECYSTECTOMY      COLONOSCOPY  10/2016    sessile serrated polyp removed from the proximal transverse, adenoma removed from the distal transverse    EGD  08/2017    gastritis, H. pylori negative, and Candida esophagitis    EGD  11/2011    slight Schatzki's ring, small hiatal hernia    EXTERNAL EAR SURGERY Left 08/2017    for the removal of skin melanoma-Plastic surgery         Active Problem List     Patient Active Problem List   Diagnosis    Type 2 diabetes mellitus with hyperglycemia, with long-term current use of insulin (HCC)    Vomiting    Leukocytosis    Mixed hyperlipidemia    Hypertension    Hx pulmonary embolism    GERD (gastroesophageal reflux disease)    Benign prostatic hyperplasia without lower urinary tract symptoms    LELO (obstructive sleep apnea)    Benign essential hypertension    Fatigue    Myalgia    Pulmonary embolism, bilateral (HCC)    Non-STEMI (non-ST elevated myocardial infarction) (HCC)    Abnormal findings on  "esophagogastroduodenoscopy (EGD)       Objective   /74   Pulse 68   Ht 5' 10\" (1.778 m)   Wt 86.2 kg (190 lb)   SpO2 97%   BMI 27.26 kg/m²   Physical exam: Awake alert and oriented no apparent distress.  HEENT atraumatic normocephalic extraocular movements are intact respiratory effort is normal extremities no cyanosis or edema.  Normal range of motion.  PSA level.  His PSA as of 01/02/2024 is 4.3 ng/mL. His free fraction has been 20%.     Current Medications     Current Outpatient Medications:     apixaban (ELIQUIS) 5 mg, Take 1 tablet by mouth 2 (two) times a day for 30 days 2 tabs PO BID x 7 days, then 1 tab PO BID (Patient taking differently: Take 2.5 mg by mouth 2 (two) times a day 2 tabs PO BID x 7 days, then 1 tab PO BID), Disp: 60 tablet, Rfl: 0    ascorbic acid (VITAMIN C) 250 mg tablet, Take 500 mg by mouth daily, Disp: , Rfl:     BD Pen Needle Kiarra U/F 32G X 4 MM MISC, Inject as directed 4 (four) times a day, Disp: 360 each, Rfl: 1    Blood Glucose Monitoring Suppl (Advocate Blood Glucose Monitor) KEYANNA, Use, Disp: , Rfl:     clopidogrel (PLAVIX) 75 mg tablet, Take 1 tablet (75 mg total) by mouth daily, Disp: 90 tablet, Rfl: 3    Empagliflozin (Jardiance) 25 MG TABS, Take 1 tablet (25 mg total) by mouth in the morning, Disp: 90 tablet, Rfl: 3    ezetimibe (ZETIA) 10 mg tablet, Take 1 tablet (10 mg total) by mouth daily at bedtime, Disp: 90 tablet, Rfl: 3    fluticasone (FLONASE) 50 mcg/act nasal spray, 2 sprays into each nostril, Disp: , Rfl:     glucose blood (OneTouch Ultra) test strip, Use 1 each 2 (two) times a day, Disp: 60 each, Rfl: 2    HumaLOG KwikPen 100 units/mL injection pen, Inject 5 units with lunch, 10 units with dinner, with scale as needed TDD up to 40 units daily., Disp: 30 mL, Rfl: 1    Insulin Glargine Solostar (Lantus SoloStar) 100 UNIT/ML SOPN, Inject 0.24 mL (24 Units total) under the skin daily Or as directed TDD up to 30 units daily., Disp: 30 mL, Rfl: 1    losartan " (COZAAR) 50 mg tablet, Take 50 mg by mouth, Disp: , Rfl:     lovastatin (MEVACOR) 40 MG tablet, Take 1 tablet (40 mg total) by mouth daily at bedtime, Disp: 90 tablet, Rfl: 3    metFORMIN (GLUCOPHAGE) 1000 MG tablet, Take 1 tablet (1,000 mg total) by mouth 2 (two) times a day with meals, Disp: 180 tablet, Rfl: 3    metoprolol tartrate (LOPRESSOR) 25 mg tablet, Take 1 tablet (25 mg total) by mouth every 12 (twelve) hours, Disp: 180 tablet, Rfl: 3    omeprazole (PriLOSEC) 40 MG capsule, Take 1 capsule (40 mg total) by mouth daily, Disp: 90 capsule, Rfl: 3    OneTouch Delica Lancets 30G MISC, Two daily to check blood sugar, Disp: 200 each, Rfl: 1    VITAMIN D PO, Take by mouth, Disp: , Rfl:     predniSONE 20 mg tablet, 3 tablets by mouth with food in AM x 3 days, 2 tabs by mouth with food in AM x 3 days then 1 tab with food each morning x 3 days (Patient not taking: Reported on 1/4/2024), Disp: 18 tablet, Rfl: 0        Baldemar Niño MD      Transcribed for Baldemar Niño MD, by Charlton Reyes on 01/04/24 at 12:04 PM. Powered by Dragon [employer]ient [employer].

## 2024-01-03 LAB
PSA FREE MFR SERPL: 20.2 %
PSA FREE SERPL-MCNC: 0.87 NG/ML
PSA SERPL-MCNC: 4.3 NG/ML (ref 0–4)

## 2024-01-04 ENCOUNTER — OFFICE VISIT (OUTPATIENT)
Dept: UROLOGY | Facility: CLINIC | Age: 69
End: 2024-01-04
Payer: MEDICARE

## 2024-01-04 VITALS
BODY MASS INDEX: 27.2 KG/M2 | SYSTOLIC BLOOD PRESSURE: 132 MMHG | OXYGEN SATURATION: 97 % | HEART RATE: 68 BPM | DIASTOLIC BLOOD PRESSURE: 74 MMHG | HEIGHT: 70 IN | WEIGHT: 190 LBS

## 2024-01-04 DIAGNOSIS — R97.20 ELEVATED PSA: Primary | ICD-10-CM

## 2024-01-04 PROCEDURE — 99213 OFFICE O/P EST LOW 20 MIN: CPT | Performed by: UROLOGY

## 2024-01-09 ENCOUNTER — OFFICE VISIT (OUTPATIENT)
Dept: CARDIOLOGY CLINIC | Facility: HOSPITAL | Age: 69
End: 2024-01-09
Payer: MEDICARE

## 2024-01-09 VITALS
OXYGEN SATURATION: 97 % | DIASTOLIC BLOOD PRESSURE: 70 MMHG | HEIGHT: 70 IN | BODY MASS INDEX: 27.63 KG/M2 | HEART RATE: 63 BPM | WEIGHT: 193 LBS | SYSTOLIC BLOOD PRESSURE: 118 MMHG

## 2024-01-09 DIAGNOSIS — E78.5 DYSLIPIDEMIA: ICD-10-CM

## 2024-01-09 DIAGNOSIS — I10 BENIGN ESSENTIAL HYPERTENSION: ICD-10-CM

## 2024-01-09 DIAGNOSIS — Z95.5 PRESENCE OF DRUG-ELUTING STENT IN RIGHT CORONARY ARTERY: ICD-10-CM

## 2024-01-09 DIAGNOSIS — I25.10 CORONARY ARTERY DISEASE INVOLVING NATIVE CORONARY ARTERY OF NATIVE HEART WITHOUT ANGINA PECTORIS: Primary | ICD-10-CM

## 2024-01-09 PROCEDURE — 99214 OFFICE O/P EST MOD 30 MIN: CPT | Performed by: PHYSICIAN ASSISTANT

## 2024-01-09 NOTE — PROGRESS NOTES
Cardiology Follow Up    Fernie March  1955  [id number]  [facility]  [address] [address]  [phone]  [phone]    1. Coronary artery disease involving native coronary artery of native heart without angina pectoris        2. Presence of drug-eluting stent in right coronary artery        3. Benign essential hypertension        4. Dyslipidemia            Discussion/Summary:  Coronary artery disease:  With prior NSTEMI s/p JOHNATHAN to the distal RCA and rPDA 8/11/23  Echo at that time revealed preserved LVEF with inferior hypokinesis   On plavix (no aspirin given Eliquis use), statin, beta-blocker  He completed cardiac rehab and remains active without any cardiopulmonary symptoms.     Hypertension:  Controlled on present regimen.    Dyslipidemia:  Most recent LDL 58 which is at goal on statin therapy.  Most recent A1C 8.3. he follows with our endocrinology group.    History of pulmonary embolism:  On anticoagulation with Eliquis, recently reduced to 2.5 mg BID due to nosebleeds.    Dilated aortic root at 43 mm noted on echo, but not reported on CTA.    He will RTO in 6 months with  or sooner if necessary. He will call the office with any concerns in the interim.    Interval History:   Fernie March is a 68 y.o. male with coronary artery disease - prior NSTEMI s/p JOHNATHAN to the distal RCA and rPDA in August 2023, hypertension, dyslipidemia, Type 2 diabetes, prior pulmonary embolism who presents to the office today for follow up.    He presents to the office today for a follow-up visit.  He previously saw another advanced practitioner in our group.  He wishes to continue care at our office.  He completed cardiac rehab.  He continues to stay active at home.  He denies any exertional chest pain/pressure/discomfort or shortness of breath.  He denies lower extremity edema, orthopnea, or PND.  He denies lightheadedness, dizziness, or syncope.  He  denies palpitations.    He did have some issues with nosebleeds since his last visit.  Subsequently he required cauterization x 2 and his Eliquis was recommended to be reduced to 2.5 mg twice daily.  He is maintained on Eliquis due to history of prior pulmonary embolism.  He denies any recurrent nosebleeds or any other abnormal bleeding since then.    He does have an elevated PSA level. He was scheduled for a prostate biopsy prior to his MI. Given use of antiplatelet therapy this has been deferred. He saw urology last week. Per that note, PSA levels have been stable and urology is currently favoring repeat PSA and follow up in 6 months. Patient was felt to be at increased risk of coming off of antiplatelet/AC at this time.    Medical Problems       Problem List       Type 2 diabetes mellitus with hyperglycemia, with long-term current use of insulin (HCC)      Lab Results   Component Value Date    HGBA1C 8.3 (H) 10/02/2023         Vomiting    Leukocytosis    Mixed hyperlipidemia    Overview Signed 11/11/2020  8:12 AM by Caitlin Rdz PA-C     Last Assessment & Plan:   Controlled-no recommended changes  TG has improved         Hypertension    Overview Signed 11/11/2020  8:12 AM by Caitlin Rdz PA-C     Last Assessment & Plan:   Doing well.  Continue current regimen. Following with PCP.          Hx pulmonary embolism    GERD (gastroesophageal reflux disease)    Benign prostatic hyperplasia without lower urinary tract symptoms    LELO (obstructive sleep apnea)    Benign essential hypertension    Overview Signed 7/25/2023 10:27 AM by Jose Henry IV, MD     Last Assessment & Plan:   Formatting of this note might be different from the original.  Doing well.  Continue current regimen.         Fatigue    Myalgia    Pulmonary embolism, bilateral (HCC)    Overview Signed 7/25/2023 10:27 AM by Jose Henry IV, MD     Last Assessment & Plan:   Formatting of this note might be different from the original.  Continue  Eliquis         Non-STEMI (non-ST elevated myocardial infarction) (HCC)    Abnormal findings on esophagogastroduodenoscopy (EGD)    Overview Signed 8/31/2023  9:46 AM by Jose Henry IV, MD     Moderate hiatal hernia, fundic gland polyps, prepyloric polyp-biopsy shows adenoma             Past Medical History:   Diagnosis Date    BPH without obstruction/lower urinary tract symptoms     CAD (coronary artery disease)     s/p JOHNATHAN distal RCA and JOHNATHAN RPAV 8/11/2023    Diabetes mellitus (HCC)     Dysuria     GERD (gastroesophageal reflux disease)     Gout     Heart attack (HCC) 2023    History of melanoma     left ear - surgically removed    History of pulmonary embolism 2017    bilateral    Hyperlipidemia     Hypertension     Melanoma (HCC) 2017    left ear    Prostate cancer (HCC)     Sleep apnea      Social History     Socioeconomic History    Marital status: /Civil Union     Spouse name: Not on file    Number of children: Not on file    Years of education: Not on file    Highest education level: Not on file   Occupational History    Occupation: Retired - [employer]   Tobacco Use    Smoking status: Never     Passive exposure: Never    Smokeless tobacco: Never   Vaping Use    Vaping status: Never Used   Substance and Sexual Activity    Alcohol use: Yes     Comment: Drinks socailly.     Drug use: No    Sexual activity: Not Currently   Other Topics Concern    Not on file   Social History Narrative    Not on file     Social Determinants of Health     Financial Resource Strain: Not on file   Food Insecurity: Not on file   Transportation Needs: Not on file   Physical Activity: Not on file   Stress: Not on file   Social Connections: Not on file   Intimate Partner Violence: Not on file   Housing Stability: Not on file      Family History   Problem Relation Age of Onset    Hyperlipidemia Mother     Stroke Father     Melanoma Father     Diabetes Sister     Hypertension Sister     Diabetes Brother     JETT disease  Brother      Past Surgical History:   Procedure Laterality Date    CARDIAC CATHETERIZATION Left 8/11/2023    Procedure: Cardiac Left Heart Cath;  Surgeon: Rubi Ivory DO;  [facility];  Service: Cardiology    CARDIAC CATHETERIZATION N/A 8/11/2023    Procedure: Cardiac Coronary Angiogram;  Surgeon: Rubi Ivory DO;  [facility];  Service: Cardiology    CARDIAC CATHETERIZATION N/A 8/11/2023    Procedure: Cardiac pci;  Surgeon: Rubi Ivory DO;  [facility];  Service: Cardiology    CARDIAC CATHETERIZATION N/A 8/11/2023    Procedure: Cardiac other - IVUS;  Surgeon: Rubi Ivory DO;  [facility];  Service: Cardiology    CHOLECYSTECTOMY      COLONOSCOPY  10/2016    sessile serrated polyp removed from the proximal transverse, adenoma removed from the distal transverse    EGD  08/2017    gastritis, H. pylori negative, and Candida esophagitis    EGD  11/2011    slight Schatzki's ring, small hiatal hernia    EXTERNAL EAR SURGERY Left 08/2017    for the removal of skin melanoma-Plastic surgery       Current Outpatient Medications:     apixaban (ELIQUIS) 5 mg, Take 1 tablet by mouth 2 (two) times a day for 30 days 2 tabs PO BID x 7 days, then 1 tab PO BID (Patient taking differently: Take 2.5 mg by mouth 2 (two) times a day 2 tabs PO BID x 7 days, then 1 tab PO BID), Disp: 60 tablet, Rfl: 0    ascorbic acid (VITAMIN C) 250 mg tablet, Take 500 mg by mouth daily, Disp: , Rfl:     BD Pen Needle Kiarra U/F 32G X 4 MM MISC, Inject as directed 4 (four) times a day, Disp: 360 each, Rfl: 1    Blood Glucose Monitoring Suppl (Advocate Blood Glucose Monitor) KEYANNA, Use, Disp: , Rfl:     clopidogrel (PLAVIX) 75 mg tablet, Take 1 tablet (75 mg total) by mouth daily, Disp: 90 tablet, Rfl: 3    Empagliflozin (Jardiance) 25 MG TABS, Take 1 tablet (25 mg total) by mouth in the morning, Disp: 90 tablet, Rfl: 3    ezetimibe (ZETIA) 10 mg tablet, Take 1  tablet (10 mg total) by mouth daily at bedtime, Disp: 90 tablet, Rfl: 3    fluticasone (FLONASE) 50 mcg/act nasal spray, 2 sprays into each nostril, Disp: , Rfl:     glucose blood (OneTouch Ultra) test strip, Use 1 each 2 (two) times a day, Disp: 60 each, Rfl: 2    HumaLOG KwikPen 100 units/mL injection pen, Inject 5 units with lunch, 10 units with dinner, with scale as needed TDD up to 40 units daily., Disp: 30 mL, Rfl: 1    Insulin Glargine Solostar (Lantus SoloStar) 100 UNIT/ML SOPN, Inject 0.24 mL (24 Units total) under the skin daily Or as directed TDD up to 30 units daily., Disp: 30 mL, Rfl: 1    losartan (COZAAR) 50 mg tablet, Take 50 mg by mouth, Disp: , Rfl:     lovastatin (MEVACOR) 40 MG tablet, Take 1 tablet (40 mg total) by mouth daily at bedtime, Disp: 90 tablet, Rfl: 3    metFORMIN (GLUCOPHAGE) 1000 MG tablet, Take 1 tablet (1,000 mg total) by mouth 2 (two) times a day with meals, Disp: 180 tablet, Rfl: 3    metoprolol tartrate (LOPRESSOR) 25 mg tablet, Take 1 tablet (25 mg total) by mouth every 12 (twelve) hours, Disp: 180 tablet, Rfl: 3    omeprazole (PriLOSEC) 40 MG capsule, Take 1 capsule (40 mg total) by mouth daily, Disp: 90 capsule, Rfl: 3    OneTouch Delica Lancets 30G MISC, Two daily to check blood sugar, Disp: 200 each, Rfl: 1    VITAMIN D PO, Take by mouth, Disp: , Rfl:     predniSONE 20 mg tablet, 3 tablets by mouth with food in AM x 3 days, 2 tabs by mouth with food in AM x 3 days then 1 tab with food each morning x 3 days (Patient not taking: Reported on 1/4/2024), Disp: 18 tablet, Rfl: 0  Allergies   Allergen Reactions    Crestor [Rosuvastatin] Rash and Hives     Other reaction(s): Urticaria    Enalapril Cough    Lipitor [Atorvastatin] Rash     pain       Labs:     Chemistry        Component Value Date/Time     12/31/2015 1226    K 4.4 11/29/2023 0941    K 4.1 12/31/2015 1226     11/29/2023 0941     12/31/2015 1226    CO2 26 11/29/2023 0941    CO2 30.5 12/31/2015 1226  "   BUN 20 11/29/2023 0941    BUN 16 12/31/2015 1226    CREATININE 0.91 11/29/2023 0941    CREATININE 0.77 12/31/2015 1226        Component Value Date/Time    CALCIUM 10.0 11/29/2023 0941    CALCIUM 9.1 12/31/2015 1226    ALKPHOS 47 08/25/2023 1119    ALKPHOS 51 12/31/2015 1226    AST 18 08/25/2023 1119    AST 29 12/31/2015 1226    ALT 15 08/25/2023 1119    ALT 47 12/31/2015 1226    BILITOT 0.28 12/31/2015 1226            Lab Results   Component Value Date    CHOL 147 12/31/2015    CHOL 153 05/23/2015    CHOL 164 12/13/2014     Lab Results   Component Value Date    HDL 37 (L) 09/16/2023    HDL 37 (L) 04/24/2023    HDL 37 (L) 04/02/2022     Lab Results   Component Value Date    LDLCALC 58 09/16/2023    LDLCALC 74 04/24/2023    LDLCALC 94 04/02/2022     Lab Results   Component Value Date    TRIG 180 (H) 09/16/2023    TRIG 257 (H) 04/24/2023    TRIG 125 04/02/2022     No results found for: \"CHOLHDL\"    Imaging: No results found.      Review of Systems   All other systems reviewed and are negative.      Vitals:    01/09/24 1242   BP: 118/70   Pulse: 63   SpO2: 97%     Vitals:    01/09/24 1242   Weight: 87.5 kg (193 lb)     Height: 5' 10\" (177.8 cm)   Body mass index is 27.69 kg/m².    Physical Exam:  Physical Exam  Vitals reviewed.   Constitutional:       General: He is not in acute distress.     Appearance: He is well-developed. He is not diaphoretic.   HENT:      Head: Normocephalic and atraumatic.   Eyes:      Pupils: Pupils are equal, round, and reactive to light.   Neck:      Vascular: No carotid bruit.   Cardiovascular:      Rate and Rhythm: Normal rate and regular rhythm.      Pulses:           Radial pulses are 2+ on the right side and 2+ on the left side.      Heart sounds: S1 normal and S2 normal. No murmur heard.  Pulmonary:      Effort: Pulmonary effort is normal. No respiratory distress.      Breath sounds: Normal breath sounds. No wheezing or rales.   Abdominal:      General: There is no distension.      " Palpations: Abdomen is soft.      Tenderness: There is no abdominal tenderness.   Musculoskeletal:         General: Normal range of motion.      Cervical back: Normal range of motion.      Right lower leg: No edema.      Left lower leg: No edema.   Skin:     General: Skin is warm and dry.      Findings: No erythema.   Neurological:      General: No focal deficit present.      Mental Status: He is alert and oriented to person, place, and time.      Gait: Gait normal.   Psychiatric:         Mood and Affect: Mood normal.         Behavior: Behavior normal.

## 2024-01-19 ENCOUNTER — OFFICE VISIT (OUTPATIENT)
Dept: ENDOCRINOLOGY | Facility: CLINIC | Age: 69
End: 2024-01-19
Payer: MEDICARE

## 2024-01-19 VITALS
HEART RATE: 60 BPM | SYSTOLIC BLOOD PRESSURE: 148 MMHG | WEIGHT: 192 LBS | DIASTOLIC BLOOD PRESSURE: 90 MMHG | HEIGHT: 70 IN | BODY MASS INDEX: 27.49 KG/M2

## 2024-01-19 DIAGNOSIS — G98.8 DISORDER OF NERVOUS SYSTEM DUE TO TYPE 2 DIABETES MELLITUS: ICD-10-CM

## 2024-01-19 DIAGNOSIS — E11.69 DISORDER OF NERVOUS SYSTEM DUE TO TYPE 2 DIABETES MELLITUS: ICD-10-CM

## 2024-01-19 DIAGNOSIS — Z79.4 TYPE 2 DIABETES MELLITUS WITH HYPERGLYCEMIA, WITH LONG-TERM CURRENT USE OF INSULIN (HCC): Primary | ICD-10-CM

## 2024-01-19 DIAGNOSIS — E11.65 TYPE 2 DIABETES MELLITUS WITH HYPERGLYCEMIA, WITH LONG-TERM CURRENT USE OF INSULIN (HCC): Primary | ICD-10-CM

## 2024-01-19 DIAGNOSIS — E11.9 TYPE 2 DIABETES MELLITUS WITHOUT COMPLICATION, WITHOUT LONG-TERM CURRENT USE OF INSULIN (HCC): ICD-10-CM

## 2024-01-19 LAB — SL AMB POCT HEMOGLOBIN AIC: 8.2 (ref ?–6.5)

## 2024-01-19 PROCEDURE — 99214 OFFICE O/P EST MOD 30 MIN: CPT | Performed by: STUDENT IN AN ORGANIZED HEALTH CARE EDUCATION/TRAINING PROGRAM

## 2024-01-19 PROCEDURE — 95251 CONT GLUC MNTR ANALYSIS I&R: CPT | Performed by: STUDENT IN AN ORGANIZED HEALTH CARE EDUCATION/TRAINING PROGRAM

## 2024-01-19 PROCEDURE — 83036 HEMOGLOBIN GLYCOSYLATED A1C: CPT | Performed by: STUDENT IN AN ORGANIZED HEALTH CARE EDUCATION/TRAINING PROGRAM

## 2024-01-19 RX ORDER — INSULIN LISPRO 100 [IU]/ML
INJECTION, SOLUTION INTRAVENOUS; SUBCUTANEOUS
Qty: 45 ML | Refills: 1 | Status: SHIPPED | OUTPATIENT
Start: 2024-01-19

## 2024-01-19 RX ORDER — INSULIN GLARGINE 100 [IU]/ML
28 INJECTION, SOLUTION SUBCUTANEOUS DAILY
Qty: 30 ML | Refills: 1 | Status: SHIPPED | OUTPATIENT
Start: 2024-01-19 | End: 2024-04-18

## 2024-01-19 NOTE — PROGRESS NOTES
Fernie March 68 y.o. male MRN: [id number]    Encounter: [id number]      Assessment/Plan     1. Type 2 diabetes on chronic insulin c/b DPN, CAD - today, we discussed increasing lantus 28 units due to fasting hyperglycemia, start humalog 5 units with breakfast due to post-meal high, continue humalog 5 units with lunch, 10 units with supper. Change scale ISF 25 >150. Continue metformin and jardiance. Has prior intolerance to trulicity. Will plan review CGM in 2-weeks for monitoring of changes. F/u in 3-mo with labs prior    2. Hypertension - above goal. Will monitor in current Rx and make adjustments as appropriate     3. Hyperlipidemia - LDL is 58 on statin. 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 He is here today with his wife.      DM longstanding, complicated by DPN, CAD.    Fernie reports no acute health concerns. He is happy with his therapy regimen, and is seeing improvements.     Presently,  he is taking lantus 24 units qHS, humalog 5 units with lunch, 10 units with supper, metformin 1g bid, and jardiance 25 mg daily.     No hyperglycemic symptoms. No hypoglycemia.     Has been very mindful of carb intake. Notices gradual worsening of Bgs following breakfast, despite low carb consumption.     Fernie March   Device used DEJUAN 2  Home use     Indication   Type 2 Diabetes    More than 72 hours of data was reviewed. Report to be scanned to chart.     Date Range: Jan 6 - Jan 19, 2024    Analysis of data:   % time CGM used: 85%  Average Glucose: 190 mg/dL  Coefficient of Variation: 22.2%     Time in Target Range:  46%   Time Above Range: 54% (10% very high)   Time Below Range: 0%     Interpretation of data: having fasting and meal time highs, notable after breakfast.     For hyperlipidemia, he takes lovastatin 40 mg daily.     Review of Systems   Constitutional:  Negative for diaphoresis and unexpected weight change.   Gastrointestinal:  Negative for nausea and vomiting.   Endocrine: Negative for polydipsia and polyuria.   Neurological:  Negative for syncope.   All other systems reviewed and are negative.      Historical Information   Past Medical History:   Diagnosis Date   • BPH without obstruction/lower urinary tract symptoms    • CAD (coronary artery disease)     s/p JOHNATHAN distal RCA and JOHNATHAN RPAV 8/11/2023   • Diabetes mellitus (HCC)    • Dysuria    • GERD (gastroesophageal reflux disease)    • Gout    • Heart attack (HCC) 2023   • History of melanoma     left ear - surgically removed   • History of pulmonary embolism 2017    bilateral   • Hyperlipidemia    • Hypertension    • Melanoma (HCC) 2017    left ear   • Prostate cancer (HCC)    • Sleep apnea      Past Surgical History:   Procedure Laterality Date   • CARDIAC CATHETERIZATION Left 8/11/2023    Procedure: Cardiac Left Heart Cath;  Surgeon: Rubi Ivory DO;  Location: [facility];  Service: Cardiology   • CARDIAC CATHETERIZATION N/A  8/11/2023    Procedure: Cardiac Coronary Angiogram;  Surgeon: Rubi Ivory DO;  Location: [facility];  Service: Cardiology   • CARDIAC CATHETERIZATION N/A 8/11/2023    Procedure: Cardiac pci;  Surgeon: Rubi Ivory DO;  Location: [facility];  Service: Cardiology   • CARDIAC CATHETERIZATION N/A 8/11/2023    Procedure: Cardiac other - IVUS;  Surgeon: Rubi Ivory DO;  Location: [facility];  Service: Cardiology   • CHOLECYSTECTOMY     • COLONOSCOPY  10/2016    sessile serrated polyp removed from the proximal transverse, adenoma removed from the distal transverse   • EGD  08/2017    gastritis, H. pylori negative, and Candida esophagitis   • EGD  11/2011    slight Schatzki's ring, small hiatal hernia   • EXTERNAL EAR SURGERY Left 08/2017    for the removal of skin melanoma-Plastic surgery     Social History   Social History     Substance and Sexual Activity   Alcohol Use Yes    Comment: Drinks socailly.      Social History     Substance and Sexual Activity   Drug Use No     Social History     Tobacco Use   Smoking Status Never   • Passive exposure: Never   Smokeless Tobacco Never     Family History:   Family History   Problem Relation Age of Onset   • Hyperlipidemia Mother    • Stroke Father    • Melanoma Father    • Diabetes Sister    • Hypertension Sister    • Diabetes Brother    • JETT disease Brother        Meds/Allergies   Current Outpatient Medications   Medication Sig Dispense Refill   • apixaban (ELIQUIS) 5 mg Take 1 tablet by mouth 2 (two) times a day for 30 days 2 tabs PO BID x 7 days, then 1 tab PO BID (Patient taking differently: Take 2.5 mg by mouth 2 (two) times a day 2 tabs PO BID x 7 days, then 1 tab PO BID) 60 tablet 0   • ascorbic acid (VITAMIN C) 250 mg tablet Take 500 mg by mouth daily     • BD Pen Needle Kiarra U/F 32G X 4 MM MISC Inject as directed 4 (four) times a day 360 each 1   • Blood Glucose Monitoring Suppl (Advocate Blood Glucose Monitor) KEYANNA Use    "  • clopidogrel (PLAVIX) 75 mg tablet Take 1 tablet (75 mg total) by mouth daily 90 tablet 3   • Empagliflozin (Jardiance) 25 MG TABS Take 1 tablet (25 mg total) by mouth in the morning 90 tablet 3   • ezetimibe (ZETIA) 10 mg tablet Take 1 tablet (10 mg total) by mouth daily at bedtime 90 tablet 3   • fluticasone (FLONASE) 50 mcg/act nasal spray 2 sprays into each nostril     • glucose blood (OneTouch Ultra) test strip Use 1 each 2 (two) times a day 60 each 2   • HumaLOG KwikPen 100 units/mL injection pen E11.65 Inject 5 units with breakfast and lunch, 10 units with dinner, with scale as needed TDD up to 50 units daily. 45 mL 1   • Insulin Glargine Solostar (Lantus SoloStar) 100 UNIT/ML SOPN Inject 0.28 mL (28 Units total) under the skin daily Or as directed TDD up to 30 units daily. 30 mL 1   • losartan (COZAAR) 50 mg tablet Take 50 mg by mouth     • lovastatin (MEVACOR) 40 MG tablet Take 1 tablet (40 mg total) by mouth daily at bedtime 90 tablet 3   • metFORMIN (GLUCOPHAGE) 1000 MG tablet Take 1 tablet (1,000 mg total) by mouth 2 (two) times a day with meals 180 tablet 3   • metoprolol tartrate (LOPRESSOR) 25 mg tablet Take 1 tablet (25 mg total) by mouth every 12 (twelve) hours 180 tablet 3   • omeprazole (PriLOSEC) 40 MG capsule Take 1 capsule (40 mg total) by mouth daily 90 capsule 3   • OneTouch Delica Lancets 30G MISC Two daily to check blood sugar 200 each 1   • VITAMIN D PO Take by mouth     • predniSONE 20 mg tablet 3 tablets by mouth with food in AM x 3 days, 2 tabs by mouth with food in AM x 3 days then 1 tab with food each morning x 3 days (Patient not taking: Reported on 1/4/2024) 18 tablet 0     No current facility-administered medications for this visit.     Allergies   Allergen Reactions   • Crestor [Rosuvastatin] Rash and Hives     Other reaction(s): Urticaria   • Enalapril Cough   • Lipitor [Atorvastatin] Rash     pain       Objective   Vitals: Blood pressure 148/90, pulse 60, height 5' 10\" (1.778 " m), weight 87.1 kg (192 lb).    Physical Exam  Vitals reviewed.   Constitutional:       Appearance: Normal appearance.   HENT:      Head: Normocephalic and atraumatic.      Nose: Nose normal.   Eyes:      General: No scleral icterus.     Conjunctiva/sclera: Conjunctivae normal.   Pulmonary:      Effort: Pulmonary effort is normal. No respiratory distress.   Abdominal:      Palpations: Abdomen is soft.      Tenderness: There is no abdominal tenderness.   Musculoskeletal:         General: No deformity.      Cervical back: Normal range of motion.   Skin:     General: Skin is warm and dry.   Neurological:      General: No focal deficit present.      Mental Status: He is alert.   Psychiatric:         Mood and Affect: Mood normal.         Behavior: Behavior normal.         The history was obtained from the review of the chart, patient and family.    Lab Results:   Lab Results   Component Value Date/Time    Hemoglobin A1C 8.2 (A) 01/19/2024 08:56 AM    Hemoglobin A1C 8.3 (H) 10/02/2023 07:40 AM    Hemoglobin A1C 8.1 (H) 04/24/2023 07:53 AM    WBC 6.13 10/05/2023 02:52 PM    WBC 4.08 (L) 08/25/2023 11:19 AM    WBC 5.81 08/11/2023 04:56 AM    Hemoglobin 13.4 10/05/2023 02:52 PM    Hemoglobin 13.5 08/25/2023 11:19 AM    Hemoglobin 14.0 08/11/2023 04:56 AM    Hematocrit 42.5 10/05/2023 02:52 PM    Hematocrit 41.6 08/25/2023 11:19 AM    Hematocrit 41.9 08/11/2023 04:56 AM    MCV 89 10/05/2023 02:52 PM    MCV 88 08/25/2023 11:19 AM    MCV 87 08/11/2023 04:56 AM    Platelets 176 10/05/2023 02:52 PM    Platelets 182 08/25/2023 11:19 AM    Platelets 192 08/11/2023 04:56 AM    BUN 20 11/29/2023 09:41 AM    BUN 18 10/02/2023 07:40 AM    BUN 15 08/25/2023 11:19 AM    Potassium 4.4 11/29/2023 09:41 AM    Potassium 3.9 10/02/2023 07:40 AM    Potassium 3.9 08/25/2023 11:19 AM    Chloride 104 11/29/2023 09:41 AM    Chloride 107 10/02/2023 07:40 AM    Chloride 101 08/25/2023 11:19 AM    CO2 26 11/29/2023 09:41 AM    CO2 25 10/02/2023 07:40  "AM    CO2 30 08/25/2023 11:19 AM    Creatinine 0.91 11/29/2023 09:41 AM    Creatinine 0.76 10/02/2023 07:40 AM    Creatinine 0.83 08/25/2023 11:19 AM    AST 18 08/25/2023 11:19 AM    AST 21 08/11/2023 03:06 AM    AST 16 08/01/2023 07:18 AM    ALT 15 08/25/2023 11:19 AM    ALT 17 08/11/2023 03:06 AM    ALT 15 08/01/2023 07:18 AM    Total Protein 7.2 08/25/2023 11:19 AM    Total Protein 7.6 08/11/2023 03:06 AM    Total Protein 7.6 08/01/2023 07:18 AM    Albumin 4.4 11/29/2023 09:41 AM    Albumin 4.4 08/25/2023 11:19 AM    Albumin 4.6 08/11/2023 03:06 AM    HDL, Direct 37 (L) 09/16/2023 08:12 AM    HDL, Direct 37 (L) 04/24/2023 07:53 AM    Triglycerides 180 (H) 09/16/2023 08:12 AM    Triglycerides 257 (H) 04/24/2023 07:53 AM           Imaging Studies: I have personally reviewed pertinent reports.      Portions of the record may have been created with voice recognition software. Occasional wrong word or \"sound a like\" substitutions may have occurred due to the inherent limitations of voice recognition software. Read the chart carefully and recognize, using context, where substitutions have occurred.    "

## 2024-01-19 NOTE — PATIENT INSTRUCTIONS
Increase Lantus 28 units nightly    Start humalog 5 units with meals, continue 5 units with lunch and supper    Use new scale for meal bolusing as below:     Blood Glucose               Additional Insulin Units                             150-200                               2  201-250                               4  251-300                               6  301-350                               8  351-400                               10  >400                                    12

## 2024-01-26 ENCOUNTER — OFFICE VISIT (OUTPATIENT)
Dept: ENDOCRINOLOGY | Facility: OTHER | Age: 69
End: 2024-01-26
Payer: MEDICARE

## 2024-01-26 VITALS — BODY MASS INDEX: 28.32 KG/M2 | WEIGHT: 197.4 LBS

## 2024-01-26 DIAGNOSIS — E11.65 TYPE 2 DIABETES MELLITUS WITH HYPERGLYCEMIA, WITH LONG-TERM CURRENT USE OF INSULIN (HCC): Primary | ICD-10-CM

## 2024-01-26 DIAGNOSIS — Z79.4 TYPE 2 DIABETES MELLITUS WITH HYPERGLYCEMIA, WITH LONG-TERM CURRENT USE OF INSULIN (HCC): Primary | ICD-10-CM

## 2024-01-26 PROCEDURE — 97803 MED NUTRITION INDIV SUBSEQ: CPT

## 2024-01-26 NOTE — PATIENT INSTRUCTIONS
Eat 3 meals per day, 4-5 hours apart. No meal skipping.     Eat 45 grams of carbohydrate per meal, and no more than 15-30 grams per snack     Set food up using the PlateMethod. Refer to your Healthy Plate handout and the last page of your Portion Book to ensure balanced meals and to see whether you are consuming appropriate amounts of the food groups.

## 2024-01-26 NOTE — PROGRESS NOTES
"Medical Nutrition Therapy        Assessment    Visit Type: Follow-up visit  Chief complaint/Medical Diagnosis/reason for visit E11.65, Z79.4    HPI Fernie was seen in person for the follow-up MNT appointment, accompanied by his spouse, Ciara. BG levels are checked frequently using his CGM. No low BG levels detected. Fernie tends to have hyperglycemia after meals but is within target range overnight. Reviewed most recent A1c (now 8.2 on 1/19/24, was 8.3 on 10/02/23). Patient does take diabetes medication as prescribed.     No longer does Fernie participate in cardiac rehab; however, he performs exercises at home of the following: treadmill 30 minutes and bike every other day for 5 miles and leg weights & sit ups.    Fernie did not realize some of the problems identified in his meals. Today I decided to show him the areas of concern by using food models that were placed on a plate to mimic the Plate Method.  After the visual representation Fernie stated that it makes more sense to him now. He was able to see his excessive carb intake, no carbs, and imbalanced meals. Patient was given the Healthy Plate handout to keep to use at home.     Problems identified in food recall include meal skipping, imbalanced meals, inconsistent carbohydrate intake, high-fat and high-sodium convenience foods, high-fat dairy, limited non-starchy vegetables and whole grains, sugary beverages, and sweets. Consumption of carbohydrates ranges from 0 to 90 grams per meal.     Portion booklet and food labels were used to teach basic carbohydrate counting. Patient agreed to keep daily food logs and return them in 3 months for assessment. RD will remain available for further dietary questions/concerns.         Ht Readings from Last 1 Encounters:   01/19/24 5' 10\" (1.778 m)     Wt Readings from Last 3 Encounters:   01/19/24 87.1 kg (192 lb)   01/15/24 87.5 kg (193 lb)   01/09/24 87.5 kg (193 lb)     Weight Change: Yes 7 lbs increase in about 3 " weeks    Barriers to Learning: no barriers    Do you follow any special diet presently?: No  Who shops: patient and spouse  Who cooks: spouse    Food Log: Completed via the method of food recall    Wakes up 7-7:30 am    5 cups coffee (10 oz)    Breakfast:8 am; coffee with 1% milk and SF sweetener with scrambled eggs OR 1 toast w/ peanut butter OR poached egg  Morning Snack: 10:30 am; cheese   Lunch:12:30-1 pm; toasted cheese (tomato, onion) sandwich (647) with Mohini with diet coke OR tomato soup (50 g) w/ milk (15 g) and sometimes handful of oyster crackers  Afternoon Snack: 2:30 & 4 pm; yogurt Light & Fit or Chobani Greek or fruit (an apple or banana) or cheese or nuts  Dinner:6 pm; 2 c lasagna w/ salad (lettuce, tomato, onion, peppers, olives) and 2x2 inch pound cake with strawberries w/ whipped cream with water  Evening Snack: 9 pm; 3 oz milk  Beverages: 5 cups coffee (10 oz)  Eating out/Take out:3x/ week; omelet (egg, meat & cheese) w/ 1/2 of a slice toast with coffee  Exercise treadmill 30 minutes and bike every other day for 5 miles and leg weights & sit ups    Calorie needs 1959 kcals/day Carbs: 45 g/meal, 15-30 g/snack     Protein:9 ounces/day    Fat: 5 servings/day        Nutrition Diagnosis:  Self-monitoring deficit  related to Food and nutrition related knowledge deficit concerning self monitoring  as evidenced by Incomplete self monitoring records (i.e. glucose, food, fluid intake, weight, physical activity)    Intervention: plate method, label reading, carbohydrate counting, meal planning, monitoring portion control, exercise guidelines, and food diary     Treatment Goals: Patient understands education and recommendations, Patient will monitor food intake daily with tracker, Patient will consume 3 meals a day, Patient will count carbohydrates, Patient will exercise, and Patient will monitor blood glucose    Monitoring and evaluation:    Term code indicator  FH 1.3.2 Food Intake Criteria: Eat 3 meals per  day, 4-5 hours apart. No meal skipping.   Term code indicator  FH 1.6.3 Carbohydrate Intake Criteria: Eat 45 grams of carbohydrate per meal, and no more than 15-30 grams per snack   Term code indicator  FH 1.3.2 Food Intake Criteria: Set food up using the PlateMethod. Refer to your Healthy Plate handout and the last page of your Portion Book to ensure balanced meals and to see whether you are consuming appropriate amounts of the food groups.    Materials Provided: 3-day food log    Patient’s Response to Instruction:  Comprehensiongood  Motivationgood  Expected Compliancegood    Begin Time: 8:00 am  End Time: 8:48 am  Referring Provider: Mango Ivory [facility], DO    Thank you for coming to the [facility] for education today.  Please feel free to call with any questions or concerns.    Nisha Crum, RD  [address] [address] [phone]

## 2024-03-15 LAB
LEFT EYE DIABETIC RETINOPATHY: NORMAL
RIGHT EYE DIABETIC RETINOPATHY: NORMAL

## 2024-03-28 ENCOUNTER — OFFICE VISIT (OUTPATIENT)
Dept: ENDOCRINOLOGY | Facility: OTHER | Age: 69
End: 2024-03-28
Payer: MEDICARE

## 2024-03-28 VITALS — BODY MASS INDEX: 27.95 KG/M2 | WEIGHT: 194.8 LBS

## 2024-03-28 DIAGNOSIS — E11.65 TYPE 2 DIABETES MELLITUS WITH HYPERGLYCEMIA, WITH LONG-TERM CURRENT USE OF INSULIN (HCC): Primary | ICD-10-CM

## 2024-03-28 DIAGNOSIS — Z79.4 TYPE 2 DIABETES MELLITUS WITH HYPERGLYCEMIA, WITH LONG-TERM CURRENT USE OF INSULIN (HCC): Primary | ICD-10-CM

## 2024-03-28 PROCEDURE — 97803 MED NUTRITION INDIV SUBSEQ: CPT

## 2024-03-28 NOTE — PATIENT INSTRUCTIONS
Continue setting food up using the Plate Method.    Ask for box ahead of time to pack part of your restaurant food.

## 2024-03-28 NOTE — PROGRESS NOTES
"Medical Nutrition Therapy        Assessment    Visit Type: Follow-up visit  Chief complaint/Medical Diagnosis/reason for visit E11.65, Z79.4    HPI Fernie was seen in person for the follow-up MNT appointment, accompanied by his wife, Ciara. BG levels are checked frequently throughout the day using his Jared. No low BG levels identified. Patient's AGP report shows improvement in BG levels. Patient was previously in target range 49% of the time and is now in range 66%. He was very high 11% and high 40% and now is very high 4% and high 30%. Patient does take diabetes medication as prescribed.     After using the food models at our last session, Fernie was able to see his over consumption of carbohydrates.  He has reduced his portions and is more mindful of his food choices.    Hyperglycemia occurs when he and his wife eat out at restaurants. Since they have 3-4 meals weekly out, we set a goal to box food portions to take home to help manage the post meal excursions.    Patient reported a current exercise regimen of walking on the treadmill for 1 mile and biking for 5 miles. Reduction of exercise has occurred since the end of his cardiac rehab. Fernie shared that he knows he should perform addition exercises.    Net carbs was discussed, as Ciara reported using those to help manage her 's condition.    Problems identified in food recall include inconsistent carbohydrate intake, limited non-starchy vegetables and whole grains, and sweets. Consumption of carbohydrates ranges from 15 to ~70 grams per meal.     Portion booklet and food labels were used to teach basic carbohydrate counting. Patient agreed to keep daily food logs and return them in 6 months for assessment. RD will remain available for further dietary questions/concerns.         Ht Readings from Last 1 Encounters:   01/19/24 5' 10\" (1.778 m)     Wt Readings from Last 3 Encounters:   01/26/24 89.5 kg (197 lb 6.4 oz)   01/19/24 87.1 kg (192 lb)   01/15/24 " 87.5 kg (193 lb)     Weight Change: No, fluctuating by a few pounds    Barriers to Learning: no barriers    Do you follow any special diet presently?: Yes - cardiac  Who shops: patient and spouse  Who cooks: patient and spouse    Food Log: Completed via the method of food recall    Wakes up 7:30-8 am    Breakfast: 7:45 am; coffee (w/ 1% milk and sweet n low) with 1 toast with buttery spread w/ 1 egg, 2 slices collazo (pork) OR tortilla (8 inch) w/ salsa and shredded cheese  Morning Snack: 11 am; 1 oz cheese or handful nuts  Lunch:12:30-1 pm;  sandwich (647 bread, lettuce, tomato, collazo, cheese or chicken) w/ diet coke  Afternoon Snack: handful of nuts  Dinner:6-6:30 pm, meat (steak, pork chop, chicken, fish; sometimes breaded, rarely with bbq) with veg (salad) and starch (baked potato, stuffing in pinwheel) with water  Evening Snack: 7 pm; 2 cookies or a couple fork fulls of cake or fruit (berries) w/ whipped cream  Beverages: coffee, diet coke, water  Eating out/Take out:3-4 x/wk; salad with veal over 1 1/2 c pasta with lemon butter sauce and a bite of cake with coffee  Exercise 2x weekly, treadmill for 1 mi and bike for 5 mi and sometimes light weights and 25 sit ups    Calorie needs 1959 kcals/day Carbs: 45 g/meal, 15-30 g/snack     Protein:9 ounces/day    Fat: 5 servings/day        Nutrition Diagnosis:  Inconsistent carbohydrate intake  intake related to Physiological causes requiring careful timing and consistency in the amount of carbohydrate (i.e. diabetes mellitus, hypoglycemia) as evidenced by  Hypoglycemia or hyperglycemia documented on regular basis associated with inconsistent carbohydrate intake    Intervention: plate method, label reading, carbohydrate counting, meal timing, meal planning, exercise guidelines, and food diary     Treatment Goals: Patient understands education and recommendations, Patient will monitor food intake daily with tracker, Patient will consume 3 meals a day, Patient will monitor  portion control, Patient will count carbohydrates, Patient will exercise, and Patient will monitor blood glucose    Monitoring and evaluation:    Term code indicator  FH 1.3.2 Food Intake Criteria: Continue setting food up using the Plate Method.  Term code indicator  FH 1.6.3 Carbohydrate Intake Criteria: Ask for box ahead of time to pack part of your restaurant food.     Materials Provided: 3-day food log    Patient’s Response to Instruction:  Comprehensiongood  Motivationgood  Expected Compliancegood    Begin Time: 8:00 am  End Time: 8:40 am  Referring Provider: Mango Hoffman, DO    Thank you for coming to the [facility] for education today.  Please feel free to call with any questions or concerns.    Nisha Crum, RD  [address] [place] [address] [phone]

## 2024-04-15 ENCOUNTER — OFFICE VISIT (OUTPATIENT)
Dept: CARDIOLOGY CLINIC | Facility: HOSPITAL | Age: 69
End: 2024-04-15
Payer: MEDICARE

## 2024-04-15 VITALS
HEIGHT: 70 IN | BODY MASS INDEX: 27.97 KG/M2 | DIASTOLIC BLOOD PRESSURE: 81 MMHG | HEART RATE: 66 BPM | SYSTOLIC BLOOD PRESSURE: 125 MMHG | WEIGHT: 195.4 LBS

## 2024-04-15 DIAGNOSIS — I25.118 ATHEROSCLEROTIC HEART DISEASE OF NATIVE CORONARY ARTERY WITH OTHER FORMS OF ANGINA PECTORIS (HCC): ICD-10-CM

## 2024-04-15 DIAGNOSIS — I10 PRIMARY HYPERTENSION: ICD-10-CM

## 2024-04-15 DIAGNOSIS — E11.69 DISORDER OF NERVOUS SYSTEM DUE TO TYPE 2 DIABETES MELLITUS  (HCC): ICD-10-CM

## 2024-04-15 DIAGNOSIS — I25.10 CORONARY ARTERY DISEASE INVOLVING NATIVE CORONARY ARTERY OF NATIVE HEART WITHOUT ANGINA PECTORIS: Primary | ICD-10-CM

## 2024-04-15 DIAGNOSIS — I26.99 PULMONARY EMBOLISM, BILATERAL (HCC): ICD-10-CM

## 2024-04-15 DIAGNOSIS — E78.2 MIXED HYPERLIPIDEMIA: ICD-10-CM

## 2024-04-15 DIAGNOSIS — I71.21 ANEURYSM OF ASCENDING AORTA WITHOUT RUPTURE (HCC): ICD-10-CM

## 2024-04-15 DIAGNOSIS — G47.33 OSA (OBSTRUCTIVE SLEEP APNEA): ICD-10-CM

## 2024-04-15 DIAGNOSIS — Z95.5 PRESENCE OF DRUG-ELUTING STENT IN RIGHT CORONARY ARTERY: ICD-10-CM

## 2024-04-15 DIAGNOSIS — G98.8 DISORDER OF NERVOUS SYSTEM DUE TO TYPE 2 DIABETES MELLITUS  (HCC): ICD-10-CM

## 2024-04-15 PROCEDURE — 99214 OFFICE O/P EST MOD 30 MIN: CPT | Performed by: INTERNAL MEDICINE

## 2024-04-15 RX ORDER — OLOPATADINE HYDROCHLORIDE 1 MG/ML
1 SOLUTION/ DROPS OPHTHALMIC 2 TIMES DAILY
COMMUNITY

## 2024-04-15 NOTE — PROGRESS NOTES
Cardiology Follow Up    Fernie March  1955  [id number]  [facility] [address] [id number] [phone]    1. Coronary artery disease involving native coronary artery of native heart without angina pectoris  VAS screening      2. Primary hypertension  VAS screening      3. Pulmonary embolism, bilateral (HCC)        4. LELO (obstructive sleep apnea)        5. 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 Mixed hyperlipidemia  VAS screening      7. Presence of drug-eluting stent in right coronary artery        8. Atherosclerotic heart disease of native coronary artery with other forms of angina pectoris (HCC)        9. Aneurysm of ascending aorta without rupture ([facility])  CT chest without contrast          Discussion/Summary: Coronary disease stable no complaints of angina.  Blood pressure well-controlled.  Lipids have been doing well with an LDL in the 50s.  He has had hives and allergic reactions to multiple statins he is tolerating his current statin and Zetia well.  We talked about dietary and lifestyle modifications.  Needs to work on getting his A1c down.  He has been on Eliquis and Plavix in August can change to Eliquis and 81 mg of aspirin.  CTA when he came in in August showed mild ascending aortic aneurysm 4.1cm I will check a 1 year follow-up August CT chest without contrast.  I advised no heavy lifting until I get the measurements.  He has mild to moderate sleep apnea he has not been on treatment.  I have ordered a vascular screening I will see him back in 6 months.    Interval History: Very pleasant 68-year-old gentleman who had a myocardial infarction in August 2023 PCI of the RCA mild to moderate disease in the left distribution, hypertension, hyperlipidemia, type 2 diabetes, pulmonary embolism presents to establish with me in the office.  Functional  capacity is good.  Denies any angina.  Has been no chest pain, shortness of breath, palpitations, lightheadedness, dizziness, or syncope.  No lower extreme edema, PND, orthopnea.  No claudication symptoms.  Is been taking all medications as prescribed.    Medical Problems       Problem List       Type 2 diabetes mellitus with hyperglycemia, with long-term current use of insulin ([facility])        Lab Results   Component Value Date    HGBA1C 8.2 (A) 01/19/2024         Vomiting    Leukocytosis    Mixed hyperlipidemia    Overview Signed 11/11/2020  8:12 AM by Caitlin Rdz PA-C     Last Assessment & Plan:   Controlled-no recommended changes  TG has improved         Hypertension    Overview Signed 11/11/2020  8:12 AM by Caitlin Rdz PA-C     Last Assessment & Plan:   Doing well.  Continue current regimen. Following with PCP.          Hx pulmonary embolism    GERD (gastroesophageal reflux disease)    Benign prostatic hyperplasia without lower urinary tract symptoms    LELO (obstructive sleep apnea)    Benign essential hypertension    Overview Signed 7/25/2023 10:27 AM by Jose Henry IV, MD     Last Assessment & Plan:   Formatting of this note might be different from the original.  Doing well.  Continue current regimen.         Fatigue    Myalgia    Pulmonary embolism, bilateral ([facility])    Overview Signed 7/25/2023 10:27 AM by Jose Henry IV, MD     Last Assessment & Plan:   Formatting of this note might be different from the original.  Continue Eliquis         Non-STEMI (non-ST elevated myocardial infarction) ([facility])    Abnormal findings on esophagogastroduodenoscopy (EGD)    Overview Signed 8/31/2023  9:46 AM by Jose Henry IV, MD     Moderate hiatal hernia, fundic gland polyps, prepyloric polyp-biopsy shows adenoma         Coronary artery disease involving native coronary artery of native heart without angina pectoris    Presence of drug-eluting stent in right coronary artery    Disorder of nervous system due  to type 2 diabetes mellitus  (HCC)        Lab Results   Component Value Date    HGBA1C 8.2 (A) 01/19/2024         Atherosclerotic heart disease of native coronary artery with other forms of angina pectoris (HCC)        Past Medical History:   Diagnosis Date    BPH without obstruction/lower urinary tract symptoms     CAD (coronary artery disease)     s/p JOHNATHAN distal RCA and JOHNATHAN RPAV 8/11/2023    Diabetes mellitus (HCC)     Dysuria     GERD (gastroesophageal reflux disease)     Gout     Heart attack (HCC) 2023    History of melanoma     left ear - surgically removed    History of pulmonary embolism 2017    bilateral    Hyperlipidemia     Hypertension     Melanoma (HCC) 2017    left ear    Prostate cancer (HCC)     Sleep apnea      Social History     Socioeconomic History    Marital status: /Civil Union     Spouse name: Not on file    Number of children: Not on file    Years of education: Not on file    Highest education level: Not on file   Occupational History    Occupation: Retired - Store [employer]   Tobacco Use    Smoking status: Never     Passive exposure: Never    Smokeless tobacco: Never   Vaping Use    Vaping status: Never Used   Substance and Sexual Activity    Alcohol use: Yes     Comment: Drinks socailly.     Drug use: No    Sexual activity: Not Currently   Other Topics Concern    Not on file   Social History Narrative    Not on file     Social Determinants of Health     Financial Resource Strain: Not on file   Food Insecurity: Not on file   Transportation Needs: Not on file   Physical Activity: Not on file   Stress: Not on file   Social Connections: Not on file   Intimate Partner Violence: Not on file   Housing Stability: Not on file      Family History   Problem Relation Age of Onset    Hyperlipidemia Mother     Stroke Father     Melanoma Father     Diabetes Sister     Hypertension Sister     Diabetes Brother     JETT disease Brother      Past Surgical History:   Procedure Laterality Date    CARDIAC  CATHETERIZATION Left 8/11/2023    Procedure: Cardiac Left Heart Cath;  Surgeon: Rubi Ivory DO;  [facility];  Service: Cardiology    CARDIAC CATHETERIZATION N/A 8/11/2023    Procedure: Cardiac Coronary Angiogram;  Surgeon: Rubi Ivory DO;  [facility];  Service: Cardiology    CARDIAC CATHETERIZATION N/A 8/11/2023    Procedure: Cardiac pci;  Surgeon: Rubi Ivory DO;  [facility];  Service: Cardiology    CARDIAC CATHETERIZATION N/A 8/11/2023    Procedure: Cardiac other - IVUS;  Surgeon: Rubi Ivory DO;  [facility];  Service: Cardiology    CHOLECYSTECTOMY      COLONOSCOPY  10/2016    sessile serrated polyp removed from the proximal transverse, adenoma removed from the distal transverse    EGD  08/2017    gastritis, H. pylori negative, and Candida esophagitis    EGD  11/2011    slight Schatzki's ring, small hiatal hernia    EXTERNAL EAR SURGERY Left 08/2017    for the removal of skin melanoma-Plastic surgery       Current Outpatient Medications:     apixaban (ELIQUIS) 5 mg, Take 1 tablet by mouth 2 (two) times a day for 30 days 2 tabs PO BID x 7 days, then 1 tab PO BID (Patient taking differently: Take 2.5 mg by mouth 2 (two) times a day 2 tabs PO BID x 7 days, then 1 tab PO BID), Disp: 60 tablet, Rfl: 0    ascorbic acid (VITAMIN C) 250 mg tablet, Take 500 mg by mouth daily, Disp: , Rfl:     Blood Glucose Monitoring Suppl (Advocate Blood Glucose Monitor) KEYANNA, Use, Disp: , Rfl:     clopidogrel (PLAVIX) 75 mg tablet, Take 1 tablet (75 mg total) by mouth daily, Disp: 90 tablet, Rfl: 3    Empagliflozin (Jardiance) 25 MG TABS, Take 1 tablet (25 mg total) by mouth in the morning, Disp: 90 tablet, Rfl: 3    ezetimibe (ZETIA) 10 mg tablet, Take 1 tablet (10 mg total) by mouth daily at bedtime, Disp: 90 tablet, Rfl: 3    fluticasone (FLONASE) 50 mcg/act nasal spray, 2 sprays into each nostril, Disp: , Rfl:     Insulin Glargine  Solostar (Lantus SoloStar) 100 UNIT/ML SOPN, Inject 0.28 mL (28 Units total) under the skin daily Or as directed TDD up to 30 units daily., Disp: 30 mL, Rfl: 1    losartan (COZAAR) 50 mg tablet, Take 50 mg by mouth, Disp: , Rfl:     lovastatin (MEVACOR) 40 MG tablet, Take 1 tablet (40 mg total) by mouth daily at bedtime, Disp: 90 tablet, Rfl: 3    metFORMIN (GLUCOPHAGE) 1000 MG tablet, Take 1 tablet (1,000 mg total) by mouth 2 (two) times a day with meals, Disp: 180 tablet, Rfl: 3    metoprolol tartrate (LOPRESSOR) 25 mg tablet, Take 1 tablet (25 mg total) by mouth every 12 (twelve) hours, Disp: 180 tablet, Rfl: 3    olopatadine (PATANOL) 0.1 % ophthalmic solution, 1 drop 2 (two) times a day, Disp: , Rfl:     omeprazole (PriLOSEC) 40 MG capsule, Take 1 capsule (40 mg total) by mouth daily, Disp: 90 capsule, Rfl: 3    OneTouch Delica Lancets 30G MISC, Two daily to check blood sugar, Disp: 200 each, Rfl: 1    VITAMIN D PO, Take by mouth, Disp: , Rfl:     BD Pen Needle Kiarra U/F 32G X 4 MM MISC, Inject as directed 4 (four) times a day, Disp: 360 each, Rfl: 1    HumaLOG KwikPen 100 units/mL injection pen, E11.65 Inject 5 units with breakfast and lunch, 10 units with dinner, with scale as needed TDD up to 50 units daily., Disp: 45 mL, Rfl: 1    predniSONE 20 mg tablet, 3 tablets by mouth with food in AM x 3 days, 2 tabs by mouth with food in AM x 3 days then 1 tab with food each morning x 3 days (Patient not taking: Reported on 1/4/2024), Disp: 18 tablet, Rfl: 0  Allergies   Allergen Reactions    Crestor [Rosuvastatin] Rash and Hives     Other reaction(s): Urticaria    Enalapril Cough    Lipitor [Atorvastatin] Rash     pain       Labs:     Chemistry        Component Value Date/Time     12/31/2015 1226    K 4.4 11/29/2023 0941    K 4.1 12/31/2015 1226     11/29/2023 0941     12/31/2015 1226    CO2 26 11/29/2023 0941    CO2 30.5 12/31/2015 1226    BUN 20 11/29/2023 0941    BUN 16 12/31/2015 1226     "CREATININE 0.91 11/29/2023 0941    CREATININE 0.77 12/31/2015 1226        Component Value Date/Time    CALCIUM 10.0 11/29/2023 0941    CALCIUM 9.1 12/31/2015 1226    ALKPHOS 47 08/25/2023 1119    ALKPHOS 51 12/31/2015 1226    AST 18 08/25/2023 1119    AST 29 12/31/2015 1226    ALT 15 08/25/2023 1119    ALT 47 12/31/2015 1226    BILITOT 0.28 12/31/2015 1226            Lab Results   Component Value Date    CHOL 147 12/31/2015    CHOL 153 05/23/2015    CHOL 164 12/13/2014     Lab Results   Component Value Date    HDL 37 (L) 09/16/2023    HDL 37 (L) 04/24/2023    HDL 37 (L) 04/02/2022     Lab Results   Component Value Date    LDLCALC 58 09/16/2023    LDLCALC 74 04/24/2023    LDLCALC 94 04/02/2022     Lab Results   Component Value Date    TRIG 180 (H) 09/16/2023    TRIG 257 (H) 04/24/2023    TRIG 125 04/02/2022     No results found for: \"CHOLHDL\"    Imaging: No results found.    ECG:        ROS    Vitals:    04/15/24 0750   BP: 125/81   Pulse: 66     Vitals:    04/15/24 0750   Weight: 88.6 kg (195 lb 6.4 oz)     Height: 5' 10\" (177.8 cm)   Body mass index is 28.04 kg/m².    Physical Exam:  Vital signs reviewed  General:  Alert and cooperative, appears stated age, no acute distress  HEENT:  PERRLA, EOMI, no scleral icterus, no conjunctival pallor  Neck:  No lymphadenopathy, no thyromegaly, no carotid bruits, no elevated JVP  Heart:  Regular rate and rhythm, normal S1/S2, no S3/S4, no murmur, rubs or gallops.  PMI nondisplaced  Lungs:  Clear to auscultation bilaterally, no wheezes rales or rhonchi  Abdomen:  Soft, non-tender, positive bowel sounds, no rebound or guarding,   no organomegaly   Extremities:  Normal range of motion.  No clubbing, cyanosis or edema   Vascular:  2+ pedal pulses  Skin:  No rashes or lesions on exposed skin  Neurologic:  Cranial nerves II-XII grossly intact without focal deficits      "

## 2024-04-19 ENCOUNTER — APPOINTMENT (OUTPATIENT)
Dept: LAB | Facility: HOSPITAL | Age: 69
End: 2024-04-19
Payer: MEDICARE

## 2024-04-19 DIAGNOSIS — E11.65 TYPE 2 DIABETES MELLITUS WITH HYPERGLYCEMIA, WITH LONG-TERM CURRENT USE OF INSULIN (HCC): ICD-10-CM

## 2024-04-19 DIAGNOSIS — Z79.4 TYPE 2 DIABETES MELLITUS WITH HYPERGLYCEMIA, WITH LONG-TERM CURRENT USE OF INSULIN (HCC): ICD-10-CM

## 2024-04-19 LAB
ALBUMIN SERPL BCP-MCNC: 4.3 G/DL (ref 3.5–5)
ALP SERPL-CCNC: 35 U/L (ref 34–104)
ALT SERPL W P-5'-P-CCNC: 12 U/L (ref 7–52)
ANION GAP SERPL CALCULATED.3IONS-SCNC: 7 MMOL/L (ref 4–13)
AST SERPL W P-5'-P-CCNC: 17 U/L (ref 13–39)
BILIRUB SERPL-MCNC: 0.54 MG/DL (ref 0.2–1)
BUN SERPL-MCNC: 18 MG/DL (ref 5–25)
CALCIUM SERPL-MCNC: 9.2 MG/DL (ref 8.4–10.2)
CHLORIDE SERPL-SCNC: 104 MMOL/L (ref 96–108)
CO2 SERPL-SCNC: 29 MMOL/L (ref 21–32)
CREAT SERPL-MCNC: 0.8 MG/DL (ref 0.6–1.3)
CREAT UR-MCNC: 68.6 MG/DL
EST. AVERAGE GLUCOSE BLD GHB EST-MCNC: 180 MG/DL
GFR SERPL CREATININE-BSD FRML MDRD: 93 ML/MIN/1.73SQ M
GLUCOSE P FAST SERPL-MCNC: 146 MG/DL (ref 65–99)
HBA1C MFR BLD: 7.9 %
MICROALBUMIN UR-MCNC: <7 MG/L
MICROALBUMIN/CREAT 24H UR: <10 MG/G CREATININE (ref 0–30)
POTASSIUM SERPL-SCNC: 3.9 MMOL/L (ref 3.5–5.3)
PROT SERPL-MCNC: 6.9 G/DL (ref 6.4–8.4)
SODIUM SERPL-SCNC: 140 MMOL/L (ref 135–147)

## 2024-04-19 PROCEDURE — 83036 HEMOGLOBIN GLYCOSYLATED A1C: CPT

## 2024-04-19 PROCEDURE — 36415 COLL VENOUS BLD VENIPUNCTURE: CPT

## 2024-04-19 PROCEDURE — 80053 COMPREHEN METABOLIC PANEL: CPT

## 2024-04-19 PROCEDURE — 82570 ASSAY OF URINE CREATININE: CPT

## 2024-04-19 PROCEDURE — 82043 UR ALBUMIN QUANTITATIVE: CPT

## 2024-04-19 PROCEDURE — 82172 ASSAY OF APOLIPOPROTEIN: CPT

## 2024-04-21 LAB — APO B SERPL-MCNC: 80 MG/DL

## 2024-04-30 ENCOUNTER — OFFICE VISIT (OUTPATIENT)
Dept: ENDOCRINOLOGY | Facility: CLINIC | Age: 69
End: 2024-04-30
Payer: MEDICARE

## 2024-04-30 VITALS
SYSTOLIC BLOOD PRESSURE: 120 MMHG | WEIGHT: 197 LBS | OXYGEN SATURATION: 95 % | DIASTOLIC BLOOD PRESSURE: 62 MMHG | HEART RATE: 68 BPM | HEIGHT: 70 IN | BODY MASS INDEX: 28.2 KG/M2

## 2024-04-30 DIAGNOSIS — I10 BENIGN ESSENTIAL HYPERTENSION: ICD-10-CM

## 2024-04-30 DIAGNOSIS — E11.65 TYPE 2 DIABETES MELLITUS WITH HYPERGLYCEMIA, WITH LONG-TERM CURRENT USE OF INSULIN (HCC): Primary | ICD-10-CM

## 2024-04-30 DIAGNOSIS — Z79.4 TYPE 2 DIABETES MELLITUS WITH HYPERGLYCEMIA, WITH LONG-TERM CURRENT USE OF INSULIN (HCC): Primary | ICD-10-CM

## 2024-04-30 DIAGNOSIS — E78.2 MIXED HYPERLIPIDEMIA: ICD-10-CM

## 2024-04-30 PROCEDURE — 99214 OFFICE O/P EST MOD 30 MIN: CPT | Performed by: PHYSICIAN ASSISTANT

## 2024-04-30 RX ORDER — INSULIN GLARGINE 100 [IU]/ML
32 INJECTION, SOLUTION SUBCUTANEOUS DAILY
Qty: 30 ML | Refills: 0 | Status: SHIPPED | OUTPATIENT
Start: 2024-04-30 | End: 2024-07-29

## 2024-04-30 RX ORDER — LOPERAMIDE HYDROCHLORIDE 2 MG/1
2 TABLET ORAL 4 TIMES DAILY PRN
COMMUNITY

## 2024-04-30 NOTE — PROGRESS NOTES
Fernie March 68 y.o. male MRN: [id number]    Encounter: [id number]      Assessment/Plan   Problem List Items Addressed This Visit          Cardiovascular and Mediastinum    Benign essential hypertension     Stable BP control on losartan, BB.            Endocrine    Type 2 diabetes mellitus with hyperglycemia, with long-term current use of insulin ([facility]) - Primary       Lab Results   Component Value Date    HGBA1C 7.9 (H) 04/19/2024   Summary: improving A1c, although not quite at goal A1c, which is ideally 6.5% or less.  BG monitoring: to continue CGM monitoring, not eligible for upgrade to Jared 3 yet, but will consider in future.  Diet: encouraged to maintain diabetic diet to best of ability. Encouraged and discussed visit with CDE for MNT - referral placed.  Pharmacotherapy discussion: Recommend increase in basal insulin to achieve better BG target - increase Lantus from 28 to 32 units. Due to variable post-prandial hyperglycemia, suggested intensification of insulin scale coverage as needed - adjusted ISF from 30 --> 25 for BG > 145. Due to persistent diarrhea, recommended metformin extended release. Encouraged to keep open mind re: another trial of GLP-1 RA  not only to achieve adequate BG control, but also for cardiorenal protective benefits, potential weight loss benefits. If GLP1-RA is reintroduced, could proceed cautiously, use conservative dosing with slow up-titration, consider alternative to Trulicity due to poor experience.  Labs: will plan to follow up A1c in office at next visit.  Return to office: 3 months.         Relevant Medications    loperamide (IMODIUM A-D) 2 MG tablet    Continuous Blood Gluc Sensor (FreeStyle Jared 2 Sensor) MISC    Insulin Glargine Solostar (Lantus SoloStar) 100 UNIT/ML SOPN    Other Relevant Orders    Ambulatory referral to Diabetic Education       Other    Mixed hyperlipidemia     Managed by cardiology - On statin, Zetia.             I have spent a total time of 40  minutes on 05/01/24 in caring for this patient including Diagnostic results, Risks and benefits of tx options, Instructions for management, Patient and family education, Importance of tx compliance, Documenting in the medical record, and Obtaining or reviewing history      .   CC: Diabetes    History of Present Illness     HPI:  Fernie March is a 68 y.o. male with type 2 DM c/b DPN, CAD.  Last endocrinology visit: 1/19/24 with Dr. Hoffman. Note reviewed. At that time Lantus was increased due to fasting hyperglycemia.     His wife is present at today's visit and helpful in providing details of history.    BG monitoring - continues to use Jared CGM. See summary report below.     Fernie mentions increase in loose stools / diarrhea. He is taking Imodium daily to help control symptoms.    Current DM medication reviewed, dosing and compliance confirmed:   Lantus 28 units daily  Humalog 5 units with breakfast and lunch, 10 units with dinner, plus scale ISF 30 starting at 150  Metformin 1000mg BID    Of note, has h/o prior intolerance to Trulicity which lead to - vomiting, diarrhea. Notes symptoms worsened with up-titration of dosing.     Diet: had dietician visit on 3/28/24. Less milk ntake (only small amount in coffee) and no cereal. 6 months f/u planned with dietician.  Exercise: yardwork staying active.     DM foot exam: up to date, sees podiatry routinely.   DM eye exam: up to date, sees Dr. Antunez.     CGM summary report:  Fernie March   Device used: Jared   Home use     Indication: Type 2 Diabetes  More than 72 hours of data was reviewed. Report to be scanned to chart.   Date Range: 4/17/24 - 4/30/24    Analysis of data:   % time CGM used:65%  Average Glucose: 174mg/dL  Coefficient of Variation: 21.1%  GMI: 7.5%  Time in Target Range: 56%  Time Above Range: 42% high, 2% very high  Time Below Range: 0% low, 0% very low    Interpretation of data:  missing some BG data due to infrequent sensor scanning, but  available data suggests improving BG control. Fasting BG approximately 150mg/dL. Post-prandial hyperglycemia is variable with timing and intensity, likely in setting of variable dietary habits.        Review of Systems   Constitutional:  Negative for chills and fever.   HENT:  Negative for ear pain and sore throat.    Eyes:  Negative for pain and visual disturbance.   Respiratory:  Negative for cough and shortness of breath.    Cardiovascular:  Negative for chest pain and palpitations.   Gastrointestinal:  Positive for diarrhea. Negative for abdominal pain and vomiting.   Endocrine: Positive for polyuria (nocturia at least 3x nightly.).   Genitourinary:  Negative for dysuria and hematuria.   Musculoskeletal:  Negative for arthralgias and back pain.   Skin:  Negative for color change and rash.   Neurological:  Negative for seizures and syncope.   All other systems reviewed and are negative.      Historical Information   Past Medical History:   Diagnosis Date    BPH without obstruction/lower urinary tract symptoms     CAD (coronary artery disease)     s/p JOHNATHAN distal RCA and JOHNATHAN RPAV 8/11/2023    Diabetes mellitus (HCC)     Dysuria     GERD (gastroesophageal reflux disease)     Gout     Heart attack (HCC) 2023    History of melanoma     left ear - surgically removed    History of pulmonary embolism 2017    bilateral    Hyperlipidemia     Hypertension     Melanoma (HCC) 2017    left ear    Prostate cancer (HCC)     Sleep apnea      Past Surgical History:   Procedure Laterality Date    CARDIAC CATHETERIZATION Left 8/11/2023    Procedure: Cardiac Left Heart Cath;  Surgeon: Rubi Ivory DO;  [facility];  Service: Cardiology    CARDIAC CATHETERIZATION N/A 8/11/2023    Procedure: Cardiac Coronary Angiogram;  Surgeon: Rubi Ivory DO;  [facility];  Service: Cardiology    CARDIAC CATHETERIZATION N/A 8/11/2023    Procedure: Cardiac pci;  Surgeon: Rubi Ivory DO;   [facility];  Service: Cardiology    CARDIAC CATHETERIZATION N/A 8/11/2023    Procedure: Cardiac other - IVUS;  Surgeon: Rubi Ivory DO;  [facility];  Service: Cardiology    CHOLECYSTECTOMY      COLONOSCOPY  10/2016    sessile serrated polyp removed from the proximal transverse, adenoma removed from the distal transverse    EGD  08/2017    gastritis, H. pylori negative, and Candida esophagitis    EGD  11/2011    slight Schatzki's ring, small hiatal hernia    EXTERNAL EAR SURGERY Left 08/2017    for the removal of skin melanoma-Plastic surgery     Social History   Social History     Substance and Sexual Activity   Alcohol Use Yes    Comment: Drinks socailly.      Social History     Substance and Sexual Activity   Drug Use No     Social History     Tobacco Use   Smoking Status Never    Passive exposure: Never   Smokeless Tobacco Never     Family History:   Family History   Problem Relation Age of Onset    Hyperlipidemia Mother     Stroke Father     Melanoma Father     Diabetes Sister     Hypertension Sister     Diabetes Brother     JETT disease Brother        Meds/Allergies   Current Outpatient Medications   Medication Sig Dispense Refill    apixaban (ELIQUIS) 5 mg Take 1 tablet by mouth 2 (two) times a day for 30 days 2 tabs PO BID x 7 days, then 1 tab PO BID (Patient taking differently: Take 2.5 mg by mouth 2 (two) times a day 2 tabs PO BID x 7 days, then 1 tab PO BID) 60 tablet 0    ascorbic acid (VITAMIN C) 250 mg tablet Take 500 mg by mouth daily      Blood Glucose Monitoring Suppl (Advocate Blood Glucose Monitor) KEYANNA Use      clopidogrel (PLAVIX) 75 mg tablet Take 1 tablet (75 mg total) by mouth daily 90 tablet 3    Continuous Blood Gluc Sensor (FreeStyle Jared 2 Sensor) MISC Use      Empagliflozin (Jardiance) 25 MG TABS Take 1 tablet (25 mg total) by mouth in the morning 90 tablet 3    ezetimibe (ZETIA) 10 mg tablet Take 1 tablet (10 mg total) by mouth daily at  "bedtime 90 tablet 3    fluticasone (FLONASE) 50 mcg/act nasal spray 2 sprays into each nostril      HumaLOG KwikPen 100 units/mL injection pen E11.65 Inject 5 units with breakfast and lunch, 10 units with dinner, with scale as needed TDD up to 50 units daily. 45 mL 1    loperamide (IMODIUM A-D) 2 MG tablet Take 2 mg by mouth 4 (four) times a day as needed for diarrhea      losartan (COZAAR) 50 mg tablet Take 50 mg by mouth      lovastatin (MEVACOR) 40 MG tablet Take 1 tablet (40 mg total) by mouth daily at bedtime 90 tablet 3    metFORMIN (GLUCOPHAGE) 1000 MG tablet Take 1 tablet (1,000 mg total) by mouth 2 (two) times a day with meals 180 tablet 3    metoprolol tartrate (LOPRESSOR) 25 mg tablet Take 1 tablet (25 mg total) by mouth every 12 (twelve) hours 180 tablet 3    olopatadine (PATANOL) 0.1 % ophthalmic solution 1 drop 2 (two) times a day      omeprazole (PriLOSEC) 40 MG capsule Take 1 capsule (40 mg total) by mouth daily 90 capsule 3    OneTouch Delica Lancets 30G MISC Two daily to check blood sugar 200 each 1    VITAMIN D PO Take by mouth      BD Pen Needle Kiarra U/F 32G X 4 MM MISC Inject as directed 4 (four) times a day 360 each 1    Insulin Glargine Solostar (Lantus SoloStar) 100 UNIT/ML SOPN Inject 0.28 mL (28 Units total) under the skin daily Or as directed TDD up to 30 units daily. 30 mL 1    predniSONE 20 mg tablet 3 tablets by mouth with food in AM x 3 days, 2 tabs by mouth with food in AM x 3 days then 1 tab with food each morning x 3 days (Patient not taking: Reported on 1/4/2024) 18 tablet 0     No current facility-administered medications for this visit.     Allergies   Allergen Reactions    Crestor [Rosuvastatin] Rash and Hives     Other reaction(s): Urticaria    Enalapril Cough    Lipitor [Atorvastatin] Rash     pain       Objective   Vitals: Blood pressure 120/62, pulse 68, height 5' 10\" (1.778 m), weight 89.4 kg (197 lb), SpO2 95%.    Physical Exam  Vitals reviewed.   HENT:      Head: " "Normocephalic.      Mouth/Throat:      Mouth: Mucous membranes are moist.   Cardiovascular:      Rate and Rhythm: Normal rate and regular rhythm.   Pulmonary:      Effort: Pulmonary effort is normal.      Breath sounds: Normal breath sounds.   Skin:     General: Skin is warm and dry.   Neurological:      Mental Status: He is alert.         The history was obtained from the review of the chart, patient.    Lab Results:   Lab Results   Component Value Date    HGBA1C 7.9 (H) 04/19/2024    HGBA1C 8.2 (A) 01/19/2024    HGBA1C 8.3 (H) 10/02/2023     Component      Latest Ref Rng 9/16/2023 10/2/2023 4/19/2024   Sodium      135 - 147 mmol/L  140  140    Potassium      3.5 - 5.3 mmol/L  3.9  3.9    Chloride      96 - 108 mmol/L  107  104    Carbon Dioxide      21 - 32 mmol/L  25  29    ANION GAP      4 - 13 mmol/L  8  7    BUN      5 - 25 mg/dL  18  18    Creatinine      0.60 - 1.30 mg/dL  0.76  0.80    GLUCOSE, FASTING      65 - 99 mg/dL  126 (H)  146 (H)    Calcium      8.4 - 10.2 mg/dL  9.0  9.2    AST      13 - 39 U/L   17    ALT      7 - 52 U/L   12    ALK PHOS      34 - 104 U/L   35    Total Protein      6.4 - 8.4 g/dL   6.9    Albumin      3.5 - 5.0 g/dL   4.3    Total Bilirubin      0.20 - 1.00 mg/dL   0.54    GFR, Calculated      ml/min/1.73sq m  95  93    Cholesterol      See Comment mg/dL 131      Triglycerides      See Comment mg/dL 180 (H)      HDL      >=40 mg/dL 37 (L)      LDL Calculated      0 - 100 mg/dL 58      Non-HDL Cholesterol      mg/dl 94      EXT Creatinine Urine      Reference range not established. mg/dL   68.6    Albumin,U,Random      <20.0 mg/L   <7.0    Albumin Creat Ratio      0 - 30 mg/g creatinine   <10    APOLIPOPROTEIN B      <90 mg/dL   80       Legend:  (H) High  (L) Low    Imaging Studies: n/a    Portions of the record may have been created with voice recognition software. Occasional wrong word or \"sound a like\" substitutions may have occurred due to the inherent limitations of voice " recognition software. Read the chart carefully and recognize, using context, where substitutions have occurred.

## 2024-05-01 NOTE — ASSESSMENT & PLAN NOTE
Lab Results   Component Value Date    HGBA1C 7.9 (H) 04/19/2024   Summary: improving A1c, although not quite at goal A1c, which is ideally 6.5% or less.  BG monitoring: to continue CGM monitoring, not eligible for upgrade to Jared 3 yet, but will consider in future.  Diet: encouraged to maintain diabetic diet to best of ability. Encouraged and discussed visit with CDE for MNT - referral placed.  Pharmacotherapy discussion: Recommend increase in basal insulin to achieve better BG target - increase Lantus from 28 to 32 units. Due to variable post-prandial hyperglycemia, suggested intensification of insulin scale coverage as needed - adjusted ISF from 30 --> 25 for BG > 145. Due to persistent diarrhea, recommended metformin extended release. Encouraged to keep open mind re: another trial of GLP-1 RA  not only to achieve adequate BG control, but also for cardiorenal protective benefits, potential weight loss benefits. If GLP1-RA is reintroduced, could proceed cautiously, use conservative dosing with slow up-titration, consider alternative to Trulicity due to poor experience.  Labs: will plan to follow up A1c in office at next visit.  Return to office: 3 months.

## 2024-05-22 ENCOUNTER — TELEPHONE (OUTPATIENT)
Dept: GASTROENTEROLOGY | Facility: CLINIC | Age: 69
End: 2024-05-22

## 2024-05-22 ENCOUNTER — OFFICE VISIT (OUTPATIENT)
Dept: GASTROENTEROLOGY | Facility: CLINIC | Age: 69
End: 2024-05-22
Payer: MEDICARE

## 2024-05-22 VITALS
HEIGHT: 70 IN | TEMPERATURE: 98.7 F | DIASTOLIC BLOOD PRESSURE: 70 MMHG | WEIGHT: 193 LBS | BODY MASS INDEX: 27.63 KG/M2 | SYSTOLIC BLOOD PRESSURE: 102 MMHG

## 2024-05-22 DIAGNOSIS — K21.9 GASTROESOPHAGEAL REFLUX DISEASE, UNSPECIFIED WHETHER ESOPHAGITIS PRESENT: ICD-10-CM

## 2024-05-22 DIAGNOSIS — K52.9 CHRONIC DIARRHEA: ICD-10-CM

## 2024-05-22 DIAGNOSIS — I21.4 NON-STEMI (NON-ST ELEVATED MYOCARDIAL INFARCTION) (HCC): ICD-10-CM

## 2024-05-22 DIAGNOSIS — D13.1 GASTRIC ADENOMA: Primary | ICD-10-CM

## 2024-05-22 DIAGNOSIS — I26.99 PULMONARY EMBOLISM, BILATERAL (HCC): ICD-10-CM

## 2024-05-22 PROCEDURE — 99213 OFFICE O/P EST LOW 20 MIN: CPT | Performed by: INTERNAL MEDICINE

## 2024-05-22 NOTE — TELEPHONE ENCOUNTER
Our mutual patient is scheduled for procedure: EGD    On: 10.01.24     With: Dr. Oreilly    He/She is taking the following blood thinner:   ELIQUIS       Can this be stopped __2____ days prior to the procedure?      Physician Approving clearance: ________________________

## 2024-05-22 NOTE — PROGRESS NOTES
[facility]  Outpatient Follow-up  Encounter: [id number]    PATIENT INFO     Name: Fernie March  YOB: 1955   Age: 68 y.o.   Sex: male   MRN: [id number]    ASSESSMENT & PLAN     Problems Addressed this Visit:   1. Gastric adenoma    2. Gastroesophageal reflux disease, unspecified whether esophagitis present    3. Chronic diarrhea    4. Non-STEMI (non-ST elevated myocardial infarction) (HCC)    5. Pulmonary embolism, bilateral (HCC)      Orders Placed This Encounter   Procedures    EGD     # Gastric Adenoma:  Patient had prior EGD 8/2023 during which patient found to have a 4 mm polyp in the prepyloric region. Forceps biopsy taken and revealed gastric adenoma. Patient referred for removal; however, currently on AP/AC with Eliquis and Plavix due to PE and CAD with JOHNATHAN placement 8/2023. Patient to be transitioned to Eliquis and ASA in 8/2024. Therefore will schedule EGD for polyp removal after this so that AC can be safely held.     Plan:  Will schedule for EGD after 9/2024     # Gastroesophageal Reflux Disease (GERD): Long standing history of GERD. Currently admits to very infrequent breakthrough symptoms but otherwise well controlled on Prilosec 40 mg daily. Therefore, will continue at this time.     Plan:  Continue on Prilosec 40 mg daily; advised to take PPI 30 - 60 min prior to meals  Repeat EGD as above for polyp removal   Discussed the importance of lifestyle modifications including:  Recommend small meals and avoidance of ulcerogenic foods   Avoid eating prior to bedtime, elevate head of bed at night  Limit intake of alcohol and caffeine   Okay to utilize Tylenol but avoid use of all NSAIDs  Daily exercise to promote weight loss    # Chronic Diarrhea: Patient with history of multiple, loose BM daily. Ongoing for the last few years. Stools nonbloody and no additional alarm symptoms. Patient has been taking Imodium with relief in symptoms. Discussed Colestipol as  an alternative due to prior history of cholecystectomy. However, patient would prefer to remain on Imodium currently. Recommend follow up with primary gastroenterologist.     FOLLOW-UP: Schedule EGD    HISTORY OF PRESENT ILLNESS       Fernie March is a 68 y.o. male who presents to GI office for follow-up appointment.  Patient with prior history of bilateral PEs on Eliquis, CAD status post JOHNATHAN (2023) on DAPT, diabetes mellitus type 2, GERD, hypertension, hyperlipidemia, and sleep apnea.    Patient presents for follow-up of gastric adenoma.  Patient was last seen on 10/27/2023 after EGD revealed gastric adenoma.  Unable to schedule EGD as patient had been on DAPT therapy due to recent CAD with JOHNATHAN placement.  Decision was made to delay procedure until AP/AC could be safely held.  Since last appointment, patient states he has been doing well overall.  Denies any upper abdominal pain.  Admits only occasional reflux symptoms.  No nausea/vomiting/dysphagia. Does admit to frequent bowel movements but states this is currently controlled with Imodium.      ENDOSCOPIC HISTORY     UPPER ENDOSCOPY: 8/7/2023 - Medium size HH.  4 mm sessile polyp in the prepyloric region. Multiple sessile polyps in the gastric body. Gastritis. Normal appearing duodenum.     COLONOSCOPY: 3/16/22 - x2 polyps removed. Recall recommended in x5 years.     REVIEW OF SYSTEMS     CONSTITUTIONAL: Denies any fever, chills, rigors, and weight loss  HEENT: No earache or tinnitus, denies hearing loss or visual disturbances  CARDIOVASCULAR: No chest pain or palpitations  RESPIRATORY: Denies any cough, hemoptysis, shortness of breath or dyspnea on exertion  GASTROINTESTINAL: As noted in the History of Present Illness  GENITOURINARY: No problems with urination, denies any hematuria or dysuria  NEUROLOGIC: No dizziness or vertigo, denies headaches   MUSCULOSKELETAL: Denies any muscle or joint pain   SKIN: Denies skin rashes or itching  ENDOCRINE: Denies  excessive thirst, denies intolerance to heat or cold  PSYCHOSOCIAL: Denies depression or anxiety, denies any recent memory loss     Historical Information   Past Medical History:   Diagnosis Date    BPH without obstruction/lower urinary tract symptoms     CAD (coronary artery disease)     s/p JOHNATHAN distal RCA and JOHNATHAN RPAV 8/11/2023    Diabetes mellitus (HCC)     Dysuria     GERD (gastroesophageal reflux disease)     Gout     Heart attack (HCC) 2023    History of melanoma     left ear - surgically removed    History of pulmonary embolism 2017    bilateral    Hyperlipidemia     Hypertension     Melanoma (HCC) 2017    left ear    Prostate cancer (HCC)     Sleep apnea      Past Surgical History:   Procedure Laterality Date    CARDIAC CATHETERIZATION Left 8/11/2023    Procedure: Cardiac Left Heart Cath;  Surgeon: Rubi Ivory DO;  [facility];  Service: Cardiology    CARDIAC CATHETERIZATION N/A 8/11/2023    Procedure: Cardiac Coronary Angiogram;  Surgeon: Rubi Ivory DO;  [facility];  Service: Cardiology    CARDIAC CATHETERIZATION N/A 8/11/2023    Procedure: Cardiac pci;  Surgeon: Rubi Ivory DO;  [facility];  Service: Cardiology    CARDIAC CATHETERIZATION N/A 8/11/2023    Procedure: Cardiac other - IVUS;  Surgeon: Rubi Ivory DO;  [facility];  Service: Cardiology    CHOLECYSTECTOMY      COLONOSCOPY  10/2016    sessile serrated polyp removed from the proximal transverse, adenoma removed from the distal transverse    EGD  08/2017    gastritis, H. pylori negative, and Candida esophagitis    EGD  11/2011    slight Schatzki's ring, small hiatal hernia    EXTERNAL EAR SURGERY Left 08/2017    for the removal of skin melanoma-Plastic surgery     Social History   Social History     Substance and Sexual Activity   Alcohol Use Yes    Comment: Drinks socailly.      Social History     Substance and Sexual Activity   Drug Use No      Social History     Tobacco Use   Smoking Status Never    Passive exposure: Never   Smokeless Tobacco Never     Family History   Problem Relation Age of Onset    Hyperlipidemia Mother     Stroke Father     Melanoma Father     Diabetes Sister     Hypertension Sister     Diabetes Brother     JETT disease Brother          MEDICATIONS AND ALLERGIES     Current Outpatient Medications   Medication Instructions    apixaban (ELIQUIS) 5 mg, Oral, 2 times daily, 2 tabs PO BID x 7 days, then 1 tab PO BID    ascorbic acid (VITAMIN C) 500 mg, Oral, Daily    BD Pen Needle Kiarra U/F 32G X 4 MM MISC Injection, 4 times daily    Blood Glucose Monitoring Suppl (Advocate Blood Glucose Monitor) KEYANNA Does not apply    clopidogrel (PLAVIX) 75 mg, Oral, Daily    Continuous Blood Gluc Sensor (FreeStyle Jared 2 Sensor) MISC Does not apply    Empagliflozin (JARDIANCE) 25 mg, Oral, Daily    ezetimibe (ZETIA) 10 mg, Oral, Daily at bedtime    fluticasone (FLONASE) 50 mcg/act nasal spray 2 sprays, Nasal    HumaLOG KwikPen 100 units/mL injection pen E11.65 Inject 5 units with breakfast and lunch, 10 units with dinner, with scale as needed TDD up to 50 units daily.    Insulin Glargine Solostar (LANTUS SOLOSTAR) 32 Units, Subcutaneous, Daily, Or as directed TDD up to 40 units daily    loperamide (IMODIUM A-D) 2 mg, Oral, 4 times daily PRN    losartan (COZAAR) 50 mg, Oral    lovastatin (MEVACOR) 40 mg, Oral, Daily at bedtime    metFORMIN (GLUCOPHAGE) 1,000 mg, Oral, 2 times daily with meals    metoprolol tartrate (LOPRESSOR) 25 mg, Oral, Every 12 hours scheduled    olopatadine (PATANOL) 0.1 % ophthalmic solution 1 drop, 2 times daily    omeprazole (PRILOSEC) 40 mg, Oral, Daily    OneTouch Delica Lancets 30G MISC Two daily to check blood sugar    VITAMIN D PO Oral     Allergies   Allergen Reactions    Crestor [Rosuvastatin] Rash and Hives     Other reaction(s): Urticaria    Enalapril Cough    Lipitor [Atorvastatin] Rash     pain       PHYSICAL EXAM  "     Objective   Blood pressure 102/70, temperature 98.7 °F (37.1 °C), temperature source Tympanic, height 5' 10\" (1.778 m), weight 87.5 kg (193 lb). Body mass index is 27.69 kg/m².    General Appearance:   Alert, cooperative, no distress   HEENT:   Normocephalic, atraumatic, anicteric     Neck:   Supple, symmetrical, trachea midline   Lungs:   Equal chest rise, respirations unlabored    Heart:   Regular rate and rhythm   Abdomen:   Soft, non-tender, non-distended; normal bowel sounds; no masses, no organomegaly    Rectal:   Deferred    Extremities:   No cyanosis, clubbing or edema    Neuro:   Moves all 4 extremities    Skin:   No jaundice, rashes, or lesions      LABORATORY RESULTS     No visits with results within 1 Day(s) from this visit.   Latest known visit with results is:   Appointment on 04/19/2024   Component Date Value    Apolipoprotein B 04/19/2024 80     Hemoglobin A1C 04/19/2024 7.9 (H)     EAG 04/19/2024 180     Sodium 04/19/2024 140     Potassium 04/19/2024 3.9     Chloride 04/19/2024 104     CO2 04/19/2024 29     ANION GAP 04/19/2024 7     BUN 04/19/2024 18     Creatinine 04/19/2024 0.80     Glucose, Fasting 04/19/2024 146 (H)     Calcium 04/19/2024 9.2     AST 04/19/2024 17     ALT 04/19/2024 12     Alkaline Phosphatase 04/19/2024 35     Total Protein 04/19/2024 6.9     Albumin 04/19/2024 4.3     Total Bilirubin 04/19/2024 0.54     eGFR 04/19/2024 93     Creatinine, Ur 04/19/2024 68.6     Albumin,U,Random 04/19/2024 <7.0     Albumin Creat Ratio 04/19/2024 <10      No results found.    RADIOLOGY RESULTS: I have personally reviewed pertinent imaging studies.      Kristie Rosa DO  Gastroenterology Fellow  [facility]  Division of Gastroenterology & Hepatology  Available on [employer]    ** Please Note: This note is constructed using a voice recognition dictation system. **    "

## 2024-05-22 NOTE — TELEPHONE ENCOUNTER
Patient is on Eliquis due to a history of a pulmonary embolism. Clearance for this medication will need to come from his PCP.    Thank you.

## 2024-06-03 ENCOUNTER — TELEPHONE (OUTPATIENT)
Age: 69
End: 2024-06-03

## 2024-06-03 NOTE — TELEPHONE ENCOUNTER
Pt needs to schedule a follow up MNT as a referral was placed however patient had a MNT in March--schedule follow up for 45 minutes with Nisha

## 2024-07-02 ENCOUNTER — HOSPITAL ENCOUNTER (OUTPATIENT)
Dept: NON INVASIVE DIAGNOSTICS | Facility: HOSPITAL | Age: 69
Discharge: HOME | End: 2024-07-02
Attending: INTERNAL MEDICINE
Payer: COMMERCIAL

## 2024-07-02 DIAGNOSIS — E78.2 MIXED HYPERLIPIDEMIA: ICD-10-CM

## 2024-07-02 DIAGNOSIS — I25.10 CORONARY ARTERY DISEASE INVOLVING NATIVE CORONARY ARTERY OF NATIVE HEART WITHOUT ANGINA PECTORIS: ICD-10-CM

## 2024-07-02 DIAGNOSIS — I10 PRIMARY HYPERTENSION: ICD-10-CM

## 2024-07-02 PROCEDURE — 93978 VASCULAR STUDY: CPT | Performed by: SURGERY

## 2024-07-02 PROCEDURE — 93922 UPR/L XTREMITY ART 2 LEVELS: CPT | Performed by: SURGERY

## 2024-07-02 PROCEDURE — 93880 EXTRACRANIAL BILAT STUDY: CPT | Performed by: SURGERY

## 2024-07-02 PROCEDURE — 93922 UPR/L XTREMITY ART 2 LEVELS: CPT

## 2024-07-05 ENCOUNTER — APPOINTMENT (OUTPATIENT)
Dept: LAB | Facility: HOSPITAL | Age: 69
End: 2024-07-05
Payer: MEDICARE

## 2024-07-05 DIAGNOSIS — R97.20 ELEVATED PSA: ICD-10-CM

## 2024-07-05 LAB
PSA FREE MFR SERPL: 23.41 %
PSA FREE SERPL-MCNC: 1 NG/ML
PSA SERPL-MCNC: 4.26 NG/ML (ref 0–4)

## 2024-07-05 PROCEDURE — 84153 ASSAY OF PSA TOTAL: CPT

## 2024-07-05 PROCEDURE — 84154 ASSAY OF PSA FREE: CPT

## 2024-07-05 PROCEDURE — 36415 COLL VENOUS BLD VENIPUNCTURE: CPT

## 2024-07-16 ENCOUNTER — OFFICE VISIT (OUTPATIENT)
Dept: UROLOGY | Facility: CLINIC | Age: 69
End: 2024-07-16
Payer: MEDICARE

## 2024-07-16 VITALS
BODY MASS INDEX: 28.41 KG/M2 | WEIGHT: 198 LBS | DIASTOLIC BLOOD PRESSURE: 74 MMHG | SYSTOLIC BLOOD PRESSURE: 106 MMHG | HEART RATE: 68 BPM

## 2024-07-16 DIAGNOSIS — R97.20 ELEVATED PSA: Primary | ICD-10-CM

## 2024-07-16 PROCEDURE — 99213 OFFICE O/P EST LOW 20 MIN: CPT | Performed by: UROLOGY

## 2024-07-16 NOTE — PATIENT INSTRUCTIONS
For your elevated PSA, we will see him in 6 months with a PSA with free and total fractions.  I currently am not concerned enough to do a biopsy given that your PSA has dropped a little bit and it was 7.11 a little over a year ago.  When you come back if the PSA is concerning, we may get an MRI and we will certainly consider biopsy where you can come off your Eliquis 4 days prior to the procedure.

## 2024-07-16 NOTE — PROGRESS NOTES
UROLOGY PROGRESS NOTE   [facility]  [address]  [address]  [place] [address]  [phone]  Fax:[phone]  [email or web address]      NAME: Fernie March  AGE: 69 y.o. SEX: male  : 1955   MRN: [id number]    DATE: 2024  TIME: 9:02 AM    Assessment and Plan:    Elevated PSA-grade 3 lesion on MRI of the prostate, PSA has actually dropped a little bit.  He is able to come off his Eliquis after August for about a 4-day.  We will check a PSA with free and total fractions in 6 months.  If this has risen is concerning, we may get another MRI of the prostate to see if that category 3 lesion has regressed anything else.  Also may simply consider a biopsy.  Follow-up in 6 months with PSA as above.               Chief Complaint   No chief complaint on file.      History of Present Illness   69-year-old man has elevated PSA with a his PSA history of 2.6 in , 3.2 in , 7.1 2023, and 4.0 2023 with a 20% free fraction.  He has a history of BPH and was previously seen by Dr. Sagastume.  He was seen by Mr. Griggs May 3, 2023.  He was set up for an MR fusion prostate biopsy August 15 with me but this was canceled due to the patient having a myocardial infarction.  He was placed on aspirin and Plavix.  MRI showed category 3 lesion 5 mm right apical posterior peripheral zone.  The volume of the gland was 41 cc.  He also has a history of pulmonary embolism.  We were contacted at the time of the MI and we decided to postpone the biopsy for 6 months at least.  He had angioplasty x3 and a drug-eluting stent placed.  He is now doing well.  He is normally on Eliquis because of a history of pulmonary embolism and he had stopped the Eliquis prior to the planned prostate biopsy and he had a myocardial infarction probably because of this and underlying coronary artery disease and the associated stress with anxiety.  I last saw him 2023 and we decided to check  a PSA at that time and in 5 months and follow-up at this time for history and physical to prepare for MR fusion biopsy with an MRI before.  This was because he was unable to stop his Plavix for the next 6 months.  PSA was 3.5 August 30, 2023 and it is 4.3 with a free fraction of 20.2% as of January 2, 2024.  Multiparametric MRI of the prostate December 4, 2023 showed a category 3 i.e. equivocal lesion 1.4 cm lesion in the posterior right peripheral zone at the apex with no major change since June 6, 2023.  44 cc gland.  Last seen by me January 4, 2024.  He was set up for follow-up in 6 months with a PSA.  Currently, he is coming off the Plavix in August and he will start Eliquis and he can stop this for 4 days prior to the procedure.  He is about to do that for an EGD that he is coming up with a biopsy.  Component  Ref Range & Units 7/5/24  7:46 AM 1/2/24  7:29 AM 8/30/23  7:56 AM 4/24/23  7:53 AM 2/27/23  9:38 AM 2/28/22 10:21 AM 1/27/21  8:38 AM   PSA, Diagnostic  0.000 - 4.000 ng/mL 4.264 High  4.3 High  R, CM 3.5 R, CM 4.0 R, CM 7.1 High  R, CM 3.2 R, CM 2.6 R, CM   With regards to his lower urinary tract symptoms, he voids very well.  No complaints.    The following portions of the patient's history were reviewed and updated as appropriate: allergies, current medications, past family history, past medical history, past social history, past surgical history and problem list.  Past Medical History:   Diagnosis Date    BPH without obstruction/lower urinary tract symptoms     CAD (coronary artery disease)     s/p JOHNATHAN distal RCA and JOHNATHAN RPAV 8/11/2023    Diabetes mellitus (HCC)     Dysuria     GERD (gastroesophageal reflux disease)     Gout     Heart attack (HCC) 2023    History of melanoma     left ear - surgically removed    History of pulmonary embolism 2017    bilateral    Hyperlipidemia     Hypertension     Melanoma (HCC) 2017    left ear    Prostate cancer (HCC)     Sleep apnea      Past Surgical History:    Procedure Laterality Date    CARDIAC CATHETERIZATION Left 8/11/2023    Procedure: Cardiac Left Heart Cath;  Surgeon: Rubi Ivory DO;  [facility];  Service: Cardiology    CARDIAC CATHETERIZATION N/A 8/11/2023    Procedure: Cardiac Coronary Angiogram;  Surgeon: Rubi Ivory DO;  [facility];  Service: Cardiology    CARDIAC CATHETERIZATION N/A 8/11/2023    Procedure: Cardiac pci;  Surgeon: Rubi Ivory DO;  [facility];  Service: Cardiology    CARDIAC CATHETERIZATION N/A 8/11/2023    Procedure: Cardiac other - IVUS;  Surgeon: Rubi Ivory DO;  [facility];  Service: Cardiology    CHOLECYSTECTOMY      COLONOSCOPY  10/2016    sessile serrated polyp removed from the proximal transverse, adenoma removed from the distal transverse    EGD  08/2017    gastritis, H. pylori negative, and Candida esophagitis    EGD  11/2011    slight Schatzki's ring, small hiatal hernia    EXTERNAL EAR SURGERY Left 08/2017    for the removal of skin melanoma-Plastic surgery       Review of Systems   Review of Systems   Constitutional:  Positive for unexpected weight change.        Weight gain off Trulicity   Genitourinary: Negative.        Active Problem List     Patient Active Problem List   Diagnosis    Type 2 diabetes mellitus with hyperglycemia, with long-term current use of insulin (HCC)    Vomiting    Leukocytosis    Mixed hyperlipidemia    Hypertension    Hx pulmonary embolism    GERD (gastroesophageal reflux disease)    Benign prostatic hyperplasia without lower urinary tract symptoms    LELO (obstructive sleep apnea)    Benign essential hypertension    Fatigue    Myalgia    Pulmonary embolism, bilateral (HCC)    Non-STEMI (non-ST elevated myocardial infarction) (HCC)    Abnormal findings on esophagogastroduodenoscopy (EGD)    Coronary artery disease involving native coronary artery of native heart without angina pectoris    Presence of  drug-eluting stent in right coronary artery    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   Constitutional:       Appearance: Normal appearance.   HENT:      Head: Normocephalic and atraumatic.   Eyes:      Extraocular Movements: Extraocular movements intact.   Pulmonary:      Effort: Pulmonary effort is normal.   Musculoskeletal:         General: Normal range of motion.      Cervical back: Normal range of motion.   Skin:     Coloration: Skin is not jaundiced or pale.   Neurological:      General: No focal deficit present.      Mental Status: He is alert and oriented to person, place, and time. Mental status is at baseline.   Psychiatric:         Mood and Affect: Mood normal.         Behavior: Behavior normal.         Thought Content: Thought content normal.         Judgment: Judgment normal.             Current Medications     Current Outpatient Medications:     apixaban (ELIQUIS) 5 mg, Take 1 tablet by mouth 2 (two) times a day for 30 days 2 tabs PO BID x 7 days, then 1 tab PO BID (Patient taking differently: Take 2.5 mg by mouth 2 (two) times a day 2 tabs PO BID x 7 days, then 1 tab PO BID), Disp: 60 tablet, Rfl: 0    ascorbic acid (VITAMIN C) 250 mg tablet, Take 500 mg by mouth daily, Disp: , Rfl:     Blood Glucose Monitoring Suppl (Advocate Blood Glucose Monitor) KEYANNA, Use, Disp: , Rfl:     clopidogrel (PLAVIX) 75 mg tablet, Take 1 tablet (75 mg total) by mouth daily, Disp: 90 tablet, Rfl: 3    Continuous Blood Gluc Sensor (FreeStyle Jared 2 Sensor) MISC, Use, Disp: , Rfl:     Empagliflozin (Jardiance) 25 MG TABS, Take 1 tablet (25 mg total) by mouth in the morning, Disp: 90 tablet, Rfl: 3    ezetimibe (ZETIA) 10 mg tablet, Take 1 tablet (10 mg total) by mouth daily at  bedtime, Disp: 90 tablet, Rfl: 3    fluticasone (FLONASE) 50 mcg/act nasal spray, 2 sprays into each nostril, Disp: , Rfl:     HumaLOG KwikPen 100 units/mL injection pen, E11.65 Inject 5 units with breakfast and lunch, 10 units with dinner, with scale as needed TDD up to 50 units daily., Disp: 45 mL, Rfl: 1    Insulin Glargine Solostar (Lantus SoloStar) 100 UNIT/ML SOPN, Inject 0.32 mL (32 Units total) under the skin daily Or as directed TDD up to 40 units daily, Disp: 30 mL, Rfl: 0    loperamide (IMODIUM A-D) 2 MG tablet, Take 2 mg by mouth 4 (four) times a day as needed for diarrhea, Disp: , Rfl:     losartan (COZAAR) 50 mg tablet, Take 50 mg by mouth, Disp: , Rfl:     lovastatin (MEVACOR) 40 MG tablet, Take 1 tablet (40 mg total) by mouth daily at bedtime, Disp: 90 tablet, Rfl: 3    metFORMIN (GLUCOPHAGE) 1000 MG tablet, Take 1 tablet (1,000 mg total) by mouth 2 (two) times a day with meals, Disp: 180 tablet, Rfl: 3    metoprolol tartrate (LOPRESSOR) 25 mg tablet, Take 1 tablet (25 mg total) by mouth every 12 (twelve) hours, Disp: 180 tablet, Rfl: 3    olopatadine (PATANOL) 0.1 % ophthalmic solution, 1 drop 2 (two) times a day, Disp: , Rfl:     omeprazole (PriLOSEC) 40 MG capsule, Take 1 capsule (40 mg total) by mouth daily, Disp: 90 capsule, Rfl: 3    OneTouch Delica Lancets 30G MISC, Two daily to check blood sugar, Disp: 200 each, Rfl: 1    VITAMIN D PO, Take by mouth, Disp: , Rfl:     BD Pen Needle Kiarra U/F 32G X 4 MM MISC, Inject as directed 4 (four) times a day, Disp: 360 each, Rfl: 1        Baldemar Niño MD

## 2024-08-05 ENCOUNTER — HOSPITAL ENCOUNTER (OUTPATIENT)
Dept: CT IMAGING | Facility: HOSPITAL | Age: 69
Discharge: HOME | End: 2024-08-05
Attending: INTERNAL MEDICINE
Payer: MEDICARE

## 2024-08-05 DIAGNOSIS — I71.21 ANEURYSM OF ASCENDING AORTA WITHOUT RUPTURE (HCC): ICD-10-CM

## 2024-08-05 PROCEDURE — 71250 CT THORAX DX C-: CPT

## 2024-08-15 DIAGNOSIS — I21.4 NON-STEMI (NON-ST ELEVATED MYOCARDIAL INFARCTION) (HCC): ICD-10-CM

## 2024-08-15 RX ORDER — METOPROLOL TARTRATE 25 MG/1
25 TABLET, FILM COATED ORAL EVERY 12 HOURS
Qty: 200 TABLET | Refills: 1 | Status: SHIPPED | OUTPATIENT
Start: 2024-08-15

## 2024-08-15 RX ORDER — EZETIMIBE 10 MG/1
10 TABLET ORAL
Qty: 100 TABLET | Refills: 1 | Status: SHIPPED | OUTPATIENT
Start: 2024-08-15

## 2024-09-05 ENCOUNTER — OFFICE VISIT (OUTPATIENT)
Dept: ENDOCRINOLOGY | Facility: CLINIC | Age: 69
End: 2024-09-05
Payer: MEDICARE

## 2024-09-05 VITALS
BODY MASS INDEX: 28.2 KG/M2 | HEART RATE: 81 BPM | OXYGEN SATURATION: 97 % | SYSTOLIC BLOOD PRESSURE: 136 MMHG | DIASTOLIC BLOOD PRESSURE: 94 MMHG | TEMPERATURE: 98.9 F | HEIGHT: 70 IN | WEIGHT: 197 LBS

## 2024-09-05 DIAGNOSIS — Z79.4 TYPE 2 DIABETES MELLITUS WITH HYPERGLYCEMIA, WITH LONG-TERM CURRENT USE OF INSULIN (HCC): Primary | ICD-10-CM

## 2024-09-05 DIAGNOSIS — E11.65 TYPE 2 DIABETES MELLITUS WITH HYPERGLYCEMIA, WITH LONG-TERM CURRENT USE OF INSULIN (HCC): Primary | ICD-10-CM

## 2024-09-05 DIAGNOSIS — I10 BENIGN ESSENTIAL HYPERTENSION: ICD-10-CM

## 2024-09-05 DIAGNOSIS — E78.2 MIXED HYPERLIPIDEMIA: ICD-10-CM

## 2024-09-05 LAB — SL AMB POCT HEMOGLOBIN AIC: 7.8 (ref ?–6.5)

## 2024-09-05 PROCEDURE — 95251 CONT GLUC MNTR ANALYSIS I&R: CPT | Performed by: STUDENT IN AN ORGANIZED HEALTH CARE EDUCATION/TRAINING PROGRAM

## 2024-09-05 PROCEDURE — 83036 HEMOGLOBIN GLYCOSYLATED A1C: CPT | Performed by: STUDENT IN AN ORGANIZED HEALTH CARE EDUCATION/TRAINING PROGRAM

## 2024-09-05 PROCEDURE — 99214 OFFICE O/P EST MOD 30 MIN: CPT | Performed by: STUDENT IN AN ORGANIZED HEALTH CARE EDUCATION/TRAINING PROGRAM

## 2024-09-05 RX ORDER — TRIAMCINOLONE ACETONIDE 1 MG/G
CREAM TOPICAL
COMMUNITY
Start: 2024-08-15

## 2024-09-05 NOTE — ASSESSMENT & PLAN NOTE
Improving control. We discussed nutrition interventions, such as reduction/elimination of processed carbs/sugars. No changes in regimen today. Will plan f/u in 4-mo

## 2024-09-05 NOTE — PROGRESS NOTES
"Ambulatory Visit  Name: Fernie March      : 1955      MRN: [id number]  Encounter Provider: Mango Hoffman DO  Encounter Date: 2024   Encounter department: [facility]    Assessment & Plan   1. Type 2 diabetes mellitus with hyperglycemia, with long-term current use of insulin (HCC)  Assessment & Plan:  Improving control. We discussed nutrition interventions, such as reduction/elimination of processed carbs/sugars. No changes in regimen today. Will plan f/u in 4-mo    Orders:  -     POCT hemoglobin A1c  -     Albumin / creatinine urine ratio; Future; Expected date: 2024  -     Comprehensive metabolic panel; Future; Expected date: 2024  -     Hemoglobin A1C; Future; Expected date: 2024  -     Lipid Panel with Direct LDL reflex; Future; Expected date: 2024  2. Mixed hyperlipidemia  Assessment & Plan:  MGMT per cardiology  3. Benign essential hypertension  Assessment & Plan:  Good control. Continue current Rx    RTC 3-mo    History of Present Illness     Fernie March is a 69 y.o. male who presents in follow up of T2D c/b DPN, CAD.     For DM he takes MTF 1g bid, jardiance 25 mg daily and lantus 32 units daily and humalog 5-5-10 units with meals plus scale ISF 25 >145. He attempts pre-bolusing of humalog. Meals due incorporate refined carbs, including croissants, breads, cereals,etc. No Sx.     For HLD he takes lovastatin 40 mg daily and ezetimibe 10 mg daily.     For HTN he takes losartan 50 mg daily.     Review of Systems   Constitutional:  Negative for diaphoresis and unexpected weight change.   Gastrointestinal:  Negative for nausea and vomiting.   Endocrine: Negative for polydipsia and polyuria.   All other systems reviewed and are negative.      Objective     /94 (BP Location: Left arm, Patient Position: Sitting)   Pulse 81   Temp 98.9 °F (37.2 °C)   Ht 5' 10\" (1.778 m)   Wt 89.4 kg (197 lb)   SpO2 97%   BMI " 28.27 kg/m²     Physical Exam  Vitals reviewed.   Constitutional:       General: He is not in acute distress.     Appearance: Normal appearance.   HENT:      Head: Normocephalic and atraumatic.      Nose: Nose normal.   Eyes:      General: No scleral icterus.     Conjunctiva/sclera: Conjunctivae normal.   Pulmonary:      Effort: Pulmonary effort is normal. No respiratory distress.   Musculoskeletal:         General: No deformity.      Cervical back: Normal range of motion.   Skin:     General: Skin is warm and dry.   Neurological:      Mental Status: He is alert.   Psychiatric:         Mood and Affect: Mood normal.         Behavior: Behavior normal.         Lab Results   Component Value Date     12/31/2015    SODIUM 140 04/19/2024    K 3.9 04/19/2024     04/19/2024    CO2 29 04/19/2024    ANIONGAP 9 12/31/2015    AGAP 7 04/19/2024    BUN 18 04/19/2024    CREATININE 0.80 04/19/2024    GLUC 127 11/29/2023    GLUF 146 (H) 04/19/2024    CALCIUM 9.2 04/19/2024    AST 17 04/19/2024    ALT 12 04/19/2024    ALKPHOS 35 04/19/2024    PROT 7.2 12/31/2015    TP 6.9 04/19/2024    BILITOT 0.28 12/31/2015    TBILI 0.54 04/19/2024    EGFR 93 04/19/2024     Lab Results   Component Value Date    HGBA1C 7.8 (A) 09/05/2024     Lab Results   Component Value Date    CHOLESTEROL 131 09/16/2023    CHOLESTEROL 162 04/24/2023    CHOLESTEROL 156 04/02/2022     Lab Results   Component Value Date    HDL 37 (L) 09/16/2023    HDL 37 (L) 04/24/2023    HDL 37 (L) 04/02/2022     Lab Results   Component Value Date    TRIG 180 (H) 09/16/2023    TRIG 257 (H) 04/24/2023    TRIG 125 04/02/2022     Lab Results   Component Value Date    NONHDLC 94 09/16/2023    NONHDLC 125 04/24/2023    NONHDLC 119 04/02/2022     Component      Latest Ref Rng 4/19/2024   EXT Creatinine Urine      Reference range not established. mg/dL 68.6    Albumin,U,Random      <20.0 mg/L <7.0    Albumin Creat Ratio      0 - 30 mg/g creatinine <10          Fernie March    Device used DEJUAN 2  Home use     Indication   Type 2 Diabetes    More than 72 hours of data was reviewed. Report to be scanned to chart.     Date Range: Aug 8 - Sep 4, 2024    Analysis of data:   % time CGM used: 74%  Average Glucose: 176 mg/dL  Coefficient of Variation: 21.3%   Time in Target Range: 60%   Time Above Range: 40% (4% very high)   Time Below Range: 0%     Interpretation of data: pretty fair glycemic control with mild fasting, and intermittent mild-mod post-prandial hyperglycemia. No hypoglycemia.       Administrative Statements

## 2024-09-11 ENCOUNTER — PATIENT MESSAGE (OUTPATIENT)
Dept: GASTROENTEROLOGY | Facility: CLINIC | Age: 69
End: 2024-09-11

## 2024-10-01 ENCOUNTER — ANESTHESIA (OUTPATIENT)
Dept: GASTROENTEROLOGY | Facility: HOSPITAL | Age: 69
End: 2024-10-01
Payer: MEDICARE

## 2024-10-01 ENCOUNTER — HOSPITAL ENCOUNTER (OUTPATIENT)
Dept: GASTROENTEROLOGY | Facility: HOSPITAL | Age: 69
Setting detail: OUTPATIENT SURGERY
Discharge: HOME | End: 2024-10-01
Attending: INTERNAL MEDICINE
Payer: MEDICARE

## 2024-10-01 ENCOUNTER — ANESTHESIA EVENT (OUTPATIENT)
Dept: GASTROENTEROLOGY | Facility: HOSPITAL | Age: 69
End: 2024-10-01
Payer: MEDICARE

## 2024-10-01 VITALS
SYSTOLIC BLOOD PRESSURE: 134 MMHG | HEART RATE: 64 BPM | OXYGEN SATURATION: 96 % | BODY MASS INDEX: 28.63 KG/M2 | WEIGHT: 200 LBS | HEIGHT: 70 IN | RESPIRATION RATE: 16 BRPM | DIASTOLIC BLOOD PRESSURE: 80 MMHG | TEMPERATURE: 97.2 F

## 2024-10-01 DIAGNOSIS — D13.1 GASTRIC ADENOMA: ICD-10-CM

## 2024-10-01 LAB — GLUCOSE SERPL-MCNC: 188 MG/DL (ref 65–140)

## 2024-10-01 PROCEDURE — 82948 REAGENT STRIP/BLOOD GLUCOSE: CPT

## 2024-10-01 PROCEDURE — 88305 TISSUE EXAM BY PATHOLOGIST: CPT | Performed by: PATHOLOGY

## 2024-10-01 PROCEDURE — 43239 EGD BIOPSY SINGLE/MULTIPLE: CPT | Performed by: INTERNAL MEDICINE

## 2024-10-01 PROCEDURE — 88342 IMHCHEM/IMCYTCHM 1ST ANTB: CPT | Performed by: PATHOLOGY

## 2024-10-01 PROCEDURE — 43254 EGD ENDO MUCOSAL RESECTION: CPT | Performed by: INTERNAL MEDICINE

## 2024-10-01 PROCEDURE — 43251 EGD REMOVE LESION SNARE: CPT | Performed by: INTERNAL MEDICINE

## 2024-10-01 PROCEDURE — 88309 TISSUE EXAM BY PATHOLOGIST: CPT | Performed by: PATHOLOGY

## 2024-10-01 RX ORDER — SODIUM CHLORIDE 9 MG/ML
INJECTION, SOLUTION INTRAVENOUS CONTINUOUS PRN
Status: DISCONTINUED | OUTPATIENT
Start: 2024-10-01 | End: 2024-10-01

## 2024-10-01 RX ORDER — PROPOFOL 10 MG/ML
INJECTION, EMULSION INTRAVENOUS AS NEEDED
Status: DISCONTINUED | OUTPATIENT
Start: 2024-10-01 | End: 2024-10-01

## 2024-10-01 RX ORDER — LIDOCAINE HYDROCHLORIDE 10 MG/ML
INJECTION, SOLUTION EPIDURAL; INFILTRATION; INTRACAUDAL; PERINEURAL AS NEEDED
Status: DISCONTINUED | OUTPATIENT
Start: 2024-10-01 | End: 2024-10-01

## 2024-10-01 RX ADMIN — PROPOFOL 50 MG: 10 INJECTION, EMULSION INTRAVENOUS at 09:18

## 2024-10-01 RX ADMIN — LIDOCAINE HYDROCHLORIDE 50 MG: 10 INJECTION, SOLUTION EPIDURAL; INFILTRATION; INTRACAUDAL; PERINEURAL at 08:55

## 2024-10-01 RX ADMIN — Medication 40 MG: at 09:01

## 2024-10-01 RX ADMIN — PROPOFOL 150 MG: 10 INJECTION, EMULSION INTRAVENOUS at 08:55

## 2024-10-01 RX ADMIN — SODIUM CHLORIDE: 0.9 INJECTION, SOLUTION INTRAVENOUS at 08:44

## 2024-10-01 RX ADMIN — PROPOFOL 50 MG: 10 INJECTION, EMULSION INTRAVENOUS at 09:07

## 2024-10-01 RX ADMIN — PROPOFOL 50 MG: 10 INJECTION, EMULSION INTRAVENOUS at 09:01

## 2024-10-01 NOTE — ANESTHESIA POSTPROCEDURE EVALUATION
Post-Op Assessment Note    CV Status:  Stable  Pain Score: 0    Pain management: adequate       Mental Status:  Sleepy and arousable   Hydration Status:  Euvolemic   PONV Controlled:  None   Airway Patency:  Patent     Post Op Vitals Reviewed: Yes    No anethesia notable event occurred.    Staff: Anesthesiologist, CRNA   Comments: REPORT GIVEN TO rn; mario; cele          /71 (10/01/24 0927)    Temp (!) 97.2 °F (36.2 °C) (10/01/24 0927)    Pulse 57 (10/01/24 0927)   Resp 16 (10/01/24 0927)    SpO2 96 % (10/01/24 0927)

## 2024-10-01 NOTE — ANESTHESIA PREPROCEDURE EVALUATION
Procedure:  EGD    Relevant Problems   CARDIO   (+) Atherosclerotic heart disease of native coronary artery with other forms of angina pectoris (HCC)   (+) Benign essential hypertension   (+) Coronary artery disease involving native coronary artery of native heart without angina pectoris   (+) Hypertension   (+) Mixed hyperlipidemia   (+) Non-STEMI (non-ST elevated myocardial infarction) (HCC)      ENDO   (+) Type 2 diabetes mellitus with hyperglycemia, with long-term current use of insulin (HCC)      GI/HEPATIC   (+) GERD (gastroesophageal reflux disease)      /RENAL   (+) Benign prostatic hyperplasia without lower urinary tract symptoms      PULMONARY   (+) LELO (obstructive sleep apnea)        Physical Exam    Airway    Mallampati score: III         Dental   No notable dental hx implants    Cardiovascular  Rhythm: regular, Rate: normal    Pulmonary  Pulmonary exam normal     Other Findings    Anesthesia Plan  ASA Score- 3     Anesthesia Type- IV sedation with anesthesia with ASA Monitors.         Additional Monitors:     Airway Plan:            Plan Factors-Exercise tolerance (METS): >4 METS.    Chart reviewed. EKG reviewed.  Existing labs reviewed. Patient summary reviewed.    Patient is not a current smoker.              Induction- intravenous.    Postoperative Plan-     Perioperative Resuscitation Plan - Level 1 - Full Code.       Informed Consent- Anesthetic plan and risks discussed with patient.  I personally reviewed this patient with the CRNA. Discussed and agreed on the Anesthesia Plan with the CRNA..

## 2024-10-01 NOTE — H&P
History and Physical -  [facility]  Fernie March 69 y.o. male MRN: [id number]                  HPI: Fernie March is a 69 y.o. year old male who presents for EGD and polypectomy for gastric adenoma      REVIEW OF SYSTEMS: Per the HPI, and otherwise unremarkable.    Historical Information   Past Medical History:   Diagnosis Date    BPH without obstruction/lower urinary tract symptoms     CAD (coronary artery disease)     s/p JOHNATHAN distal RCA and JOHNATHAN RPAV 8/11/2023    Diabetes mellitus (HCC)     Dysuria     GERD (gastroesophageal reflux disease)     Gout     Heart attack (HCC) 2023    History of melanoma     left ear - surgically removed    History of pulmonary embolism 2017    bilateral    Hyperlipidemia     Hypertension     Melanoma (HCC) 2017    left ear    Prostate cancer (HCC)     Sleep apnea      Past Surgical History:   Procedure Laterality Date    CARDIAC CATHETERIZATION Left 8/11/2023    Procedure: Cardiac Left Heart Cath;  Surgeon: Rubi Ivory DO;  Location: [facility];  Service: Cardiology    CARDIAC CATHETERIZATION N/A 8/11/2023    Procedure: Cardiac Coronary Angiogram;  Surgeon: Rubi Ivory DO;  Location: [facility];  Service: Cardiology    CARDIAC CATHETERIZATION N/A 8/11/2023    Procedure: Cardiac pci;  Surgeon: Rubi Ivory DO;  Location: [facility];  Service: Cardiology    CARDIAC CATHETERIZATION N/A 8/11/2023    Procedure: Cardiac other - IVUS;  Surgeon: Rubi Ivory DO;  Location: [facility];  Service: Cardiology    CHOLECYSTECTOMY      COLONOSCOPY  10/2016    sessile serrated polyp removed from the proximal transverse, adenoma removed from the distal transverse    EGD  08/2017    gastritis, H. pylori negative, and Candida esophagitis    EGD  11/2011    slight Schatzki's ring, small hiatal hernia    EXTERNAL EAR SURGERY Left 08/2017    for the removal of skin melanoma-Plastic surgery     Social History   Social  "History     Substance and Sexual Activity   Alcohol Use Yes    Comment: Drinks socailly.      Social History     Substance and Sexual Activity   Drug Use No     Social History     Tobacco Use   Smoking Status Never    Passive exposure: Never   Smokeless Tobacco Never     Family History   Problem Relation Age of Onset    Hyperlipidemia Mother     Stroke Father     Melanoma Father     Diabetes Sister     Hypertension Sister     Diabetes Brother     JETT disease Brother        Meds/Allergies       Current Outpatient Medications:     ascorbic acid (VITAMIN C) 250 mg tablet    Blood Glucose Monitoring Suppl ([employer]ocate Blood Glucose Monitor) KEYANNA    Continuous Blood Gluc Sensor (FreeStyle Jared 2 Sensor) MISC    ezetimibe (ZETIA) 10 mg tablet    fluticasone (FLONASE) 50 mcg/act nasal spray    HumaLOG KwikPen 100 units/mL injection pen    Insulin Glargine Solostar (Lantus SoloStar) 100 UNIT/ML SOPN    loperamide (IMODIUM A-D) 2 MG tablet    losartan (COZAAR) 50 mg tablet    lovastatin (MEVACOR) 40 MG tablet    metFORMIN (GLUCOPHAGE) 1000 MG tablet    metoprolol tartrate (LOPRESSOR) 25 mg tablet    olopatadine (PATANOL) 0.1 % ophthalmic solution    omeprazole (PriLOSEC) 40 MG capsule    OneTouch Delica Lancets 30G MISC    triamcinolone (KENALOG) 0.1 % cream    VITAMIN D PO    apixaban (ELIQUIS) 5 mg    BD Pen Needle Kiarra U/F 32G X 4 MM MISC    clopidogrel (PLAVIX) 75 mg tablet    Empagliflozin (Jardiance) 25 MG TABS    Allergies   Allergen Reactions    Crestor [Rosuvastatin] Rash and Hives     Other reaction(s): Urticaria    Enalapril Cough    Lipitor [Atorvastatin] Rash     pain       Objective     /90   Pulse 63   Temp (!) 96.8 °F (36 °C) (Tympanic)   Resp 17   Ht 5' 10\" (1.778 m)   Wt 90.7 kg (200 lb)   SpO2 97%   BMI 28.70 kg/m²       PHYSICAL EXAM    Gen: NAD  Head: NCAT  CV: RRR  CHEST: Clear  ABD: soft, NT/ND  EXT: no edema      ASSESSMENT/PLAN:  This is a 69 y.o. year old male here for EGD and polypectomy " for gastric adenoma, and he is stable and optimized for his procedure.

## 2024-10-02 ENCOUNTER — APPOINTMENT (EMERGENCY)
Dept: CT IMAGING | Facility: HOSPITAL | Age: 69
End: 2024-10-02
Payer: MEDICARE

## 2024-10-02 ENCOUNTER — HOSPITAL ENCOUNTER (EMERGENCY)
Facility: HOSPITAL | Age: 69
Discharge: HOME | End: 2024-10-02
Payer: MEDICARE

## 2024-10-02 VITALS
RESPIRATION RATE: 22 BRPM | DIASTOLIC BLOOD PRESSURE: 85 MMHG | TEMPERATURE: 98 F | HEART RATE: 104 BPM | SYSTOLIC BLOOD PRESSURE: 129 MMHG | OXYGEN SATURATION: 93 %

## 2024-10-02 DIAGNOSIS — R11.2 NAUSEA AND VOMITING: Primary | ICD-10-CM

## 2024-10-02 LAB
2HR DELTA HS TROPONIN: 1 NG/L
ALBUMIN SERPL BCG-MCNC: 4.1 G/DL (ref 3.5–5)
ALP SERPL-CCNC: 39 U/L (ref 34–104)
ALT SERPL W P-5'-P-CCNC: 18 U/L (ref 7–52)
ANION GAP SERPL CALCULATED.3IONS-SCNC: 8 MMOL/L (ref 4–13)
AST SERPL W P-5'-P-CCNC: 19 U/L (ref 13–39)
BASOPHILS # BLD AUTO: 0.02 THOUSANDS/ÂΜL (ref 0–0.1)
BASOPHILS NFR BLD AUTO: 0 % (ref 0–1)
BILIRUB SERPL-MCNC: 0.44 MG/DL (ref 0.2–1)
BUN SERPL-MCNC: 12 MG/DL (ref 5–25)
CALCIUM SERPL-MCNC: 9.3 MG/DL (ref 8.4–10.2)
CARDIAC TROPONIN I PNL SERPL HS: 7 NG/L
CARDIAC TROPONIN I PNL SERPL HS: 8 NG/L
CHLORIDE SERPL-SCNC: 101 MMOL/L (ref 96–108)
CO2 SERPL-SCNC: 28 MMOL/L (ref 21–32)
CREAT SERPL-MCNC: 0.79 MG/DL (ref 0.6–1.3)
D DIMER PPP FEU-MCNC: 0.37 UG/ML FEU
EOSINOPHIL # BLD AUTO: 0.14 THOUSAND/ÂΜL (ref 0–0.61)
EOSINOPHIL NFR BLD AUTO: 2 % (ref 0–6)
ERYTHROCYTE [DISTWIDTH] IN BLOOD BY AUTOMATED COUNT: 16.1 % (ref 11.6–15.1)
GFR SERPL CREATININE-BSD FRML MDRD: 92 ML/MIN/1.73SQ M
GLUCOSE SERPL-MCNC: 198 MG/DL (ref 65–140)
GLUCOSE SERPL-MCNC: 214 MG/DL (ref 65–140)
HCT VFR BLD AUTO: 39 % (ref 36.5–49.3)
HGB BLD-MCNC: 12.1 G/DL (ref 12–17)
IMM GRANULOCYTES # BLD AUTO: 0.01 THOUSAND/UL (ref 0–0.2)
IMM GRANULOCYTES NFR BLD AUTO: 0 % (ref 0–2)
LIPASE SERPL-CCNC: 21 U/L (ref 11–82)
LYMPHOCYTES # BLD AUTO: 1.13 THOUSANDS/ÂΜL (ref 0.6–4.47)
LYMPHOCYTES NFR BLD AUTO: 15 % (ref 14–44)
MCH RBC QN AUTO: 26.1 PG (ref 26.8–34.3)
MCHC RBC AUTO-ENTMCNC: 31 G/DL (ref 31.4–37.4)
MCV RBC AUTO: 84 FL (ref 82–98)
MONOCYTES # BLD AUTO: 0.53 THOUSAND/ÂΜL (ref 0.17–1.22)
MONOCYTES NFR BLD AUTO: 7 % (ref 4–12)
NEUTROPHILS # BLD AUTO: 5.55 THOUSANDS/ÂΜL (ref 1.85–7.62)
NEUTS SEG NFR BLD AUTO: 76 % (ref 43–75)
NRBC BLD AUTO-RTO: 0 /100 WBCS
PLATELET # BLD AUTO: 151 THOUSANDS/UL (ref 149–390)
PMV BLD AUTO: 9.1 FL (ref 8.9–12.7)
POTASSIUM SERPL-SCNC: 3.8 MMOL/L (ref 3.5–5.3)
PROT SERPL-MCNC: 6.8 G/DL (ref 6.4–8.4)
RBC # BLD AUTO: 4.64 MILLION/UL (ref 3.88–5.62)
SODIUM SERPL-SCNC: 137 MMOL/L (ref 135–147)
WBC # BLD AUTO: 7.38 THOUSAND/UL (ref 4.31–10.16)

## 2024-10-02 PROCEDURE — 88305 TISSUE EXAM BY PATHOLOGIST: CPT | Performed by: PATHOLOGY

## 2024-10-02 PROCEDURE — 85379 FIBRIN DEGRADATION QUANT: CPT

## 2024-10-02 PROCEDURE — 84484 ASSAY OF TROPONIN QUANT: CPT

## 2024-10-02 PROCEDURE — 99285 EMERGENCY DEPT VISIT HI MDM: CPT

## 2024-10-02 PROCEDURE — 74177 CT ABD & PELVIS W/CONTRAST: CPT

## 2024-10-02 PROCEDURE — 96375 TX/PRO/DX INJ NEW DRUG ADDON: CPT

## 2024-10-02 PROCEDURE — 80053 COMPREHEN METABOLIC PANEL: CPT

## 2024-10-02 PROCEDURE — 99284 EMERGENCY DEPT VISIT MOD MDM: CPT

## 2024-10-02 PROCEDURE — 93005 ELECTROCARDIOGRAM TRACING: CPT

## 2024-10-02 PROCEDURE — 88309 TISSUE EXAM BY PATHOLOGIST: CPT | Performed by: PATHOLOGY

## 2024-10-02 PROCEDURE — 83690 ASSAY OF LIPASE: CPT

## 2024-10-02 PROCEDURE — 82948 REAGENT STRIP/BLOOD GLUCOSE: CPT

## 2024-10-02 PROCEDURE — 96374 THER/PROPH/DIAG INJ IV PUSH: CPT

## 2024-10-02 PROCEDURE — 85025 COMPLETE CBC W/AUTO DIFF WBC: CPT

## 2024-10-02 PROCEDURE — 88342 IMHCHEM/IMCYTCHM 1ST ANTB: CPT | Performed by: PATHOLOGY

## 2024-10-02 PROCEDURE — 36415 COLL VENOUS BLD VENIPUNCTURE: CPT

## 2024-10-02 RX ORDER — ONDANSETRON 4 MG/1
4 TABLET, FILM COATED ORAL EVERY 8 HOURS PRN
Qty: 12 TABLET | Refills: 0 | Status: SHIPPED | OUTPATIENT
Start: 2024-10-02

## 2024-10-02 RX ORDER — PANTOPRAZOLE SODIUM 40 MG/10ML
40 INJECTION, POWDER, LYOPHILIZED, FOR SOLUTION INTRAVENOUS ONCE
Status: COMPLETED | OUTPATIENT
Start: 2024-10-02 | End: 2024-10-02

## 2024-10-02 RX ORDER — MORPHINE SULFATE 4 MG/ML
4 INJECTION, SOLUTION INTRAMUSCULAR; INTRAVENOUS ONCE
Status: COMPLETED | OUTPATIENT
Start: 2024-10-02 | End: 2024-10-02

## 2024-10-02 RX ORDER — ONDANSETRON 2 MG/ML
4 INJECTION INTRAMUSCULAR; INTRAVENOUS ONCE
Status: COMPLETED | OUTPATIENT
Start: 2024-10-02 | End: 2024-10-02

## 2024-10-02 RX ADMIN — ONDANSETRON 4 MG: 2 INJECTION INTRAMUSCULAR; INTRAVENOUS at 05:38

## 2024-10-02 RX ADMIN — PANTOPRAZOLE SODIUM 40 MG: 40 INJECTION, POWDER, FOR SOLUTION INTRAVENOUS at 06:40

## 2024-10-02 RX ADMIN — IOHEXOL 100 ML: 350 INJECTION, SOLUTION INTRAVENOUS at 06:20

## 2024-10-02 RX ADMIN — MORPHINE SULFATE 4 MG: 4 INJECTION, SOLUTION INTRAMUSCULAR; INTRAVENOUS at 06:40

## 2024-10-02 NOTE — ED PROVIDER NOTES
"Final diagnoses:   Nausea and vomiting     ED Disposition       ED Disposition   Discharge    Condition   Stable    Date/Time   Wed Oct 2, 2024  9:56 AM    Comment   Fernie March discharge to home/self care.                   Assessment & Plan       Medical Decision Making  Medical complexity: This is a 69-year-old male who underwent EGD less than 24 hours ago but is having significant abdominal distention, epigastric pain, and several episodes of nonbloody nonbilious emesis.  He is also having chills.  No fevers at home.  No other illness symptoms, and is afebrile here in the emergency department.  Will need rule out for perforation or obstruction.  This will be accomplished with a CT scan of the abdomen pelvis.  Will screen lab work for signs of occult infection including a CBC to look for leukocytosis or left shift.  Will evaluate metabolic panel for signs of endorgan dysfunction.     Reassessment/disposition: Patient was signed out to morning team pending CT scan results as well as delta troponin.  I do estimate that the patient will likely go home as I do not see any acute pathology on my wet read of the CT scan at 0712 AM on 10/2/2024.  Patient is having ongoing upper abdominal distention that he calls \"gas pain\".  I do feel that this could be related somewhat to the snare retrieval of his sessile polyp earlier today.  He was given Protonix.  He is having ongoing symptoms at this time.  Please see ED handoff note if there is any major changes to patient condition or any discrepancy with plan for discharge.    Amount and/or Complexity of Data Reviewed  Labs: ordered. Decision-making details documented in ED Course.  Radiology: ordered.    Risk  Prescription drug management.        ED Course as of 10/03/24 0027   Wed Oct 02, 2024   0632 I reached out to gastroenterology on-call who did let me know that bloating can sometimes be associated with the hot snare forceps retrieval of the procedure he had " yesterday.  They recommended IV PPI and ongoing workup.  They stated that nausea and vomiting which is delayed after procedure is not a typical complication known for EGDs.   0632 D-dimer, quantitative  On reassessment, the patient tells me that when he got up to go to the bathroom a minute ago he was having sternal chest pain.  He then tells me that vomiting was his only symptom whenever he had his blood clots prior.  He has had his anticoagulation held for several days now in preparation for his procedure yesterday.   0706 ECG interpreted by me.  Date: 10/2/2024.  Time: 0536.  Rate: 96 bpm.  Axis: Normal.  Rhythm: Regular.  There are no ST elevations or depressions evident on this ECG.  There are Q waves present in lead III, and some T waves which are flattened in lead III and V6.  Interpretation: This is an abnormal ECG, no acute ischemic changes noted.       Medications   ondansetron (ZOFRAN) injection 4 mg (4 mg Intravenous Given 10/2/24 0538)   iohexol (OMNIPAQUE) 350 MG/ML injection (MULTI-DOSE) 100 mL (100 mL Intravenous Given 10/2/24 0620)   morphine injection 4 mg (4 mg Intravenous Given 10/2/24 0640)   pantoprazole (PROTONIX) injection 40 mg (40 mg Intravenous Given 10/2/24 0640)       ED Risk Strat Scores   HEART Risk Score      Flowsheet Row Most Recent Value   Heart Score Risk Calculator    History 0 Filed at: 10/02/2024 0715   ECG 1 Filed at: 10/02/2024 0715   Age 2 Filed at: 10/02/2024 0715   Risk Factors 1 Filed at: 10/02/2024 0715   Troponin 0 Filed at: 10/02/2024 0715   HEART Score 4 Filed at: 10/02/2024 0715                               SBIRT 20yo+      Flowsheet Row Most Recent Value   Initial Alcohol Screen: US AUDIT-C     1. How often do you have a drink containing alcohol? 0 Filed at: 10/02/2024 0521   2. How many drinks containing alcohol do you have on a typical day you are drinking?  0 Filed at: 10/02/2024 0521   3a. Male UNDER 65: How often do you have five or more drinks on one  occasion? 0 Filed at: 10/02/2024 0521   3b. FEMALE Any Age, or MALE 65+: How often do you have 4 or more drinks on one occassion? 0 Filed at: 10/02/2024 0521   Audit-C Score 0 Filed at: 10/02/2024 0521   GERALDO: How many times in the past year have you...    Used an illegal drug or used a prescription medication for non-medical reasons? Never Filed at: 10/02/2024 0521            Wells' Criteria for PE      Flowsheet Row Most Recent Value   Wells' Criteria for PE    Clinical signs and symptoms of DVT 0 Filed at: 10/02/2024 0703   PE is primary diagnosis or equally likely 0 Filed at: 10/02/2024 0703   HR >100 1.5 Filed at: 10/02/2024 0703   Immobilization at least 3 days or Surgery in the previous 4 weeks 1.5 Filed at: 10/02/2024 0703   Previous, objectively diagnosed PE or DVT 1.5 Filed at: 10/02/2024 0703   Hemoptysis 0 Filed at: 10/02/2024 0703   Malignancy with treatment within 6 months or palliative 0 Filed at: 10/02/2024 0703   Wells' Criteria Total 4.5 Filed at: 10/02/2024 0703                        History of Present Illness       Chief Complaint   Patient presents with    Vomiting     Pt got a gastric adenoma removed yesterday, is now c/o vomiting, chills, abdominal pain, cramps       Past Medical History:   Diagnosis Date    BPH without obstruction/lower urinary tract symptoms     CAD (coronary artery disease)     s/p JOHNATHAN distal RCA and JOHNATHAN RPAV 8/11/2023    Diabetes mellitus (HCC)     Dysuria     GERD (gastroesophageal reflux disease)     Gout     Heart attack (HCC) 2023    History of melanoma     left ear - surgically removed    History of pulmonary embolism 2017    bilateral    Hyperlipidemia     Hypertension     Melanoma (HCC) 2017    left ear    Prostate cancer (HCC)     Sleep apnea       Past Surgical History:   Procedure Laterality Date    CARDIAC CATHETERIZATION Left 8/11/2023    Procedure: Cardiac Left Heart Cath;  Surgeon: Rubi Ivory DO;  Location: [facility];  Service: Cardiology     CARDIAC CATHETERIZATION N/A 8/11/2023    Procedure: Cardiac Coronary Angiogram;  Surgeon: Rubi Ivory DO;  Location: [facility];  Service: Cardiology    CARDIAC CATHETERIZATION N/A 8/11/2023    Procedure: Cardiac pci;  Surgeon: Rubi Ivory DO;  Location: [facility];  Service: Cardiology    CARDIAC CATHETERIZATION N/A 8/11/2023    Procedure: Cardiac other - IVUS;  Surgeon: Rubi Ivory DO;  Location: [facility];  Service: Cardiology    CHOLECYSTECTOMY      COLONOSCOPY  10/2016    sessile serrated polyp removed from the proximal transverse, adenoma removed from the distal transverse    EGD  08/2017    gastritis, H. pylori negative, and Candida esophagitis    EGD  11/2011    slight Schatzki's ring, small hiatal hernia    EXTERNAL EAR SURGERY Left 08/2017    for the removal of skin melanoma-Plastic surgery      Family History   Problem Relation Age of Onset    Hyperlipidemia Mother     Stroke Father     Melanoma Father     Diabetes Sister     Hypertension Sister     Diabetes Brother     JETT disease Brother       Social History     Tobacco Use    Smoking status: Never     Passive exposure: Never    Smokeless tobacco: Never   Vaping Use    Vaping status: Never Used   Substance Use Topics    Alcohol use: Yes     Comment: Drinks socailly.     Drug use: No      E-Cigarette/Vaping    E-Cigarette Use Never User       E-Cigarette/Vaping Substances    Nicotine No     THC No     CBD No     Flavoring No     Other No     Unknown No       I have reviewed and agree with the history as documented.     This is a 69-year-old male who has a known history of type 2 diabetes, hypertension, pulmonary embolus, GERD, CAD, and hyperlipidemia who presents today with upper abdominal pain, nausea, vomiting, and chills since an EGD that was performed yesterday.  I reviewed the EGD notes and see the patient underwent a cold snare removal of a sessile polyp with 3 clips for hemostasis inserted into  the prepylorus area of the stomach.  Patient states that he was feeling well after the procedure, and even had a coffee at the hospital.  He went home and ate lunch without much incident.  However in the evening, around dinnertime the patient began to feel very nauseous.  He then has had multiple episodes of emesis.  He believes that his upper abdomen is becoming more distended, and he is feeling significantly more pain in this area as well.  The patient states that sometime this morning he began to experience chills.  He has been taking his temperature at home without any fever.  He otherwise is denying any cough, congestion, change in stool, or bowel habits, or change in urine.  He is denying any chest pain or shortness of breath.  He is denying any leg swelling.        Review of Systems   Constitutional:  Positive for chills and fatigue. Negative for fever.   HENT:  Negative for congestion and sore throat.    Eyes:  Negative for pain and visual disturbance.   Respiratory:  Negative for cough, chest tightness and shortness of breath.    Cardiovascular:  Negative for chest pain and palpitations.   Gastrointestinal:  Positive for abdominal distention, abdominal pain, nausea and vomiting. Negative for blood in stool, constipation and diarrhea.   Genitourinary:  Negative for dysuria, flank pain and hematuria.   Musculoskeletal:  Negative for arthralgias, back pain and neck pain.   Skin:  Negative for color change and rash.   Neurological:  Positive for weakness. Negative for dizziness, syncope and light-headedness.   Hematological:  Negative for adenopathy. Does not bruise/bleed easily.   All other systems reviewed and are negative.          Objective       ED Triage Vitals [10/02/24 0523]   Temperature Pulse Blood Pressure Respirations SpO2 Patient Position - Orthostatic VS   98 °F (36.7 °C) 94 (!) 171/82 20 95 % Lying      Temp Source Heart Rate Source BP Location FiO2 (%) Pain Score    Oral Monitor Left arm -- No  Pain      Vitals      Date and Time Temp Pulse SpO2 Resp BP Pain Score FACES Pain Rating User   10/02/24 1000 -- 104 93 % 22 129/85 -- -- SK   10/02/24 0900 -- 98 91 % 20 142/81 -- -- SK   10/02/24 0800 -- 100 90 % 22 146/81 -- -- SK   10/02/24 0710 -- -- -- -- -- 3 -- SK   10/02/24 0700 -- 103 91 % 18 151/78 -- -- SK   10/02/24 0640 -- -- -- -- -- 3 -- BMD   10/02/24 0523 98 °F (36.7 °C) 94 95 % 20 171/82 No Pain no pain at this time. comes and goes -- BMD            Physical Exam  Vitals and nursing note reviewed.   Constitutional:       General: He is not in acute distress.     Appearance: He is well-developed. He is obese. He is not toxic-appearing or diaphoretic.   HENT:      Head: Normocephalic and atraumatic.      Right Ear: External ear normal.      Left Ear: External ear normal.      Nose: Nose normal. No congestion or rhinorrhea.      Mouth/Throat:      Mouth: Mucous membranes are moist.      Pharynx: No oropharyngeal exudate or posterior oropharyngeal erythema.   Eyes:      General: No scleral icterus.     Extraocular Movements: Extraocular movements intact.      Conjunctiva/sclera: Conjunctivae normal.      Pupils: Pupils are equal, round, and reactive to light.   Cardiovascular:      Rate and Rhythm: Normal rate and regular rhythm.      Pulses: Normal pulses.      Heart sounds: No murmur heard.  Pulmonary:      Effort: Pulmonary effort is normal. No respiratory distress.      Breath sounds: Normal breath sounds. No wheezing or rales.   Abdominal:      General: There is distension.      Palpations: Abdomen is soft. There is no mass.      Tenderness: There is abdominal tenderness. There is no right CVA tenderness, left CVA tenderness or guarding.      Hernia: No hernia is present.   Musculoskeletal:         General: No swelling. Normal range of motion.      Cervical back: Normal range of motion and neck supple.      Right lower leg: No edema.      Left lower leg: No edema.   Skin:     General: Skin is  warm and dry.      Capillary Refill: Capillary refill takes less than 2 seconds.   Neurological:      General: No focal deficit present.      Mental Status: He is alert and oriented to person, place, and time.   Psychiatric:         Mood and Affect: Mood normal.         Behavior: Behavior normal.         Results Reviewed       Procedure Component Value Units Date/Time    HS Troponin I 2hr [[id number]]  (Normal) Collected: 10/02/24 0920    Lab Status: Final result Specimen: Blood from Arm, Right Updated: 10/02/24 0954     hs TnI 2hr 8 ng/L      Delta 2hr hsTnI 1 ng/L     HS Troponin 0hr (reflex protocol) [[id number]]  (Normal) Collected: 10/02/24 0637    Lab Status: Final result Specimen: Blood from Arm, Right Updated: 10/02/24 0711     hs TnI 0hr 7 ng/L     D-dimer, quantitative [[id number]]  (Normal) Collected: 10/02/24 0637    Lab Status: Final result Specimen: Blood from Arm, Right Updated: 10/02/24 0656     D-Dimer, Quant 0.37 ug/ml FEU     Narrative:      In the evaluation for possible pulmonary embolism, in the appropriate (Well's Score of 4 or less) patient, the age adjusted d-dimer cutoff for this patient can be calculated as:    Age x 0.01 (in ug/mL) for Age-adjusted D-dimer exclusion threshold for a patient over 50 years.    Comprehensive metabolic panel [[id number]]  (Abnormal) Collected: 10/02/24 0531    Lab Status: Final result Specimen: Blood from Arm, Right Updated: 10/02/24 0557     Sodium 137 mmol/L      Potassium 3.8 mmol/L      Chloride 101 mmol/L      CO2 28 mmol/L      ANION GAP 8 mmol/L      BUN 12 mg/dL      Creatinine 0.79 mg/dL      Glucose 198 mg/dL      Calcium 9.3 mg/dL      AST 19 U/L      ALT 18 U/L      Alkaline Phosphatase 39 U/L      Total Protein 6.8 g/dL      Albumin 4.1 g/dL      Total Bilirubin 0.44 mg/dL      eGFR 92 ml/min/1.73sq m     Narrative:      [employer] Kid[employer] guidelines for Chronic Kidney Disease (CKD):     Stage 1 with normal or high GFR (GFR > 90  mL/min/1.73 square meters)    Stage 2 Mild CKD (GFR = 60-89 mL/min/1.73 square meters)    Stage 3A Moderate CKD (GFR = 45-59 mL/min/1.73 square meters)    Stage 3B Moderate CKD (GFR = 30-44 mL/min/1.73 square meters)    Stage 4 Severe CKD (GFR = 15-29 mL/min/1.73 square meters)    Stage 5 End Stage CKD (GFR <15 mL/min/1.73 square meters)  Note: GFR calculation is accurate only with a steady state creatinine    Lipase [[id number]]  (Normal) Collected: 10/02/24 0531    Lab Status: Final result Specimen: Blood from Arm, Right Updated: 10/02/24 0557     Lipase 21 u/L     CBC and differential [[id number]]  (Abnormal) Collected: 10/02/24 0531    Lab Status: Final result Specimen: Blood from Arm, Right Updated: 10/02/24 0542     WBC 7.38 Thousand/uL      RBC 4.64 Million/uL      Hemoglobin 12.1 g/dL      Hematocrit 39.0 %      MCV 84 fL      MCH 26.1 pg      MCHC 31.0 g/dL      RDW 16.1 %      MPV 9.1 fL      Platelets 151 Thousands/uL      nRBC 0 /100 WBCs      Segmented % 76 %      Immature Grans % 0 %      Lymphocytes % 15 %      Monocytes % 7 %      Eosinophils Relative 2 %      Basophils Relative 0 %      Absolute Neutrophils 5.55 Thousands/µL      Absolute Immature Grans 0.01 Thousand/uL      Absolute Lymphocytes 1.13 Thousands/µL      Absolute Monocytes 0.53 Thousand/µL      Eosinophils Absolute 0.14 Thousand/µL      Basophils Absolute 0.02 Thousands/µL     Fingerstick Glucose (POCT) [[id number]]  (Abnormal) Collected: 10/02/24 0530    Lab Status: Final result Specimen: Blood Updated: 10/02/24 0532     POC Glucose 214 mg/dl             CT abdomen pelvis with contrast   Final Interpretation by Daniel Collins DO (10/02 0714)      No CT evidence of perforated viscus.      Reticulonodular opacities in the right lower lobe. Scattered groundglass and alveolar opacities in the inferior left lingula, suspicious for infectious or inflammatory pneumonitis.      Enlarged prostate.  Mild circumferential bladder wall  thickening, consider chronic bladder outlet obstruction and/or cystitis. Correlation with the patient's symptoms, laboratory values, and urinalysis recommended.      Renal cysts, small hiatal hernia, and other findings as above.         Workstation performed: [id number]             Procedures    ED Medication and Procedure Management   Prior to Admission Medications   Prescriptions Last Dose Informant Patient Reported? Taking?   BD Pen Needle Kiarra U/F 32G X 4 MM MISC  Spouse/Significant Other No No   Sig: Inject as directed 4 (four) times a day   Blood Glucose Monitoring Suppl ([facility]ocate Blood Glucose Monitor) KEYANNA  Spouse/Significant Other, Self Yes No   Sig: Use   Continuous Blood Gluc Sensor (FreeStyle Jared 2 Sensor) MISC  Self, Spouse/Significant Other Yes No   Sig: Use   Empagliflozin (Jardiance) 25 MG TABS Past Week Spouse/Significant Other, Self No Yes   Sig: Take 1 tablet (25 mg total) by mouth in the morning   HumaLOG KwikPen 100 units/mL injection pen  Self, Spouse/Significant Other No No   Sig: E11.65 Inject 5 units with breakfast and lunch, 10 units with dinner, with scale as needed TDD up to 50 units daily.   Insulin Glargine Solostar (Lantus SoloStar) 100 UNIT/ML SOPN  Self, Spouse/Significant Other No No   Sig: Inject 0.32 mL (32 Units total) under the skin daily Or as directed TDD up to 40 units daily   OneTouch Delica Lancets 30G MISC  Spouse/Significant Other, Self No No   Sig: Two daily to check blood sugar   VITAMIN D PO 10/1/2024 Spouse/Significant Other, Self Yes Yes   Sig: Take by mouth   apixaban (ELIQUIS) 5 mg Past Week Self, Spouse/Significant Other No Yes   Sig: Take 1 tablet by mouth 2 (two) times a day for 30 days 2 tabs PO BID x 7 days, then 1 tab PO BID   Patient taking differently: Take 2.5 mg by mouth 2 (two) times a day 2 tabs PO BID x 7 days, then 1 tab PO BID   ascorbic acid (VITAMIN C) 250 mg tablet 10/1/2024 Self, Spouse/Significant Other Yes Yes   Sig: Take 500 mg by mouth  daily   clopidogrel (PLAVIX) 75 mg tablet Past Week Spouse/Significant Other, Self No Yes   Sig: Take 1 tablet (75 mg total) by mouth daily   ezetimibe (ZETIA) 10 mg tablet 10/1/2024  No Yes   Sig: TAKE 1 TABLET DAILY AT BEDTIME   fluticasone (FLONASE) 50 mcg/act nasal spray 10/1/2024 Self, Spouse/Significant Other Yes Yes   Si sprays into each nostril   loperamide (IMODIUM A-D) 2 MG tablet 10/1/2024 Self, Spouse/Significant Other Yes Yes   Sig: Take 2 mg by mouth 4 (four) times a day as needed for diarrhea   losartan (COZAAR) 50 mg tablet 10/1/2024 Self, Spouse/Significant Other Yes Yes   Sig: Take 50 mg by mouth   lovastatin (MEVACOR) 40 MG tablet 10/1/2024 Spouse/Significant Other, Self No Yes   Sig: Take 1 tablet (40 mg total) by mouth daily at bedtime   metFORMIN (GLUCOPHAGE) 1000 MG tablet 10/1/2024 Spouse/Significant Other, Self No Yes   Sig: Take 1 tablet (1,000 mg total) by mouth 2 (two) times a day with meals   metoprolol tartrate (LOPRESSOR) 25 mg tablet 10/1/2024  No Yes   Sig: TAKE 1 TABLET EVERY 12 HOURS   olopatadine (PATANOL) 0.1 % ophthalmic solution  Self, Spouse/Significant Other Yes No   Si drop 2 (two) times a day   omeprazole (PriLOSEC) 40 MG capsule 10/1/2024  No Yes   Sig: TAKE 1 CAPSULE DAILY   triamcinolone (KENALOG) 0.1 % cream   Yes No      Facility-Administered Medications: None     Discharge Medication List as of 10/2/2024  9:57 AM        START taking these medications    Details   ondansetron (ZOFRAN) 4 mg tablet Take 1 tablet (4 mg total) by mouth every 8 (eight) hours as needed for vomiting or nausea, Starting Wed 10/2/2024, Normal           CONTINUE these medications which have NOT CHANGED    Details   apixaban (ELIQUIS) 5 mg Take 1 tablet by mouth 2 (two) times a day for 30 days 2 tabs PO BID x 7 days, then 1 tab PO BID, Starting Fri 3/17/2017, Normal      ascorbic acid (VITAMIN C) 250 mg tablet Take 500 mg by mouth daily, Historical Med      clopidogrel (PLAVIX) 75 mg  tablet Take 1 tablet (75 mg total) by mouth daily, Starting Mon 8/21/2023, Normal      Empagliflozin (Jardiance) 25 MG TABS Take 1 tablet (25 mg total) by mouth in the morning, Starting Thu 10/12/2023, Normal      ezetimibe (ZETIA) 10 mg tablet TAKE 1 TABLET DAILY AT BEDTIME, Starting Thu 8/15/2024, Normal      fluticasone (FLONASE) 50 mcg/act nasal spray 2 sprays into each nostril, Starting Mon 11/26/2018, Historical Med      loperamide (IMODIUM A-D) 2 MG tablet Take 2 mg by mouth 4 (four) times a day as needed for diarrhea, Historical Med      losartan (COZAAR) 50 mg tablet Take 50 mg by mouth, Historical Med      lovastatin (MEVACOR) 40 MG tablet Take 1 tablet (40 mg total) by mouth daily at bedtime, Starting Mon 10/23/2023, Normal      metFORMIN (GLUCOPHAGE) 1000 MG tablet Take 1 tablet (1,000 mg total) by mouth 2 (two) times a day with meals, Starting Tue 11/14/2023, Normal      metoprolol tartrate (LOPRESSOR) 25 mg tablet TAKE 1 TABLET EVERY 12 HOURS, Starting Thu 8/15/2024, Normal      omeprazole (PriLOSEC) 40 MG capsule TAKE 1 CAPSULE DAILY, Starting Thu 8/15/2024, Normal      VITAMIN D PO Take by mouth, Historical Med      BD Pen Needle Kiarra U/F 32G X 4 MM MISC Inject as directed 4 (four) times a day, Starting Tue 11/28/2023, Until Mon 2/26/2024, Normal      Blood Glucose Monitoring Suppl ([facility] Blood Glucose Monitor) KEYANNA Use, Historical Med      Continuous Blood Gluc Sensor (FreeStyle Jared 2 Sensor) MISC Use, Historical Med      HumaLOG KwikPen 100 units/mL injection pen E11.65 Inject 5 units with breakfast and lunch, 10 units with dinner, with scale as needed TDD up to 50 units daily., Fill Later      Insulin Glargine Solostar (Lantus SoloStar) 100 UNIT/ML SOPN Inject 0.32 mL (32 Units total) under the skin daily Or as directed TDD up to 40 units daily, Starting Tue 4/30/2024, Until Tue 10/1/2024, Normal      olopatadine (PATANOL) 0.1 % ophthalmic solution 1 drop 2 (two) times a day, Historical Med       OneTouch Delica Lancets 30G MISC Two daily to check blood sugar, Normal      triamcinolone (KENALOG) 0.1 % cream Historical Med           No discharge procedures on file.  ED SEPSIS DOCUMENTATION   Time reflects when diagnosis was documented in both MDM as applicable and the Disposition within this note       Time User Action Codes Description Comment    10/2/2024  9:56 AM Deandre Baker Add [R11.2] Nausea and vomiting                  Chris Nagel MD  10/03/24 0027

## 2024-10-02 NOTE — ED CARE HANDOFF
I received signout on this patient pending CT scan and delta troponin.  CT scan without acute pathology.  Went to reevaluate the patient.  He states that he still has some generalized abdominal pain that he attributes to gas.  Denies any ongoing chest pain or shortness of breath.  His D-dimer was negative.  Troponin delta troponin within normal limits.  Reviewed this with patient and wife bedside.  Patient anxious to be discharged at this time.  I believe this reasonable.  Return precautions reviewed.     Deandre Baker MD  10/02/24 1005

## 2024-10-04 LAB
ATRIAL RATE: 94 BPM
P AXIS: 59 DEGREES
PR INTERVAL: 166 MS
QRS AXIS: 16 DEGREES
QRSD INTERVAL: 92 MS
QT INTERVAL: 358 MS
QTC INTERVAL: 447 MS
T WAVE AXIS: 46 DEGREES
VENTRICULAR RATE: 94 BPM

## 2024-10-04 PROCEDURE — 93010 ELECTROCARDIOGRAM REPORT: CPT | Performed by: INTERNAL MEDICINE

## 2024-10-07 ENCOUNTER — TELEPHONE (OUTPATIENT)
Dept: GASTROENTEROLOGY | Facility: CLINIC | Age: 69
End: 2024-10-07

## 2024-10-07 NOTE — TELEPHONE ENCOUNTER
----- Message from Cordell Oreilly MD sent at 10/7/2024 11:39 AM EDT -----  Regarding: follow up EGD  Please schedule him for recall EGD in 1 year.     Thank you     Cordell

## 2024-10-15 ENCOUNTER — OFFICE VISIT (OUTPATIENT)
Dept: CARDIOLOGY CLINIC | Facility: HOSPITAL | Age: 69
End: 2024-10-15
Payer: MEDICARE

## 2024-10-15 VITALS
HEIGHT: 70 IN | BODY MASS INDEX: 28.66 KG/M2 | WEIGHT: 200.2 LBS | HEART RATE: 61 BPM | SYSTOLIC BLOOD PRESSURE: 124 MMHG | OXYGEN SATURATION: 96 % | DIASTOLIC BLOOD PRESSURE: 82 MMHG

## 2024-10-15 DIAGNOSIS — I25.118 ATHEROSCLEROTIC HEART DISEASE OF NATIVE CORONARY ARTERY WITH OTHER FORMS OF ANGINA PECTORIS (HCC): Primary | ICD-10-CM

## 2024-10-15 DIAGNOSIS — I10 PRIMARY HYPERTENSION: ICD-10-CM

## 2024-10-15 DIAGNOSIS — I26.99 PULMONARY EMBOLISM, BILATERAL (HCC): ICD-10-CM

## 2024-10-15 DIAGNOSIS — I10 BENIGN ESSENTIAL HYPERTENSION: ICD-10-CM

## 2024-10-15 DIAGNOSIS — Z95.5 PRESENCE OF DRUG-ELUTING STENT IN RIGHT CORONARY ARTERY: ICD-10-CM

## 2024-10-15 DIAGNOSIS — G47.33 OSA (OBSTRUCTIVE SLEEP APNEA): ICD-10-CM

## 2024-10-15 DIAGNOSIS — I25.10 CORONARY ARTERY DISEASE INVOLVING NATIVE CORONARY ARTERY OF NATIVE HEART WITHOUT ANGINA PECTORIS: ICD-10-CM

## 2024-10-15 DIAGNOSIS — E78.2 MIXED HYPERLIPIDEMIA: ICD-10-CM

## 2024-10-15 PROCEDURE — 99214 OFFICE O/P EST MOD 30 MIN: CPT | Performed by: INTERNAL MEDICINE

## 2024-10-15 RX ORDER — ASPIRIN 81 MG/1
81 TABLET, CHEWABLE ORAL DAILY
Start: 2024-10-15

## 2024-10-15 NOTE — PROGRESS NOTES
Cardiology Follow Up    Fernie March  1955  [id number]  [facility] [address] [id number] [phone]    1. Atherosclerotic heart disease of native coronary artery with other forms of angina pectoris (HCC)  aspirin 81 mg chewable tablet      2. Benign essential hypertension        3. Coronary artery disease involving native coronary artery of native heart without angina pectoris        4. Primary hypertension        5. Pulmonary embolism, bilateral ([facility])        6. LELO (obstructive sleep apnea)        7. Presence of drug-eluting stent in right coronary artery        8. Mixed hyperlipidemia            Discussion/Summary: Overall he is feeling well coronary disease stable no complaints of angina.  He can switch from Plavix to aspirin 81 daily.  Continue Eliquis for pulmonary embolism management per primary.  Blood pressure is well-controlled lipids have been doing well he is due for a check this December.  Carotids have mild plaque on the left follow-up at 2-year interval.  Ascending aortic aneurysm 4.4 cm I advised him of a lifting restriction of 50 pounds follow-up in a year.      Interval History: Very pleasant 69-year-old gentleman who had a myocardial infarction in August 2023 PCI of the RCA mild to moderate disease in the left distribution, hypertension, hyperlipidemia, type 2 diabetes, pulmonary embolism presents to establish with me in the office.  Functional capacity is good.  Denies any angina.      Overall he is doing well denies any chest pain, shortness of breath, palpitations, lightheadedness, dizziness, or syncope.  There has been no lower extremity edema, PND, orthopnea.  He has been taking all medications as prescribed.  No bleeding.        Medical Problems       Problem List       Type 2 diabetes mellitus with hyperglycemia, with long-term current use of insulin ([facility])       Lab Results   Component Value Date    HGBA1C 7.8 (A) 09/05/2024         Vomiting    Leukocytosis    Mixed hyperlipidemia    Overview Signed 11/11/2020  8:12 AM by Caitlin Rdz PA-C     Last Assessment & Plan:   Controlled-no recommended changes  TG has improved         Hypertension    Overview Signed 11/11/2020  8:12 AM by Caitlin Rdz PA-C     Last Assessment & Plan:   Doing well.  Continue current regimen. Following with PCP.          Hx pulmonary embolism    GERD (gastroesophageal reflux disease)    Benign prostatic hyperplasia without lower urinary tract symptoms    LELO (obstructive sleep apnea)    Benign essential hypertension    Overview Signed 7/25/2023 10:27 AM by Jose Henry IV, MD     Last Assessment & Plan:   Formatting of this note might be different from the original.  Doing well.  Continue current regimen.         Fatigue    Myalgia    Pulmonary embolism, bilateral (HCC)    Overview Signed 7/25/2023 10:27 AM by Jose Henry IV, MD     Last Assessment & Plan:   Formatting of this note might be different from the original.  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  (HCC)      Lab Results   Component Value Date    HGBA1C 7.8 (A) 09/05/2024         Atherosclerotic heart disease of native coronary artery with other forms of angina pectoris (HCC)        Past Medical History:   Diagnosis Date    BPH without obstruction/lower urinary tract symptoms     CAD (coronary artery disease)     s/p JOHNATHAN distal RCA and JOHNATHAN RPAV 8/11/2023     Diabetes mellitus (HCC)     Dysuria     GERD (gastroesophageal reflux disease)     Gout     Heart attack (HCC) 2023    History of melanoma     left ear - surgically removed    History of pulmonary embolism 2017    bilateral    Hyperlipidemia     Hypertension     Melanoma (HCC) 2017    left ear    Prostate cancer (HCC)     Sleep apnea      Social History     Socioeconomic History    Marital status: /Civil Union     Spouse name: Not on file    Number of children: Not on file    Years of education: Not on file    Highest education level: Not on file   Occupational History    Occupation: Retired - [employer]   Tobacco Use    Smoking status: Never     Passive exposure: Never    Smokeless tobacco: Never   Vaping Use    Vaping status: Never Used   Substance and Sexual Activity    Alcohol use: Yes     Comment: Drinks socailly.     Drug use: No    Sexual activity: Not Currently   Other Topics Concern    Not on file   Social History Narrative    Not on file     Social Determinants of Health     Financial Resource Strain: Not on file   Food Insecurity: Not on file   Transportation Needs: Not on file   Physical Activity: Not on file   Stress: Not on file   Social Connections: Not on file   Intimate Partner Violence: Not on file   Housing Stability: Not on file      Family History   Problem Relation Age of Onset    Hyperlipidemia Mother     Stroke Father     Melanoma Father     Diabetes Sister     Hypertension Sister     Diabetes Brother     JETT disease Brother      Past Surgical History:   Procedure Laterality Date    CARDIAC CATHETERIZATION Left 8/11/2023    Procedure: Cardiac Left Heart Cath;  Surgeon: Rubi Ivory DO;  Location: [facility];  Service: Cardiology    CARDIAC CATHETERIZATION N/A 8/11/2023    Procedure: Cardiac Coronary Angiogram;  Surgeon: Rubi Ivory DO;  Location: [facility];  Service: Cardiology    CARDIAC CATHETERIZATION N/A 8/11/2023    Procedure: Cardiac pci;   Surgeon: Rubi Ivory DO;  Location: [facility];  Service: Cardiology    CARDIAC CATHETERIZATION N/A 8/11/2023    Procedure: Cardiac other - IVUS;  Surgeon: Rubi Ivory DO;  Location: [facility];  Service: Cardiology    CHOLECYSTECTOMY      COLONOSCOPY  10/2016    sessile serrated polyp removed from the proximal transverse, adenoma removed from the distal transverse    EGD  08/2017    gastritis, H. pylori negative, and Candida esophagitis    EGD  11/2011    slight Schatzki's ring, small hiatal hernia    EXTERNAL EAR SURGERY Left 08/2017    for the removal of skin melanoma-Plastic surgery       Current Outpatient Medications:     apixaban (ELIQUIS) 5 mg, Take 1 tablet by mouth 2 (two) times a day for 30 days 2 tabs PO BID x 7 days, then 1 tab PO BID (Patient taking differently: Take 2.5 mg by mouth 2 (two) times a day 2 tabs PO BID x 7 days, then 1 tab PO BID), Disp: 60 tablet, Rfl: 0    ascorbic acid (VITAMIN C) 250 mg tablet, Take 500 mg by mouth daily, Disp: , Rfl:     aspirin 81 mg chewable tablet, Chew 1 tablet (81 mg total) daily, Disp: , Rfl:     BD Pen Needle Kiarra U/F 32G X 4 MM MISC, Inject as directed 4 (four) times a day, Disp: 360 each, Rfl: 1    Blood Glucose Monitoring Suppl (Advocate Blood Glucose Monitor) KEYANNA, Use, Disp: , Rfl:     Continuous Blood Gluc Sensor (FreeStyle Jared 2 Sensor) MISC, Use, Disp: , Rfl:     Empagliflozin (Jardiance) 25 MG TABS, Take 1 tablet (25 mg total) by mouth in the morning, Disp: 90 tablet, Rfl: 3    ezetimibe (ZETIA) 10 mg tablet, TAKE 1 TABLET DAILY AT BEDTIME, Disp: 100 tablet, Rfl: 1    fluticasone (FLONASE) 50 mcg/act nasal spray, 2 sprays into each nostril, Disp: , Rfl:     HumaLOG KwikPen 100 units/mL injection pen, E11.65 Inject 5 units with breakfast and lunch, 10 units with dinner, with scale as needed TDD up to 50 units daily., Disp: 45 mL, Rfl: 1    Insulin Glargine Solostar (Lantus SoloStar) 100 UNIT/ML SOPN, Inject 0.32 mL (32  Units total) under the skin daily Or as directed TDD up to 40 units daily, Disp: 30 mL, Rfl: 0    loperamide (IMODIUM A-D) 2 MG tablet, Take 2 mg by mouth 4 (four) times a day as needed for diarrhea, Disp: , Rfl:     losartan (COZAAR) 50 mg tablet, Take 50 mg by mouth, Disp: , Rfl:     lovastatin (MEVACOR) 40 MG tablet, Take 1 tablet (40 mg total) by mouth daily at bedtime, Disp: 90 tablet, Rfl: 3    metFORMIN (GLUCOPHAGE) 1000 MG tablet, Take 1 tablet (1,000 mg total) by mouth 2 (two) times a day with meals, Disp: 180 tablet, Rfl: 3    metoprolol tartrate (LOPRESSOR) 25 mg tablet, TAKE 1 TABLET EVERY 12 HOURS, Disp: 200 tablet, Rfl: 1    olopatadine (PATANOL) 0.1 % ophthalmic solution, 1 drop 2 (two) times a day, Disp: , Rfl:     omeprazole (PriLOSEC) 40 MG capsule, TAKE 1 CAPSULE DAILY, Disp: 90 capsule, Rfl: 3    OneTouch Delica Lancets 30G MISC, Two daily to check blood sugar, Disp: 200 each, Rfl: 1    triamcinolone (KENALOG) 0.1 % cream, , Disp: , Rfl:     VITAMIN D PO, Take by mouth, Disp: , Rfl:     ondansetron (ZOFRAN) 4 mg tablet, Take 1 tablet (4 mg total) by mouth every 8 (eight) hours as needed for vomiting or nausea (Patient not taking: Reported on 10/15/2024), Disp: 12 tablet, Rfl: 0  Allergies   Allergen Reactions    Crestor [Rosuvastatin] Rash and Hives     Other reaction(s): Urticaria    Enalapril Cough    Lipitor [Atorvastatin] Rash     pain       Labs:     Chemistry        Component Value Date/Time     12/31/2015 1226    K 3.8 10/02/2024 0531    K 4.1 12/31/2015 1226     10/02/2024 0531     12/31/2015 1226    CO2 28 10/02/2024 0531    CO2 30.5 12/31/2015 1226    BUN 12 10/02/2024 0531    BUN 16 12/31/2015 1226    CREATININE 0.79 10/02/2024 0531    CREATININE 0.77 12/31/2015 1226        Component Value Date/Time    CALCIUM 9.3 10/02/2024 0531    CALCIUM 9.1 12/31/2015 1226    ALKPHOS 39 10/02/2024 0531    ALKPHOS 51 12/31/2015 1226    AST 19 10/02/2024 0531    AST 29 12/31/2015  "1226    ALT 18 10/02/2024 0531    ALT 47 12/31/2015 1226    BILITOT 0.28 12/31/2015 1226            Lab Results   Component Value Date    CHOL 147 12/31/2015    CHOL 153 05/23/2015    CHOL 164 12/13/2014     Lab Results   Component Value Date    HDL 37 (L) 09/16/2023    HDL 37 (L) 04/24/2023    HDL 37 (L) 04/02/2022     Lab Results   Component Value Date    LDLCALC 58 09/16/2023    LDLCALC 74 04/24/2023    LDLCALC 94 04/02/2022     Lab Results   Component Value Date    TRIG 180 (H) 09/16/2023    TRIG 257 (H) 04/24/2023    TRIG 125 04/02/2022     No results found for: \"CHOLHDL\"    Imaging: No results found.    ECG:        ROS    Vitals:    10/15/24 1212   BP: 124/82   Pulse: 61   SpO2: 96%     Vitals:    10/15/24 1212   Weight: 90.8 kg (200 lb 3.2 oz)     Height: 5' 10\" (177.8 cm)   Body mass index is 28.73 kg/m².    Physical Exam:  Vital signs reviewed  General:  Alert and cooperative, appears stated age, no acute distress  HEENT:  PERRLA, EOMI, no scleral icterus, no conjunctival pallor  Neck:  No lymphadenopathy, no thyromegaly, no carotid bruits, no elevated JVP  Heart:  Regular rate and rhythm, normal S1/S2, no S3/S4, no murmur, rubs or gallops.  PMI nondisplaced  Lungs:  Clear to auscultation bilaterally, no wheezes rales or rhonchi  Abdomen:  Soft, non-tender, positive bowel sounds, no rebound or guarding,   no organomegaly   Extremities:  Normal range of motion.  No clubbing, cyanosis or edema   Vascular:  2+ pedal pulses  Skin:  No rashes or lesions on exposed skin  Neurologic:  Cranial nerves II-XII grossly intact without focal deficits  Psych:  Normal mood and affect      "

## 2024-10-27 DIAGNOSIS — E78.2 MIXED HYPERLIPIDEMIA: ICD-10-CM

## 2024-10-29 RX ORDER — LOVASTATIN 40 MG/1
40 TABLET ORAL
Qty: 90 TABLET | Refills: 0 | Status: SHIPPED | OUTPATIENT
Start: 2024-10-29

## 2024-11-01 ENCOUNTER — TELEPHONE (OUTPATIENT)
Dept: ENDOCRINOLOGY | Facility: CLINIC | Age: 69
End: 2024-11-01

## 2024-11-01 DIAGNOSIS — Z79.4 TYPE 2 DIABETES MELLITUS WITH HYPERGLYCEMIA, WITH LONG-TERM CURRENT USE OF INSULIN (HCC): ICD-10-CM

## 2024-11-01 DIAGNOSIS — E11.65 TYPE 2 DIABETES MELLITUS WITH HYPERGLYCEMIA, WITH LONG-TERM CURRENT USE OF INSULIN (HCC): ICD-10-CM

## 2024-12-05 ENCOUNTER — RESULTS FOLLOW-UP (OUTPATIENT)
Dept: ENDOCRINOLOGY | Facility: CLINIC | Age: 69
End: 2024-12-05

## 2024-12-05 ENCOUNTER — APPOINTMENT (OUTPATIENT)
Dept: LAB | Facility: HOSPITAL | Age: 69
End: 2024-12-05
Payer: MEDICARE

## 2024-12-05 DIAGNOSIS — Z79.4 TYPE 2 DIABETES MELLITUS WITH HYPERGLYCEMIA, WITH LONG-TERM CURRENT USE OF INSULIN (HCC): ICD-10-CM

## 2024-12-05 DIAGNOSIS — E11.65 TYPE 2 DIABETES MELLITUS WITH HYPERGLYCEMIA, WITH LONG-TERM CURRENT USE OF INSULIN (HCC): ICD-10-CM

## 2024-12-05 LAB
ALBUMIN SERPL BCG-MCNC: 4.3 G/DL (ref 3.5–5)
ALP SERPL-CCNC: 35 U/L (ref 34–104)
ALT SERPL W P-5'-P-CCNC: 16 U/L (ref 7–52)
ANION GAP SERPL CALCULATED.3IONS-SCNC: 8 MMOL/L (ref 4–13)
AST SERPL W P-5'-P-CCNC: 18 U/L (ref 13–39)
BILIRUB SERPL-MCNC: 0.49 MG/DL (ref 0.2–1)
BUN SERPL-MCNC: 17 MG/DL (ref 5–25)
CALCIUM SERPL-MCNC: 8.9 MG/DL (ref 8.4–10.2)
CHLORIDE SERPL-SCNC: 104 MMOL/L (ref 96–108)
CHOLEST SERPL-MCNC: 142 MG/DL (ref ?–200)
CO2 SERPL-SCNC: 27 MMOL/L (ref 21–32)
CREAT SERPL-MCNC: 0.82 MG/DL (ref 0.6–1.3)
CREAT UR-MCNC: 57.3 MG/DL
EST. AVERAGE GLUCOSE BLD GHB EST-MCNC: 203 MG/DL
GFR SERPL CREATININE-BSD FRML MDRD: 91 ML/MIN/1.73SQ M
GLUCOSE P FAST SERPL-MCNC: 179 MG/DL (ref 65–99)
HBA1C MFR BLD: 8.7 %
HDLC SERPL-MCNC: 42 MG/DL
LDLC SERPL CALC-MCNC: 70 MG/DL (ref 0–100)
MICROALBUMIN UR-MCNC: 9.2 MG/L
MICROALBUMIN/CREAT 24H UR: 16 MG/G CREATININE (ref 0–30)
POTASSIUM SERPL-SCNC: 4.2 MMOL/L (ref 3.5–5.3)
PROT SERPL-MCNC: 6.7 G/DL (ref 6.4–8.4)
SODIUM SERPL-SCNC: 139 MMOL/L (ref 135–147)
TRIGL SERPL-MCNC: 152 MG/DL (ref ?–150)

## 2024-12-05 PROCEDURE — 80061 LIPID PANEL: CPT

## 2024-12-05 PROCEDURE — 36415 COLL VENOUS BLD VENIPUNCTURE: CPT

## 2024-12-05 PROCEDURE — 82570 ASSAY OF URINE CREATININE: CPT

## 2024-12-05 PROCEDURE — 83036 HEMOGLOBIN GLYCOSYLATED A1C: CPT

## 2024-12-05 PROCEDURE — 80053 COMPREHEN METABOLIC PANEL: CPT

## 2024-12-05 PROCEDURE — 82043 UR ALBUMIN QUANTITATIVE: CPT

## 2024-12-16 ENCOUNTER — OFFICE VISIT (OUTPATIENT)
Dept: ENDOCRINOLOGY | Facility: CLINIC | Age: 69
End: 2024-12-16
Payer: MEDICARE

## 2024-12-16 VITALS
BODY MASS INDEX: 29.35 KG/M2 | HEART RATE: 68 BPM | DIASTOLIC BLOOD PRESSURE: 80 MMHG | HEIGHT: 70 IN | SYSTOLIC BLOOD PRESSURE: 148 MMHG | TEMPERATURE: 97.6 F | WEIGHT: 205 LBS

## 2024-12-16 DIAGNOSIS — I10 BENIGN ESSENTIAL HYPERTENSION: ICD-10-CM

## 2024-12-16 DIAGNOSIS — Z79.4 TYPE 2 DIABETES MELLITUS WITH HYPERGLYCEMIA, WITH LONG-TERM CURRENT USE OF INSULIN (HCC): Primary | ICD-10-CM

## 2024-12-16 DIAGNOSIS — E78.2 MIXED HYPERLIPIDEMIA: ICD-10-CM

## 2024-12-16 DIAGNOSIS — E11.65 TYPE 2 DIABETES MELLITUS WITH HYPERGLYCEMIA, WITH LONG-TERM CURRENT USE OF INSULIN (HCC): Primary | ICD-10-CM

## 2024-12-16 PROCEDURE — 99214 OFFICE O/P EST MOD 30 MIN: CPT | Performed by: STUDENT IN AN ORGANIZED HEALTH CARE EDUCATION/TRAINING PROGRAM

## 2024-12-16 PROCEDURE — 95251 CONT GLUC MNTR ANALYSIS I&R: CPT | Performed by: STUDENT IN AN ORGANIZED HEALTH CARE EDUCATION/TRAINING PROGRAM

## 2024-12-16 RX ORDER — INSULIN GLARGINE 100 [IU]/ML
35 INJECTION, SOLUTION SUBCUTANEOUS DAILY
Qty: 30 ML | Refills: 0 | Status: SHIPPED | OUTPATIENT
Start: 2024-12-16 | End: 2025-03-16

## 2024-12-16 RX ORDER — INSULIN LISPRO 100 [IU]/ML
INJECTION, SOLUTION INTRAVENOUS; SUBCUTANEOUS
Qty: 45 ML | Refills: 1 | Status: SHIPPED | OUTPATIENT
Start: 2024-12-16

## 2024-12-16 NOTE — PROGRESS NOTES
"Name: Fernie March      : 1955      MRN: [id number]  Encounter Provider: Mango Hoffman DO  Encounter Date: 2024   Encounter department: [facility]  :  Assessment & Plan  Type 2 diabetes mellitus with hyperglycemia, with long-term current use of insulin (HCC)  There is fasting and post-prandial hyperglycemia. We will increase lantus 35 units, and humalog 8 - 8 - 10 units with no other changes to existing regimen.   Lab Results   Component Value Date    HGBA1C 8.7 (H) 2024     Orders:  •  HumaLOG KwikPen 100 units/mL injection pen; E11.65 Inject 8 units with breakfast and lunch, 10 units with dinner, with scale as needed TDD up to 50 units daily.  •  Insulin Glargine Solostar (Lantus SoloStar) 100 UNIT/ML SOPN; Inject 0.35 mL (35 Units total) under the skin daily Or as directed TDD up to 40 units daily    Mixed hyperlipidemia  Cw statin       Benign essential hypertension  Above goal.        RTC 4-months    History of Present Illness   HPI  Fernie March is a 69 y.o. male who presents in follow up of T2D. 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     For DM, he takes MTF 1g bid, jardiance 25 mg daily, lantus 32 units qHS, and humalog 5-5-10 units plus scale ISF 25 > 145.    He denies hypoglycemia. He denies hyperglycemic symptoms.     There is history of intolerance to trulicity due to GI side effects    For HLD he takes lovastatin 40 mg daily and ezetimibe 10 mg daily.      For HTN he takes losartan 50 mg daily.     History obtained from: patient and patient's Significant Other    Review of Systems   Constitutional:  Positive for unexpected weight change. Negative for diaphoresis.   All other systems reviewed and are negative.         Objective   /80 (Patient Position: Sitting, Cuff Size: Standard)   Pulse 68   Temp 97.6 °F (36.4 °C)   Ht 5' 10\" (1.778 m)   Wt 93 kg (205 lb)   BMI 29.41 kg/m²      Physical Exam  Vitals reviewed.   Constitutional:  "      General: He is not in acute distress.     Appearance: Normal appearance.   HENT:      Head: Normocephalic and atraumatic.      Nose: Nose normal.   Eyes:      General: No scleral icterus.     Conjunctiva/sclera: Conjunctivae normal.   Cardiovascular:      Pulses: no weak pulses.           Dorsalis pedis pulses are 2+ on the right side and 2+ on the left side.   Pulmonary:      Effort: Pulmonary effort is normal. No respiratory distress.   Musculoskeletal:         General: No deformity.      Cervical back: Normal range of motion.   Feet:      Right foot:      Skin integrity: No ulcer, skin breakdown, erythema, warmth, callus or dry skin.      Left foot:      Skin integrity: No ulcer, skin breakdown, erythema, warmth, callus or dry skin.   Skin:     General: Skin is warm and dry.   Neurological:      Mental Status: He is alert.   Psychiatric:         Mood and Affect: Mood normal.         Behavior: Behavior normal.         Component      Latest Ref Rng 12/5/2024   Sodium      135 - 147 mmol/L 139    Potassium      3.5 - 5.3 mmol/L 4.2    Chloride      96 - 108 mmol/L 104    Carbon Dioxide      21 - 32 mmol/L 27    ANION GAP      4 - 13 mmol/L 8    BUN      5 - 25 mg/dL 17    Creatinine      0.60 - 1.30 mg/dL 0.82    GLUCOSE, FASTING      65 - 99 mg/dL 179 (H)    Calcium      8.4 - 10.2 mg/dL 8.9    AST      13 - 39 U/L 18    ALT      7 - 52 U/L 16    ALK PHOS      34 - 104 U/L 35    Total Protein      6.4 - 8.4 g/dL 6.7    Albumin      3.5 - 5.0 g/dL 4.3    Total Bilirubin      0.20 - 1.00 mg/dL 0.49    GFR, Calculated      ml/min/1.73sq m 91    Cholesterol      See Comment mg/dL 142    Triglycerides      See Comment mg/dL 152 (H)    HDL      >=40 mg/dL 42    LDL Calculated      0 - 100 mg/dL 70    EXT Creatinine Urine      Reference range not established. mg/dL 57.3    Albumin,U,Random      <20.0 mg/L 9.2    Albumin Creat Ratio      0 - 30 mg/g creatinine 16    Hemoglobin A1C      Normal 4.0-5.6%; PreDiabetic  5.7-6.4%; Diabetic >=6.5%; Glycemic control for adults with diabetes <7.0% % 8.7 (H)    eAG, EST AVG Glucose      mg/dl 203       Diabetic Foot Exam    Patient's shoes and socks removed.    Right Foot/Ankle   Right Foot Inspection  Skin Exam: skin normal and skin intact. No dry skin, no warmth, no callus, no erythema, no maceration, no abnormal color, no pre-ulcer, no ulcer and no callus.     Toe Exam: ROM and strength within normal limits.     Sensory   Vibration: intact  Monofilament testing: intact    Vascular  The right DP pulse is 2+.     Left Foot/Ankle  Left Foot Inspection  Skin Exam: skin normal and skin intact. No dry skin, no warmth, no erythema, no maceration, normal color, no pre-ulcer, no ulcer and no callus.     Toe Exam: ROM and strength within normal limits.     Sensory   Vibration: intact  Monofilament testing: intact    Vascular  The left DP pulse is 2+.     Assign Risk Category  No deformity present  No loss of protective sensation  No weak pulses  Risk: 0      Legend:  (H) High Fernie March   Device used DEUJAN 2  Home use     Indication   Type 2 Diabetes    More than 72 hours of data was reviewed. Report to be scanned to chart.     Date Range: Nov 19 - Dec 16, 2024    Analysis of data:   % time CGM used:  Average Glucose: 188  Coefficient of Variation: 25%   Time in Target Range: 47%   Time Above Range: 53% (10% very high)   Time Below Range: 0%     Interpretation of data: there is fasting and post prandial hyperglycemia, which is of varying intensity and duration. No hypoglycemia. Needs optimization of diet and insulin regimen

## 2024-12-16 NOTE — ASSESSMENT & PLAN NOTE
There is fasting and post-prandial hyperglycemia. We will increase lantus 35 units, and humalog 8 - 8 - 10 units with no other changes to existing regimen.   Lab Results   Component Value Date    HGBA1C 8.7 (H) 12/05/2024     Orders:  •  HumaLOG KwikPen 100 units/mL injection pen; E11.65 Inject 8 units with breakfast and lunch, 10 units with dinner, with scale as needed TDD up to 50 units daily.  •  Insulin Glargine Solostar (Lantus SoloStar) 100 UNIT/ML SOPN; Inject 0.35 mL (35 Units total) under the skin daily Or as directed TDD up to 40 units daily

## 2025-01-06 ENCOUNTER — OFFICE VISIT (OUTPATIENT)
Dept: URGENT CARE | Facility: MEDICAL CENTER | Age: 70
End: 2025-01-06
Payer: MEDICARE

## 2025-01-06 VITALS
TEMPERATURE: 98.1 F | RESPIRATION RATE: 18 BRPM | BODY MASS INDEX: 29.49 KG/M2 | OXYGEN SATURATION: 95 % | SYSTOLIC BLOOD PRESSURE: 123 MMHG | HEART RATE: 76 BPM | WEIGHT: 206 LBS | HEIGHT: 70 IN | DIASTOLIC BLOOD PRESSURE: 72 MMHG

## 2025-01-06 DIAGNOSIS — B96.89 ACUTE BACTERIAL BRONCHITIS: Primary | ICD-10-CM

## 2025-01-06 DIAGNOSIS — R05.1 ACUTE COUGH: ICD-10-CM

## 2025-01-06 DIAGNOSIS — J20.8 ACUTE BACTERIAL BRONCHITIS: Primary | ICD-10-CM

## 2025-01-06 LAB
SARS-COV-2 AG UPPER RESP QL IA: NEGATIVE
VALID CONTROL: NORMAL

## 2025-01-06 PROCEDURE — G0463 HOSPITAL OUTPT CLINIC VISIT: HCPCS | Performed by: PHYSICIAN ASSISTANT

## 2025-01-06 PROCEDURE — 87811 SARS-COV-2 COVID19 W/OPTIC: CPT | Performed by: PHYSICIAN ASSISTANT

## 2025-01-06 PROCEDURE — 99212 OFFICE O/P EST SF 10 MIN: CPT | Performed by: PHYSICIAN ASSISTANT

## 2025-01-06 RX ORDER — APIXABAN 2.5 MG/1
TABLET, FILM COATED ORAL
COMMUNITY
Start: 2024-11-13

## 2025-01-06 RX ORDER — OLOPATADINE HYDROCHLORIDE 665 UG/1
SPRAY NASAL
COMMUNITY
Start: 2024-12-04

## 2025-01-06 RX ORDER — AZITHROMYCIN 250 MG/1
TABLET, FILM COATED ORAL
Qty: 6 TABLET | Refills: 0 | Status: SHIPPED | OUTPATIENT
Start: 2025-01-06 | End: 2025-01-10

## 2025-01-06 NOTE — PROGRESS NOTES
[facility]        NAME: Fernie March is a 69 y.o. male  : 1955    MRN: [id number]  DATE: 2025  TIME: 9:45 AM    Assessment and Plan   Acute bacterial bronchitis [J20.8, B96.89]  1. Acute bacterial bronchitis  azithromycin (ZITHROMAX) 250 mg tablet      2. Acute cough  Poct Covid 19 Rapid Antigen Test            Patient Instructions     Start Antibiotic as prescribed  Take Probiotic and eat yogurt to replace the good bacteria in your gut   Drink plenty of fluids  If symptoms worsen go to the ER for further evaluation  If symptoms fail to improve follow up with PCP    Follow up with PCP in 3-5 days.  Proceed to  ER if symptoms worsen.    If tests have been performed at [facility], our office will contact you with results if changes need to be made to the care plan discussed with you at the visit.  [facility]    Chief Complaint     Chief Complaint   Patient presents with   • Cough     Cough w/ daerk green phlegm, chest congestion, sore throat, bronchitis, loss of voice, Mucinex/Diabetic tussin taken for relief          History of Present Illness       Patient presents with a 2-day history of sore throat and productive cough.  Patient denies fever, chills, stuffy nose, shortness of breath, wheezing, nausea, vomiting, diarrhea, body aches or headaches.  Point-of-care COVID test negative.  He is taking Mucinex, sugar-free Robitussin DM and honey.        Review of Systems   Review of Systems   Constitutional:  Negative for chills and fever.   HENT:  Positive for rhinorrhea and sore throat. Negative for congestion.    Respiratory:  Positive for cough. Negative for shortness of breath and wheezing.    Gastrointestinal:  Negative for diarrhea, nausea and vomiting.   Musculoskeletal:  Negative for myalgias.   Neurological:  Negative for headaches.         Current Medications       Current Outpatient Medications:   •  apixaban (ELIQUIS) 5 mg, Take 1  tablet by mouth 2 (two) times a day for 30 days 2 tabs PO BID x 7 days, then 1 tab PO BID, Disp: 60 tablet, Rfl: 0  •  ascorbic acid (VITAMIN C) 250 mg tablet, Take 500 mg by mouth daily, Disp: , Rfl:   •  aspirin 81 mg chewable tablet, Chew 1 tablet (81 mg total) daily, Disp: , Rfl:   •  azithromycin (ZITHROMAX) 250 mg tablet, Take 2 tablets today then 1 tablet daily x 4 days, Disp: 6 tablet, Rfl: 0  •  Blood Glucose Monitoring Suppl (Advocate Blood Glucose Monitor) KEYANNA, Use, Disp: , Rfl:   •  Continuous Blood Gluc Sensor (FreeStyle Jared 2 Sensor) MISC, Use, Disp: , Rfl:   •  Eliquis 2.5 MG, , Disp: , Rfl:   •  Empagliflozin (Jardiance) 25 MG TABS, Take 1 tablet (25 mg total) by mouth in the morning, Disp: 90 tablet, Rfl: 3  •  ezetimibe (ZETIA) 10 mg tablet, TAKE 1 TABLET DAILY AT BEDTIME, Disp: 100 tablet, Rfl: 1  •  fluticasone (FLONASE) 50 mcg/act nasal spray, 2 sprays into each nostril, Disp: , Rfl:   •  HumaLOG KwikPen 100 units/mL injection pen, E11.65 Inject 8 units with breakfast and lunch, 10 units with dinner, with scale as needed TDD up to 50 units daily., Disp: 45 mL, Rfl: 1  •  Insulin Glargine Solostar (Lantus SoloStar) 100 UNIT/ML SOPN, Inject 0.35 mL (35 Units total) under the skin daily Or as directed TDD up to 40 units daily, Disp: 30 mL, Rfl: 0  •  loperamide (IMODIUM A-D) 2 MG tablet, Take 2 mg by mouth 4 (four) times a day as needed for diarrhea, Disp: , Rfl:   •  losartan (COZAAR) 50 mg tablet, Take 50 mg by mouth, Disp: , Rfl:   •  lovastatin (MEVACOR) 40 MG tablet, TAKE 1 TABLET DAILY AT BEDTIME, Disp: 90 tablet, Rfl: 0  •  metFORMIN (GLUCOPHAGE) 1000 MG tablet, Take 1 tablet (1,000 mg total) by mouth 2 (two) times a day with meals, Disp: 180 tablet, Rfl: 3  •  metoprolol tartrate (LOPRESSOR) 25 mg tablet, TAKE 1 TABLET EVERY 12 HOURS, Disp: 200 tablet, Rfl: 1  •  olopatadine (PATANOL) 0.1 % ophthalmic solution, 1 drop 2 (two) times a day, Disp: , Rfl:   •  Olopatadine HCl 0.6 % SOLN, USE  2 SPRAYS TO EACH NOSTRIL  2 TIMES A DAY, Disp: , Rfl:   •  omeprazole (PriLOSEC) 40 MG capsule, TAKE 1 CAPSULE DAILY, Disp: 90 capsule, Rfl: 3  •  OneTouch Delica Lancets 30G MISC, Two daily to check blood sugar, Disp: 200 each, Rfl: 1  •  triamcinolone (KENALOG) 0.1 % cream, , Disp: , Rfl:   •  VITAMIN D PO, Take by mouth, Disp: , Rfl:   •  BD Pen Needle Kiarra U/F 32G X 4 MM MISC, Inject as directed 4 (four) times a day, Disp: 360 each, Rfl: 1  •  ondansetron (ZOFRAN) 4 mg tablet, Take 1 tablet (4 mg total) by mouth every 8 (eight) hours as needed for vomiting or nausea (Patient not taking: Reported on 10/15/2024), Disp: 12 tablet, Rfl: 0    Current Allergies     Allergies as of 01/06/2025 - Reviewed 01/06/2025   Allergen Reaction Noted   • Crestor [rosuvastatin] Rash and Hives 02/24/2015   • Enalapril Cough 01/21/2013   • Lipitor [atorvastatin] Rash 08/29/2023            The following portions of the patient's history were reviewed and updated as appropriate: allergies, current medications, past family history, past medical history, past social history, past surgical history and problem list.     Past Medical History:   Diagnosis Date   • BPH without obstruction/lower urinary tract symptoms    • CAD (coronary artery disease)     s/p JOHNATHAN distal RCA and JOHNATHAN RPAV 8/11/2023   • Diabetes mellitus (HCC)    • Dysuria    • GERD (gastroesophageal reflux disease)    • Gout    • Heart attack (HCC) 2023   • History of melanoma     left ear - surgically removed   • History of pulmonary embolism 2017    bilateral   • Hyperlipidemia    • Hypertension    • Melanoma (HCC) 2017    left ear   • Prostate cancer (HCC)    • Sleep apnea        Past Surgical History:   Procedure Laterality Date   • CARDIAC CATHETERIZATION Left 8/11/2023    Procedure: Cardiac Left Heart Cath;  Surgeon: Rubi Ivory DO;  [facility];  Service: Cardiology   • CARDIAC CATHETERIZATION N/A 8/11/2023    Procedure: Cardiac Coronary  "Angiogram;  Surgeon: Rubi Ivory DO;  [facility];  Service: Cardiology   • CARDIAC CATHETERIZATION N/A 8/11/2023    Procedure: Cardiac pci;  Surgeon: Rubi Ivory DO;  [facility];  Service: Cardiology   • CARDIAC CATHETERIZATION N/A 8/11/2023    Procedure: Cardiac other - IVUS;  Surgeon: Rubi Ivory DO;  [facility];  Service: Cardiology   • CHOLECYSTECTOMY     • COLONOSCOPY  10/2016    sessile serrated polyp removed from the proximal transverse, adenoma removed from the distal transverse   • EGD  08/2017    gastritis, H. pylori negative, and Candida esophagitis   • EGD  11/2011    slight Schatzki's ring, small hiatal hernia   • EXTERNAL EAR SURGERY Left 08/2017    for the removal of skin melanoma-Plastic surgery       Family History   Problem Relation Age of Onset   • Hyperlipidemia Mother    • Stroke Father    • Melanoma Father    • Diabetes Sister    • Hypertension Sister    • Diabetes Brother    • JETT disease Brother          Medications have been verified.        Objective   /72   Pulse 76   Temp 98.1 °F (36.7 °C)   Resp 18   Ht 5' 10\" (1.778 m)   Wt 93.4 kg (206 lb)   SpO2 95%   BMI 29.56 kg/m²   No LMP for male patient.       Physical Exam     Physical Exam  Vitals and nursing note reviewed.   Constitutional:       Appearance: Normal appearance.   HENT:      Head: Normocephalic and atraumatic.      Right Ear: Tympanic membrane normal.      Left Ear: Tympanic membrane normal.      Mouth/Throat:      Mouth: Mucous membranes are moist.      Pharynx: Oropharynx is clear.   Eyes:      Conjunctiva/sclera: Conjunctivae normal.   Cardiovascular:      Rate and Rhythm: Normal rate and regular rhythm.      Heart sounds: Normal heart sounds.   Pulmonary:      Effort: Pulmonary effort is normal.      Breath sounds: Normal breath sounds.   Musculoskeletal:      Cervical back: Neck supple.   Lymphadenopathy:      Cervical: No cervical " adenopathy.   Skin:     General: Skin is warm.   Neurological:      Mental Status: He is alert.

## 2025-01-16 ENCOUNTER — APPOINTMENT (OUTPATIENT)
Dept: LAB | Facility: HOSPITAL | Age: 70
End: 2025-01-16
Payer: MEDICARE

## 2025-01-16 ENCOUNTER — TELEPHONE (OUTPATIENT)
Dept: ENDOCRINOLOGY | Facility: CLINIC | Age: 70
End: 2025-01-16

## 2025-01-16 DIAGNOSIS — R97.20 ELEVATED PSA: ICD-10-CM

## 2025-01-16 LAB
PSA FREE MFR SERPL: 19.97 %
PSA FREE SERPL-MCNC: 0.95 NG/ML
PSA SERPL-MCNC: 4.76 NG/ML (ref 0–4)

## 2025-01-16 PROCEDURE — 84153 ASSAY OF PSA TOTAL: CPT

## 2025-01-16 PROCEDURE — 36415 COLL VENOUS BLD VENIPUNCTURE: CPT

## 2025-01-16 PROCEDURE — 84154 ASSAY OF PSA FREE: CPT

## 2025-01-16 NOTE — TELEPHONE ENCOUNTER
Patient came in says he thought you said he should have blood work before next visit, there isnt any ordered, did you want anything done.

## 2025-01-17 NOTE — TELEPHONE ENCOUNTER
Appears patient just had full panel of labs collected in December.   Recommend we just collect A1c as a follow up, ok have this obtained in office during next visit in April.

## 2025-01-21 ENCOUNTER — OFFICE VISIT (OUTPATIENT)
Dept: UROLOGY | Facility: CLINIC | Age: 70
End: 2025-01-21
Payer: MEDICARE

## 2025-01-21 VITALS
SYSTOLIC BLOOD PRESSURE: 130 MMHG | WEIGHT: 204 LBS | HEART RATE: 74 BPM | DIASTOLIC BLOOD PRESSURE: 82 MMHG | BODY MASS INDEX: 29.2 KG/M2 | OXYGEN SATURATION: 96 % | TEMPERATURE: 98.2 F | HEIGHT: 70 IN

## 2025-01-21 DIAGNOSIS — R97.20 ELEVATED PSA: Primary | ICD-10-CM

## 2025-01-21 DIAGNOSIS — G98.8 DISORDER OF NERVOUS SYSTEM DUE TO TYPE 2 DIABETES MELLITUS  (HCC): ICD-10-CM

## 2025-01-21 DIAGNOSIS — E11.65 TYPE 2 DIABETES MELLITUS WITH HYPERGLYCEMIA, WITH LONG-TERM CURRENT USE OF INSULIN (HCC): ICD-10-CM

## 2025-01-21 DIAGNOSIS — E11.69 DISORDER OF NERVOUS SYSTEM DUE TO TYPE 2 DIABETES MELLITUS  (HCC): ICD-10-CM

## 2025-01-21 DIAGNOSIS — Z79.4 TYPE 2 DIABETES MELLITUS WITH HYPERGLYCEMIA, WITH LONG-TERM CURRENT USE OF INSULIN (HCC): ICD-10-CM

## 2025-01-21 PROCEDURE — 99213 OFFICE O/P EST LOW 20 MIN: CPT | Performed by: UROLOGY

## 2025-01-21 NOTE — PROGRESS NOTES
UROLOGY PROGRESS NOTE   [facility]  [address]  [address]  [place] [address]  [phone]  Fax:[phone]  [email or web address]      NAME: Fernie March  AGE: 69 y.o. SEX: male  : 1955   MRN: [id number]    DATE: 2025  TIME: 12:02 PM    Assessment and Plan:    Chronically elevated PSA: As below, has never had a biopsy yet.  However the PSA is not as high as it once was although it is still elevated.  He currently is not a good surgical risk for biopsy due to poorly controlled diabetes and fairly recent myocardial infarction which caused us to cancel the last biopsy.  Follow-up in 6 months with another PSA.               Chief Complaint   No chief complaint on file.      History of Present Illness   Last seen by me 2024-chronically elevated PSA.  When I last saw him the PSA actually dropped little bit.  He is now able to come off of his Eliquis for about a 4-day period.  Here for follow-up PSA.  He has a history of a category 3 lesion.    69-year-old man has elevated PSA with a his PSA history of 2.6 in , 3.2 in , 7.1 2023, and 4.0 2023 with a 20% free fraction.  He has a history of BPH and was previously seen by Dr. Sagastume.  He was seen by Mr. Griggs May 3, 2023.  He was set up for an MR fusion prostate biopsy August 15 with me but this was canceled due to the patient having a myocardial infarction.  He was placed on aspirin and Plavix.  MRI showed category 3 lesion 5 mm right apical posterior peripheral zone.  The volume of the gland was 41 cc.  He also has a history of pulmonary embolism.  We were contacted at the time of the MI and we decided to postpone the biopsy for 6 months at least.  He had angioplasty x3 and a drug-eluting stent placed.  He is now doing well.  He is normally on Eliquis because of a history of pulmonary embolism and he had stopped the Eliquis prior to the planned prostate biopsy and he had a myocardial  infarction probably because of this and underlying coronary artery disease and the associated stress with anxiety.  I last saw him August 29, 2023 and we decided to check a PSA at that time and in 5 months and follow-up at this time for history and physical to prepare for MR fusion biopsy with an MRI before.  This was because he was unable to stop his Plavix for the next 6 months.  PSA was 3.5 August 30, 2023 and it is 4.3 with a free fraction of 20.2% as of January 2, 2024.  Multiparametric MRI of the prostate December 4, 2023 showed a category 3 i.e. equivocal lesion 1.4 cm lesion in the posterior right peripheral zone at the apex with no major change since June 6, 2023.  44 cc gland. Has urinary frequency due to glycosuria-last hemoglobin A1c 8.7.    Of note, he and his last medical history it has been entered that he has prostate cancer.  This is not the case.  This is an erroneous entry.    Component  Ref Range & Units (hover) 1/16/25  8:02 AM 7/5/24  7:46 AM 1/2/24  7:29 AM 8/30/23  7:56 AM 4/24/23  7:53 AM 2/27/23  9:38 AM 2/28/22 10:21 AM   PSA, Diagnostic 4.758 High  4.264 High  CM 4.3 High  R, CM 3.5 R, CM 4.0 R, CM 7.1 High  R, CM 3.2 R, CM   Comment: [facility] Access chemiluminescent immunoassay. Confirm baseline values for patients being serially monitored.   PSA, Free 0.950 0.998 0.87 R, CM 0.77 R, CM 0.83 R, CM     PSA % Free 19.966 23.405 20.2 CM 22.0 CM 20.8 CM             He had a CT scan October 2, 2024 after undergoing EGD and having meeta pain rule out perforation-this showed simple renal cysts, unremarkable kidneys no stones or hydronephrosis send prostate is enlarged with 56 cc gland.      The following portions of the patient's history were reviewed and updated as appropriate: allergies, current medications, past family history, past medical history, past social history, past surgical history and problem list.  Past Medical History:   Diagnosis Date    BPH without obstruction/lower  urinary tract symptoms     CAD (coronary artery disease)     s/p JOHNATHAN distal RCA and JOHNATHAN RPAV 8/11/2023    Diabetes mellitus (HCC)     Dysuria     GERD (gastroesophageal reflux disease)     Gout     Heart attack (HCC) 2023    History of melanoma     left ear - surgically removed    History of pulmonary embolism 2017    bilateral    Hyperlipidemia     Hypertension     Melanoma (HCC) 2017    left ear    Prostate cancer (HCC)     Sleep apnea      Past Surgical History:   Procedure Laterality Date    CARDIAC CATHETERIZATION Left 8/11/2023    Procedure: Cardiac Left Heart Cath;  Surgeon: Rubi Ivory DO;  [facility];  Service: Cardiology    CARDIAC CATHETERIZATION N/A 8/11/2023    Procedure: Cardiac Coronary Angiogram;  Surgeon: Rubi Ivory DO;  [facility];  Service: Cardiology    CARDIAC CATHETERIZATION N/A 8/11/2023    Procedure: Cardiac pci;  Surgeon: Rubi Ivory DO;  [facility];  Service: Cardiology    CARDIAC CATHETERIZATION N/A 8/11/2023    Procedure: Cardiac other - IVUS;  Surgeon: Rubi Ivory DO;  [facility];  Service: Cardiology    CHOLECYSTECTOMY      COLONOSCOPY  10/2016    sessile serrated polyp removed from the proximal transverse, adenoma removed from the distal transverse    EGD  08/2017    gastritis, H. pylori negative, and Candida esophagitis    EGD  11/2011    slight Schatzki's ring, small hiatal hernia    EXTERNAL EAR SURGERY Left 08/2017    for the removal of skin melanoma-Plastic surgery     shoulder  Review of Systems   Review of Systems   Genitourinary:  Positive for urgency. Negative for difficulty urinating, dysuria, enuresis, flank pain, frequency, genital sores and hematuria.       Active Problem List     Patient Active Problem List   Diagnosis    Type 2 diabetes mellitus with hyperglycemia, with long-term current use of insulin (HCC)    Vomiting    Leukocytosis    Mixed hyperlipidemia     "Hypertension    Hx pulmonary embolism    GERD (gastroesophageal reflux disease)    Benign prostatic hyperplasia without lower urinary tract symptoms    LELO (obstructive sleep apnea)    Benign essential hypertension    Fatigue    Myalgia    Pulmonary embolism, bilateral (HCC)    Non-STEMI (non-ST elevated myocardial infarction) ([facility])    Abnormal findings on esophagogastroduodenoscopy (EGD)    Coronary artery disease involving native coronary artery of native heart without angina pectoris    Presence of drug-eluting stent in right coronary artery    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   Constitutional:       Appearance: Normal appearance.   HENT:      Head: Normocephalic and atraumatic.   Eyes:      Extraocular Movements: Extraocular movements intact.   Pulmonary:      Effort: Pulmonary effort is normal.   Musculoskeletal:         General: Normal range of motion.      Cervical back: Normal range of motion.   Skin:     Coloration: Skin is not jaundiced or pale.   Neurological:      General: No focal deficit present.      Mental Status: He is alert and oriented to person, place, and time. Mental status is at baseline.   Psychiatric:         Mood and Affect: Mood normal.         Behavior: Behavior normal.         Thought Content: Thought content normal.         Judgment: Judgment normal.             Current Medications     Current Outpatient Medications:     apixaban (ELIQUIS) 5 mg, Take 1 tablet by mouth 2 (two) times a day for 30 days 2 tabs PO BID x 7 days, then 1 tab PO BID, Disp: 60 tablet, Rfl: 0    ascorbic acid (VITAMIN C) 250 mg tablet, Take 500 mg by mouth daily, Disp: , Rfl:     aspirin 81 mg chewable " tablet, Chew 1 tablet (81 mg total) daily, Disp: , Rfl:     BD Pen Needle Kiarra U/F 32G X 4 MM MISC, Inject as directed 4 (four) times a day, Disp: 360 each, Rfl: 1    Blood Glucose Monitoring Suppl (Advocate Blood Glucose Monitor) KEYANNA, Use, Disp: , Rfl:     Continuous Blood Gluc Sensor (FreeStyle Jared 2 Sensor) MISC, Use, Disp: , Rfl:     Eliquis 2.5 MG, , Disp: , Rfl:     Empagliflozin (Jardiance) 25 MG TABS, Take 1 tablet (25 mg total) by mouth in the morning, Disp: 90 tablet, Rfl: 3    ezetimibe (ZETIA) 10 mg tablet, TAKE 1 TABLET DAILY AT BEDTIME, Disp: 100 tablet, Rfl: 1    fluticasone (FLONASE) 50 mcg/act nasal spray, 2 sprays into each nostril, Disp: , Rfl:     HumaLOG KwikPen 100 units/mL injection pen, E11.65 Inject 8 units with breakfast and lunch, 10 units with dinner, with scale as needed TDD up to 50 units daily., Disp: 45 mL, Rfl: 1    Insulin Glargine Solostar (Lantus SoloStar) 100 UNIT/ML SOPN, Inject 0.35 mL (35 Units total) under the skin daily Or as directed TDD up to 40 units daily, Disp: 30 mL, Rfl: 0    loperamide (IMODIUM A-D) 2 MG tablet, Take 2 mg by mouth 4 (four) times a day as needed for diarrhea, Disp: , Rfl:     losartan (COZAAR) 50 mg tablet, Take 50 mg by mouth, Disp: , Rfl:     lovastatin (MEVACOR) 40 MG tablet, TAKE 1 TABLET DAILY AT BEDTIME, Disp: 90 tablet, Rfl: 0    metFORMIN (GLUCOPHAGE) 1000 MG tablet, Take 1 tablet (1,000 mg total) by mouth 2 (two) times a day with meals, Disp: 180 tablet, Rfl: 3    metoprolol tartrate (LOPRESSOR) 25 mg tablet, TAKE 1 TABLET EVERY 12 HOURS, Disp: 200 tablet, Rfl: 1    olopatadine (PATANOL) 0.1 % ophthalmic solution, 1 drop 2 (two) times a day, Disp: , Rfl:     Olopatadine HCl 0.6 % SOLN, USE 2 SPRAYS TO EACH NOSTRIL  2 TIMES A DAY, Disp: , Rfl:     omeprazole (PriLOSEC) 40 MG capsule, TAKE 1 CAPSULE DAILY, Disp: 90 capsule, Rfl: 3    OneTouch Delica Lancets 30G MISC, Two daily to check blood sugar, Disp: 200 each, Rfl: 1    triamcinolone  (KENALOG) 0.1 % cream, , Disp: , Rfl:     VITAMIN D PO, Take by mouth, Disp: , Rfl:     ondansetron (ZOFRAN) 4 mg tablet, Take 1 tablet (4 mg total) by mouth every 8 (eight) hours as needed for vomiting or nausea (Patient not taking: Reported on 10/15/2024), Disp: 12 tablet, Rfl: 0        Baldemar Niño MD

## 2025-01-25 DIAGNOSIS — E78.2 MIXED HYPERLIPIDEMIA: ICD-10-CM

## 2025-01-27 ENCOUNTER — OFFICE VISIT (OUTPATIENT)
Dept: URGENT CARE | Facility: MEDICAL CENTER | Age: 70
End: 2025-01-27
Payer: MEDICARE

## 2025-01-27 ENCOUNTER — APPOINTMENT (OUTPATIENT)
Dept: RADIOLOGY | Facility: MEDICAL CENTER | Age: 70
End: 2025-01-27
Payer: MEDICARE

## 2025-01-27 VITALS
HEIGHT: 71 IN | RESPIRATION RATE: 18 BRPM | TEMPERATURE: 98.7 F | BODY MASS INDEX: 28.98 KG/M2 | OXYGEN SATURATION: 95 % | WEIGHT: 207 LBS | SYSTOLIC BLOOD PRESSURE: 123 MMHG | HEART RATE: 65 BPM | DIASTOLIC BLOOD PRESSURE: 87 MMHG

## 2025-01-27 DIAGNOSIS — R05.1 ACUTE COUGH: ICD-10-CM

## 2025-01-27 DIAGNOSIS — R05.1 ACUTE COUGH: Primary | ICD-10-CM

## 2025-01-27 PROCEDURE — 71046 X-RAY EXAM CHEST 2 VIEWS: CPT

## 2025-01-27 PROCEDURE — 99212 OFFICE O/P EST SF 10 MIN: CPT | Performed by: PHYSICIAN ASSISTANT

## 2025-01-27 PROCEDURE — G0463 HOSPITAL OUTPT CLINIC VISIT: HCPCS | Performed by: PHYSICIAN ASSISTANT

## 2025-01-27 RX ORDER — LOVASTATIN 40 MG/1
40 TABLET ORAL
Qty: 90 TABLET | Refills: 1 | Status: SHIPPED | OUTPATIENT
Start: 2025-01-27

## 2025-01-27 RX ORDER — DEXTROMETHORPHAN HYDROBROMIDE AND PROMETHAZINE HYDROCHLORIDE 15; 6.25 MG/5ML; MG/5ML
5 SYRUP ORAL 4 TIMES DAILY PRN
Qty: 240 ML | Refills: 0 | Status: CANCELLED | OUTPATIENT
Start: 2025-01-27

## 2025-01-27 RX ORDER — HYDROCODONE POLISTIREX AND CHLORPHENIRAMINE POLISTIREX 10; 8 MG/5ML; MG/5ML
5 SUSPENSION, EXTENDED RELEASE ORAL EVERY 12 HOURS PRN
Qty: 70 ML | Refills: 0 | Status: SHIPPED | OUTPATIENT
Start: 2025-01-27

## 2025-01-27 NOTE — PROGRESS NOTES
[facility]        NAME: Fernie March is a 69 y.o. male  : 1955    MRN: [id number]  DATE: 2025  TIME: 9:31 AM    Assessment and Plan   Acute cough [R05.1]  1. Acute cough  XR chest pa and lateral    Hydrocod Gianfranco-Chlorphe Gianfranco ER (TUSSIONEX) 10-8 mg/5 mL ER suspension        Patient advised not to take prescription cough medicine prescribed today while driving.  Advised he should only take primarily before bed to help with cough due to the narcotics within the cough suppressant.    Patient Instructions       Follow up with PCP in 3-5 days.  Proceed to  ER if symptoms worsen.    If tests have been performed at [facility], our office will contact you with results if changes need to be made to the care plan discussed with you at the visit.  [facility]    Chief Complaint     Chief Complaint   Patient presents with   • Cold Like Symptoms     Cough, post nasal drip , body aches          History of Present Illness       Patient presents with a persisting cough which wakes him up at night since having bronchitis earlier this month.  Patient states he has continued postnasal drip, congestion, runny nose cough and occasional body aches.  Patient denies fever chills, shortness of breath or wheezing.  He is taken erythema possible over-the-counter without improvement of his cough.  He is also taking Benadryl at night which helps the postnasal drip but not the cough.        Review of Systems   Review of Systems   Constitutional:  Negative for chills and fever.   HENT:  Positive for congestion, postnasal drip and rhinorrhea. Negative for sore throat.    Respiratory:  Positive for cough. Negative for shortness of breath and wheezing.    Gastrointestinal:  Negative for diarrhea, nausea and vomiting.   Musculoskeletal:  Positive for myalgias.         Current Medications       Current Outpatient Medications:   •  apixaban (ELIQUIS) 5 mg, Take 1 tablet by  mouth 2 (two) times a day for 30 days 2 tabs PO BID x 7 days, then 1 tab PO BID, Disp: 60 tablet, Rfl: 0  •  ascorbic acid (VITAMIN C) 250 mg tablet, Take 500 mg by mouth daily, Disp: , Rfl:   •  aspirin 81 mg chewable tablet, Chew 1 tablet (81 mg total) daily, Disp: , Rfl:   •  Blood Glucose Monitoring Suppl (Advocate Blood Glucose Monitor) KEYANNA, Use, Disp: , Rfl:   •  Continuous Blood Gluc Sensor (FreeStyle Jared 2 Sensor) MISC, Use, Disp: , Rfl:   •  Eliquis 2.5 MG, , Disp: , Rfl:   •  Empagliflozin (Jardiance) 25 MG TABS, Take 1 tablet (25 mg total) by mouth in the morning, Disp: 90 tablet, Rfl: 3  •  ezetimibe (ZETIA) 10 mg tablet, TAKE 1 TABLET DAILY AT BEDTIME, Disp: 100 tablet, Rfl: 1  •  fluticasone (FLONASE) 50 mcg/act nasal spray, 2 sprays into each nostril, Disp: , Rfl:   •  HumaLOG KwikPen 100 units/mL injection pen, E11.65 Inject 8 units with breakfast and lunch, 10 units with dinner, with scale as needed TDD up to 50 units daily., Disp: 45 mL, Rfl: 1  •  Hydrocod Gianfranco-Chlorphe Gianfranco ER (TUSSIONEX) 10-8 mg/5 mL ER suspension, Take 5 mL by mouth every 12 (twelve) hours as needed for cough Max Daily Amount: 10 mL, Disp: 70 mL, Rfl: 0  •  Insulin Glargine Solostar (Lantus SoloStar) 100 UNIT/ML SOPN, Inject 0.35 mL (35 Units total) under the skin daily Or as directed TDD up to 40 units daily, Disp: 30 mL, Rfl: 0  •  loperamide (IMODIUM A-D) 2 MG tablet, Take 2 mg by mouth 4 (four) times a day as needed for diarrhea, Disp: , Rfl:   •  losartan (COZAAR) 50 mg tablet, Take 50 mg by mouth, Disp: , Rfl:   •  lovastatin (MEVACOR) 40 MG tablet, TAKE 1 TABLET DAILY AT BEDTIME, Disp: 90 tablet, Rfl: 0  •  metFORMIN (GLUCOPHAGE) 1000 MG tablet, Take 1 tablet (1,000 mg total) by mouth 2 (two) times a day with meals, Disp: 180 tablet, Rfl: 3  •  metoprolol tartrate (LOPRESSOR) 25 mg tablet, TAKE 1 TABLET EVERY 12 HOURS, Disp: 200 tablet, Rfl: 1  •  olopatadine (PATANOL) 0.1 % ophthalmic solution, 1 drop 2 (two) times a  day, Disp: , Rfl:   •  Olopatadine HCl 0.6 % SOLN, USE 2 SPRAYS TO EACH NOSTRIL  2 TIMES A DAY, Disp: , Rfl:   •  omeprazole (PriLOSEC) 40 MG capsule, TAKE 1 CAPSULE DAILY, Disp: 90 capsule, Rfl: 3  •  OneTouch Delica Lancets 30G MISC, Two daily to check blood sugar, Disp: 200 each, Rfl: 1  •  triamcinolone (KENALOG) 0.1 % cream, , Disp: , Rfl:   •  VITAMIN D PO, Take by mouth, Disp: , Rfl:   •  BD Pen Needle Kiarra U/F 32G X 4 MM MISC, Inject as directed 4 (four) times a day, Disp: 360 each, Rfl: 1  •  ondansetron (ZOFRAN) 4 mg tablet, Take 1 tablet (4 mg total) by mouth every 8 (eight) hours as needed for vomiting or nausea (Patient not taking: Reported on 10/15/2024), Disp: 12 tablet, Rfl: 0    Current Allergies     Allergies as of 01/27/2025 - Reviewed 01/27/2025   Allergen Reaction Noted   • Crestor [rosuvastatin] Rash and Hives 02/24/2015   • Enalapril Cough 01/21/2013   • Lipitor [atorvastatin] Rash 08/29/2023            The following portions of the patient's history were reviewed and updated as appropriate: allergies, current medications, past family history, past medical history, past social history, past surgical history and problem list.     Past Medical History:   Diagnosis Date   • BPH without obstruction/lower urinary tract symptoms    • CAD (coronary artery disease)     s/p JOHNATHAN distal RCA and JOHNATHAN RPAV 8/11/2023   • Diabetes mellitus (HCC)    • Dysuria    • GERD (gastroesophageal reflux disease)    • Gout    • Heart attack (HCC) 2023   • History of melanoma     left ear - surgically removed   • History of pulmonary embolism 2017    bilateral   • Hyperlipidemia    • Hypertension    • Melanoma (HCC) 2017    left ear   • Prostate cancer (HCC)    • Sleep apnea        Past Surgical History:   Procedure Laterality Date   • CARDIAC CATHETERIZATION Left 8/11/2023    Procedure: Cardiac Left Heart Cath;  Surgeon: Rubi Ivory DO;  [facility];  Service: Cardiology   • CARDIAC  "CATHETERIZATION N/A 8/11/2023    Procedure: Cardiac Coronary Angiogram;  Surgeon: Rubi Ivory DO;  [facility];  Service: Cardiology   • CARDIAC CATHETERIZATION N/A 8/11/2023    Procedure: Cardiac pci;  Surgeon: Rubi Ivory DO;  [facility];  Service: Cardiology   • CARDIAC CATHETERIZATION N/A 8/11/2023    Procedure: Cardiac other - IVUS;  Surgeon: Rubi Ivory DO;  [facility];  Service: Cardiology   • CHOLECYSTECTOMY     • COLONOSCOPY  10/2016    sessile serrated polyp removed from the proximal transverse, adenoma removed from the distal transverse   • EGD  08/2017    gastritis, H. pylori negative, and Candida esophagitis   • EGD  11/2011    slight Schatzki's ring, small hiatal hernia   • EXTERNAL EAR SURGERY Left 08/2017    for the removal of skin melanoma-Plastic surgery       Family History   Problem Relation Age of Onset   • Hyperlipidemia Mother    • Stroke Father    • Melanoma Father    • Diabetes Sister    • Hypertension Sister    • Diabetes Brother    • JETT disease Brother          Medications have been verified.        Objective   /87   Pulse 65   Temp 98.7 °F (37.1 °C)   Resp 18   Ht 5' 11\" (1.803 m)   Wt 93.9 kg (207 lb)   SpO2 95%   BMI 28.87 kg/m²   No LMP for male patient.       Physical Exam     Physical Exam  Vitals and nursing note reviewed.   Constitutional:       Appearance: Normal appearance.   HENT:      Head: Normocephalic and atraumatic.      Right Ear: Tympanic membrane normal.      Left Ear: Tympanic membrane normal.      Mouth/Throat:      Mouth: Mucous membranes are moist.      Pharynx: Oropharynx is clear.   Eyes:      Conjunctiva/sclera: Conjunctivae normal.   Cardiovascular:      Rate and Rhythm: Normal rate and regular rhythm.      Heart sounds: Normal heart sounds.   Pulmonary:      Effort: Pulmonary effort is normal.      Breath sounds: Normal breath sounds.   Musculoskeletal:      Cervical back: " Neck supple.   Lymphadenopathy:      Cervical: No cervical adenopathy.   Skin:     General: Skin is warm.   Neurological:      Mental Status: He is alert.     Xray read by radiologist as no acute cardiopulmonary disease.

## 2025-01-27 NOTE — PATIENT INSTRUCTIONS
Take 5 ml of prescription cough medication every 12 hrs as needed  If cough persists follow up with PCP    If tests have been performed at [facility], our office will contact you with results if changes need to be made to the care plan discussed with you at the visit.  You can review your full results on [facility]

## 2025-02-01 ENCOUNTER — APPOINTMENT (OUTPATIENT)
Dept: RADIOLOGY | Facility: MEDICAL CENTER | Age: 70
End: 2025-02-01
Payer: MEDICARE

## 2025-02-01 ENCOUNTER — OFFICE VISIT (OUTPATIENT)
Dept: URGENT CARE | Facility: MEDICAL CENTER | Age: 70
End: 2025-02-01
Payer: MEDICARE

## 2025-02-01 VITALS
BODY MASS INDEX: 28.56 KG/M2 | SYSTOLIC BLOOD PRESSURE: 138 MMHG | OXYGEN SATURATION: 97 % | DIASTOLIC BLOOD PRESSURE: 88 MMHG | HEIGHT: 71 IN | RESPIRATION RATE: 18 BRPM | TEMPERATURE: 97.8 F | HEART RATE: 82 BPM | WEIGHT: 204 LBS

## 2025-02-01 DIAGNOSIS — M54.41 ACUTE RIGHT-SIDED LOW BACK PAIN WITH RIGHT-SIDED SCIATICA: Primary | ICD-10-CM

## 2025-02-01 DIAGNOSIS — M54.41 ACUTE RIGHT-SIDED LOW BACK PAIN WITH RIGHT-SIDED SCIATICA: ICD-10-CM

## 2025-02-01 PROCEDURE — 72110 X-RAY EXAM L-2 SPINE 4/>VWS: CPT

## 2025-02-01 PROCEDURE — G0463 HOSPITAL OUTPT CLINIC VISIT: HCPCS | Performed by: PHYSICIAN ASSISTANT

## 2025-02-01 PROCEDURE — 99213 OFFICE O/P EST LOW 20 MIN: CPT | Performed by: PHYSICIAN ASSISTANT

## 2025-02-01 RX ORDER — METHOCARBAMOL 750 MG/1
750 TABLET, FILM COATED ORAL EVERY 6 HOURS PRN
Qty: 20 TABLET | Refills: 0 | Status: SHIPPED | OUTPATIENT
Start: 2025-02-01

## 2025-02-01 NOTE — PATIENT INSTRUCTIONS
Start Robaxin, caution as this may  cause sedation, do not take and drive. If it is causing too much sedation you may break the tablet in half and take half a tablet.  Drink plenty of water  If symptoms worsen go to the emergency room for further evaluation  Follow-up with PCP if symptoms fail to improve    If tests have been performed at [facility], our office will contact you with results if changes need to be made to the care plan discussed with you at the visit.  You can review your full results on [facility]

## 2025-02-01 NOTE — PROGRESS NOTES
[facility]        NAME: Fernie March is a 69 y.o. male  : 1955    MRN: [id number]  DATE: 2025  TIME: 12:30 PM    Assessment and Plan   Acute right-sided low back pain with right-sided sciatica [M54.41]  1. Acute right-sided low back pain with right-sided sciatica  XR spine lumbar minimum 4 views non injury    methocarbamol (Robaxin-750) 750 mg tablet            Patient Instructions     Start Robaxin, caution as this may  cause sedation, do not take and drive. If it is causing too much sedation you may break the tablet in half and take half a tablet.  Drink plenty of water  If symptoms worsen go to the emergency room for further evaluation  Follow-up with PCP if symptoms fail to improve    Follow up with PCP in 3-5 days.  Proceed to  ER if symptoms worsen.    If tests have been performed at [facility], our office will contact you with results if changes need to be made to the care plan discussed with you at the visit.  [facility]    Chief Complaint     Chief Complaint   Patient presents with   • Leg Pain     Right hip and thigh pain which started on Tuesday. States he has history of gout. Was at chiropractor yesterday with no relief. No injury.         History of Present Illness       Patient presents with a 4-day history of right low back pain which radiates into his right anterior thigh.  Patient denies bowel or bladder incontinence, saddle anesthesia, lower extremity muscle weakness or paresthesia.  He has a previous history of back pain.  He went to his chiropractor without improvement.  Patient is wondering if it could be a gout flareup.  He has been drinking a lot of tea recently because of a lingering cough.  No known back or hip injury.  Denies hip pain.        Review of Systems   Review of Systems   Constitutional:  Negative for chills and fever.   Respiratory:  Positive for cough.    Genitourinary:  Negative for difficulty  urinating.   Musculoskeletal:  Positive for back pain and joint swelling (right thigh).   Skin:  Negative for rash.   Neurological:  Negative for weakness and numbness.         Current Medications       Current Outpatient Medications:   •  apixaban (ELIQUIS) 5 mg, Take 1 tablet by mouth 2 (two) times a day for 30 days 2 tabs PO BID x 7 days, then 1 tab PO BID, Disp: 60 tablet, Rfl: 0  •  ascorbic acid (VITAMIN C) 250 mg tablet, Take 500 mg by mouth daily, Disp: , Rfl:   •  aspirin 81 mg chewable tablet, Chew 1 tablet (81 mg total) daily, Disp: , Rfl:   •  Blood Glucose Monitoring Suppl (Advocate Blood Glucose Monitor) KEYANNA, Use, Disp: , Rfl:   •  Continuous Blood Gluc Sensor (FreeStyle Jared 2 Sensor) MISC, Use, Disp: , Rfl:   •  Eliquis 2.5 MG, , Disp: , Rfl:   •  Empagliflozin (Jardiance) 25 MG TABS, Take 1 tablet (25 mg total) by mouth in the morning, Disp: 90 tablet, Rfl: 3  •  ezetimibe (ZETIA) 10 mg tablet, TAKE 1 TABLET DAILY AT BEDTIME, Disp: 100 tablet, Rfl: 1  •  fluticasone (FLONASE) 50 mcg/act nasal spray, 2 sprays into each nostril, Disp: , Rfl:   •  HumaLOG KwikPen 100 units/mL injection pen, E11.65 Inject 8 units with breakfast and lunch, 10 units with dinner, with scale as needed TDD up to 50 units daily., Disp: 45 mL, Rfl: 1  •  Hydrocod Gianfranco-Chlorphe Gianfranco ER (TUSSIONEX) 10-8 mg/5 mL ER suspension, Take 5 mL by mouth every 12 (twelve) hours as needed for cough Max Daily Amount: 10 mL, Disp: 70 mL, Rfl: 0  •  Insulin Glargine Solostar (Lantus SoloStar) 100 UNIT/ML SOPN, Inject 0.35 mL (35 Units total) under the skin daily Or as directed TDD up to 40 units daily, Disp: 30 mL, Rfl: 0  •  loperamide (IMODIUM A-D) 2 MG tablet, Take 2 mg by mouth 4 (four) times a day as needed for diarrhea, Disp: , Rfl:   •  losartan (COZAAR) 50 mg tablet, Take 50 mg by mouth, Disp: , Rfl:   •  lovastatin (MEVACOR) 40 MG tablet, TAKE 1 TABLET DAILY AT BEDTIME, Disp: 90 tablet, Rfl: 1  •  metFORMIN (GLUCOPHAGE) 1000 MG  tablet, Take 1 tablet (1,000 mg total) by mouth 2 (two) times a day with meals, Disp: 180 tablet, Rfl: 3  •  methocarbamol (Robaxin-750) 750 mg tablet, Take 1 tablet (750 mg total) by mouth every 6 (six) hours as needed for muscle spasms, Disp: 20 tablet, Rfl: 0  •  metoprolol tartrate (LOPRESSOR) 25 mg tablet, TAKE 1 TABLET EVERY 12 HOURS, Disp: 200 tablet, Rfl: 1  •  olopatadine (PATANOL) 0.1 % ophthalmic solution, 1 drop 2 (two) times a day, Disp: , Rfl:   •  Olopatadine HCl 0.6 % SOLN, USE 2 SPRAYS TO EACH NOSTRIL  2 TIMES A DAY, Disp: , Rfl:   •  omeprazole (PriLOSEC) 40 MG capsule, TAKE 1 CAPSULE DAILY, Disp: 90 capsule, Rfl: 3  •  OneTouch Delica Lancets 30G MISC, Two daily to check blood sugar, Disp: 200 each, Rfl: 1  •  triamcinolone (KENALOG) 0.1 % cream, , Disp: , Rfl:   •  VITAMIN D PO, Take by mouth, Disp: , Rfl:   •  BD Pen Needle Kiarra U/F 32G X 4 MM MISC, Inject as directed 4 (four) times a day, Disp: 360 each, Rfl: 1  •  ondansetron (ZOFRAN) 4 mg tablet, Take 1 tablet (4 mg total) by mouth every 8 (eight) hours as needed for vomiting or nausea (Patient not taking: Reported on 10/15/2024), Disp: 12 tablet, Rfl: 0    Current Allergies     Allergies as of 02/01/2025 - Reviewed 02/01/2025   Allergen Reaction Noted   • Crestor [rosuvastatin] Rash and Hives 02/24/2015   • Enalapril Cough 01/21/2013   • Lipitor [atorvastatin] Rash 08/29/2023            The following portions of the patient's history were reviewed and updated as appropriate: allergies, current medications, past family history, past medical history, past social history, past surgical history and problem list.     Past Medical History:   Diagnosis Date   • BPH without obstruction/lower urinary tract symptoms    • CAD (coronary artery disease)     s/p JOHNATHAN distal RCA and JOHNATHAN RPAV 8/11/2023   • Diabetes mellitus (HCC)    • Dysuria    • GERD (gastroesophageal reflux disease)    • Gout    • Heart attack (HCC) 2023   • History of melanoma     left  "ear - surgically removed   • History of pulmonary embolism 2017    bilateral   • Hyperlipidemia    • Hypertension    • Melanoma (HCC) 2017    left ear   • Prostate cancer (HCC)    • Sleep apnea        Past Surgical History:   Procedure Laterality Date   • CARDIAC CATHETERIZATION Left 8/11/2023    Procedure: Cardiac Left Heart Cath;  Surgeon: Rubi Ivory DO;  [facility];  Service: Cardiology   • CARDIAC CATHETERIZATION N/A 8/11/2023    Procedure: Cardiac Coronary Angiogram;  Surgeon: Rubi Ivory DO;  [facility];  Service: Cardiology   • CARDIAC CATHETERIZATION N/A 8/11/2023    Procedure: Cardiac pci;  Surgeon: Rubi Ivory DO;  [facility];  Service: Cardiology   • CARDIAC CATHETERIZATION N/A 8/11/2023    Procedure: Cardiac other - IVUS;  Surgeon: Rubi Ivory DO;  [facility];  Service: Cardiology   • CHOLECYSTECTOMY     • COLONOSCOPY  10/2016    sessile serrated polyp removed from the proximal transverse, adenoma removed from the distal transverse   • EGD  08/2017    gastritis, H. pylori negative, and Candida esophagitis   • EGD  11/2011    slight Schatzki's ring, small hiatal hernia   • EXTERNAL EAR SURGERY Left 08/2017    for the removal of skin melanoma-Plastic surgery       Family History   Problem Relation Age of Onset   • Hyperlipidemia Mother    • Stroke Father    • Melanoma Father    • Diabetes Sister    • Hypertension Sister    • Diabetes Brother    • JETT disease Brother          Medications have been verified.        Objective   /88   Pulse 82   Temp 97.8 °F (36.6 °C)   Resp 18   Ht 5' 11\" (1.803 m)   Wt 92.5 kg (204 lb)   SpO2 97%   BMI 28.45 kg/m²   No LMP for male patient.       Physical Exam     Physical Exam  Vitals and nursing note reviewed.   Constitutional:       Appearance: Normal appearance.   HENT:      Head: Normocephalic and atraumatic.   Cardiovascular:      Rate and Rhythm: " Normal rate.   Pulmonary:      Effort: Pulmonary effort is normal.   Musculoskeletal:      Comments: No midline vertebral tenderness with palpation.  There is right lumbar paraspinal muscle tenderness and tightness which extends laterally.  Lower extremity muscle strength 5/5 bilaterally throughout.  Lower extremity patellar reflex 2+ bilaterally, Achilles reflex 1+ bilaterally   Skin:     General: Skin is warm.   Neurological:      Mental Status: He is alert.     Xray independently reviewed by me contemporaneously as degernative changes, no acute osseous abnormality. Awaiting radiology interpretation.

## 2025-02-02 ENCOUNTER — RESULTS FOLLOW-UP (OUTPATIENT)
Dept: URGENT CARE | Facility: MEDICAL CENTER | Age: 70
End: 2025-02-02

## 2025-02-06 ENCOUNTER — PATIENT MESSAGE (OUTPATIENT)
Dept: ENDOCRINOLOGY | Facility: CLINIC | Age: 70
End: 2025-02-06

## 2025-02-12 ENCOUNTER — EVALUATION (OUTPATIENT)
Dept: PHYSICAL THERAPY | Facility: HOME HEALTHCARE | Age: 70
End: 2025-02-12
Payer: MEDICARE

## 2025-02-12 DIAGNOSIS — G89.29 CHRONIC RIGHT-SIDED LOW BACK PAIN WITH RIGHT-SIDED SCIATICA: Primary | ICD-10-CM

## 2025-02-12 DIAGNOSIS — M54.41 CHRONIC RIGHT-SIDED LOW BACK PAIN WITH RIGHT-SIDED SCIATICA: Primary | ICD-10-CM

## 2025-02-12 PROCEDURE — 97110 THERAPEUTIC EXERCISES: CPT | Performed by: PHYSICAL THERAPIST

## 2025-02-12 PROCEDURE — 97162 PT EVAL MOD COMPLEX 30 MIN: CPT | Performed by: PHYSICAL THERAPIST

## 2025-02-12 NOTE — LETTER
2025    Desiree Kearney MD  [address]  [address]  [facility] [address]    Patient: Fernie March   YOB: 1955   Date of Visit: 2025     Encounter Diagnosis     ICD-10-CM    1. Chronic right-sided low back pain with right-sided sciatica  M54.41     G89.29           Dear Dr. Kearney:    Thank you for your recent referral of Fernie March. Please review the attached evaluation summary from Fernie's recent visit.     Please verify that you agree with the plan of care by signing the attached order.     If you have any questions or concerns, please do not hesitate to call.     I sincerely appreciate the opportunity to share in the care of one of your patients and hope to have another opportunity to work with you in the near future.       Sincerely,    Ronel Anderson, PT      Referring Provider:      I certify that I have read the below Plan of Care and certify the need for these services furnished under this plan of treatment while under my care.                    Desiree Kearney MD  [address]  [address]  [facility] [address]  Via Fax: [phone]          PT Evaluation     Today's date: 2025  Patient name: Fernie March  : 1955  MRN: [id number]  Referring provider: Desiree Kearney MD  Dx:   Encounter Diagnosis     ICD-10-CM    1. Chronic right-sided low back pain with right-sided sciatica  M54.41     G89.29                      Assessment  Impairments: abnormal gait, abnormal or restricted ROM, abnormal movement, activity intolerance, impaired balance, impaired physical strength, lacks appropriate home exercise program, pain with function, weight-bearing intolerance, poor posture , poor body mechanics, activity limitations and endurance    Assessment details: Pt Fernie March is a 69 y.o. who presents to OPPT with s/s consistent with L/S pain with radiating symptoms into R LE. Pt presents with limited L/S mobility and core  "strength deficits, decreased R LE ROM and strength, impaired soft tissue mobility/limited flexibility, decreased postural awareness, and gait/balance dysfunctions. Pt currently reports limitations with all daily activities due to increased pain and symptoms worse at night with difficulty sleeping. Pt would benefit from skilled therapy services to address outlined impairments, work towards goals, and restore pts PLOF. Thank you!     Goals  STGs to be achieved in 4 weeks:  -Pt to demonstrate reduced subjective pain rating \"at worst\" by at least 2-3 points from Initial Eval to allow for reduced pain with ADLs and improved functional activity tolerance.   -Pt to demonstrate L/S ROM improved WFL in order to maximize joint mobility and function and allow for progression of exercise program and achievement of goals.   -Pt to demonstrate increased MMT of RLE by at least 1/2 grade in order to improve safety and stability with ADLs and functional mobility.     LTGs to be achieved upon discharge:   -Pt will be I with HEP in order to continue to improve quality of life and independence and reduce risk for re-injury.   -Pt to demonstrate return to activities of daily living without limiations or restrictions.   -Pt will return to ambulation > 1 hour to help facilitate return to community activities independently   -Pt to demonstrate improved function as noted by achieving or exceeding predicted score on FOTO outcomes assessment tool.          Plan  Patient would benefit from: skilled physical therapy  Planned modality interventions: cryotherapy and thermotherapy: hydrocollator packs    Planned therapy interventions: abdominal trunk stabilization, balance, manual therapy, neuromuscular re-education, patient education, postural training, therapeutic activities, therapeutic exercise, home exercise program, flexibility and functional ROM exercises    Frequency: 2x week  Duration in weeks: 12  Plan of Care beginning date: " 2/12/2025  Plan of Care expiration date: 5/7/2025  Treatment plan discussed with: patient and referring physician        Subjective Evaluation    History of Present Illness  Mechanism of injury: Pt reporting that he began to have pain 1-2 months ago and thinks it was caused from chronic coughing when sick.  He went to chiropractor for adjustment which did help for a few hours but pain came back.  He went to urgent care and had x-rays taken which were (+) for L/S DJD.  He was given steroids which did help reduce symptoms but now that he is done with the steroids the pain is coming back.  PCP mentioned possibility of seeing a spinal specialist if symptoms persist.  Referral to OPPT for conservative management of s/s at this time.   Quality of life: good    Patient Goals  Patient goals for therapy: increased motion, decreased pain, increased strength and independence with ADLs/IADLs    Pain  At best pain rating: 3  At worst pain rating: 10  Quality: dull ache, tight, throbbing and sharp  Relieving factors: medications and heat    Social Support  Steps to enter house: yes  Lives with: spouse      Diagnostic Tests  X-ray: abnormal  Treatments  Previous treatment: chiropractic  Current treatment: physical therapy        Objective     Postural Observations  Seated posture: fair      Tenderness     Right Hip   Tenderness in the greater trochanter.     Neurological Testing     Sensation     Lumbar   Left   Intact: light touch    Right   Intact: light touch    Hip   Left Hip   Intact: light touch    Right Hip   Intact: light touch    Active Range of Motion     Lumbar   Flexion:  Restriction level: moderate  Extension:  Restriction level: moderate  Left lateral flexion:  Restriction level: moderate  Right lateral flexion:  Restriction level: moderate  Left Hip   Flexion: 115 degrees   Abduction: 35 degrees     Right Hip   Flexion: 95 degrees with pain  Abduction: 30 degrees with pain    Strength/Myotome Testing     Left Hip    Normal muscle strength    Right Hip   Planes of Motion   Flexion: 3-  Abduction: 3+  Adduction: 3+    Tests     Lumbar     Left   Negative crossed SLR.     Right   Negative crossed SLR.     Right Hip   Positive modified Tre and piriformis.   Modified Bereket: Positive.   SLR: Positive.     Ambulation     Ambulation: Stairs   Pattern: non-reciprocal  Railings: one rail  Pattern: non-reciprocal  Railings: one rail             Re-eval Date: 3/12     Precautions: None     Visit Count 1           Manuals 2/12       R quad, ITB, HS                                 Neuro Re-Ed         PPT        PPT with maddi         TA        TA + OH lifts        MTP/LTP        Palloff press                 Ther Ex        NuStep for endurance/ conditioning         Standing hip flex/abd/ext        Squats         Step-ups         LTR        SKTC        SLR        S/L SLR        Clamshells         Heel walk outs         Bridges         Self quad stretch  Standing vs prone   HEP       Self HS stretch  Seated   HEP                Ther Activity                        Gait Training                        Modalities

## 2025-02-12 NOTE — PROGRESS NOTES
"PT Evaluation     Today's date: 2025  Patient name: Fernie March  : 1955  MRN: [id number]  Referring provider: Desiree Kearney MD  Dx:   Encounter Diagnosis     ICD-10-CM    1. Chronic right-sided low back pain with right-sided sciatica  M54.41     G89.29                      Assessment  Impairments: abnormal gait, abnormal or restricted ROM, abnormal movement, activity intolerance, impaired balance, impaired physical strength, lacks appropriate home exercise program, pain with function, weight-bearing intolerance, poor posture , poor body mechanics, activity limitations and endurance    Assessment details: Pt Fernie March is a 69 y.o. who presents to OPPT with s/s consistent with L/S pain with radiating symptoms into R LE. Pt presents with limited L/S mobility and core strength deficits, decreased R LE ROM and strength, impaired soft tissue mobility/limited flexibility, decreased postural awareness, and gait/balance dysfunctions. Pt currently reports limitations with all daily activities due to increased pain and symptoms worse at night with difficulty sleeping. Pt would benefit from skilled therapy services to address outlined impairments, work towards goals, and restore pts PLOF. Thank you!     Goals  STGs to be achieved in 4 weeks:  -Pt to demonstrate reduced subjective pain rating \"at worst\" by at least 2-3 points from Initial Eval to allow for reduced pain with ADLs and improved functional activity tolerance.   -Pt to demonstrate L/S ROM improved WFL in order to maximize joint mobility and function and allow for progression of exercise program and achievement of goals.   -Pt to demonstrate increased MMT of RLE by at least 1/2 grade in order to improve safety and stability with ADLs and functional mobility.     LTGs to be achieved upon discharge:   -Pt will be I with HEP in order to continue to improve quality of life and independence and reduce risk for re-injury.   -Pt to demonstrate " return to activities of daily living without limiations or restrictions.   -Pt will return to ambulation > 1 hour to help facilitate return to community activities independently   -Pt to demonstrate improved function as noted by achieving or exceeding predicted score on FOTO outcomes assessment tool.          Plan  Patient would benefit from: skilled physical therapy  Planned modality interventions: cryotherapy and thermotherapy: hydrocollator packs    Planned therapy interventions: abdominal trunk stabilization, balance, manual therapy, neuromuscular re-education, patient education, postural training, therapeutic activities, therapeutic exercise, home exercise program, flexibility and functional ROM exercises    Frequency: 2x week  Duration in weeks: 12  Plan of Care beginning date: 2/12/2025  Plan of Care expiration date: 5/7/2025  Treatment plan discussed with: patient and referring physician        Subjective Evaluation    History of Present Illness  Mechanism of injury: Pt reporting that he began to have pain 1-2 months ago and thinks it was caused from chronic coughing when sick.  He went to chiropractor for adjustment which did help for a few hours but pain came back.  He went to urgent care and had x-rays taken which were (+) for L/S DJD.  He was given steroids which did help reduce symptoms but now that he is done with the steroids the pain is coming back.  PCP mentioned possibility of seeing a spinal specialist if symptoms persist.  Referral to OPPT for conservative management of s/s at this time.   Quality of life: good    Patient Goals  Patient goals for therapy: increased motion, decreased pain, increased strength and independence with ADLs/IADLs    Pain  At best pain rating: 3  At worst pain rating: 10  Quality: dull ache, tight, throbbing and sharp  Relieving factors: medications and heat    Social Support  Steps to enter house: yes  Lives with: spouse      Diagnostic Tests  X-ray:  abnormal  Treatments  Previous treatment: chiropractic  Current treatment: physical therapy        Objective     Postural Observations  Seated posture: fair      Tenderness     Right Hip   Tenderness in the greater trochanter.     Neurological Testing     Sensation     Lumbar   Left   Intact: light touch    Right   Intact: light touch    Hip   Left Hip   Intact: light touch    Right Hip   Intact: light touch    Active Range of Motion     Lumbar   Flexion:  Restriction level: moderate  Extension:  Restriction level: moderate  Left lateral flexion:  Restriction level: moderate  Right lateral flexion:  Restriction level: moderate  Left Hip   Flexion: 115 degrees   Abduction: 35 degrees     Right Hip   Flexion: 95 degrees with pain  Abduction: 30 degrees with pain    Strength/Myotome Testing     Left Hip   Normal muscle strength    Right Hip   Planes of Motion   Flexion: 3-  Abduction: 3+  Adduction: 3+    Tests     Lumbar     Left   Negative crossed SLR.     Right   Negative crossed SLR.     Right Hip   Positive modified Tre and piriformis.   Modified Bereket: Positive.   SLR: Positive.     Ambulation     Ambulation: Stairs   Pattern: non-reciprocal  Railings: one rail  Pattern: non-reciprocal  Railings: one rail             Re-eval Date: 3/12     Precautions: None     Visit Count 1           Manuals 2/12       R quad, ITB, HS                                 Neuro Re-Ed         PPT        PPT with maddi         TA        TA + OH lifts        MTP/LTP        Palloff press                 Ther Ex        NuStep for endurance/ conditioning         Standing hip flex/abd/ext        Squats         Step-ups         LTR        SKTC        SLR        S/L SLR        Clamshells         Heel walk outs         Bridges         Self quad stretch  Standing vs prone   HEP       Self HS stretch  Seated   HEP                Ther Activity                        Gait Training                        Modalities

## 2025-02-19 ENCOUNTER — OFFICE VISIT (OUTPATIENT)
Dept: PHYSICAL THERAPY | Facility: HOME HEALTHCARE | Age: 70
End: 2025-02-19
Payer: MEDICARE

## 2025-02-19 DIAGNOSIS — M54.41 CHRONIC RIGHT-SIDED LOW BACK PAIN WITH RIGHT-SIDED SCIATICA: Primary | ICD-10-CM

## 2025-02-19 DIAGNOSIS — G89.29 CHRONIC RIGHT-SIDED LOW BACK PAIN WITH RIGHT-SIDED SCIATICA: Primary | ICD-10-CM

## 2025-02-19 PROCEDURE — 97140 MANUAL THERAPY 1/> REGIONS: CPT

## 2025-02-19 PROCEDURE — 97110 THERAPEUTIC EXERCISES: CPT

## 2025-02-19 PROCEDURE — 97112 NEUROMUSCULAR REEDUCATION: CPT

## 2025-02-19 NOTE — PROGRESS NOTES
"Daily Note     Today's date: 2025  Patient name: Fernie March  : 1955  MRN: [id number]  Referring provider: Desiree Kearney MD  Dx: No diagnosis found.    Start Time: 915          Subjective: I have pain at my R LB and thigh.     Objective: See treatment diary below    Assessment:  Pt with fairly good chance to session. Pt requires continuous vc's for correct form and to concentrate on core strength and lumbar stability as pt tends to compensate in all positions of ex. Pt with R HS and Quad tightness noted with MT. Pt denied R thigh pain and reports reduced LBP at end of session. Patient would benefit from continued PT    Plan: Continue per plan of care.      Re-eval Date: 3/12     Precautions: None     Visit Count 1 2          Manuals -      R quad, ITB, HS   Gentle  HS and SL Quad                                Neuro Re-Ed         PPT  3\" x10      PPT with marches         TA  5\" x10      TA + OH lifts        MTP/LTP  Red 1x10      Palloff press   Red 1x10              Ther Ex        NuStep for endurance/ conditioning   L2  10'      Standing hip flex/abd/ext  Flex/ext  1x10 Erinn      Squats         Step-ups   C 1x10  R only      Hip add  Seated w/no   LB support  5\" x10      Hip Abd  Seated w/no   LB support  Red 1x10      LTR  5\" x10      SKTC  5\" x5 erinn      SLR        S/L SLR        Clamshells         Heel walk outs         Bridges         Self quad stretch  Standing vs prone   HEP       Self HS stretch  Seated   HEP                Ther Activity                        Gait Training                        Modalities                                "

## 2025-02-21 ENCOUNTER — OFFICE VISIT (OUTPATIENT)
Dept: PHYSICAL THERAPY | Facility: HOME HEALTHCARE | Age: 70
End: 2025-02-21
Payer: MEDICARE

## 2025-02-21 DIAGNOSIS — G89.29 CHRONIC RIGHT-SIDED LOW BACK PAIN WITH RIGHT-SIDED SCIATICA: Primary | ICD-10-CM

## 2025-02-21 DIAGNOSIS — I21.4 NON-STEMI (NON-ST ELEVATED MYOCARDIAL INFARCTION) (HCC): ICD-10-CM

## 2025-02-21 DIAGNOSIS — M54.41 CHRONIC RIGHT-SIDED LOW BACK PAIN WITH RIGHT-SIDED SCIATICA: Primary | ICD-10-CM

## 2025-02-21 PROCEDURE — 97112 NEUROMUSCULAR REEDUCATION: CPT

## 2025-02-21 PROCEDURE — 97140 MANUAL THERAPY 1/> REGIONS: CPT

## 2025-02-21 PROCEDURE — 97110 THERAPEUTIC EXERCISES: CPT

## 2025-02-21 NOTE — PROGRESS NOTES
"Daily Note     Today's date: 2025  Patient name: Fernie March  : 1955  MRN: [id number]  Referring provider: Desiree Kearney MD  Dx:   Encounter Diagnosis     ICD-10-CM    1. Chronic right-sided low back pain with right-sided sciatica  M54.41     G89.29           Start Time: 0943  Stop Time: 1036  Total time in clinic (min): 53 minutes    Subjective: Pt stated that his R LS pain is 5/10, experiencing radicular pain in R thigh and knee.  Pt states that after last session, he experienced n/t in R foot that lasted for an hour and then subsided.        Objective: See treatment diary below      Assessment: Pt tolerated treatment session w/ minimal occurrences of intermittent radicular sx in RLE.  PT was able to provide mvmts, stretches, and exercises to reduce RLE radicular sx and provided pt w/ Piriformis stretch to perform at home to reduce RLE muscular tightness.      Plan: Continue per plan of care.      Re-eval Date: 3/12     Precautions: None     Visit Count 1 2          Manuals -2025       R quad, ITB, HS   Gentle  HS and SL Quad   DF  L HS, Piri                             Neuro Re-Ed   -2025       PPT  3\" x10 Standing 3\" x15 w/ cueing     PPT with marches    Standing x15 w/ cueing      TA  5\" x10      TA + OH lifts        MTP/LTP  Red 1x10 Green x20 ea     Palloff press   Red 1x10 Green 3\"x10 ea b/l             L Sidebend when radicular sx occurring in RLE.   5\" x10 w/ bracing of // bar                     Ther Ex        NuStep for endurance/ conditioning   L2  10' L4 10'     Standing hip flex/abd/ext  Flex/ext  1x10 Reed Flex Ext 1x15 b/l     Squats         Step-ups   C 1x10  R only D 1x15  B/l     Hip add  Seated w/no   LB support  5\" x10 Supine 3\"x15     Hip Abd  Seated w/no   LB support  Red 1x10      LTR  5\" x10 5\" x10 ea b/l     SKTC  5\" x5 reed      SLR        S/L SLR        Clamshells         Heel walk outs         Bridges    3\"x10     Self quad stretch  Standing vs " prone   HEP       Self HS stretch  Seated   HEP                Education on Self Piriformis Stretch   DF             Ther Activity                        Gait Training                        Modalities

## 2025-02-21 NOTE — TELEPHONE ENCOUNTER
Medication: Ezetimibe 10 mg     Dose/Frequency: 1 tab at bedtime     Quantity: 90    Pharmacy: [facility]     Office:   [] PCP/Provider -   [x] Speciality/Provider -     Does the patient have enough for 3 days?   [x] Yes   [] No - Send as HP to POD

## 2025-02-24 RX ORDER — EZETIMIBE 10 MG/1
10 TABLET ORAL
Qty: 90 TABLET | Refills: 31 | Status: SHIPPED | OUTPATIENT
Start: 2025-02-24

## 2025-02-25 ENCOUNTER — OFFICE VISIT (OUTPATIENT)
Dept: PHYSICAL THERAPY | Facility: HOME HEALTHCARE | Age: 70
End: 2025-02-25
Payer: MEDICARE

## 2025-02-25 DIAGNOSIS — G89.29 CHRONIC RIGHT-SIDED LOW BACK PAIN WITH RIGHT-SIDED SCIATICA: Primary | ICD-10-CM

## 2025-02-25 DIAGNOSIS — M54.41 CHRONIC RIGHT-SIDED LOW BACK PAIN WITH RIGHT-SIDED SCIATICA: Primary | ICD-10-CM

## 2025-02-25 PROCEDURE — 97110 THERAPEUTIC EXERCISES: CPT

## 2025-02-25 PROCEDURE — 97112 NEUROMUSCULAR REEDUCATION: CPT

## 2025-02-25 NOTE — PROGRESS NOTES
"Daily Note     Today's date: 2025  Patient name: Fernie March  : 1955  MRN: [id number]  Referring provider: Desiree Kearney MD  Dx:   Encounter Diagnosis     ICD-10-CM    1. Chronic right-sided low back pain with right-sided sciatica  M54.41     G89.29           Start Time: 08  Stop Time: 930  Total time in clinic (min): 48 minutes    Subjective:  Pt stated that presenting to the clinic he had minimal pain in his R quad.  He states that he has the most difficulty lifting his RLE into his car.  Pt states that if he is seated for longer periods of time that he experiences buckling in R knee when standing up.       Objective: See treatment diary below      Assessment: Pt tolerated treatment session w/o increase in RLE pain or LBP.  Pt was able to perform increased resistance during exercises w/ Tbands.  Pt to continue performing self-stretching at clinic and at home to decrease muscular tightness.      Plan: Continue per plan of care.      Re-eval Date: 3/12     Precautions: None     Visit Count 1 2 3 4        Manuals -2025      R quad, ITB, HS   Gentle  HS and SL Quad   DF  L HS, Piri                             Neuro Re-Ed   -2025       PPT  3\" x10 Standing 3\" x15 w/ cueing Standing 3\" x15 w/ cueing    PPT with marches    Standing x15 w/ cueing  Standing x15 ea b/l w/ PPT hold     TA  5\" x10      TA + OH lifts        MTP/LTP  Red 1x10 Green x20 ea Blue x20 ea    Palloff press   Red 1x10 Green 3\"x10 ea b/l Blue 3\"x10 ea b/l            L Sidebend when radicular sx occurring in RLE.   5\" x10 w/ bracing of // bar                     Ther Ex        NuStep for endurance/ conditioning   L2  10' L4 10' L4 10'    Standing hip flex/abd/ext  Flex/ext  1x10 Reed Flex Ext 1x15 b/l Flex Ext 1x15 b/l    Squats         Step-ups   C 1x10  R only D 1x15  B/l D 1x15  B/l    Hip add  Seated w/no   LB support  5\" x10 Supine 3\"x15     Hip Abd  Seated w/no   LB support  Red 1x10    " "  LTR  5\" x10 5\" x10 ea b/l     SKTC  5\" x5 erinn      SLR        S/L SLR        Clamshells         Heel walk outs         Bridges    3\"x10     Self quad stretch  Standing vs prone   HEP   RLE 3x20\"  Supine    Self HS stretch  Seated   HEP    RLE 3x20\"  Seated on table            Education on Self Piriformis Stretch   DF RLE 3x20\"            Ther Activity                        Gait Training                        Modalities                                    "

## 2025-02-28 ENCOUNTER — OFFICE VISIT (OUTPATIENT)
Dept: PHYSICAL THERAPY | Facility: HOME HEALTHCARE | Age: 70
End: 2025-02-28
Payer: MEDICARE

## 2025-02-28 DIAGNOSIS — G89.29 CHRONIC RIGHT-SIDED LOW BACK PAIN WITH RIGHT-SIDED SCIATICA: Primary | ICD-10-CM

## 2025-02-28 DIAGNOSIS — M54.41 CHRONIC RIGHT-SIDED LOW BACK PAIN WITH RIGHT-SIDED SCIATICA: Primary | ICD-10-CM

## 2025-02-28 PROCEDURE — 97112 NEUROMUSCULAR REEDUCATION: CPT

## 2025-02-28 PROCEDURE — 97110 THERAPEUTIC EXERCISES: CPT

## 2025-02-28 NOTE — PROGRESS NOTES
"Daily Note     Today's date: 2025  Patient name: Fernie March  : 1955  MRN: [id number]  Referring provider: Desiree Kearney MD  Dx:   Encounter Diagnosis     ICD-10-CM    1. Chronic right-sided low back pain with right-sided sciatica  M54.41     G89.29           Start Time: 0920  Stop Time: 1000  Total time in clinic (min): 40 minutes    Subjective: Pt states that he had stiffness in LS and RLE but no radicular sx in RLE.  Has improved ability to get into car, however still having instances of difficulty.       Objective: See treatment diary below      Assessment:  Pt tolerated treatment session w/o increase in LBP or RLE pain.  Pt required cueing for exercise form and reduction of holding breath while performing exercises, however decreased from previous sessions.  PT had pt perform self-stretching w/ recall of stretches prior to performing, pt was able to remember the stretches, continue cognitive questioning of HEP for pt.  Pt to continue performing core strengthening exercises, increase difficulty depending on pt's pain levels next session.        Plan: Continue per plan of care.      Re-eval Date: 3/12     Precautions: None     Visit Count 1 2 3 4 5       Manuals -2025     R quad, ITB, HS   Gentle  HS and SL Quad   DF  L HS, Piri  Self-Stretching                           Neuro Re-Ed   -2025     PPT  3\" x10 Standing 3\" x15 w/ cueing Standing 3\" x15 w/ cueing Standing 3\" x15 w/ cueing   PPT with marches    Standing x15 w/ cueing  Standing x15 ea b/l w/ PPT hold  Standing x15 ea b/l w/ PPT hold    TA  5\" x10      TA + OH lifts        MTP/LTP  Red 1x10 Green x20 ea Blue x20 ea Blue x20 ea   Palloff press   Red 1x10 Green 3\"x10 ea b/l Blue 3\"x10 ea b/l Blue 5\"x10 ea b/l           L Sidebend when radicular sx occurring in RLE.   5\" x10 w/ bracing of // bar                     Ther Ex        NuStep for endurance/ conditioning   L2  10' L4 " "10' L4 10' L5 10'   Standing hip flex/abd/ext  Flex/ext  1x10 Reed Flex Ext 1x15 b/l Flex Ext 1x15 b/l Flex Ext 1x15 b/l   Squats      x10   Step-ups   C 1x10  R only D 1x15  B/l D 1x15  B/l D 1x15 each  B/l   Hip add  Seated w/no   LB support  5\" x10 Supine 3\"x15  Supine 3\"x15   Hip Abd  Seated w/no   LB support  Red 1x10      LTR  5\" x10 5\" x10 ea b/l  5\" x10 ea b/l   SKTC  5\" x5 reed      SLR        S/L SLR        Clamshells         Heel walk outs         Bridges    3\"x10  5\"x10   Self quad stretch  Standing vs prone   HEP   RLE 3x20\"  Supine B/L 3x20\" ea supine   Self HS stretch  Seated   HEP    RLE 3x20\"  Seated on table B/L 3x20\" ea seated in chair           Education on Self Piriformis Stretch   DF RLE 3x20\" B/L 3x20\" ea seated in chair           Ther Activity                        Gait Training                        Modalities                                      "

## 2025-03-03 DIAGNOSIS — I21.4 NON-STEMI (NON-ST ELEVATED MYOCARDIAL INFARCTION) (HCC): ICD-10-CM

## 2025-03-03 NOTE — TELEPHONE ENCOUNTER
Medication: metoprolol tartrate    Dose/Frequency: 25 mg every 12 hours    Quantity: 200    Pharmacy: [facility]    Office:   [] PCP/Provider -   [x] Speciality/Provider - Caitlin COLEMAN    Does the patient have enough for 3 days?   [x] Yes   [] No - Send as HP to POD

## 2025-03-04 ENCOUNTER — TELEPHONE (OUTPATIENT)
Age: 70
End: 2025-03-04

## 2025-03-04 ENCOUNTER — OFFICE VISIT (OUTPATIENT)
Dept: PHYSICAL THERAPY | Facility: HOME HEALTHCARE | Age: 70
End: 2025-03-04
Payer: MEDICARE

## 2025-03-04 DIAGNOSIS — M54.41 CHRONIC RIGHT-SIDED LOW BACK PAIN WITH RIGHT-SIDED SCIATICA: Primary | ICD-10-CM

## 2025-03-04 DIAGNOSIS — G89.29 CHRONIC RIGHT-SIDED LOW BACK PAIN WITH RIGHT-SIDED SCIATICA: Primary | ICD-10-CM

## 2025-03-04 PROCEDURE — 97112 NEUROMUSCULAR REEDUCATION: CPT

## 2025-03-04 PROCEDURE — 97110 THERAPEUTIC EXERCISES: CPT

## 2025-03-04 NOTE — TELEPHONE ENCOUNTER
Pt under care of: Dr. Baldemar Niño  Office Location: [facility]    Last Seen (include Follow Up recommendations of last visit- see Office Visit - Instructions): Follow-up in 6 months with another PSA     Pt calling due to: scheduling 6 month f/u, but nothing available in July. Patient was scheduled for September.    Active Symptoms?  Explain: none    Pt can be reached at: [phone]    Appointment Details  Date: 9/ 3/25  Time: 1030 am     Location: [facility]   Provider: Dr. Niño    Does the appointment need further review? (Reason) No

## 2025-03-04 NOTE — PROGRESS NOTES
"Daily Note     Today's date: 3/4/2025  Patient name: Fernie March  : 1955  MRN: [id number]  Referring provider: Desiree Kearney MD  Dx:   Encounter Diagnosis     ICD-10-CM    1. Chronic right-sided low back pain with right-sided sciatica  M54.41     G89.29           Start Time: 920  Stop Time: 958  Total time in clinic (min): 38 minutes    Subjective: Pt stated that his LBP has improved and he isn't getting n/t in RLE, however w/ increased activity [place] the [place] yesterday, he experienced some discomfort in R dorsal foot.  Pt has been performing piriformis stretches at home diligently.        Objective: See treatment diary below      Assessment: Pt tolerated treatment session w/o increase in LBP and no instances of radicular sx in RLE.  Pt had no complaints of R foot pain during session.  Pt was able to perform increased holds and increased resistance during exercises.  Continue progressing pt appropriately.        Plan: Continue per plan of care.      Re-eval Date: 3/12     Precautions: None     Visit Count 6 2 3 4 5       Manuals 3/4 2-2025     R quad, ITB, HS   Gentle  HS and SL Quad   DF  L HS, Piri  Self-Stretching                           Neuro Re-Ed  3/4 2-2025     PPT  3\" x10 Standing 3\" x15 w/ cueing Standing 3\" x15 w/ cueing Standing 3\" x15 w/ cueing   PPT with marches  1x15 each  B/l  Standing x15 w/ cueing  Standing x15 ea b/l w/ PPT hold  Standing x15 ea b/l w/ PPT hold    TA  5\" x10      TA + OH lifts        MTP/LTP Blue x20 ea 2\" hold Red 1x10 Green x20 ea Blue x20 ea Blue x20 ea   Palloff press  Blue 5\"x15 ea b/l Red 1x10 Green 3\"x10 ea b/l Blue 3\"x10 ea b/l Blue 5\"x10 ea b/l           L Sidebend when radicular sx occurring in RLE.   5\" x10 w/ bracing of // bar                     Ther Ex        NuStep for endurance/ conditioning  L5 10' L2  10' L4 10' L4 10' L5 10'   Standing hip flex/abd/ext Flex/ABD 1x15 ea b/l " "Flex/ext  1x10 Reed Flex Ext 1x15 b/l Flex Ext 1x15 b/l Flex Ext 1x15 b/l   Squats  x15    x10           Step-ups  D 1x15 each  B/l C 1x10  R only D 1x15  B/l D 1x15  B/l D 1x15 each  B/l   Hip add  Seated w/no   LB support 5\"x15 5\" x10 Supine 3\"x15  Supine 3\"x15   Hip Abd  Seated w/no   LB support Green 1x15 Red 1x10      LTR  5\" x10 5\" x10 ea b/l  5\" x10 ea b/l   SKTC  5\" x5 reed      SLR        S/L SLR        Clamshells         Heel walk outs         Bridges    3\"x10  5\"x10   Self quad stretch  Standing vs prone   HEP   RLE 3x20\"  Supine B/L 3x20\" ea supine   Self HS stretch  Seated   HEP    RLE 3x20\"  Seated on table B/L 3x20\" ea seated in chair           Education on Self Piriformis Stretch   DF RLE 3x20\" B/L 3x20\" ea seated in chair           Ther Activity                        Gait Training                        Modalities                                        "

## 2025-03-05 RX ORDER — METOPROLOL TARTRATE 25 MG/1
25 TABLET, FILM COATED ORAL EVERY 12 HOURS
Qty: 180 TABLET | Refills: 1 | Status: SHIPPED | OUTPATIENT
Start: 2025-03-05 | End: 2025-03-06 | Stop reason: SDUPTHER

## 2025-03-06 DIAGNOSIS — I21.4 NON-STEMI (NON-ST ELEVATED MYOCARDIAL INFARCTION) (HCC): ICD-10-CM

## 2025-03-06 RX ORDER — METOPROLOL TARTRATE 25 MG/1
25 TABLET, FILM COATED ORAL EVERY 12 HOURS
Qty: 180 TABLET | Refills: 1 | Status: SHIPPED | OUTPATIENT
Start: 2025-03-06

## 2025-03-06 NOTE — TELEPHONE ENCOUNTER
Reason for call: NOT A DUPLICATE. Sent to wrong Pharmacy Patient wanted this sent to Mail order pharmacy    [x] Refill   [] Prior Auth  [] Other:     Office:   [] PCP/Provider -   [x] Specialty/Provider - Cardio    Medication: metoprolol tartrate (LOPRESSOR) 25 mg tablet     Dose/Frequency: Take 1 tablet (25 mg total) by mouth every 12 (twelve) hours     Quantity: 180    Pharmacy: [facility] [address] [facility]   Does the patient have enough for 3 days?   [] Yes   [] No - Send as HP to POD    Mail Away Pharmacy   Does the patient have enough for 10 days?   [] Yes   [x] No - Send as HP to POD

## 2025-03-07 ENCOUNTER — OFFICE VISIT (OUTPATIENT)
Dept: PHYSICAL THERAPY | Facility: HOME HEALTHCARE | Age: 70
End: 2025-03-07
Payer: MEDICARE

## 2025-03-07 DIAGNOSIS — G89.29 CHRONIC RIGHT-SIDED LOW BACK PAIN WITH RIGHT-SIDED SCIATICA: Primary | ICD-10-CM

## 2025-03-07 DIAGNOSIS — M54.41 CHRONIC RIGHT-SIDED LOW BACK PAIN WITH RIGHT-SIDED SCIATICA: Primary | ICD-10-CM

## 2025-03-07 PROCEDURE — 97110 THERAPEUTIC EXERCISES: CPT

## 2025-03-07 PROCEDURE — 97112 NEUROMUSCULAR REEDUCATION: CPT

## 2025-03-07 NOTE — PROGRESS NOTES
"   Daily Note     Today's date: 3/7/2025  Patient name: Fernie March  : 1955  MRN: [id number]  Referring provider: Desiree Kearney MD  Dx:   Encounter Diagnosis     ICD-10-CM    1. Chronic right-sided low back pain with right-sided sciatica  M54.41     G89.29           Start Time: 920  Stop Time: 959  Total time in clinic (min): 39 minutes    Subjective: Pt states that he has no LBP and no radicular sx in RLE, however 2x yesterday after sitting for longer periods of time his R knee \"gave out.\"  Pt did not fall, but did have minor LOB during knee giving out.      Objective: See treatment diary below      Assessment: Pt tolerated treatment session w/o increase in LBP or sx in RLE, no LOB and knee giving out occurred.  PT monitored pt's sx w/ no changes.  Pt performed exercises requiring decreased cueing from previous sessions, still needs cueing for form and breathing during PPT.      Plan: Continue per plan of care.      Re-eval Date: 3/12     Precautions: None     Visit Count 6 7 3 4 5       Manuals 3/4 3/7 2025   2025   2025     R quad, ITB, HS   Gentle  HS and SL Quad   DF  L HS, Piri  Self-Stretching                           Neuro Re-Ed  3/4 3/7 2025    2025     PPT   Standing 3\" x15 w/ cueing Standing 3\" x15 w/ cueing Standing 3\" x15 w/ cueing   PPT with marches  1x15 each  B/l Standing x15 ea b/l w/ PPT hold  Standing x15 w/ cueing  Standing x15 ea b/l w/ PPT hold  Standing x15 ea b/l w/ PPT hold    TA        TA + OH lifts        MTP/LTP Blue x20 ea 2\" hold Blue x20 ea 2\" hold Green x20 ea Blue x20 ea Blue x20 ea   Palloff press  Blue 5\"x15 ea b/l Blue 5\"x15 ea b/l Green 3\"x10 ea b/l Blue 3\"x10 ea b/l Blue 5\"x10 ea b/l           L Sidebend when radicular sx occurring in RLE.   5\" x10 w/ bracing of // bar                     Ther Ex        NuStep for endurance/ conditioning  L5 10'  L4 10' L4 10' L5 10'   SBC  L1 10'      Standing hip flex/abd/ext Flex/ABD 1x15 ea b/l " "Flex/ABD  1x15 Reed Flex Ext 1x15 b/l Flex Ext 1x15 b/l Flex Ext 1x15 b/l   Squats  x15 x15   x10           Step-ups  D 1x15 each  B/l D 1x15 each  B/l D 1x15  B/l D 1x15  B/l D 1x15 each  B/l   Hip add  Seated w/no   LB support 5\"x15 5\"x15 Supine 3\"x15  Supine 3\"x15   Hip Abd  Seated w/no   LB support Green 1x15 Green 5\" hold 1x15      LTR   5\" x10 ea b/l  5\" x10 ea b/l   SKTC        SLR        S/L SLR        Clamshells         Heel walk outs         Bridges    3\"x10  5\"x10   Self quad stretch  Standing vs prone   HEP   RLE 3x20\"  Supine B/L 3x20\" ea supine   Self HS stretch  Seated   HEP  B/l 15\"x 3 ea  RLE 3x20\"  Seated on table B/L 3x20\" ea seated in chair           Education on Self Piriformis Stretch  B/L 15\"x3 ea DF RLE 3x20\" B/L 3x20\" ea seated in chair           Ther Activity                        Gait Training                        Modalities                                          "

## 2025-03-14 ENCOUNTER — OFFICE VISIT (OUTPATIENT)
Dept: PHYSICAL THERAPY | Facility: HOME HEALTHCARE | Age: 70
End: 2025-03-14
Payer: MEDICARE

## 2025-03-14 DIAGNOSIS — M54.41 CHRONIC RIGHT-SIDED LOW BACK PAIN WITH RIGHT-SIDED SCIATICA: Primary | ICD-10-CM

## 2025-03-14 DIAGNOSIS — G89.29 CHRONIC RIGHT-SIDED LOW BACK PAIN WITH RIGHT-SIDED SCIATICA: Primary | ICD-10-CM

## 2025-03-14 PROCEDURE — 97112 NEUROMUSCULAR REEDUCATION: CPT | Performed by: PHYSICAL THERAPIST

## 2025-03-14 PROCEDURE — 97140 MANUAL THERAPY 1/> REGIONS: CPT | Performed by: PHYSICAL THERAPIST

## 2025-03-14 PROCEDURE — 97110 THERAPEUTIC EXERCISES: CPT | Performed by: PHYSICAL THERAPIST

## 2025-03-14 NOTE — PROGRESS NOTES
"Daily Note     Today's date: 3/14/2025  Patient name: Fernie March  : 1955  MRN: [id number]  Referring provider: Desiree Kearney MD  Dx:   Encounter Diagnosis     ICD-10-CM    1. Chronic right-sided low back pain with right-sided sciatica  M54.41     G89.29                      Subjective: Pt reporting that he is feeling better but the R leg still feels tight.       Objective: See treatment diary below      Assessment: Pt with significant quad weakness and c/o tightness during SLR and LAQ added this date.  Updated HEP to include to focus on regaining quad strength as well as single leg butterfly stretch to target hip adductors.  Pt with improvement to flexibility but remaining deficits contributing to c/o tightness with daily activities; would benefit from continued therapy to address.     Plan: Continue per plan of care.      Re-eval Date: 3/12     Precautions: None     Visit Count 6 7 8 4 5       Manuals 3/4 3/7 3/14 2025   2025     R quad, ITB, HS   Gentle  HS and SL Quad   HS, ITB, quad   HZ   Self-Stretching                           Neuro Re-Ed  3/4 3/7   2025     PPT    Standing 3\" x15 w/ cueing Standing 3\" x15 w/ cueing   PPT with marches  1x15 each  B/l Standing x15 ea b/l w/ PPT hold   Standing x15 ea b/l w/ PPT hold  Standing x15 ea b/l w/ PPT hold    TA        TA + OH lifts        MTP/LTP Blue x20 ea 2\" hold Blue x20 ea 2\" hold Blue x 20 ea  Blue x20 ea Blue x20 ea   Palloff press  Blue 5\"x15 ea b/l Blue 5\"x15 ea b/l Blue 5\" x 15  R/L  Blue 3\"x10 ea b/l Blue 5\"x10 ea b/l           L Sidebend when radicular sx occurring in RLE.                        Ther Ex        NuStep for endurance/ conditioning  L5 10'  L5 10'  L4 10' L5 10'   SBC  L1 10'      Standing hip flex/abd/ext Flex/ABD 1x15 ea b/l Flex/ABD  1x15 Reed Flex/Abd   X 15 ea  Flex Ext 1x15 b/l Flex Ext 1x15 b/l   Squats  x15 x15 X 15   x10           Step-ups  D 1x15 each  B/l D 1x15 each  B/l D x 15 each reed  D 1x15  B/l " "D 1x15 each  B/l   Hip add  Seated w/no   LB support 5\"x15 5\"x15   Supine 3\"x15   Hip Abd  Seated w/no   LB support Green 1x15 Green 5\" hold 1x15 Green 5\" hold x 15      LTR   10\" holds x 5 erinn   5\" x10 ea b/l   SKTC        SLR   X 10 erinn   HEP      LAQ   X 10   HEP     X 10 with ER      S/L SLR        Clamshells         Heel walk outs         Bridges    3\" holds x 10   5\"x10   Self quad stretch  Standing vs prone   HEP   RLE 3x20\"  Supine B/L 3x20\" ea supine   Self HS stretch  Seated   HEP  B/l 15\"x 3 ea  RLE 3x20\"  Seated on table B/L 3x20\" ea seated in chair   Butterfly stretch    Supine - single leg at a time   30\" x 4 for HEP      Education on Self Piriformis Stretch  B/L 15\"x3 ea  RLE 3x20\" B/L 3x20\" ea seated in chair           Ther Activity                        Gait Training                        Modalities                                            "

## 2025-03-18 ENCOUNTER — OFFICE VISIT (OUTPATIENT)
Dept: PHYSICAL THERAPY | Facility: HOME HEALTHCARE | Age: 70
End: 2025-03-18
Payer: MEDICARE

## 2025-03-18 DIAGNOSIS — M54.41 CHRONIC RIGHT-SIDED LOW BACK PAIN WITH RIGHT-SIDED SCIATICA: Primary | ICD-10-CM

## 2025-03-18 DIAGNOSIS — G89.29 CHRONIC RIGHT-SIDED LOW BACK PAIN WITH RIGHT-SIDED SCIATICA: Primary | ICD-10-CM

## 2025-03-18 PROCEDURE — 97110 THERAPEUTIC EXERCISES: CPT

## 2025-03-18 PROCEDURE — 97112 NEUROMUSCULAR REEDUCATION: CPT

## 2025-03-18 NOTE — LETTER
2025    Desiree Kearney MD  [address]  [address]  [facility] [address]    Patient: Fernie March   YOB: 1955   Date of Visit: 3/18/2025     Encounter Diagnosis     ICD-10-CM    1. Chronic right-sided low back pain with right-sided sciatica  M54.41     G89.29           Dear Dr. Kearney:    Thank you for your recent referral of Fernie March. Please review the attached evaluation summary from Fernie's recent visit.     Please verify that you agree with the plan of care by signing the attached order.     If you have any questions or concerns, please do not hesitate to call.     I sincerely appreciate the opportunity to share in the care of one of your patients and hope to have another opportunity to work with you in the near future.       Sincerely,    Issac Baker, PT      Referring Provider:      I certify that I have read the below Plan of Care and certify the need for these services furnished under this plan of treatment while under my care.                    Desiree Kearney MD  [address]  [address]  [facility] [address]  Via Fax: [phone]          PT Evaluation     Today's date: 3/18/2025  Patient name: Fernie March  : 1955  MRN: [id number]  Referring provider: Desiree Kearney MD  Dx:   Encounter Diagnosis     ICD-10-CM    1. Chronic right-sided low back pain with right-sided sciatica  M54.41     G89.29           Start Time: 0700  Stop Time: 0743  Total time in clinic (min): 43 minutes    Assessment  Impairments: abnormal gait, abnormal or restricted ROM, activity intolerance, impaired physical strength, lacks appropriate home exercise program, pain with function, poor posture , poor body mechanics, activity limitations and endurance    Assessment details: Re-Eval 3/18/24:  Pt Fernie March is a 69 y.o. who presents to OPPT with s/s consistent with L/S pain with radiating symptoms into R LE.  Pt's sx have decreased and improved, experincing  "only intermitten RLE radicular sx w/o instance of R knee buckling.  Pt has improved P/AROM in RLE, however still having decrease soft tissue mobility/ flexibility.  Pt has increased amount of time WB and ambulating but still experiences soreness, fatigue around an hour. Pt currently reports limitations, but improved, with all daily activities due to increased pain and symptoms worse at night with difficulty sleeping. Pt would benefit from skilled therapy services to address outlined impairments, work towards goals, and restore pts PLOF. Thank you!     Pt Fernie March is a 69 y.o. who presents to OPPT with s/s consistent with L/S pain with radiating symptoms into R LE. Pt presents with limited L/S mobility and core strength deficits, decreased R LE ROM and strength, impaired soft tissue mobility/limited flexibility, decreased postural awareness, and gait/balance dysfunctions. Pt currently reports limitations with all daily activities due to increased pain and symptoms worse at night with difficulty sleeping. Pt would benefit from skilled therapy services to address outlined impairments, work towards goals, and restore pts PLOF. Thank you!     Goals  STGs to be achieved in 4 weeks:  -Pt to demonstrate reduced subjective pain rating \"at worst\" by at least 2-3 points from Initial Eval to allow for reduced pain with ADLs and improved functional activity tolerance.  -MET  -Pt to demonstrate L/S ROM improved WFL in order to maximize joint mobility and function and allow for progression of exercise program and achievement of goals. -progressing  -Pt to demonstrate increased MMT of RLE by at least 1/2 grade in order to improve safety and stability with ADLs and functional mobility.  -MET    LTGs to be achieved upon discharge:   -Pt will be I with HEP in order to continue to improve quality of life and independence and reduce risk for re-injury. -PROGRESSING  -Pt to demonstrate return to activities of daily living without " limiations or restrictions.  -PROGRESSING  -Pt will return to ambulation > 1 hour to help facilitate return to community activities independently -PROGRESSING  -Pt to demonstrate improved function as noted by achieving or exceeding predicted score on FOTO outcomes assessment tool. -PROGRESSING          Plan  Patient would benefit from: skilled physical therapy  Planned modality interventions: cryotherapy and thermotherapy: hydrocollator packs    Planned therapy interventions: abdominal trunk stabilization, balance, manual therapy, neuromuscular re-education, patient education, postural training, therapeutic activities, therapeutic exercise, home exercise program, flexibility and functional ROM exercises    Frequency: 2x week  Duration in weeks: 12  Plan of Care beginning date: 2/12/2025  Plan of Care expiration date: 5/7/2025  Treatment plan discussed with: patient and referring physician  Plan details: Continue POC focusing on addressing & improving impairments listed in eval.        Subjective Evaluation    History of Present Illness  Mechanism of injury: Re-Eval 3/18:  Still seeing chriopractor 1x every 3 weeks.  Pt states he experiences radicular sx in RLE but states that it has improved significantly, however still experiencing sx when ambulating.  No medication.    Pt reporting that he began to have pain 1-2 months ago and thinks it was caused from chronic coughing when sick.  He went to chiropractor for adjustment which did help for a few hours but pain came back.  He went to urgent care and had x-rays taken which were (+) for L/S DJD.  He was given steroids which did help reduce symptoms but now that he is done with the steroids the pain is coming back.  PCP mentioned possibility of seeing a spinal specialist if symptoms persist.  Referral to OPPT for conservative management of s/s at this time.   Quality of life: good    Patient Goals  Patient goals for therapy: increased motion, decreased pain, increased  "strength and independence with ADLs/IADLs    Pain  At best pain rating: 3  At worst pain ratin  Location: RLE  Quality: dull ache, tight, throbbing and sharp  Relieving factors: heat    Social Support  Steps to enter house: yes  Lives with: spouse      Diagnostic Tests  X-ray: abnormal  Treatments  Previous treatment: chiropractic  Current treatment: physical therapy        Objective     Postural Observations  Seated posture: fair      Neurological Testing     Sensation     Lumbar   Left   Intact: light touch    Right   Intact: light touch    Hip   Left Hip   Intact: light touch    Right Hip   Intact: light touch    Active Range of Motion     Lumbar   Flexion:  Restriction level: moderate  Extension:  Restriction level: moderate  Left lateral flexion:  Restriction level: moderate  Right lateral flexion:  Restriction level: moderate  Left Hip   Flexion: 115 degrees   Abduction: 35 degrees     Right Hip   Flexion: 102 degrees with pain  Abduction: 30 degrees with pain    Strength/Myotome Testing     Left Hip   Normal muscle strength    Right Hip   Planes of Motion   Flexion: 3+  Abduction: 4+  Adduction: 4    Left Knee   Normal strength    Right Knee   Normal strength    Tests     Lumbar     Left   Negative crossed SLR.     Right   Negative crossed SLR.     Right Hip   Positive modified Tre and piriformis.   Modified Bereket: Positive.   SLR: Positive.     Ambulation     Ambulation: Stairs   Pattern: non-reciprocal  Railings: one rail  Pattern: non-reciprocal  Railings: one rail      Flowsheet Rows      Flowsheet Row Most Recent Value   PT/OT G-Codes    Current Score 41   Projected Score 68               Re-eval Date: 3/12     Precautions: None     Visit Count 6 7 8 9  Re-Eval 5       Manuals 3/4 3/7 3/14 3/18   2025     R quad, ITB, HS   Gentle  HS and SL Quad   HS, ITB, quad   HZ   Self-Stretching                           Neuro Re-Ed  3/4 3/7   2025     PPT     Standing 3\" x15 w/ cueing   PPT with " "marches  1x15 each  B/l Standing x15 ea b/l w/ PPT hold    Standing x15 ea b/l w/ PPT hold    TA        TA + OH lifts        MTP/LTP Blue x20 ea 2\" hold Blue x20 ea 2\" hold Blue x 20 ea  Blue x 20 ea  Blue x20 ea   Palloff press  Blue 5\"x15 ea b/l Blue 5\"x15 ea b/l Blue 5\" x 15  R/L  Blue 5\" x 15  R/L  Blue 5\"x10 ea b/l           L Sidebend when radicular sx occurring in RLE.                        Ther Ex        NuStep for endurance/ conditioning  L5 10'  L5 10'  L5 10' L5 10'   SBC  L1 10'      Standing hip flex/abd/ext Flex/ABD 1x15 ea b/l Flex/ABD  1x15 Reed Flex/Abd   X 15 ea  Flex/Abd   X 20 ea  Flex Ext 1x15 b/l   Squats  x15 x15 X 15  X 15  x10           Step-ups  D 1x15 each  B/l D 1x15 each  B/l D x 15 each reed  D x 20 each reed  D 1x15 each  B/l   Hip add  Seated w/no   LB support 5\"x15 5\"x15   Supine 3\"x15   Hip Abd  Seated w/no   LB support Green 1x15 Green 5\" hold 1x15 Green 5\" hold x 15      LTR   10\" holds x 5 reed  10\"x5 ea b/l 5\" x10 ea b/l   SKTC        SLR   X 10 reed   HEP      LAQ   X 10   HEP     X 10 with ER      S/L SLR        Clamshells         Heel walk outs         Bridges    3\" holds x 10  5\"x10 5\"x10   Self quad stretch  Standing vs prone   HEP    B/L 3x20\" ea supine   Self HS stretch  Seated   HEP  B/l 15\"x 3 ea   B/L 3x20\" ea seated in chair   Butterfly stretch    Supine - single leg at a time   30\" x 4 for HEP  Supine - single leg at a time   30\" x 4 for HEP     Education on Self Piriformis Stretch  B/L 15\"x3 ea  B/L 3x20\" ea seated in chair B/L 3x20\" ea seated in chair           Re-Eval Tests & Measures    DF                    Ther Activity                        Gait Training                        Modalities                                                   "

## 2025-03-18 NOTE — PROGRESS NOTES
PT Evaluation     Today's date: 3/18/2025  Patient name: Fernie March  : 1955  MRN: [id number]  Referring provider: Desiree Kearney MD  Dx:   Encounter Diagnosis     ICD-10-CM    1. Chronic right-sided low back pain with right-sided sciatica  M54.41     G89.29           Start Time: 0700  Stop Time: 0743  Total time in clinic (min): 43 minutes    Assessment  Impairments: abnormal gait, abnormal or restricted ROM, activity intolerance, impaired physical strength, lacks appropriate home exercise program, pain with function, poor posture , poor body mechanics, activity limitations and endurance    Assessment details: Re-Eval 3/18/24:  Pt Fernie March is a 69 y.o. who presents to OPPT with s/s consistent with L/S pain with radiating symptoms into R LE.  Pt's sx have decreased and improved, experincing only intermitten RLE radicular sx w/o instance of R knee buckling.  Pt has improved P/AROM in RLE, however still having decrease soft tissue mobility/ flexibility.  Pt has increased amount of time WB and ambulating but still experiences soreness, fatigue around an hour. Pt currently reports limitations, but improved, with all daily activities due to increased pain and symptoms worse at night with difficulty sleeping. Pt would benefit from skilled therapy services to address outlined impairments, work towards goals, and restore pts PLOF. Thank you!     Pt Fernie March is a 69 y.o. who presents to OPPT with s/s consistent with L/S pain with radiating symptoms into R LE. Pt presents with limited L/S mobility and core strength deficits, decreased R LE ROM and strength, impaired soft tissue mobility/limited flexibility, decreased postural awareness, and gait/balance dysfunctions. Pt currently reports limitations with all daily activities due to increased pain and symptoms worse at night with difficulty sleeping. Pt would benefit from skilled therapy services to address outlined impairments, work towards goals,  "and restore pts PLOF. Thank you!     Goals  STGs to be achieved in 4 weeks:  -Pt to demonstrate reduced subjective pain rating \"at worst\" by at least 2-3 points from Initial Eval to allow for reduced pain with ADLs and improved functional activity tolerance.  -MET  -Pt to demonstrate L/S ROM improved WFL in order to maximize joint mobility and function and allow for progression of exercise program and achievement of goals. -progressing  -Pt to demonstrate increased MMT of RLE by at least 1/2 grade in order to improve safety and stability with ADLs and functional mobility.  -MET    LTGs to be achieved upon discharge:   -Pt will be I with HEP in order to continue to improve quality of life and independence and reduce risk for re-injury. -PROGRESSING  -Pt to demonstrate return to activities of daily living without limiations or restrictions.  -PROGRESSING  -Pt will return to ambulation > 1 hour to help facilitate return to community activities independently -PROGRESSING  -Pt to demonstrate improved function as noted by achieving or exceeding predicted score on FOTO outcomes assessment tool. -PROGRESSING          Plan  Patient would benefit from: skilled physical therapy  Planned modality interventions: cryotherapy and thermotherapy: hydrocollator packs    Planned therapy interventions: abdominal trunk stabilization, balance, manual therapy, neuromuscular re-education, patient education, postural training, therapeutic activities, therapeutic exercise, home exercise program, flexibility and functional ROM exercises    Frequency: 2x week  Duration in weeks: 12  Plan of Care beginning date: 2/12/2025  Plan of Care expiration date: 5/7/2025  Treatment plan discussed with: patient and referring physician  Plan details: Continue POC focusing on addressing & improving impairments listed in eval.        Subjective Evaluation    History of Present Illness  Mechanism of injury: Re-Eval 3/18:  Still seeing chriopractor 1x every 3 " weeks.  Pt states he experiences radicular sx in RLE but states that it has improved significantly, however still experiencing sx when ambulating.  No medication.    Pt reporting that he began to have pain 1-2 months ago and thinks it was caused from chronic coughing when sick.  He went to chiropractor for adjustment which did help for a few hours but pain came back.  He went to urgent care and had x-rays taken which were (+) for L/S DJD.  He was given steroids which did help reduce symptoms but now that he is done with the steroids the pain is coming back.  PCP mentioned possibility of seeing a spinal specialist if symptoms persist.  Referral to OPPT for conservative management of s/s at this time.   Quality of life: good    Patient Goals  Patient goals for therapy: increased motion, decreased pain, increased strength and independence with ADLs/IADLs    Pain  At best pain rating: 3  At worst pain ratin  Location: RLE  Quality: dull ache, tight, throbbing and sharp  Relieving factors: heat    Social Support  Steps to enter house: yes  Lives with: spouse      Diagnostic Tests  X-ray: abnormal  Treatments  Previous treatment: chiropractic  Current treatment: physical therapy        Objective     Postural Observations  Seated posture: fair      Neurological Testing     Sensation     Lumbar   Left   Intact: light touch    Right   Intact: light touch    Hip   Left Hip   Intact: light touch    Right Hip   Intact: light touch    Active Range of Motion     Lumbar   Flexion:  Restriction level: moderate  Extension:  Restriction level: moderate  Left lateral flexion:  Restriction level: moderate  Right lateral flexion:  Restriction level: moderate  Left Hip   Flexion: 115 degrees   Abduction: 35 degrees     Right Hip   Flexion: 102 degrees with pain  Abduction: 30 degrees with pain    Strength/Myotome Testing     Left Hip   Normal muscle strength    Right Hip   Planes of Motion   Flexion: 3+  Abduction: 4+  Adduction:  "4    Left Knee   Normal strength    Right Knee   Normal strength    Tests     Lumbar     Left   Negative crossed SLR.     Right   Negative crossed SLR.     Right Hip   Positive modified Tre and piriformis.   Modified Bereket: Positive.   SLR: Positive.     Ambulation     Ambulation: Stairs   Pattern: non-reciprocal  Railings: one rail  Pattern: non-reciprocal  Railings: one rail      Flowsheet Rows      Flowsheet Row Most Recent Value   PT/OT G-Codes    Current Score 41   Projected Score 68               Re-eval Date: 3/12     Precautions: None     Visit Count 6 7 8 9  Re-Eval 5       Manuals 3/4 3/7 3/14 3/18   2/28/2025     R quad, ITB, HS   Gentle  HS and SL Quad   HS, ITB, quad   HZ   Self-Stretching                           Neuro Re-Ed  3/4 3/7   2/28/2025     PPT     Standing 3\" x15 w/ cueing   PPT with marches  1x15 each  B/l Standing x15 ea b/l w/ PPT hold    Standing x15 ea b/l w/ PPT hold    TA        TA + OH lifts        MTP/LTP Blue x20 ea 2\" hold Blue x20 ea 2\" hold Blue x 20 ea  Blue x 20 ea  Blue x20 ea   Palloff press  Blue 5\"x15 ea b/l Blue 5\"x15 ea b/l Blue 5\" x 15  R/L  Blue 5\" x 15  R/L  Blue 5\"x10 ea b/l           L Sidebend when radicular sx occurring in RLE.                        Ther Ex        NuStep for endurance/ conditioning  L5 10'  L5 10'  L5 10' L5 10'   SBC  L1 10'      Standing hip flex/abd/ext Flex/ABD 1x15 ea b/l Flex/ABD  1x15 Reed Flex/Abd   X 15 ea  Flex/Abd   X 20 ea  Flex Ext 1x15 b/l   Squats  x15 x15 X 15  X 15  x10           Step-ups  D 1x15 each  B/l D 1x15 each  B/l D x 15 each reed  D x 20 each reed  D 1x15 each  B/l   Hip add  Seated w/no   LB support 5\"x15 5\"x15   Supine 3\"x15   Hip Abd  Seated w/no   LB support Green 1x15 Green 5\" hold 1x15 Green 5\" hold x 15      LTR   10\" holds x 5 reed  10\"x5 ea b/l 5\" x10 ea b/l   SKTC        SLR   X 10 reed   HEP      LAQ   X 10   HEP     X 10 with ER      S/L SLR        Clamshells         Heel walk outs         Bridges    3\" holds x " "10  5\"x10 5\"x10   Self quad stretch  Standing vs prone   HEP    B/L 3x20\" ea supine   Self HS stretch  Seated   HEP  B/l 15\"x 3 ea   B/L 3x20\" ea seated in chair   Butterfly stretch    Supine - single leg at a time   30\" x 4 for HEP  Supine - single leg at a time   30\" x 4 for HEP     Education on Self Piriformis Stretch  B/L 15\"x3 ea  B/L 3x20\" ea seated in chair B/L 3x20\" ea seated in chair           Re-Eval Tests & Measures    DF                    Ther Activity                        Gait Training                        Modalities                                   "

## 2025-03-18 NOTE — PROGRESS NOTES
"Daily Note     Today's date: 3/18/2025  Patient name: Fernie March  : 1955  MRN: [id number]  Referring provider: Desiree Kearney MD  Dx:   Encounter Diagnosis     ICD-10-CM    1. Chronic right-sided low back pain with right-sided sciatica  M54.41     G89.29           Start Time: 0700          Subjective: ***      Objective: See treatment diary below      Assessment: Tolerated treatment {Tolerated treatment :[id number]}. Patient {assessment:[id number]}      Plan: Continue per plan of care.      Re-eval Date: 3/12     Precautions: None     Visit Count 6 7 8 9  Re-Eval 5       Manuals 3/4 3/7 3/14 3/18   2025     R quad, ITB, HS   Gentle  HS and SL Quad   HS, ITB, quad   HZ   Self-Stretching                           Neuro Re-Ed  3/4 3/7   2025     PPT     Standing 3\" x15 w/ cueing   PPT with marches  1x15 each  B/l Standing x15 ea b/l w/ PPT hold    Standing x15 ea b/l w/ PPT hold    TA        TA + OH lifts        MTP/LTP Blue x20 ea 2\" hold Blue x20 ea 2\" hold Blue x 20 ea   Blue x20 ea   Palloff press  Blue 5\"x15 ea b/l Blue 5\"x15 ea b/l Blue 5\" x 15  R/L   Blue 5\"x10 ea b/l           L Sidebend when radicular sx occurring in RLE.                        Ther Ex        NuStep for endurance/ conditioning  L5 10'  L5 10'  L5 10' L5 10'   SBC  L1 10'      Standing hip flex/abd/ext Flex/ABD 1x15 ea b/l Flex/ABD  1x15 Reed Flex/Abd   X 15 ea  Flex/Abd   X 20 ea  Flex Ext 1x15 b/l   Squats  x15 x15 X 15   x10           Step-ups  D 1x15 each  B/l D 1x15 each  B/l D x 15 each reed   D 1x15 each  B/l   Hip add  Seated w/no   LB support 5\"x15 5\"x15   Supine 3\"x15   Hip Abd  Seated w/no   LB support Green 1x15 Green 5\" hold 1x15 Green 5\" hold x 15      LTR   10\" holds x 5 reed   5\" x10 ea b/l   SKTC        SLR   X 10 reed   HEP      LAQ   X 10   HEP     X 10 with ER      S/L SLR        Clamshells         Heel walk outs         Bridges    3\" holds x 10   5\"x10   Self quad stretch  Standing vs prone   HEP    " "B/L 3x20\" ea supine   Self HS stretch  Seated   HEP  B/l 15\"x 3 ea   B/L 3x20\" ea seated in chair   Butterfly stretch    Supine - single leg at a time   30\" x 4 for HEP      Education on Self Piriformis Stretch  B/L 15\"x3 ea   B/L 3x20\" ea seated in chair           Ther Activity                        Gait Training                        Modalities                                              "

## 2025-03-21 ENCOUNTER — OFFICE VISIT (OUTPATIENT)
Dept: PHYSICAL THERAPY | Facility: HOME HEALTHCARE | Age: 70
End: 2025-03-21
Payer: MEDICARE

## 2025-03-21 DIAGNOSIS — M54.41 CHRONIC RIGHT-SIDED LOW BACK PAIN WITH RIGHT-SIDED SCIATICA: Primary | ICD-10-CM

## 2025-03-21 DIAGNOSIS — G89.29 CHRONIC RIGHT-SIDED LOW BACK PAIN WITH RIGHT-SIDED SCIATICA: Primary | ICD-10-CM

## 2025-03-21 PROCEDURE — 97110 THERAPEUTIC EXERCISES: CPT

## 2025-03-21 PROCEDURE — 97112 NEUROMUSCULAR REEDUCATION: CPT

## 2025-03-21 PROCEDURE — 97140 MANUAL THERAPY 1/> REGIONS: CPT

## 2025-03-21 NOTE — PROGRESS NOTES
"Daily Note     Today's date: 3/21/2025  Patient name: Fernie March  : 1955  MRN: [id number]  Referring provider: Desiree Kearney MD  Dx: No diagnosis found.    Start Time: 0740          Subjective: I did some yard work yesterday so my leg is a little sore.     Objective: See treatment diary below    Assessment: Pt chance entire program well. Pt with R LE tightness and ROM deficits evident with self stretch and MT. Pt with good form with TE  and chance added [employer] hip abd without c/o increased s/s. Pt reports consistency with HEP and self stretch.  Patient would benefit from continued PT    Plan: Continue per plan of care.      Re-eval Date: 3/12     Precautions: None     Visit Count 6 7 8 9  Re-Eval 1       Manuals 3/4 3/7 3/14 3/18   3-21     R quad, ITB, HS   Gentle  HS and SL Quad   HS, ITB, quad   HZ   HS, ITB, piri  EC                           Neuro Re-Ed  3/4 3/7   3-21     PPT        PPT with marches  1x15 each  B/l Standing x15 ea b/l w/ PPT hold     Standing   x15 ea erinn  W/PPT   TA        TA + OH lifts        MTP/LTP Blue x20 ea 2\" hold Blue x20 ea 2\" hold Blue x 20 ea  Blue x 20 ea  Blue x20 ea   Palloff press  Blue 5\"x15 ea b/l Blue 5\"x15 ea b/l Blue 5\" x 15  R/L  Blue 5\" x 15  R/L  Blue 5\"x10 ea   R/L           L Sidebend when radicular sx occurring in RLE.                        Ther Ex        NuStep for endurance/ conditioning  L5 10'  L5 10'  L5 10' L5 10'   SBC  L1 10'      Standing hip flex/abd/ext Flex/ABD 1x15 ea b/l Flex/ABD  1x15 Erinn Flex/Abd   X 15 ea  Flex/Abd   X 20 ea  Flex/abd  X 20 ea b/l   Squats  x15 x15 X 15  X 15  x15           Step-ups  D 1x15 each  B/l D 1x15 each  B/l D x 15 each erinn  D x 20 each erinn  D 1x20 each  B/l   Hip add  Seated w/no   LB support 5\"x15 5\"x15      Hip Abd  Seated w/no   LB support Green 1x15 Green 5\" hold 1x15 Green 5\" hold x 15   LF  45# x20   LTR   10\" holds x 5 erinn  10\"x5 ea b/l 10\" x5 ea b/l   SKTC        SLR   X 10 erinn   HEP   1x10 erinn " "  LAQ   X 10   HEP     X 10 with ER      S/L SLR        Clamshells         Heel walk outs         Bridges    3\" holds x 10  5\"x10 5\"x15   Self quad stretch  Standing vs prone   HEP       Self HS stretch  Seated   HEP  B/l 15\"x 3 ea      Butterfly stretch    Supine - single leg at a time   30\" x 4 for HEP  Supine - single leg at a time   30\" x 4 for HEP  Supine - single  Leg at a time  30\" x4    Education on Self Piriformis Stretch  B/L 15\"x3 ea  B/L 3x20\" ea seated in chair B/L 3x20\" ea seated in chair           Re-Eval Tests & Measures    DF                    Ther Activity                        Gait Training                        Modalities                                   "

## 2025-03-25 ENCOUNTER — OFFICE VISIT (OUTPATIENT)
Dept: PHYSICAL THERAPY | Facility: HOME HEALTHCARE | Age: 70
End: 2025-03-25
Payer: MEDICARE

## 2025-03-25 DIAGNOSIS — G89.29 CHRONIC RIGHT-SIDED LOW BACK PAIN WITH RIGHT-SIDED SCIATICA: Primary | ICD-10-CM

## 2025-03-25 DIAGNOSIS — M54.41 CHRONIC RIGHT-SIDED LOW BACK PAIN WITH RIGHT-SIDED SCIATICA: Primary | ICD-10-CM

## 2025-03-25 PROCEDURE — 97140 MANUAL THERAPY 1/> REGIONS: CPT

## 2025-03-25 PROCEDURE — 97112 NEUROMUSCULAR REEDUCATION: CPT

## 2025-03-25 PROCEDURE — 97110 THERAPEUTIC EXERCISES: CPT

## 2025-03-25 NOTE — PROGRESS NOTES
"Daily Note     Today's date: 3/25/2025  Patient name: Fernie March  : 1955  MRN: [id number]  Referring provider: Desiree Kearney MD  Dx:   Encounter Diagnosis     ICD-10-CM    1. Chronic right-sided low back pain with right-sided sciatica  M54.41     G89.29           Start Time: 06  Stop Time: 07  Total time in clinic (min): 38 minutes    Subjective: Pt states that his LBP and RLE pain have improved substantially.  Was working outside yesterday for most of the day and stated that when he was done he did not experience any pain or have any problems w/ his R knee.       Objective: See treatment diary below      Assessment: Pt tolerated treatment sessio w/o increase in LBP and RLE pain.  Pt was able to perform increased WB exercises such as 3 way SLR kicks w/ a 2# cuff on ankle for increased weight & difficulty.  Continue to challenge pt to further strengthen core and BLEs to allow pt to have pain free motion during activity.       Plan: Continue per plan of care.      Re-eval Date: 3/12     Precautions: None     Visit Count 3 7 8 1  Re-Eval 2       Manuals 3/25 3/7 3/14 3/18   3-21     R quad, ITB, HS  DF    B/L HS, Piri, Hip ADD Gentle  HS and SL Quad   HS, ITB, quad   HZ   HS, ITB, piri  EC                           Neuro Re-Ed  3/25 3/7   3-21     PPT        PPT with marches   Standing x15 ea b/l w/ PPT hold     Standing   x15 ea reed  W/PPT   TA        TA + OH lifts        MTP/LTP Black x20 ea 2\" hold Blue x20 ea 2\" hold Blue x 20 ea  Blue x 20 ea  Blue x20 ea   Palloff press  Black 5\"x15 ea b/l Blue 5\"x15 ea b/l Blue 5\" x 15  R/L  Blue 5\" x 15  R/L  Blue 5\"x10 ea   R/L           L Sidebend when radicular sx occurring in RLE.                        Ther Ex        NuStep for endurance/ conditioning    L5 10'  L5 10' L5 10'   SBC L5 10' L1 10'      Standing hip flex/abd/ext 2# Flex/ABD 1x20 ea b/l Flex/ABD  1x15 Reed Flex/Abd   X 15 ea  Flex/Abd   X 20 ea  Flex/abd  X 20 ea b/l   Squats  x15 x15 X 15  " "X 15  x15           Step-ups  D 2# 1x20 each  B/l up & down D 1x15 each  B/l D x 15 each erinn  D x 20 each erinn  D 1x20 each  B/l   Hip add  Seated w/no   LB support 5\"x15 5\"x15      Hip Abd  Seated w/no   LB support LF  45# x20 Green 5\" hold 1x15 Green 5\" hold x 15   LF  45# x20   LTR 5\"x10 ea b/l  10\" holds x 5 erinn  10\"x5 ea b/l 10\" x5 ea b/l   SKTC        SLR ER 1x15 ea b/l  X 10 erinn   HEP   1x10 erinn   LAQ 3\"x10 ea b/l  X 10   HEP     X 10 with ER      S/L SLR        Clamshells         Heel walk outs         Bridges  5\"x15  3\" holds x 10  5\"x10 5\"x15   Self quad stretch  Standing vs prone   HEP       Self HS stretch  B/l 15\"x 3 ea B/l 15\"x 3 ea      Butterfly stretch  Supine - single  Leg at a time  30\" x4   Supine - single leg at a time   30\" x 4 for HEP  Supine - single leg at a time   30\" x 4 for HEP  Supine - single  Leg at a time  30\" x4    Education on Self Piriformis Stretch B/L 3x20\" ea seated in chair B/L 15\"x3 ea  B/L 3x20\" ea seated in chair B/L 3x20\" ea seated in chair           Re-Eval Tests & Measures    DF                    Ther Activity                        Gait Training                        Modalities                                     "

## 2025-03-31 ENCOUNTER — OFFICE VISIT (OUTPATIENT)
Dept: PHYSICAL THERAPY | Facility: HOME HEALTHCARE | Age: 70
End: 2025-03-31
Payer: MEDICARE

## 2025-03-31 DIAGNOSIS — M54.41 CHRONIC RIGHT-SIDED LOW BACK PAIN WITH RIGHT-SIDED SCIATICA: Primary | ICD-10-CM

## 2025-03-31 DIAGNOSIS — G89.29 CHRONIC RIGHT-SIDED LOW BACK PAIN WITH RIGHT-SIDED SCIATICA: Primary | ICD-10-CM

## 2025-03-31 PROCEDURE — 97110 THERAPEUTIC EXERCISES: CPT

## 2025-03-31 PROCEDURE — 97112 NEUROMUSCULAR REEDUCATION: CPT

## 2025-03-31 NOTE — PROGRESS NOTES
"Daily Note     Today's date: 3/31/2025  Patient name: Fernie March  : 1955  MRN: [id number]  Referring provider: Desiree Kearney MD  Dx:   Encounter Diagnosis     ICD-10-CM    1. Chronic right-sided low back pain with right-sided sciatica  M54.41     G89.29           Start Time: 0659  Stop Time: 07  Total time in clinic (min): 42 minutes    Subjective: Pt states that he doesn't have any pain or heaviness in RLE or LBP.  Pt states that he hasn't had any giving out of R knee.  Pt states that he does still have difficulty w/ ambulating due to lack of endurance.      Objective: See treatment diary below      Assessment:  Pt tolerated treatment session w/o increase in pain in RLE or LS.  PT had pt perform Leg press machine to substitute for squats this session.  Pt was able to perform all exercises w/ minimal cueing.  Pt had difficulty at end of session performing SLR w/ RLE due to decreased endurance and strength in R hip flexors.      Plan: Continue per plan of care.      Re-eval Date: 3/12     Precautions: None     Visit Count 3 4 8 1  Re-Eval 2       Manuals 3/25 3/31 3/14 3/18   3-21     R quad, ITB, HS  DF    B/L HS, Piri, Hip ADD  HS, ITB, quad   HZ   HS, ITB, piri  EC                           Neuro Re-Ed  3/25 3/31   3-21     PPT        PPT with marches        Standing   x15 ea erinn  W/PPT   TA        TA + OH lifts        MTP/LTP Black x20 ea 2\" hold Black x20 ea 2\" hold Blue x 20 ea  Blue x 20 ea  Blue x20 ea   Palloff press  Black 5\"x15 ea b/l Black 5\"x20 ea b/l Blue 5\" x 15  R/L  Blue 5\" x 15  R/L  Blue 5\"x10 ea   R/L           L Sidebend when radicular sx occurring in RLE.                        Ther Ex        NuStep for endurance/ conditioning   L5 10'  L5 10'  L5 10' L5 10'   SBC L5 10'       Standing hip flex/abd/ext 2# Flex/ABD 1x20 ea b/l 2# Flex/ABD 1x20 ea b/l Flex/Abd   X 15 ea  Flex/Abd   X 20 ea  Flex/abd  X 20 ea b/l   Squats  x15  X 15  X 15  x15   Leg Press  100# x20      Step-ups  " "D 2# 1x20 each  B/l up & down D 2# 1x20 each  B/l up & down D x 15 each erinn  D x 20 each erinn  D 1x20 each  B/l   Hip add  Seated w/no   LB support 5\"x15 LF 30# x20      Hip Abd  Seated w/no   LB support LF  45# x20 LF  50# x20 Green 5\" hold x 15   LF  45# x20   LTR 5\"x10 ea b/l  10\" holds x 5 erinn  10\"x5 ea b/l 10\" x5 ea b/l   SKTC        SLR ER 1x15 ea b/l ER 1x15 ea b/l X 10 erinn   HEP   1x10 erinn   LAQ 3\"x10 ea b/l  X 10   HEP     X 10 with ER      S/L SLR        Clamshells         Heel walk outs         Bridges  5\"x15 5\"x15 3\" holds x 10  5\"x10 5\"x15   Physioball Rollouts - FWD  10x 10\"      Self quad stretch  Standing vs prone   HEP       Self HS stretch  B/l 15\"x 3 ea       Butterfly stretch  Supine - single  Leg at a time  30\" x4   Supine - single leg at a time   30\" x 4 for HEP  Supine - single leg at a time   30\" x 4 for HEP  Supine - single  Leg at a time  30\" x4    Education on Self Piriformis Stretch B/L 3x20\" ea seated in chair B/L 20\"x3 ea  B/L 3x20\" ea seated in chair B/L 3x20\" ea seated in chair           Re-Eval Tests & Measures    DF                    Ther Activity                        Gait Training                        Modalities                                       "

## 2025-04-03 ENCOUNTER — OFFICE VISIT (OUTPATIENT)
Dept: PHYSICAL THERAPY | Facility: HOME HEALTHCARE | Age: 70
End: 2025-04-03
Payer: MEDICARE

## 2025-04-03 DIAGNOSIS — M54.41 CHRONIC RIGHT-SIDED LOW BACK PAIN WITH RIGHT-SIDED SCIATICA: Primary | ICD-10-CM

## 2025-04-03 DIAGNOSIS — G89.29 CHRONIC RIGHT-SIDED LOW BACK PAIN WITH RIGHT-SIDED SCIATICA: Primary | ICD-10-CM

## 2025-04-03 PROCEDURE — 97110 THERAPEUTIC EXERCISES: CPT

## 2025-04-03 PROCEDURE — 97112 NEUROMUSCULAR REEDUCATION: CPT

## 2025-04-03 NOTE — PROGRESS NOTES
"Daily Note     Today's date: 4/3/2025  Patient name: Fernie March  : 1955  MRN: [id number]  Referring provider: Desiree Kearney MD  Dx:   Encounter Diagnosis     ICD-10-CM    1. Chronic right-sided low back pain with right-sided sciatica  M54.41     G89.29           Start Time: 0657  Stop Time: 0745  Total time in clinic (min): 48 minutes    Subjective: Pt states that he has minimal pain/ stiffness in R HS causing discomfort.  Pt states that he woke up w/ tightness, not having any \"giving\" of R knee.       Objective: See treatment diary below      Assessment: Pt tolerated treatment session w/o increase in RLE tightness/ pain.  Pt was able to perform exercises w/ minimal cueing on form/ technique.  Pt needs cues for SLRs w/ ER to avoid ABD of LE.  PT had pt perform core strengthening exercises, such as palloff press, to increase endurance of abdominal musculature to maintain LS stability.       Plan: Continue per plan of care.      Re-eval Date: 3/12     Precautions: None     Visit Count 3 4 5 1  Re-Eval 2       Manuals 3/25 3/31 4/3 3/18   3-21     R quad, ITB, HS  DF    B/L HS, Piri, Hip ADD    HS, ITB, piri  EC                           Neuro Re-Ed  3/25 3/31   3-21     PPT        PPT with marches        Standing   x15 ea erinn  W/PPT   TA        TA + OH lifts        MTP/LTP Black x20 ea 2\" hold Black x20 ea 2\" hold Black x20 ea 2\" hold Blue x 20 ea  Blue x20 ea   Palloff press  Black 5\"x15 ea b/l Black 5\"x20 ea b/l Black  10\" x 10  R/L  Blue 5\" x 15  R/L  Blue 5\"x10 ea   R/L           L Sidebend when radicular sx occurring in RLE.                        Ther Ex        NuStep for endurance/ conditioning   L5 10'  L6 10'  L5 10' L5 10'   SBC L5 10'       Standing hip flex/abd/ext 2# Flex/ABD 1x20 ea b/l 2# Flex/ABD 1x20 ea b/l 2# Flex/ABD 1x20 ea b/l Flex/Abd   X 20 ea  Flex/abd  X 20 ea b/l   Squats  x15   X 15  x15   Leg Press  100# x20 130# x20     Step-ups  D 2# 1x20 each  B/l up & down D 2# 1x20 " "each  B/l up & down D 2# 1x20 each  B/l up & down D x 20 each erinn  D 1x20 each  B/l   Hip add  Seated w/no   LB support 5\"x15 LF 30# x20 LF 30# x20     Hip Abd  Seated w/no   LB support LF  45# x20 LF  50# x20 LF  50# x20  LF  45# x20   LTR 5\"x10 ea b/l  10\" holds x 5 erinn  10\"x5 ea b/l 10\" x5 ea b/l   SKTC        SLR ER 1x15 ea b/l ER 1x15 ea b/l 2# ER x15 ea b/l  1x10 erinn   LAQ 3\"x10 ea b/l       S/L SLR        Clamshells         Heel walk outs         Bridges  5\"x15 5\"x15 5\"x15 5\"x10 5\"x15   Physioball Rollouts - FWD  10x 10\" 10x 10\"     Self quad stretch  Standing vs prone   HEP       Self HS stretch  B/l 15\"x 3 ea       Butterfly stretch  Supine - single  Leg at a time  30\" x4   Supine - single leg at a time   30\" x 2 Supine - single leg at a time   30\" x 4 for HEP  Supine - single  Leg at a time  30\" x4    Education on Self Piriformis Stretch B/L 3x20\" ea seated in chair B/L 20\"x3 ea B/L 20\"x3 ea B/L 3x20\" ea seated in chair B/L 3x20\" ea seated in chair           Re-Eval Tests & Measures    DF                    Ther Activity                        Gait Training                        Modalities                                         "

## 2025-04-07 ENCOUNTER — OFFICE VISIT (OUTPATIENT)
Dept: PHYSICAL THERAPY | Facility: HOME HEALTHCARE | Age: 70
End: 2025-04-07
Payer: MEDICARE

## 2025-04-07 DIAGNOSIS — M54.41 CHRONIC RIGHT-SIDED LOW BACK PAIN WITH RIGHT-SIDED SCIATICA: Primary | ICD-10-CM

## 2025-04-07 DIAGNOSIS — G89.29 CHRONIC RIGHT-SIDED LOW BACK PAIN WITH RIGHT-SIDED SCIATICA: Primary | ICD-10-CM

## 2025-04-07 PROCEDURE — 97112 NEUROMUSCULAR REEDUCATION: CPT

## 2025-04-07 PROCEDURE — 97110 THERAPEUTIC EXERCISES: CPT

## 2025-04-10 ENCOUNTER — OFFICE VISIT (OUTPATIENT)
Dept: PHYSICAL THERAPY | Facility: HOME HEALTHCARE | Age: 70
End: 2025-04-10
Payer: MEDICARE

## 2025-04-10 DIAGNOSIS — M54.41 CHRONIC RIGHT-SIDED LOW BACK PAIN WITH RIGHT-SIDED SCIATICA: Primary | ICD-10-CM

## 2025-04-10 DIAGNOSIS — G89.29 CHRONIC RIGHT-SIDED LOW BACK PAIN WITH RIGHT-SIDED SCIATICA: Primary | ICD-10-CM

## 2025-04-10 PROCEDURE — 97112 NEUROMUSCULAR REEDUCATION: CPT

## 2025-04-10 PROCEDURE — 97110 THERAPEUTIC EXERCISES: CPT

## 2025-04-10 NOTE — PROGRESS NOTES
"Daily Note     Today's date: 4/10/2025  Patient name: Fernie March  : 1955  MRN: [id number]  Referring provider: Desiree Kearney MD  Dx: No diagnosis found.    Start Time: 0700          Subjective: I have some soreness at my R hip and quad but have not had the radicular pain from my back in a few weeks. I am sleeping well. I had a Chiropractor appt yesterday.     Objective: See treatment diary below    Assessment: Pt chance session well. Pt denied increased pain with all TE. Pt progressing well toward I HEP and was provided with information about Wellness program. Pt reports consistency with HEP and self stretch. Pt with good form and minimal vc's needed.. Patient would benefit from continued PT    Plan: Continue per plan of care. Progress to I HEP.      Re-eval Date:      Precautions: None     Visit Count 3 4 5 6 7       Manuals 3/25 3/31 4/3 4/7   4-10     R quad, ITB, HS  DF    B/L HS, Piri, Hip ADD                               Neuro Re-Ed  3/25 3/31        PPT        PPT with maddi          TA        TA + OH lifts        MTP/LTP Black x20 ea 2\" hold Black x20 ea 2\" hold Black x20 ea 2\" hold LF Matrix  32.5# ea pulley x20 ea   LF Matrix  32.5# ea pulley  X20 ea   Palloff press  Black 5\"x15 ea b/l Black 5\"x20 ea b/l Black  10\" x 10  R/L  LF Matrix  37.5# 5\" hold x15 ea way LF Matrix  37.5# 5\" hold  X15 ea way           L Sidebend when radicular sx occurring in RLE.                        Ther Ex        NuStep for endurance/ conditioning   L5 10'  L6 10'  L6 10' L6 10'   SBC L5 10'       Standing hip flex/abd/ext 2# Flex/ABD 1x20 ea b/l 2# Flex/ABD 1x20 ea b/l 2# Flex/ABD 1x20 ea b/l NV 2# flex/abd  1x20 ea b/l   Squats  x15       Leg Press  100# x20 130# x20 150# x20 150# x20   Step-ups  D 2# 1x20 each  B/l up & down D 2# 1x20 each  B/l up & down D 2# 1x20 each  B/l up & down NV D 2# x20 ea  Reed up & down   Hip add  Seated w/no   LB support 5\"x15 LF 30# x20 LF 30# x20 LF 30# x20 LF 35# x20   Hip " "Abd  Seated w/no   LB support LF  45# x20 LF  50# x20 LF  50# x20 LF  50# x20 LF  50# x20   LTR 5\"x10 ea b/l  10\" holds x 5 erinn  10\"x5 ea b/l 10\" x5 ea b/l   SKTC        SLR ER 1x15 ea b/l ER 1x15 ea b/l 2# ER x15 ea b/l NV 0# w/ER 1x10 erinn   LAQ 3\"x10 ea b/l       S/L SLR        Clamshells         Heel walk outs         Bridges  5\"x15 5\"x15 5\"x15 NV 5\"x15   Physioball Rollouts - FWD  10x 10\" 10x 10\"  10x 10\"   Self quad stretch  Standing vs prone   HEP       Self HS stretch  B/l 15\"x 3 ea       Butterfly stretch  Supine - single  Leg at a time  30\" x4   Supine - single leg at a time   30\" x 2 Supine - single leg at a time   30\" x 4   Supine - single  Leg at a time  30\" x4    Education on Self Piriformis Stretch B/L 3x20\" ea seated in chair B/L 20\"x3 ea B/L 20\"x3 ea B/L 4x30\" ea seated in chair B/L 3x20\" ea seated in chair           Re-Eval Tests & Measures                        Ther Activity                        Gait Training                        Modalities                                             "

## 2025-04-14 ENCOUNTER — OFFICE VISIT (OUTPATIENT)
Dept: PHYSICAL THERAPY | Facility: HOME HEALTHCARE | Age: 70
End: 2025-04-14
Payer: MEDICARE

## 2025-04-14 DIAGNOSIS — G89.29 CHRONIC RIGHT-SIDED LOW BACK PAIN WITH RIGHT-SIDED SCIATICA: Primary | ICD-10-CM

## 2025-04-14 DIAGNOSIS — M54.41 CHRONIC RIGHT-SIDED LOW BACK PAIN WITH RIGHT-SIDED SCIATICA: Primary | ICD-10-CM

## 2025-04-14 PROCEDURE — 97110 THERAPEUTIC EXERCISES: CPT

## 2025-04-14 PROCEDURE — 97112 NEUROMUSCULAR REEDUCATION: CPT

## 2025-04-14 NOTE — PROGRESS NOTES
"Daily Note     Today's date: 2025  Patient name: Fernie March  : 1955  MRN: [id number]  Referring provider: Desiree Kearney MD  Dx:   Encounter Diagnosis     ICD-10-CM    1. Chronic right-sided low back pain with right-sided sciatica  M54.41     G89.29           Start Time: 0655  Stop Time: 0740  Total time in clinic (min): 45 minutes    Subjective: Pt states that he had no LBP, no pain in R hip/ RLE this weekend.  Pt stated that the only time he felt discomfort was when he was performing stretches due to soft tissue immobility.       Objective: See treatment diary below      Assessment: Pt tolerated treatment session w/o increase in LBP.  Pt was able to perform exercises w/ minimal cueing from PT.  Pt and PT discussed wellness program that clinic offers for pt to continue performing exercises to maintain general fitness and overall wellness to maintain sx.  PT provided pt w/ papers, schedule, and will discuss and create an appropriate exercise program for pt next session, pt w/ potential d/c next visit.       Plan: Continue per plan of care.  Potential discharge next visit.     Re-eval Date:      Precautions: None     Visit Count 8 4 5 6 7       Manuals 4/14 3/31 4/3 4/7   4-10     R quad, ITB, HS                                 Neuro Re-Ed  3/25 3/31        PPT        PPT with maddi          TA        TA + OH lifts        MTP/LTP MTP - LF Matrix  32.5# ea pulley  X20    LTP - LF Matrix 37.5# ea pulley x20 Black x20 ea 2\" hold Black x20 ea 2\" hold LF Matrix  32.5# ea pulley x20 ea   LF Matrix  32.5# ea pulley  X20 ea   Palloff press  LF Matrix  37.5# 5\" hold  X15 ea way Black 5\"x20 ea b/l Black  10\" x 10  R/L  LF Matrix  37.5# 5\" hold x15 ea way LF Matrix  37.5# 5\" hold  X15 ea way           L Sidebend when radicular sx occurring in RLE.                        Ther Ex        NuStep for endurance/ conditioning  L6 10' L5 10'  L6 10'  L6 10' L6 10'   SBC        Standing hip flex/abd/ext  2# " "Flex/ABD 1x20 ea b/l 2# Flex/ABD 1x20 ea b/l NV 2# flex/abd  1x20 ea b/l   Squats         Leg Press  100# x20 130# x20 150# x20 150# x20   Step-ups   D 2# 1x20 each  B/l up & down D 2# 1x20 each  B/l up & down NV D 2# x20 ea  Reed up & down   Hip add  Seated w/no   LB support LF 45# x20 LF 30# x20 LF 30# x20 LF 30# x20 LF 35# x20   Hip Abd  Seated w/no   LB support LF  60# x20 LF  50# x20 LF  50# x20 LF  50# x20 LF  50# x20   LTR 5\"x10 ea b/l  10\" holds x 5 reed  10\"x5 ea b/l 10\" x5 ea b/l   SKTC        SLR ER 2.5# 1x15 ea b/l ER 1x15 ea b/l 2# ER x15 ea b/l NV 0# w/ER 1x10 reed   LAQ        S/L SLR        Clamshells         Heel walk outs         Bridges  5\"x15 5\"x15 5\"x15 NV 5\"x15   Physioball Rollouts - FWD  10x 10\" 10x 10\"  10x 10\"   Self quad stretch  Supine w/ strap 20\"x3 ea b/l       Self HS stretch  B/l 20\"x 3 ea       Butterfly stretch  Supine - single  Leg at a time  20\" x3   Supine - single leg at a time   30\" x 2 Supine - single leg at a time   30\" x 4   Supine - single  Leg at a time  30\" x4    Self Piriformis Stretch B/L 3x20\" ea seated in chair B/L 20\"x3 ea B/L 20\"x3 ea B/L 4x30\" ea seated in chair B/L 3x20\" ea seated in chair           Re-Eval Tests & Measures                        Ther Activity                        Gait Training                        Modalities                                               "

## 2025-04-16 ENCOUNTER — OFFICE VISIT (OUTPATIENT)
Dept: PHYSICAL THERAPY | Facility: HOME HEALTHCARE | Age: 70
End: 2025-04-16
Attending: INTERNAL MEDICINE
Payer: MEDICARE

## 2025-04-16 DIAGNOSIS — G89.29 CHRONIC RIGHT-SIDED LOW BACK PAIN WITH RIGHT-SIDED SCIATICA: Primary | ICD-10-CM

## 2025-04-16 DIAGNOSIS — M54.41 CHRONIC RIGHT-SIDED LOW BACK PAIN WITH RIGHT-SIDED SCIATICA: Primary | ICD-10-CM

## 2025-04-16 PROCEDURE — 97110 THERAPEUTIC EXERCISES: CPT

## 2025-04-16 PROCEDURE — 97112 NEUROMUSCULAR REEDUCATION: CPT

## 2025-04-16 NOTE — PROGRESS NOTES
PT Discharge    Today's date: 2025  Patient name: Fernie March  : 1955  MRN: [id number]  Referring provider: Desiree Kearney MD  Dx:   Encounter Diagnosis     ICD-10-CM    1. Chronic right-sided low back pain with right-sided sciatica  M54.41     G89.29             Start Time: 0658  Stop Time: 0745  Total time in clinic (min): 47 minutes    Assessment  Impairments: endurance    Assessment details: D/C 25:  Pt Fernie March is a 69 y.o. who presents to OPPT with s/s consistent with L/S pain with radiating symptoms into R LE.  Pt d/c upon this visit.  Pt has improved w/ all sx, no pain experienced in RLE.  Pt has improved ROM, strength, and functionality in RLE allowing pt to perform all activities of daily living and recreationally w/o difficulty.  PT educated pt on how to set up exercise equipment and provided pt w/ exercise flowsheet for pt to perform wellness at clinic.  Pt achieved predicted score on FOTO, all goals are met.  Pt benefited from skilled therapy services to address outlined impairments, work towards goals, and restore pts PLOF. Thank you!       Re-Eval 3/18/24:  Pt Fernie March is a 69 y.o. who presents to OPPT with s/s consistent with L/S pain with radiating symptoms into R LE.  Pt's sx have decreased and improved, experincing only intermitten RLE radicular sx w/o instance of R knee buckling.  Pt has improved P/AROM in RLE, however still having decrease soft tissue mobility/ flexibility.  Pt has increased amount of time WB and ambulating but still experiences soreness, fatigue around an hour. Pt currently reports limitations, but improved, with all daily activities due to increased pain and symptoms worse at night with difficulty sleeping. Pt would benefit from skilled therapy services to address outlined impairments, work towards goals, and restore pts PLOF. Thank you!     Pt Fernie March is a 69 y.o. who presents to OPPT with s/s consistent with L/S pain with  "radiating symptoms into R LE. Pt presents with limited L/S mobility and core strength deficits, decreased R LE ROM and strength, impaired soft tissue mobility/limited flexibility, decreased postural awareness, and gait/balance dysfunctions. Pt currently reports limitations with all daily activities due to increased pain and symptoms worse at night with difficulty sleeping. Pt would benefit from skilled therapy services to address outlined impairments, work towards goals, and restore pts PLOF. Thank you!     Goals  STGs to be achieved in 4 weeks:  -Pt to demonstrate reduced subjective pain rating \"at worst\" by at least 2-3 points from Initial Eval to allow for reduced pain with ADLs and improved functional activity tolerance.  -MET  -Pt to demonstrate L/S ROM improved WFL in order to maximize joint mobility and function and allow for progression of exercise program and achievement of goals. -progressing  -Pt to demonstrate increased MMT of RLE by at least 1/2 grade in order to improve safety and stability with ADLs and functional mobility.  -MET    LTGs to be achieved upon discharge:   -Pt will be I with HEP in order to continue to improve quality of life and independence and reduce risk for re-injury. -MET  -Pt to demonstrate return to activities of daily living without limiations or restrictions.  -MET  -Pt will return to ambulation > 1 hour to help facilitate return to community activities independently -MET  -Pt to demonstrate improved function as noted by achieving or exceeding predicted score on FOTO outcomes assessment tool. -PROGRESSING          Plan  Patient would benefit from: skilled physical therapy  Planned modality interventions: cryotherapy and thermotherapy: hydrocollator packs    Planned therapy interventions: abdominal trunk stabilization, balance, manual therapy, neuromuscular re-education, patient education, postural training, therapeutic activities, therapeutic exercise, home exercise program, " flexibility and functional ROM exercises    Frequency: 2x week  Duration in weeks: 12  Plan of Care beginning date: 2025  Plan of Care expiration date: 2025  Treatment plan discussed with: patient and referring physician  Plan details: D/C from current encounter.        Subjective Evaluation    History of Present Illness  Mechanism of injury: D/C 25: Pt states that he has no pain in R hip or L/S . Able to functionally perform all tasks w/o difficulty.  Pt wants to join wellness program in clinic.    Re-Eval 3/18:  Still seeing chriopractor 1x every 3 weeks.  Pt states he experiences radicular sx in RLE but states that it has improved significantly, however still experiencing sx when ambulating.  No medication.    Pt reporting that he began to have pain 1-2 months ago and thinks it was caused from chronic coughing when sick.  He went to chiropractor for adjustment which did help for a few hours but pain came back.  He went to urgent care and had x-rays taken which were (+) for L/S DJD.  He was given steroids which did help reduce symptoms but now that he is done with the steroids the pain is coming back.  PCP mentioned possibility of seeing a spinal specialist if symptoms persist.  Referral to OPPT for conservative management of s/s at this time.   Quality of life: good    Patient Goals  Patient goals for therapy: increased motion, decreased pain, increased strength and independence with ADLs/IADLs    Pain  Current pain ratin  At best pain ratin  At worst pain ratin  Location: RLE  Quality: dull ache, tight, throbbing and sharp  Relieving factors: heat    Social Support  Steps to enter house: yes  Lives with: spouse      Diagnostic Tests  X-ray: abnormal  Treatments  Previous treatment: chiropractic  Current treatment: physical therapy        Objective     Postural Observations  Seated posture: good      Neurological Testing     Sensation     Lumbar   Left   Intact: light touch    Right  "  Intact: light touch    Hip   Left Hip   Intact: light touch    Right Hip   Intact: light touch    Active Range of Motion     Lumbar   Flexion:  Restriction level: moderate  Extension:  Restriction level: moderate  Left lateral flexion:  Restriction level: moderate  Right lateral flexion:  Restriction level: moderate  Left Hip   Flexion: 115 degrees WFL  Abduction: 35 degrees WFL    Right Hip   Flexion: 110 degrees WFL  Abduction: 35 degrees WFL    Strength/Myotome Testing     Left Hip   Normal muscle strength    Right Hip   Planes of Motion   Flexion: 4+  Abduction: 4+  Adduction: 4    Left Knee   Normal strength    Right Knee   Normal strength    Tests     Lumbar     Left   Negative crossed SLR.     Right   Negative crossed SLR.     Right Hip   Positive modified Tre and piriformis.   Modified Bereket: Positive.     Ambulation     Ambulation: Stairs   Pattern: reciprocal  Railings: one rail  Pattern: reciprocal  Railings: one rail      Flowsheet Rows      Flowsheet Row Most Recent Value   PT/OT G-Codes    Current Score 41   Projected Score 68                 Re-eval Date: 3/12     Precautions: None       Visit Count 8 9  D/C 5 6 7       Manuals 4/14 4/16 4/3 4/7   4-10     R quad, ITB, HS                                 Neuro Re-Ed           PPT        PPT with maddi          TA        TA + OH lifts        MTP/LTP MTP - LF Matrix  32.5# ea pulley  X20    LTP - LF Matrix 37.5# ea pulley x20 MTP - LF Matrix  32.5# ea pulley  X20    LTP - LF Matrix 37.5# ea pulley x20 Black x20 ea 2\" hold LF Matrix  32.5# ea pulley x20 ea   LF Matrix  32.5# ea pulley  X20 ea   Palloff press  LF Matrix  37.5# 5\" hold  X15 ea way LF Matrix  37.5# 5\" hold  X15 ea way Black  10\" x 10  R/L  LF Matrix  37.5# 5\" hold x15 ea way LF Matrix  37.5# 5\" hold  X15 ea way           L Sidebend when radicular sx occurring in RLE.                        Ther Ex        NuStep for endurance/ conditioning  L6 10' L6 10'  L6 10'  L6 10' L6 10'   SBC     " "   Standing hip flex/abd/ext   2# Flex/ABD 1x20 ea b/l NV 2# flex/abd  1x20 ea b/l   Squats         Leg Press  150# x20 130# x20 150# x20 150# x20   Step-ups    D 2# 1x20 each  B/l up & down NV D 2# x20 ea  Reed up & down   Hip add  Seated w/no   LB support LF 45# x20 LF 35# x20   LF 30# x20 LF 30# x20 LF 35# x20   Hip Abd  Seated w/no   LB support LF  60# x20 LF  60# x20   LF  50# x20 LF  50# x20 LF  50# x20   LF Leg Ext  15# 20x      LF Incline Press  45# 20x      Matrix Tricep Pressdown  37.5# x20      LTR 5\"x10 ea b/l  10\" holds x 5 reed  10\"x5 ea b/l 10\" x5 ea b/l   SKTC        SLR ER 2.5# 1x15 ea b/l ER 1x15 ea b/l 2# ER x15 ea b/l NV 0# w/ER 1x10 reed   LAQ        S/L SLR        Clamshells         Heel walk outs         Bridges  5\"x15  5\"x15 NV 5\"x15   Physioball Rollouts - FWD   10x 10\"  10x 10\"   Self quad stretch  Supine w/ strap 20\"x3 ea b/l       Self HS stretch  B/l 20\"x 3 ea 30\"x2 ea b/l      Butterfly stretch  Supine - single  Leg at a time  20\" x3  30\"x2 ea b/l Supine - single leg at a time   30\" x 2 Supine - single leg at a time   30\" x 4   Supine - single  Leg at a time  30\" x4    Self Piriformis Stretch B/L 3x20\" ea seated in chair 30\"x2 ea b/l B/L 20\"x3 ea B/L 4x30\" ea seated in chair B/L 3x20\" ea seated in chair           D/Cl Tests & Measures  DF                      Ther Activity                        Gait Training                        Modalities                                  "

## 2025-04-21 ENCOUNTER — APPOINTMENT (OUTPATIENT)
Dept: PHYSICAL THERAPY | Facility: HOME HEALTHCARE | Age: 70
End: 2025-04-21
Payer: MEDICARE

## 2025-04-23 ENCOUNTER — APPOINTMENT (OUTPATIENT)
Dept: PHYSICAL THERAPY | Facility: HOME HEALTHCARE | Age: 70
End: 2025-04-23
Payer: MEDICARE

## 2025-04-24 ENCOUNTER — OFFICE VISIT (OUTPATIENT)
Dept: ENDOCRINOLOGY | Facility: CLINIC | Age: 70
End: 2025-04-24
Payer: MEDICARE

## 2025-04-24 VITALS
DIASTOLIC BLOOD PRESSURE: 90 MMHG | WEIGHT: 200.4 LBS | SYSTOLIC BLOOD PRESSURE: 160 MMHG | TEMPERATURE: 97.3 F | HEART RATE: 66 BPM | HEIGHT: 71 IN | BODY MASS INDEX: 28.06 KG/M2

## 2025-04-24 DIAGNOSIS — Z79.4 TYPE 2 DIABETES MELLITUS WITH HYPERGLYCEMIA, WITH LONG-TERM CURRENT USE OF INSULIN (HCC): Primary | ICD-10-CM

## 2025-04-24 DIAGNOSIS — E11.65 TYPE 2 DIABETES MELLITUS WITH HYPERGLYCEMIA, WITH LONG-TERM CURRENT USE OF INSULIN (HCC): Primary | ICD-10-CM

## 2025-04-24 LAB — SL AMB POCT HEMOGLOBIN AIC: 8.3 (ref ?–6.5)

## 2025-04-24 PROCEDURE — 99214 OFFICE O/P EST MOD 30 MIN: CPT | Performed by: PHYSICIAN ASSISTANT

## 2025-04-24 PROCEDURE — 83036 HEMOGLOBIN GLYCOSYLATED A1C: CPT | Performed by: PHYSICIAN ASSISTANT

## 2025-04-24 RX ORDER — INSULIN LISPRO 100 [IU]/ML
INJECTION, SOLUTION INTRAVENOUS; SUBCUTANEOUS
Status: SHIPPED
Start: 2025-04-24

## 2025-04-24 RX ORDER — INSULIN GLARGINE 100 [IU]/ML
40 INJECTION, SOLUTION SUBCUTANEOUS DAILY
Status: SHIPPED
Start: 2025-04-24 | End: 2025-07-23

## 2025-04-24 RX ORDER — GLUCAGON 3 MG/1
POWDER NASAL
Qty: 1 EACH | Refills: 0 | Status: SHIPPED | OUTPATIENT
Start: 2025-04-24

## 2025-04-24 RX ORDER — METFORMIN HYDROCHLORIDE 500 MG/1
1000 TABLET, EXTENDED RELEASE ORAL 2 TIMES DAILY WITH MEALS
Qty: 360 TABLET | Refills: 3 | Status: SHIPPED | OUTPATIENT
Start: 2025-04-24

## 2025-04-24 NOTE — ASSESSMENT & PLAN NOTE
Lab Results   Component Value Date    HGBA1C 8.7 (H) 12/05/2024   Continue CGM in setting of MDI insulin use. Upgrade to Jared 3 - Rx needed to DME company.    Previous intolerance to Trulicity (GI side effects). Ozempic sample provided - 0.125mg weekly to start x 2-4 weeks, followed by cautious up-titration to 0.25mg weekly to follow if well tolerated.    Will adjust insulin dosing slightly - increase Novolog to 9-9-11, continue ISF 25 > 120.Encouraged to continue take insulin before start of meal to prevent post-prandial lows; increase Lantus slightly to 40 units daily.  Will change metformin to XR / ER formulation in efforts to help reduce potential GI adverse effects.     Dietitian consult encouraged, contact information provided.   Hypoglycemia management discussed - Rx for glucagon provided.    Follow up - 3 months.    Orders:    POCT hemoglobin A1c    Glucagon (Baqsimi Two Pack) 3 MG/DOSE POWD; Use as directed for hypoglycemia emergency    Ambulatory referral to Diabetic Education; Future    metFORMIN (GLUCOPHAGE-XR) 500 mg 24 hr tablet; Take 2 tablets (1,000 mg total) by mouth 2 (two) times a day with meals    Insulin Glargine Solostar (Lantus SoloStar) 100 UNIT/ML SOPN; Inject 0.4 mL (40 Units total) under the skin daily Or as directed TDD up to 40 units daily    HumaLOG KwikPen 100 units/mL injection pen; E11.65 Inject 9 units with breakfast and lunch, 11 units with dinner, with scale as needed TDD up to 50 units daily.    semaglutide, 0.25 or 0.5 mg/dose, (Ozempic, 0.25 or 0.5 MG/DOSE,) 2 mg/3 mL injection pen; Inject 0.375 mL (0.25 mg total) under the skin every 7 days

## 2025-04-24 NOTE — PROGRESS NOTES
Name: Fernie March      : 1955      MRN: [id number]  Encounter Provider: Leo Colon PA-C  Encounter Date: 2025   Encounter department: [facility]    CC: Diabetes follow up  :  Assessment & Plan  Type 2 diabetes mellitus with hyperglycemia, with long-term current use of insulin ([facility])    Lab Results   Component Value Date    HGBA1C 8.7 (H) 2024   Continue CGM in setting of MDI insulin use. Upgrade to Jared 3 - Rx needed to DME company.    Previous intolerance to Trulicity (GI side effects). Ozempic sample provided - 0.125mg weekly to start x 2-4 weeks, followed by cautious up-titration to 0.25mg weekly to follow if well tolerated.    Will adjust insulin dosing slightly - increase Novolog to 9--11, continue ISF 25 > 120.Encouraged to continue take insulin before start of meal to prevent post-prandial lows; increase Lantus slightly to 40 units daily.  Will change metformin to XR / ER formulation in efforts to help reduce potential GI adverse effects.     Dietitian consult encouraged, contact information provided.   Hypoglycemia management discussed - Rx for glucagon provided.    Follow up - 3 months.    Orders:    POCT hemoglobin A1c    Glucagon (Baqsimi Two Pack) 3 MG/DOSE POWD; Use as directed for hypoglycemia emergency    Ambulatory referral to Diabetic Education; Future    metFORMIN (GLUCOPHAGE-XR) 500 mg 24 hr tablet; Take 2 tablets (1,000 mg total) by mouth 2 (two) times a day with meals    Insulin Glargine Solostar (Lantus SoloStar) 100 UNIT/ML SOPN; Inject 0.4 mL (40 Units total) under the skin daily Or as directed TDD up to 40 units daily    HumaLOG KwikPen 100 units/mL injection pen; E11.65 Inject 9 units with breakfast and lunch, 11 units with dinner, with scale as needed TDD up to 50 units daily.    semaglutide, 0.25 or 0.5 mg/dose, (Ozempic, 0.25 or 0.5 MG/DOSE,) 2 mg/3 mL injection pen; Inject 0.375 mL (0.25 mg total) under the skin  every 7 days      I have spent a total time of 35 minutes in caring for this patient on the day of the visit/encounter including Diagnostic results, Risks and benefits of tx options, Instructions for management, Impressions, Counseling / Coordination of care, Obtaining or reviewing history  , and CGM report review.     History of Present Illness     Fernie March is a 69 y.o. male with type 2 DM here for routine follow up visit today.   DM is c/b DPN, CAD.  Most recent endocrinology visit: 12/16/24 with Dr. Hoffman, note reviewed. At that time basal-bolus insulin regimen was intensified due to fasting and post-prandial hyperglycemia.     Doing PT recently for legs, back, started in February 2025.   Diet - tries best to follow healthy, diabetic diet.      DM medications:   Lantus 35 units at bedtime  Humalog 8-10-10 ( but Usually giving about 10 units with breakfast, 10-11 at lunch, dinner is about 12)  Jardiance 25mg daily  Metformin 1000mg BID    There is history of intolerance to Trulicity due to GI side effects    Hypoglycemia may occur overnight about once a month, into BG 60s.      For HLD he takes lovastatin 40 mg daily and ezetimibe 10 mg daily.   For HTN he takes losartan 50 mg daily.     28 day CGM summary report:  Fernie March   Device usedL Jared 2+  Home use   Indication: Type 2 Diabetes  More than 72 hours of data was reviewed. Report to be scanned to chart.   Date Range: 3/26/25 - 4/22/25    Analysis of data:   % time CGM used: 74%  Average Glucose: 207mg/dL  Coefficient of Variation: 23.0%  GMI: 8.3%  Time in Target Range:  30%  Time Above Range: 52% high, 18% very high  Time Below Range: 0% low, 0% very low    Interpretation of data:  not at goal, TIR only 30%, with variable timing and intensity of post-prandial hyperglycemia.       Review of Systems as per HPI  Medical History Reviewed by provider this encounter:  Tobacco  Allergies  Meds  Problems  Med Hx  Surg Hx  Fam Hx     .    "  Medical History Reviewed by provider this encounter:     .    Objective   /90 (Patient Position: Sitting, Cuff Size: Standard)   Pulse 66   Temp (!) 97.3 °F (36.3 °C) (Temporal)   Ht 5' 11\" (1.803 m)   Wt 90.9 kg (200 lb 6.4 oz)   BMI 27.95 kg/m²      Body mass index is 27.95 kg/m².  Wt Readings from Last 3 Encounters:   04/24/25 90.9 kg (200 lb 6.4 oz)   02/01/25 92.5 kg (204 lb)   01/27/25 93.9 kg (207 lb)     Physical Exam  Vitals reviewed.   Constitutional:       General: He is not in acute distress.     Appearance: He is not ill-appearing.   HENT:      Head: Normocephalic.   Pulmonary:      Effort: No respiratory distress.   Neurological:      Mental Status: He is alert.   Psychiatric:         Mood and Affect: Mood normal.         Labs:   Lab Results   Component Value Date    HGBA1C 8.7 (H) 12/05/2024    HGBA1C 7.8 (A) 09/05/2024    HGBA1C 7.9 (H) 04/19/2024     Lab Results   Component Value Date    CREATININE 0.82 12/05/2024    CREATININE 0.79 10/02/2024    CREATININE 0.80 04/19/2024    BUN 17 12/05/2024     12/31/2015    K 4.2 12/05/2024     12/05/2024    CO2 27 12/05/2024     eGFR   Date Value Ref Range Status   12/05/2024 91 ml/min/1.73sq m Final     Lab Results   Component Value Date    CHOL 147 12/31/2015    HDL 42 12/05/2024    TRIG 152 (H) 12/05/2024     Lab Results   Component Value Date    ALT 16 12/05/2024    AST 18 12/05/2024    ALKPHOS 35 12/05/2024    BILITOT 0.28 12/31/2015     Lab Results   Component Value Date    BKE1TLQPERQP 4.390 08/11/2023    ULP8KXINIFVA 2.871 04/24/2023    BZS9NPJBIQLK 2.355 03/07/2023     No results found for: \"FREET4\", \"TSI\"    There are no Patient Instructions on file for this visit.    Discussed with the patient and all questioned fully answered. He will call me if any problems arise.      "

## 2025-04-29 ENCOUNTER — APPOINTMENT (OUTPATIENT)
Dept: PHYSICAL THERAPY | Facility: HOME HEALTHCARE | Age: 70
End: 2025-04-29
Payer: MEDICARE

## 2025-05-20 ENCOUNTER — TELEPHONE (OUTPATIENT)
Age: 70
End: 2025-05-20

## 2025-05-20 NOTE — TELEPHONE ENCOUNTER
Ed called and said he would like a script [facility] Ozempic [facility] please. Ed said it went well with the sample he tried.

## 2025-05-20 NOTE — TELEPHONE ENCOUNTER
Happy to hear Ozempic is going well. Will send in new Rx for Ozempic 0.25mg weekly [facility]. May wish to up-titrate to 0.5mg weekly with next Rx. If he continues to tolerate well.     Will plan to review his CGM report in 2 weeks, however if he notices any concerning hypoglycemia or near hypoglycemia in the meantime, please let us know. Anticipate we may need to reduce dose of insulin as he continues Ozempic.

## 2025-06-02 NOTE — PROGRESS NOTES
"Medical Nutrition Therapy        Assessment    Visit Type: Initial visit  Chief complaint/Medical Diagnosis/reason for visit E11.65, Z79.4     HPI Fernie was seen in person for the initial MNT appointment, and previously seen for MNT in March of 2024. BG levels are checked using his Jared 2 sensor. See AGP data below. Patient does take diabetes medication as prescribed.     Patient reported a current exercise regimen of [employer] 5 times weekly.     Problems identified in food recall include inconsistent carbohydrate intake, imbalanced meals, limited non-starchy vegetables and whole grains. Consumption of carbohydrates ranges from 15 to 50 grams per meal.     Provided patient with a 1789 calorie meal plan to assist with consistency, balance and portion control.  Encouraged the consumption of regular meals at regular times.  Advised patient to keep carbohydrate intake to 45 grams per meal and 15-20 per snack to assist with glycemic control.  Suggested keeping protein intake to 8 ounces a day and fat to 5 servings daily to assist with lipid management and calorie control. Portion booklet and food labels were used to teach basic carbohydrate counting. Patient agreed to keep daily food logs and return them in 2 months for assessment. RD will remain available for further dietary questions/concerns.           Ht Readings from Last 1 Encounters:   04/24/25 5' 11\" (1.803 m)     Wt Readings from Last 3 Encounters:   04/24/25 90.9 kg (200 lb 6.4 oz)   02/01/25 92.5 kg (204 lb)   01/27/25 93.9 kg (207 lb)     Weight Change: Yes 2 lbs unintentional weight gain in a little over a month time duration.    Barriers to Learning: no barriers    Do you follow any special diet presently?: No  Who shops: patient and spouse  Who cooks: patient and spouse    Food Log: Please see scanned log    Exercise [facility] using machines- 5x/wk    Calorie needs 1789 kcals/day Carbs: 45 g/meal, 15-20 g/snack     " Protein:8 ounces/day    Fat: 5 servings/day        Nutrition Diagnosis:  Inconsistent carbohydrate intake  intake related to Physiological causes requiring careful timing and consistency in the amount of carbohydrate (i.e. diabetes mellitus, hypoglycemia) as evidenced by  Hypoglycemia or hyperglycemia documented on regular basis associated with inconsistent carbohydrate intake    Intervention: plate method, increased plant based foods, and individualized meal plan     Treatment Goals: Patient understands education and recommendations, Patient will consume 3 meals a day, Patient will increase their intake of plant based foods, Patient will count carbohydrates, Patient will exercise, and Patient will monitor blood glucose    Monitoring and evaluation:    Term code indicator  FH 3.1 Food and Nutrition Knowledge and CH 1.1 Personal Data Criteria: Test your glucose levels with the individualized meals generated with during today's session.  Term code indicator  FH 1.6.4 Fiber Intake Criteria: Add non-starchy vegetables into your lunch meal and if you get them in at breakfast too that's even better.  Term code indicator  FH 3.1 Food and Nutrition Knowledge and CH 1.1 Personal Data Criteria: Try some balanced meal combinations that do not have bread to see if your blood glucose improves.    Materials Provided: portion book, 3-day food log    Patient’s Response to Instruction:  Comprehensiongood  Motivationgood  Expected Compliancegood    Begin Time: 3:33 pm  End Time: 4:50 pm  Referring Provider: Leo Colon PA-C    Thank you for coming to the [facility] for education today.  Please feel free to call with any questions or concerns.    Nisha Crum, RD  [address] [id number] [phone]

## 2025-06-05 ENCOUNTER — CONSULT (OUTPATIENT)
Dept: ENDOCRINOLOGY | Facility: OTHER | Age: 70
End: 2025-06-05
Attending: PHYSICIAN ASSISTANT
Payer: MEDICARE

## 2025-06-05 ENCOUNTER — PATIENT MESSAGE (OUTPATIENT)
Dept: ENDOCRINOLOGY | Facility: CLINIC | Age: 70
End: 2025-06-05

## 2025-06-05 VITALS — BODY MASS INDEX: 28.76 KG/M2 | WEIGHT: 206.2 LBS

## 2025-06-05 DIAGNOSIS — E11.65 TYPE 2 DIABETES MELLITUS WITH HYPERGLYCEMIA, WITH LONG-TERM CURRENT USE OF INSULIN (HCC): Primary | ICD-10-CM

## 2025-06-05 DIAGNOSIS — Z79.4 TYPE 2 DIABETES MELLITUS WITH HYPERGLYCEMIA, WITH LONG-TERM CURRENT USE OF INSULIN (HCC): Primary | ICD-10-CM

## 2025-06-05 PROCEDURE — 97802 MEDICAL NUTRITION INDIV IN: CPT

## 2025-06-05 NOTE — PATIENT INSTRUCTIONS
Test your glucose levels with the individualized meals generated with during today's session.    Add non-starchy vegetables into your lunch meal and if you get them in at breakfast too that's even better.    Try some balanced meal combinations that do not have bread to see if your blood glucose improves.

## 2025-06-13 ENCOUNTER — TELEPHONE (OUTPATIENT)
Dept: ENDOCRINOLOGY | Facility: CLINIC | Age: 70
End: 2025-06-13

## 2025-06-13 NOTE — TELEPHONE ENCOUNTER
Patient returning and call and made aware:    Leo Colon PA-C to Fernie March        6/5/25  1:16 PM  Hi Mr. March, good afternoon.      I took a look at your latest CGM data - sensor blood sugars appear about the same compared to a few weeks ago, still not ideally controlled.    I would suggest we continue to increase your mealtime insulin - if you are currently taking Novolog 9 units before breakfast and lunch, 11 units before dinner, I would go ahead and increase to 10 units before breakfast and lunch, 12 units before dinner. You may continue to use sliding scale coverage as needed.       For Ozempic - Have you been able to titrate up to the 0.25mg dose so far, no side effects?     Leo Colon PA-C    Patient verbalized understanding. Patient states he has taken 4 weeks of Ozempic 0.25 mg and is tolerating it well. Asking if it can be increased to 0.5 mg. Patient states if so, rx can be sent to [facility]. Please advise, thank you.

## 2025-06-30 ENCOUNTER — TELEPHONE (OUTPATIENT)
Age: 70
End: 2025-06-30

## 2025-06-30 NOTE — TELEPHONE ENCOUNTER
Patient called stating he was told he can increase to Ozempic 0.5 mg a few weeks ago. However, he has recently been having lower blood sugars. He reports that sometimes with it being low he is not even taking his Lantus. He is still taking Ozempic 0.25 mg weekly.  Please advise

## 2025-07-06 DIAGNOSIS — E78.2 MIXED HYPERLIPIDEMIA: ICD-10-CM

## 2025-07-08 RX ORDER — LOVASTATIN 40 MG/1
40 TABLET ORAL
Qty: 90 TABLET | Refills: 1 | Status: SHIPPED | OUTPATIENT
Start: 2025-07-08

## 2025-07-25 ENCOUNTER — TELEPHONE (OUTPATIENT)
Age: 70
End: 2025-07-25

## 2025-07-25 ENCOUNTER — OFFICE VISIT (OUTPATIENT)
Dept: ENDOCRINOLOGY | Facility: CLINIC | Age: 70
End: 2025-07-25
Payer: MEDICARE

## 2025-07-25 VITALS
OXYGEN SATURATION: 97 % | TEMPERATURE: 98 F | WEIGHT: 203 LBS | HEIGHT: 71 IN | DIASTOLIC BLOOD PRESSURE: 80 MMHG | BODY MASS INDEX: 28.42 KG/M2 | HEART RATE: 70 BPM | SYSTOLIC BLOOD PRESSURE: 130 MMHG

## 2025-07-25 DIAGNOSIS — Z79.4 TYPE 2 DIABETES MELLITUS WITH HYPERGLYCEMIA, WITH LONG-TERM CURRENT USE OF INSULIN (HCC): Primary | ICD-10-CM

## 2025-07-25 DIAGNOSIS — E11.65 TYPE 2 DIABETES MELLITUS WITH HYPERGLYCEMIA, WITH LONG-TERM CURRENT USE OF INSULIN (HCC): Primary | ICD-10-CM

## 2025-07-25 LAB — SL AMB POCT HEMOGLOBIN AIC: 8.2 (ref ?–6.5)

## 2025-07-25 PROCEDURE — 83036 HEMOGLOBIN GLYCOSYLATED A1C: CPT | Performed by: PHYSICIAN ASSISTANT

## 2025-07-25 PROCEDURE — 99214 OFFICE O/P EST MOD 30 MIN: CPT | Performed by: PHYSICIAN ASSISTANT

## 2025-07-25 RX ORDER — INSULIN GLARGINE 100 [IU]/ML
32 INJECTION, SOLUTION SUBCUTANEOUS DAILY
Status: SHIPPED
Start: 2025-07-25 | End: 2025-10-23

## 2025-07-25 RX ORDER — INSULIN LISPRO 100 [IU]/ML
INJECTION, SOLUTION INTRAVENOUS; SUBCUTANEOUS
Status: SHIPPED
Start: 2025-07-25

## 2025-07-25 RX ORDER — LANCETS 30 GAUGE
EACH MISCELLANEOUS
Qty: 100 EACH | Refills: 3 | Status: SHIPPED | OUTPATIENT
Start: 2025-07-25

## 2025-07-25 RX ORDER — BLOOD SUGAR DIAGNOSTIC
STRIP MISCELLANEOUS
Qty: 100 EACH | Refills: 3 | Status: SHIPPED | OUTPATIENT
Start: 2025-07-25

## 2025-07-25 NOTE — PATIENT INSTRUCTIONS
Recommend reduce insulin as follows:  Reduce Lantus to 32 units daily  Reduce Humalog to 9 units with breakfast and lunch, 8 units with dinner.

## 2025-07-25 NOTE — PROGRESS NOTES
Name: Fernie March      : 1955      MRN: [id number]  Encounter Provider: Leo Colon PA-C  Encounter Date: 2025   Encounter department: [facility]    CC: DM follow up  :  Assessment & Plan  Type 2 diabetes mellitus with hyperglycemia, with long-term current use of insulin (HCC)    Lab Results   Component Value Date    HGBA1C 8.2 (A) 2025     A1c improving, but not at goal. GMI on 2 week CGM report more reassuring - 7.0%.   Will trial increase Ozempic 0.5mg weekly with caution due to prior ill effects on Trulicity. May titrate back to 0.25mg weekly if needed.   Given GLP1 dose increase, and some overnight hypoglycemia as well as post dinner hypoglycemia will make the following insulin dosing adjustments. Reduce Lantus to 32 units daily; reduce Humalog to 9-9-8 plus scale ISF 25 > 145. Provided some guidance for self-adjustment of insulin therapy if needed for continued hypoglycemia, or he may contact us as needed with concerning BG trends.   Will continue metformin, Jardiance as taking.   Will continue to follow up with dietitian. Exercise routine to continue as well.     Follow up - 3-4 weeks with BMP, A1c to be collected just prior to this appointment.       Orders:    POCT hemoglobin A1c    HumaLOG KwikPen 100 units/mL injection pen; E11.65 Inject 9 units with breakfast and lunch, 8 units with dinner, with scale as needed TDD up to 50 units daily.    Insulin Glargine Solostar (Lantus SoloStar) 100 UNIT/ML SOPN; Inject 0.32 mL (32 Units total) under the skin daily Or as directed TDD up to 40 units daily    semaglutide, 0.25 or 0.5 mg/dose, (Ozempic, 0.25 or 0.5 MG/DOSE,) 2 mg/3 mL injection pen; Inject 0.75 mL (0.5 mg total) under the skin every 7 days    glucose blood (OneTouch Ultra Test) test strip; Use twice daily as needed    Lancets (OneTouch Delica Plus [id number]) MISC; Use twice daily as needed      I have spent a total time of 35  minutes in caring for this patient on the day of the visit/encounter including Diagnostic results, Instructions for management, Impressions, Counseling / Coordination of care, Documenting in the medical record, and Obtaining or reviewing history  .           History of Present Illness     Fernie March is a 70 y.o. male here for routine DM follow up.     Ed reports hypoglycemia over the last few weeks. This tends to occur between 9pm - 12 midnight. He also reports his appetite seems to be worst at dinner. Previously this was his most predictable and largest meal of day.     Weight loss of about 6lbs since June - his clothes are also fitting better.     Exercise - workouts 5x weekly at gym - bodyweight exercises, weight training.  Diet - meals 3x daily. Trying protein shake as recommended by dietitian, mindful of DM diet overall.     DM medications reviewed.  Lantus 40 units daily, although skipping dose 2 nights a week on average due to low blood sugars.  Humalog 10-10-11 plus scale ISF 25 > 145  Jardiance 25mg daily  Metformin XR 500mg BID  Ozempic only 0.25mg weekly     2 week CGM report review:   Fernie March   Device used: Jared 2  Indication: type 2 DM  More than 72 hours of data was reviewed. Report to be scanned to chart.   Date Range: 7/12/25 - 7/25/25    Analysis of data:   % time CGM used: 83%  Average Glucose: 153mg/dL  Coefficient of Variation: 24.3%  GMI: 7.0%  Time in Target Range: 78% in range  Time Above Range: 20% high, 1% very high  Time Below Range: 1% low, 0% very low    Interpretation of data:  mild, brief episodes of hypoglycemia occurring early evening as well as overnight at times, leading to Ed holding Lantus at times. Post prandial hyperglycemia episodes improving, but most likely after lunch.     Review of Systems as per HPI  Medical History Reviewed by provider this encounter:  Tobacco  Allergies  Meds  Problems  Med Hx  Surg Hx  Fam Hx     .     Medical History Reviewed  "by provider this encounter:     .    Objective   /80 (BP Location: Right arm, Patient Position: Sitting)   Pulse 70   Temp 98 °F (36.7 °C)   Ht 5' 11\" (1.803 m)   Wt 92.1 kg (203 lb)   SpO2 97%   BMI 28.31 kg/m²      Body mass index is 28.31 kg/m².  Wt Readings from Last 3 Encounters:   07/25/25 92.1 kg (203 lb)   06/05/25 93.5 kg (206 lb 3.2 oz)   04/24/25 90.9 kg (200 lb 6.4 oz)     Physical Exam  Constitutional:       General: He is not in acute distress.     Appearance: He is not ill-appearing.   HENT:      Head: Normocephalic.   Pulmonary:      Effort: No respiratory distress.     Neurological:      Mental Status: He is alert. Mental status is at baseline.     Psychiatric:         Mood and Affect: Mood normal.         Labs: I have reviewed pertinent labs including:   Lab Results   Component Value Date    HGBA1C 8.3 (A) 04/24/2025    HGBA1C 8.7 (H) 12/05/2024    HGBA1C 7.8 (A) 09/05/2024      Lab Results   Component Value Date    CREATININE 0.82 12/05/2024    CREATININE 0.79 10/02/2024    CREATININE 0.80 04/19/2024    BUN 17 12/05/2024     12/31/2015    K 4.2 12/05/2024     12/05/2024    CO2 27 12/05/2024      Cholesterol   Date Value Ref Range Status   12/31/2015 147 mg/dL Final     Comment:     CHOLESTEROL:       Desirable        <200 mg/dl       Borderline High  200-239 mg/dl       High             >239 mg/dl  ____________________________________       HDL   Date Value Ref Range Status   12/31/2015 40 mg/dL Final     Comment:     HDL:       High       >59 mg/dl       Low        <41 mg/dl  ______________________________       HDL, Direct   Date Value Ref Range Status   12/05/2024 42 >=40 mg/dL Final     Triglycerides   Date Value Ref Range Status   12/05/2024 152 (H) See Comment mg/dL Final     Comment:     Triglyceride:     0-9Y            <75mg/dL     10Y-17Y         <90 mg/dL       >=18Y     Normal          <150 mg/dL     Borderline High 150-199 mg/dL     High            200-499 " "mg/dL        Very High       >499 mg/dL    Specimen collection should occur prior to Metamizole administration due to the potential for falsely depressed results.   12/31/2015 98 mg/dL Final     Comment:     TRIGLYCERIDE:       Normal              <150 mg/dl       Borderline High    150-199 mg/dl       High               200-499 mg/dl       Very High          >499 mg/dl  _______________________________________        ALT   Date Value Ref Range Status   12/05/2024 16 7 - 52 U/L Final     Comment:     Specimen collection should occur prior to Sulfasalazine administration due to the potential for falsely depressed results.    12/31/2015 47 12 - 78 U/L Final     AST   Date Value Ref Range Status   12/05/2024 18 13 - 39 U/L Final   12/31/2015 29 5 - 45 U/L Final     Alkaline Phosphatase   Date Value Ref Range Status   12/05/2024 35 34 - 104 U/L Final   12/31/2015 51 46 - 116 U/L Final     Total Bilirubin   Date Value Ref Range Status   12/31/2015 0.28 0.20 - 1.00 mg/dL Final      Lab Results   Component Value Date    ZAN6ZODRDTPL 4.390 08/11/2023      No results found for: \"FREET4\", \"T3FREE\", \"TSI\", \"ANTITPOAB\"   Lab Results   Component Value Date    WORE55HCMZOG 21.1 (L) 04/02/2022    [id number] 31.6 04/25/2020    [id number] 29.1 (L) 08/20/2019        There are no Patient Instructions on file for this visit.    Discussed with the patient and all questioned fully answered. He will call me if any problems arise.    "

## 2025-07-25 NOTE — ASSESSMENT & PLAN NOTE
Lab Results   Component Value Date    HGBA1C 8.2 (A) 07/25/2025     A1c improving, but not at goal. GMI on 2 week CGM report more reassuring - 7.0%.   Will trial increase Ozempic 0.5mg weekly with caution due to prior ill effects on Trulicity. May titrate back to 0.25mg weekly if needed.   Given GLP1 dose increase, and some overnight hypoglycemia as well as post dinner hypoglycemia will make the following insulin dosing adjustments. Reduce Lantus to 32 units daily; reduce Humalog to 9-9-8 plus scale ISF 25 > 145. Provided some guidance for self-adjustment of insulin therapy if needed for continued hypoglycemia, or he may contact us as needed with concerning BG trends.   Will continue metformin, Jardiance as taking.   Will continue to follow up with dietitian. Exercise routine to continue as well.     Follow up - 3-4 weeks with BMP, A1c to be collected just prior to this appointment.       Orders:    POCT hemoglobin A1c    HumaLOG KwikPen 100 units/mL injection pen; E11.65 Inject 9 units with breakfast and lunch, 8 units with dinner, with scale as needed TDD up to 50 units daily.    Insulin Glargine Solostar (Lantus SoloStar) 100 UNIT/ML SOPN; Inject 0.32 mL (32 Units total) under the skin daily Or as directed TDD up to 40 units daily    semaglutide, 0.25 or 0.5 mg/dose, (Ozempic, 0.25 or 0.5 MG/DOSE,) 2 mg/3 mL injection pen; Inject 0.75 mL (0.5 mg total) under the skin every 7 days    glucose blood (OneTouch Ultra Test) test strip; Use twice daily as needed    Lancets (OneTouch Delica Plus [id number]) MISC; Use twice daily as needed

## (undated) DEVICE — GUIDELINER CATHETERS ARE INTENDED TO BE USED IN CONJUNCTION WITH GUIDE CATHETERS TO ACCESS DISCRETE REGIONS OF THE CORONARY AND/OR PERIPHERAL VASCULATURE, AND TO FACILITATE PLACEMENT OF INTERVENTIONAL DEVICES.: Brand: GUIDELINER® V3 CATHETER

## (undated) DEVICE — CATH THROMBECTOMY EXPORT 6FR 140CM

## (undated) DEVICE — MINI TREK CORONARY DILATATION CATHETER 2.0 MM X 15 MM / RAPID-EXCHANGE: Brand: MINI TREK

## (undated) DEVICE — DGW .035 FC J3MM 260CM TEF: Brand: EMERALD

## (undated) DEVICE — NC TREK NEO™ CORONARY DILATATION CATHETER 3.25 MM X 15 MM / RAPID-EXCHANGE: Brand: NC TREK NEO™

## (undated) DEVICE — CATH DIAG 6FR IMPULSE 100CM AR1

## (undated) DEVICE — RADIFOCUS OPTITORQUE ANGIOGRAPHIC CATHETER: Brand: OPTITORQUE

## (undated) DEVICE — CATH DIAG 5FR IMPULSE 110CM 6S PIG 145 ANG

## (undated) DEVICE — GUIDEWIRE WHOLEY HI TORQUE INTERM MOD J .035 145CM

## (undated) DEVICE — GLIDESHEATH BASIC HYDROPHILIC COATED INTRODUCER SHEATH: Brand: GLIDESHEATH

## (undated) DEVICE — CATH GUIDE LAUNCHER 6FR AL 0.75

## (undated) DEVICE — CORONARY IMAGING CATHETER: Brand: OPTICROSS™ 6 HD

## (undated) DEVICE — PINNACLE INTRODUCER SHEATH: Brand: PINNACLE

## (undated) DEVICE — RUNTHROUGH NS EXTRA FLOPPY PTCA GUIDEWIRE: Brand: RUNTHROUGH